# Patient Record
Sex: FEMALE | Race: WHITE | NOT HISPANIC OR LATINO | Employment: UNEMPLOYED | URBAN - NONMETROPOLITAN AREA
[De-identification: names, ages, dates, MRNs, and addresses within clinical notes are randomized per-mention and may not be internally consistent; named-entity substitution may affect disease eponyms.]

---

## 2021-01-27 ENCOUNTER — HOSPITAL ENCOUNTER (OUTPATIENT)
Facility: HOSPITAL | Age: 76
End: 2021-03-18
Attending: INTERNAL MEDICINE | Admitting: INTERNAL MEDICINE

## 2021-01-27 ENCOUNTER — APPOINTMENT (OUTPATIENT)
Dept: GENERAL RADIOLOGY | Facility: HOSPITAL | Age: 76
End: 2021-01-27

## 2021-01-27 LAB
A-A DO2: 264.7 MMHG (ref 0–300)
ALBUMIN SERPL-MCNC: 2.72 G/DL (ref 3.5–5.2)
ALBUMIN/GLOB SERPL: 0.7 G/DL
ALP SERPL-CCNC: 103 U/L (ref 39–117)
ALT SERPL W P-5'-P-CCNC: 15 U/L (ref 1–33)
ANION GAP SERPL CALCULATED.3IONS-SCNC: 8.7 MMOL/L (ref 5–15)
ARTERIAL PATENCY WRIST A: POSITIVE
AST SERPL-CCNC: 21 U/L (ref 1–32)
ATMOSPHERIC PRESS: 732 MMHG
BASE EXCESS BLDA CALC-SCNC: 12.1 MMOL/L (ref 0–2)
BASOPHILS # BLD AUTO: 0.07 10*3/MM3 (ref 0–0.2)
BASOPHILS NFR BLD AUTO: 0.7 % (ref 0–1.5)
BDY SITE: ABNORMAL
BILIRUB SERPL-MCNC: 0.4 MG/DL (ref 0–1.2)
BODY TEMPERATURE: 0 C
BUN SERPL-MCNC: 25 MG/DL (ref 8–23)
BUN/CREAT SERPL: 92.6 (ref 7–25)
CALCIUM SPEC-SCNC: 8.4 MG/DL (ref 8.6–10.5)
CHLORIDE SERPL-SCNC: 98 MMOL/L (ref 98–107)
CO2 BLDA-SCNC: 40.5 MMOL/L (ref 22–33)
CO2 SERPL-SCNC: 32.3 MMOL/L (ref 22–29)
COHGB MFR BLD: 1.7 % (ref 0–5)
CREAT SERPL-MCNC: 0.27 MG/DL (ref 0.57–1)
CRP SERPL-MCNC: 11.03 MG/DL (ref 0–0.5)
DEPRECATED RDW RBC AUTO: 55.5 FL (ref 37–54)
EOSINOPHIL # BLD AUTO: 0.3 10*3/MM3 (ref 0–0.4)
EOSINOPHIL NFR BLD AUTO: 3.1 % (ref 0.3–6.2)
ERYTHROCYTE [DISTWIDTH] IN BLOOD BY AUTOMATED COUNT: 14.7 % (ref 12.3–15.4)
GFR SERPL CREATININE-BSD FRML MDRD: >150 ML/MIN/1.73
GLOBULIN UR ELPH-MCNC: 3.7 GM/DL
GLUCOSE BLDC GLUCOMTR-MCNC: 149 MG/DL (ref 70–130)
GLUCOSE SERPL-MCNC: 173 MG/DL (ref 65–99)
HCO3 BLDA-SCNC: 38.6 MMOL/L (ref 20–26)
HCT VFR BLD AUTO: 33 % (ref 34–46.6)
HCT VFR BLD CALC: 28.3 % (ref 38–51)
HGB BLD-MCNC: 9.3 G/DL (ref 12–15.9)
HGB BLDA-MCNC: 9.2 G/DL (ref 13.5–17.5)
HYPOCHROMIA BLD QL: NORMAL
IMM GRANULOCYTES # BLD AUTO: 0.39 10*3/MM3 (ref 0–0.05)
IMM GRANULOCYTES NFR BLD AUTO: 4 % (ref 0–0.5)
INHALED O2 CONCENTRATION: 60 %
LYMPHOCYTES # BLD AUTO: 0.68 10*3/MM3 (ref 0.7–3.1)
LYMPHOCYTES NFR BLD AUTO: 7 % (ref 19.6–45.3)
Lab: ABNORMAL
MACROCYTES BLD QL SMEAR: NORMAL
MCH RBC QN AUTO: 29.5 PG (ref 26.6–33)
MCHC RBC AUTO-ENTMCNC: 28.2 G/DL (ref 31.5–35.7)
MCV RBC AUTO: 104.8 FL (ref 79–97)
METHGB BLD QL: 0.4 % (ref 0–3)
MODALITY: ABNORMAL
MONOCYTES # BLD AUTO: 0.55 10*3/MM3 (ref 0.1–0.9)
MONOCYTES NFR BLD AUTO: 5.6 % (ref 5–12)
NEUTROPHILS NFR BLD AUTO: 7.76 10*3/MM3 (ref 1.7–7)
NEUTROPHILS NFR BLD AUTO: 79.6 % (ref 42.7–76)
NOTE: ABNORMAL
NRBC BLD AUTO-RTO: 0 /100 WBC (ref 0–0.2)
OXYHGB MFR BLDV: 94.9 % (ref 94–99)
PCO2 BLDA: 61.4 MM HG (ref 35–45)
PCO2 TEMP ADJ BLD: ABNORMAL MM[HG]
PEEP RESPIRATORY: 10 CM[H2O]
PH BLDA: 7.41 PH UNITS (ref 7.35–7.45)
PH, TEMP CORRECTED: ABNORMAL
PLAT MORPH BLD: NORMAL
PLATELET # BLD AUTO: 147 10*3/MM3 (ref 140–450)
PMV BLD AUTO: 9.5 FL (ref 6–12)
PO2 BLDA: 80.7 MM HG (ref 83–108)
PO2 TEMP ADJ BLD: ABNORMAL MM[HG]
POTASSIUM SERPL-SCNC: 5 MMOL/L (ref 3.5–5.2)
PROT SERPL-MCNC: 6.4 G/DL (ref 6–8.5)
RBC # BLD AUTO: 3.15 10*6/MM3 (ref 3.77–5.28)
SAO2 % BLDCOA: 96.9 % (ref 94–99)
SET MECH RESP RATE: 24
SODIUM SERPL-SCNC: 139 MMOL/L (ref 136–145)
VENTILATOR MODE: ABNORMAL
VT ON VENT VENT: 450 ML
WBC # BLD AUTO: 9.75 10*3/MM3 (ref 3.4–10.8)

## 2021-01-27 PROCEDURE — 71045 X-RAY EXAM CHEST 1 VIEW: CPT

## 2021-01-27 PROCEDURE — 86140 C-REACTIVE PROTEIN: CPT | Performed by: PHYSICIAN ASSISTANT

## 2021-01-27 PROCEDURE — 84134 ASSAY OF PREALBUMIN: CPT | Performed by: PHYSICIAN ASSISTANT

## 2021-01-27 PROCEDURE — 85025 COMPLETE CBC W/AUTO DIFF WBC: CPT | Performed by: PHYSICIAN ASSISTANT

## 2021-01-27 PROCEDURE — 93010 ELECTROCARDIOGRAM REPORT: CPT | Performed by: INTERNAL MEDICINE

## 2021-01-27 PROCEDURE — 93005 ELECTROCARDIOGRAM TRACING: CPT | Performed by: PHYSICIAN ASSISTANT

## 2021-01-27 PROCEDURE — 82962 GLUCOSE BLOOD TEST: CPT

## 2021-01-27 PROCEDURE — 85007 BL SMEAR W/DIFF WBC COUNT: CPT | Performed by: PHYSICIAN ASSISTANT

## 2021-01-27 PROCEDURE — 36600 WITHDRAWAL OF ARTERIAL BLOOD: CPT

## 2021-01-27 PROCEDURE — 82805 BLOOD GASES W/O2 SATURATION: CPT

## 2021-01-27 PROCEDURE — 82375 ASSAY CARBOXYHB QUANT: CPT

## 2021-01-27 PROCEDURE — 71045 X-RAY EXAM CHEST 1 VIEW: CPT | Performed by: RADIOLOGY

## 2021-01-27 PROCEDURE — 80053 COMPREHEN METABOLIC PANEL: CPT | Performed by: PHYSICIAN ASSISTANT

## 2021-01-27 PROCEDURE — 83050 HGB METHEMOGLOBIN QUAN: CPT

## 2021-01-28 ENCOUNTER — OUTSIDE FACILITY SERVICE (OUTPATIENT)
Dept: PULMONOLOGY | Facility: CLINIC | Age: 76
End: 2021-01-28

## 2021-01-28 LAB
ALBUMIN SERPL-MCNC: 2.67 G/DL (ref 3.5–5.2)
ALBUMIN/GLOB SERPL: 0.7 G/DL
ALP SERPL-CCNC: 94 U/L (ref 39–117)
ALT SERPL W P-5'-P-CCNC: 14 U/L (ref 1–33)
ANION GAP SERPL CALCULATED.3IONS-SCNC: 7.2 MMOL/L (ref 5–15)
AST SERPL-CCNC: 19 U/L (ref 1–32)
BACTERIA UR QL AUTO: ABNORMAL /HPF
BILIRUB SERPL-MCNC: 0.4 MG/DL (ref 0–1.2)
BILIRUB UR QL STRIP: NEGATIVE
BUN SERPL-MCNC: 24 MG/DL (ref 8–23)
BUN/CREAT SERPL: 104.3 (ref 7–25)
CALCIUM SPEC-SCNC: 8.3 MG/DL (ref 8.6–10.5)
CHLORIDE SERPL-SCNC: 98 MMOL/L (ref 98–107)
CHOLEST SERPL-MCNC: 149 MG/DL (ref 0–200)
CLARITY UR: ABNORMAL
CO2 SERPL-SCNC: 33.8 MMOL/L (ref 22–29)
COLOR UR: ABNORMAL
CREAT SERPL-MCNC: 0.23 MG/DL (ref 0.57–1)
CRP SERPL-MCNC: 9.43 MG/DL (ref 0–0.5)
DEPRECATED RDW RBC AUTO: 54.4 FL (ref 37–54)
EOSINOPHIL # BLD MANUAL: 0.17 10*3/MM3 (ref 0–0.4)
EOSINOPHIL NFR BLD MANUAL: 2 % (ref 0.3–6.2)
ERYTHROCYTE [DISTWIDTH] IN BLOOD BY AUTOMATED COUNT: 14.7 % (ref 12.3–15.4)
GFR SERPL CREATININE-BSD FRML MDRD: >150 ML/MIN/1.73
GLOBULIN UR ELPH-MCNC: 3.6 GM/DL
GLUCOSE BLDC GLUCOMTR-MCNC: 111 MG/DL (ref 70–130)
GLUCOSE BLDC GLUCOMTR-MCNC: 121 MG/DL (ref 70–130)
GLUCOSE BLDC GLUCOMTR-MCNC: 127 MG/DL (ref 70–130)
GLUCOSE SERPL-MCNC: 126 MG/DL (ref 65–99)
GLUCOSE UR STRIP-MCNC: NEGATIVE MG/DL
HCT VFR BLD AUTO: 30.3 % (ref 34–46.6)
HDLC SERPL-MCNC: 31 MG/DL (ref 40–60)
HGB BLD-MCNC: 8.8 G/DL (ref 12–15.9)
HGB UR QL STRIP.AUTO: ABNORMAL
HYALINE CASTS UR QL AUTO: ABNORMAL /LPF
HYPOCHROMIA BLD QL: ABNORMAL
KETONES UR QL STRIP: ABNORMAL
LDLC SERPL CALC-MCNC: 90 MG/DL (ref 0–100)
LDLC/HDLC SERPL: 2.76 {RATIO}
LEUKOCYTE ESTERASE UR QL STRIP.AUTO: ABNORMAL
LYMPHOCYTES # BLD MANUAL: 0.86 10*3/MM3 (ref 0.7–3.1)
LYMPHOCYTES NFR BLD MANUAL: 10 % (ref 19.6–45.3)
LYMPHOCYTES NFR BLD MANUAL: 6 % (ref 5–12)
MACROCYTES BLD QL SMEAR: ABNORMAL
MCH RBC QN AUTO: 29.5 PG (ref 26.6–33)
MCHC RBC AUTO-ENTMCNC: 29 G/DL (ref 31.5–35.7)
MCV RBC AUTO: 101.7 FL (ref 79–97)
METAMYELOCYTES NFR BLD MANUAL: 1 % (ref 0–0)
MONOCYTES # BLD AUTO: 0.52 10*3/MM3 (ref 0.1–0.9)
NEUTROPHILS # BLD AUTO: 6.99 10*3/MM3 (ref 1.7–7)
NEUTROPHILS NFR BLD MANUAL: 78 % (ref 42.7–76)
NEUTS BAND NFR BLD MANUAL: 3 % (ref 0–5)
NITRITE UR QL STRIP: NEGATIVE
PH UR STRIP.AUTO: 5.5 [PH] (ref 5–8)
PLAT MORPH BLD: NORMAL
PLATELET # BLD AUTO: 157 10*3/MM3 (ref 140–450)
PMV BLD AUTO: 9.9 FL (ref 6–12)
POTASSIUM SERPL-SCNC: 4.5 MMOL/L (ref 3.5–5.2)
PREALB SERPL-MCNC: 11.1 MG/DL (ref 20–40)
PROT SERPL-MCNC: 6.3 G/DL (ref 6–8.5)
PROT UR QL STRIP: ABNORMAL
QT INTERVAL: 306 MS
QTC INTERVAL: 456 MS
RBC # BLD AUTO: 2.98 10*6/MM3 (ref 3.77–5.28)
RBC # UR: ABNORMAL /HPF
REF LAB TEST METHOD: ABNORMAL
SCAN SLIDE: NORMAL
SODIUM SERPL-SCNC: 139 MMOL/L (ref 136–145)
SP GR UR STRIP: >1.03 (ref 1–1.03)
SQUAMOUS #/AREA URNS HPF: ABNORMAL /HPF
STOMATOCYTES BLD QL SMEAR: ABNORMAL
TRIGL SERPL-MCNC: 162 MG/DL (ref 0–150)
UROBILINOGEN UR QL STRIP: ABNORMAL
VLDLC SERPL-MCNC: 28 MG/DL (ref 5–40)
WBC # BLD AUTO: 8.63 10*3/MM3 (ref 3.4–10.8)
WBC UR QL AUTO: ABNORMAL /HPF

## 2021-01-28 PROCEDURE — 82962 GLUCOSE BLOOD TEST: CPT

## 2021-01-28 PROCEDURE — 99291 CRITICAL CARE FIRST HOUR: CPT | Performed by: INTERNAL MEDICINE

## 2021-01-28 PROCEDURE — 87040 BLOOD CULTURE FOR BACTERIA: CPT | Performed by: INTERNAL MEDICINE

## 2021-01-28 PROCEDURE — 85007 BL SMEAR W/DIFF WBC COUNT: CPT | Performed by: PHYSICIAN ASSISTANT

## 2021-01-28 PROCEDURE — 87070 CULTURE OTHR SPECIMN AEROBIC: CPT | Performed by: INTERNAL MEDICINE

## 2021-01-28 PROCEDURE — 86140 C-REACTIVE PROTEIN: CPT | Performed by: PHYSICIAN ASSISTANT

## 2021-01-28 PROCEDURE — 87205 SMEAR GRAM STAIN: CPT | Performed by: INTERNAL MEDICINE

## 2021-01-28 PROCEDURE — 80061 LIPID PANEL: CPT | Performed by: PHYSICIAN ASSISTANT

## 2021-01-28 PROCEDURE — 85025 COMPLETE CBC W/AUTO DIFF WBC: CPT | Performed by: PHYSICIAN ASSISTANT

## 2021-01-28 PROCEDURE — 80053 COMPREHEN METABOLIC PANEL: CPT | Performed by: PHYSICIAN ASSISTANT

## 2021-01-28 PROCEDURE — 81001 URINALYSIS AUTO W/SCOPE: CPT | Performed by: PHYSICIAN ASSISTANT

## 2021-01-29 ENCOUNTER — OUTSIDE FACILITY SERVICE (OUTPATIENT)
Dept: PULMONOLOGY | Facility: CLINIC | Age: 76
End: 2021-01-29

## 2021-01-29 ENCOUNTER — APPOINTMENT (OUTPATIENT)
Dept: GENERAL RADIOLOGY | Facility: HOSPITAL | Age: 76
End: 2021-01-29

## 2021-01-29 LAB
ALBUMIN SERPL-MCNC: 2.84 G/DL (ref 3.5–5.2)
ALBUMIN/GLOB SERPL: 0.8 G/DL
ALP SERPL-CCNC: 87 U/L (ref 39–117)
ALT SERPL W P-5'-P-CCNC: 14 U/L (ref 1–33)
ANION GAP SERPL CALCULATED.3IONS-SCNC: 10.5 MMOL/L (ref 5–15)
AST SERPL-CCNC: 21 U/L (ref 1–32)
BASOPHILS # BLD AUTO: 0.07 10*3/MM3 (ref 0–0.2)
BASOPHILS NFR BLD AUTO: 0.8 % (ref 0–1.5)
BILIRUB SERPL-MCNC: 0.3 MG/DL (ref 0–1.2)
BUN SERPL-MCNC: 25 MG/DL (ref 8–23)
BUN/CREAT SERPL: 96.2 (ref 7–25)
CALCIUM SPEC-SCNC: 8.5 MG/DL (ref 8.6–10.5)
CHLORIDE SERPL-SCNC: 99 MMOL/L (ref 98–107)
CO2 SERPL-SCNC: 30.5 MMOL/L (ref 22–29)
CREAT SERPL-MCNC: 0.26 MG/DL (ref 0.57–1)
CRP SERPL-MCNC: 5.06 MG/DL (ref 0–0.5)
DEPRECATED RDW RBC AUTO: 53.8 FL (ref 37–54)
EOSINOPHIL # BLD AUTO: 0.24 10*3/MM3 (ref 0–0.4)
EOSINOPHIL NFR BLD AUTO: 2.7 % (ref 0.3–6.2)
ERYTHROCYTE [DISTWIDTH] IN BLOOD BY AUTOMATED COUNT: 14.6 % (ref 12.3–15.4)
GFR SERPL CREATININE-BSD FRML MDRD: >150 ML/MIN/1.73
GLOBULIN UR ELPH-MCNC: 3.7 GM/DL
GLUCOSE BLDC GLUCOMTR-MCNC: 104 MG/DL (ref 70–130)
GLUCOSE BLDC GLUCOMTR-MCNC: 109 MG/DL (ref 70–130)
GLUCOSE BLDC GLUCOMTR-MCNC: 127 MG/DL (ref 70–130)
GLUCOSE BLDC GLUCOMTR-MCNC: 98 MG/DL (ref 70–130)
GLUCOSE SERPL-MCNC: 117 MG/DL (ref 65–99)
HCT VFR BLD AUTO: 31.4 % (ref 34–46.6)
HGB BLD-MCNC: 9.1 G/DL (ref 12–15.9)
IMM GRANULOCYTES # BLD AUTO: 0.45 10*3/MM3 (ref 0–0.05)
IMM GRANULOCYTES NFR BLD AUTO: 5 % (ref 0–0.5)
LYMPHOCYTES # BLD AUTO: 1.18 10*3/MM3 (ref 0.7–3.1)
LYMPHOCYTES NFR BLD AUTO: 13.2 % (ref 19.6–45.3)
MCH RBC QN AUTO: 29.5 PG (ref 26.6–33)
MCHC RBC AUTO-ENTMCNC: 29 G/DL (ref 31.5–35.7)
MCV RBC AUTO: 101.9 FL (ref 79–97)
MONOCYTES # BLD AUTO: 0.67 10*3/MM3 (ref 0.1–0.9)
MONOCYTES NFR BLD AUTO: 7.5 % (ref 5–12)
NEUTROPHILS NFR BLD AUTO: 6.34 10*3/MM3 (ref 1.7–7)
NEUTROPHILS NFR BLD AUTO: 70.8 % (ref 42.7–76)
NRBC BLD AUTO-RTO: 0.2 /100 WBC (ref 0–0.2)
PLATELET # BLD AUTO: 170 10*3/MM3 (ref 140–450)
PMV BLD AUTO: 9.8 FL (ref 6–12)
POTASSIUM SERPL-SCNC: 4.1 MMOL/L (ref 3.5–5.2)
PROT SERPL-MCNC: 6.5 G/DL (ref 6–8.5)
RBC # BLD AUTO: 3.08 10*6/MM3 (ref 3.77–5.28)
SODIUM SERPL-SCNC: 140 MMOL/L (ref 136–145)
WBC # BLD AUTO: 8.95 10*3/MM3 (ref 3.4–10.8)

## 2021-01-29 PROCEDURE — 97165 OT EVAL LOW COMPLEX 30 MIN: CPT

## 2021-01-29 PROCEDURE — 74018 RADEX ABDOMEN 1 VIEW: CPT

## 2021-01-29 PROCEDURE — 99291 CRITICAL CARE FIRST HOUR: CPT | Performed by: INTERNAL MEDICINE

## 2021-01-29 PROCEDURE — 86140 C-REACTIVE PROTEIN: CPT | Performed by: PHYSICIAN ASSISTANT

## 2021-01-29 PROCEDURE — 85025 COMPLETE CBC W/AUTO DIFF WBC: CPT | Performed by: PHYSICIAN ASSISTANT

## 2021-01-29 PROCEDURE — 74018 RADEX ABDOMEN 1 VIEW: CPT | Performed by: RADIOLOGY

## 2021-01-29 PROCEDURE — 82962 GLUCOSE BLOOD TEST: CPT

## 2021-01-29 PROCEDURE — 80053 COMPREHEN METABOLIC PANEL: CPT | Performed by: PHYSICIAN ASSISTANT

## 2021-01-30 ENCOUNTER — APPOINTMENT (OUTPATIENT)
Dept: CT IMAGING | Facility: HOSPITAL | Age: 76
End: 2021-01-30

## 2021-01-30 ENCOUNTER — OUTSIDE FACILITY SERVICE (OUTPATIENT)
Dept: PULMONOLOGY | Facility: CLINIC | Age: 76
End: 2021-01-30

## 2021-01-30 LAB
ALBUMIN SERPL-MCNC: 2.97 G/DL (ref 3.5–5.2)
ALBUMIN/GLOB SERPL: 0.8 G/DL
ALP SERPL-CCNC: 82 U/L (ref 39–117)
ALT SERPL W P-5'-P-CCNC: 14 U/L (ref 1–33)
AMYLASE SERPL-CCNC: 101 U/L (ref 28–100)
ANION GAP SERPL CALCULATED.3IONS-SCNC: 10 MMOL/L (ref 5–15)
AST SERPL-CCNC: 18 U/L (ref 1–32)
BACTERIA SPEC RESP CULT: NO GROWTH
BILIRUB SERPL-MCNC: 0.3 MG/DL (ref 0–1.2)
BUN SERPL-MCNC: 30 MG/DL (ref 8–23)
BUN/CREAT SERPL: 107.1 (ref 7–25)
CALCIUM SPEC-SCNC: 8.6 MG/DL (ref 8.6–10.5)
CHLORIDE SERPL-SCNC: 99 MMOL/L (ref 98–107)
CO2 SERPL-SCNC: 33 MMOL/L (ref 22–29)
CREAT SERPL-MCNC: 0.28 MG/DL (ref 0.57–1)
CRP SERPL-MCNC: 2.69 MG/DL (ref 0–0.5)
DEPRECATED RDW RBC AUTO: 54.3 FL (ref 37–54)
EOSINOPHIL # BLD MANUAL: 0.43 10*3/MM3 (ref 0–0.4)
EOSINOPHIL NFR BLD MANUAL: 5 % (ref 0.3–6.2)
ERYTHROCYTE [DISTWIDTH] IN BLOOD BY AUTOMATED COUNT: 15 % (ref 12.3–15.4)
GFR SERPL CREATININE-BSD FRML MDRD: >150 ML/MIN/1.73
GLOBULIN UR ELPH-MCNC: 3.6 GM/DL
GLUCOSE BLDC GLUCOMTR-MCNC: 102 MG/DL (ref 70–130)
GLUCOSE BLDC GLUCOMTR-MCNC: 107 MG/DL (ref 70–130)
GLUCOSE BLDC GLUCOMTR-MCNC: 110 MG/DL (ref 70–130)
GLUCOSE BLDC GLUCOMTR-MCNC: 124 MG/DL (ref 70–130)
GLUCOSE BLDC GLUCOMTR-MCNC: 131 MG/DL (ref 70–130)
GLUCOSE SERPL-MCNC: 126 MG/DL (ref 65–99)
GRAM STN SPEC: NORMAL
GRAM STN SPEC: NORMAL
HCT VFR BLD AUTO: 30.5 % (ref 34–46.6)
HGB BLD-MCNC: 9 G/DL (ref 12–15.9)
HYPOCHROMIA BLD QL: ABNORMAL
LIPASE SERPL-CCNC: 96 U/L (ref 13–60)
LYMPHOCYTES # BLD MANUAL: 1.19 10*3/MM3 (ref 0.7–3.1)
LYMPHOCYTES NFR BLD MANUAL: 14 % (ref 19.6–45.3)
LYMPHOCYTES NFR BLD MANUAL: 9 % (ref 5–12)
MACROCYTES BLD QL SMEAR: ABNORMAL
MCH RBC QN AUTO: 29.7 PG (ref 26.6–33)
MCHC RBC AUTO-ENTMCNC: 29.5 G/DL (ref 31.5–35.7)
MCV RBC AUTO: 100.7 FL (ref 79–97)
METAMYELOCYTES NFR BLD MANUAL: 2 % (ref 0–0)
MONOCYTES # BLD AUTO: 0.77 10*3/MM3 (ref 0.1–0.9)
NEUTROPHILS # BLD AUTO: 5.95 10*3/MM3 (ref 1.7–7)
NEUTROPHILS NFR BLD MANUAL: 65 % (ref 42.7–76)
NEUTS BAND NFR BLD MANUAL: 5 % (ref 0–5)
PLAT MORPH BLD: NORMAL
PLATELET # BLD AUTO: 175 10*3/MM3 (ref 140–450)
PMV BLD AUTO: 9.9 FL (ref 6–12)
POTASSIUM SERPL-SCNC: 3.4 MMOL/L (ref 3.5–5.2)
POTASSIUM SERPL-SCNC: 5 MMOL/L (ref 3.5–5.2)
PROT SERPL-MCNC: 6.6 G/DL (ref 6–8.5)
RBC # BLD AUTO: 3.03 10*6/MM3 (ref 3.77–5.28)
SODIUM SERPL-SCNC: 142 MMOL/L (ref 136–145)
WBC # BLD AUTO: 8.5 10*3/MM3 (ref 3.4–10.8)

## 2021-01-30 PROCEDURE — 86140 C-REACTIVE PROTEIN: CPT | Performed by: PHYSICIAN ASSISTANT

## 2021-01-30 PROCEDURE — 99291 CRITICAL CARE FIRST HOUR: CPT | Performed by: INTERNAL MEDICINE

## 2021-01-30 PROCEDURE — 85025 COMPLETE CBC W/AUTO DIFF WBC: CPT | Performed by: PHYSICIAN ASSISTANT

## 2021-01-30 PROCEDURE — 83690 ASSAY OF LIPASE: CPT | Performed by: INTERNAL MEDICINE

## 2021-01-30 PROCEDURE — 80053 COMPREHEN METABOLIC PANEL: CPT | Performed by: PHYSICIAN ASSISTANT

## 2021-01-30 PROCEDURE — 74176 CT ABD & PELVIS W/O CONTRAST: CPT

## 2021-01-30 PROCEDURE — 82150 ASSAY OF AMYLASE: CPT | Performed by: INTERNAL MEDICINE

## 2021-01-30 PROCEDURE — 74176 CT ABD & PELVIS W/O CONTRAST: CPT | Performed by: RADIOLOGY

## 2021-01-30 PROCEDURE — 82962 GLUCOSE BLOOD TEST: CPT

## 2021-01-30 PROCEDURE — 85007 BL SMEAR W/DIFF WBC COUNT: CPT | Performed by: PHYSICIAN ASSISTANT

## 2021-01-30 PROCEDURE — 84132 ASSAY OF SERUM POTASSIUM: CPT | Performed by: INTERNAL MEDICINE

## 2021-01-31 ENCOUNTER — APPOINTMENT (OUTPATIENT)
Dept: ULTRASOUND IMAGING | Facility: HOSPITAL | Age: 76
End: 2021-01-31

## 2021-01-31 ENCOUNTER — OUTSIDE FACILITY SERVICE (OUTPATIENT)
Dept: PULMONOLOGY | Facility: CLINIC | Age: 76
End: 2021-01-31

## 2021-01-31 LAB
ALBUMIN SERPL-MCNC: 2.66 G/DL (ref 3.5–5.2)
ALBUMIN/GLOB SERPL: 0.8 G/DL
ALP SERPL-CCNC: 74 U/L (ref 39–117)
ALT SERPL W P-5'-P-CCNC: 13 U/L (ref 1–33)
AMYLASE SERPL-CCNC: 105 U/L (ref 28–100)
ANION GAP SERPL CALCULATED.3IONS-SCNC: 5 MMOL/L (ref 5–15)
ANISOCYTOSIS BLD QL: NORMAL
AST SERPL-CCNC: 18 U/L (ref 1–32)
BASOPHILS # BLD AUTO: 0.05 10*3/MM3 (ref 0–0.2)
BASOPHILS NFR BLD AUTO: 0.7 % (ref 0–1.5)
BILIRUB SERPL-MCNC: 0.3 MG/DL (ref 0–1.2)
BUN SERPL-MCNC: 29 MG/DL (ref 8–23)
BUN/CREAT SERPL: 116 (ref 7–25)
CALCIUM SPEC-SCNC: 8.1 MG/DL (ref 8.6–10.5)
CHLORIDE SERPL-SCNC: 105 MMOL/L (ref 98–107)
CO2 SERPL-SCNC: 29 MMOL/L (ref 22–29)
CREAT SERPL-MCNC: 0.25 MG/DL (ref 0.57–1)
CRP SERPL-MCNC: 1.38 MG/DL (ref 0–0.5)
DEPRECATED RDW RBC AUTO: 55.5 FL (ref 37–54)
EOSINOPHIL # BLD AUTO: 0.49 10*3/MM3 (ref 0–0.4)
EOSINOPHIL NFR BLD AUTO: 6.4 % (ref 0.3–6.2)
ERYTHROCYTE [DISTWIDTH] IN BLOOD BY AUTOMATED COUNT: 15.2 % (ref 12.3–15.4)
GFR SERPL CREATININE-BSD FRML MDRD: >150 ML/MIN/1.73
GLOBULIN UR ELPH-MCNC: 3.1 GM/DL
GLUCOSE BLDC GLUCOMTR-MCNC: 121 MG/DL (ref 70–130)
GLUCOSE BLDC GLUCOMTR-MCNC: 85 MG/DL (ref 70–130)
GLUCOSE BLDC GLUCOMTR-MCNC: 91 MG/DL (ref 70–130)
GLUCOSE BLDC GLUCOMTR-MCNC: 97 MG/DL (ref 70–130)
GLUCOSE SERPL-MCNC: 121 MG/DL (ref 65–99)
HCT VFR BLD AUTO: 29.6 % (ref 34–46.6)
HGB BLD-MCNC: 8.4 G/DL (ref 12–15.9)
HYPOCHROMIA BLD QL: NORMAL
IMM GRANULOCYTES # BLD AUTO: 0.4 10*3/MM3 (ref 0–0.05)
IMM GRANULOCYTES NFR BLD AUTO: 5.2 % (ref 0–0.5)
LIPASE SERPL-CCNC: 143 U/L (ref 13–60)
LYMPHOCYTES # BLD AUTO: 1.06 10*3/MM3 (ref 0.7–3.1)
LYMPHOCYTES NFR BLD AUTO: 13.9 % (ref 19.6–45.3)
MACROCYTES BLD QL SMEAR: NORMAL
MCH RBC QN AUTO: 29.1 PG (ref 26.6–33)
MCHC RBC AUTO-ENTMCNC: 28.4 G/DL (ref 31.5–35.7)
MCV RBC AUTO: 102.4 FL (ref 79–97)
MONOCYTES # BLD AUTO: 0.49 10*3/MM3 (ref 0.1–0.9)
MONOCYTES NFR BLD AUTO: 6.4 % (ref 5–12)
NEUTROPHILS NFR BLD AUTO: 5.16 10*3/MM3 (ref 1.7–7)
NEUTROPHILS NFR BLD AUTO: 67.4 % (ref 42.7–76)
NRBC BLD AUTO-RTO: 0 /100 WBC (ref 0–0.2)
PLAT MORPH BLD: NORMAL
PLATELET # BLD AUTO: 154 10*3/MM3 (ref 140–450)
PMV BLD AUTO: 9.8 FL (ref 6–12)
POTASSIUM SERPL-SCNC: 3.6 MMOL/L (ref 3.5–5.2)
PROT SERPL-MCNC: 5.8 G/DL (ref 6–8.5)
RBC # BLD AUTO: 2.89 10*6/MM3 (ref 3.77–5.28)
SODIUM SERPL-SCNC: 139 MMOL/L (ref 136–145)
WBC # BLD AUTO: 7.65 10*3/MM3 (ref 3.4–10.8)

## 2021-01-31 PROCEDURE — 80053 COMPREHEN METABOLIC PANEL: CPT | Performed by: PHYSICIAN ASSISTANT

## 2021-01-31 PROCEDURE — 82150 ASSAY OF AMYLASE: CPT | Performed by: INTERNAL MEDICINE

## 2021-01-31 PROCEDURE — 99291 CRITICAL CARE FIRST HOUR: CPT | Performed by: INTERNAL MEDICINE

## 2021-01-31 PROCEDURE — 82962 GLUCOSE BLOOD TEST: CPT

## 2021-01-31 PROCEDURE — 85007 BL SMEAR W/DIFF WBC COUNT: CPT | Performed by: PHYSICIAN ASSISTANT

## 2021-01-31 PROCEDURE — 85025 COMPLETE CBC W/AUTO DIFF WBC: CPT | Performed by: PHYSICIAN ASSISTANT

## 2021-01-31 PROCEDURE — 86140 C-REACTIVE PROTEIN: CPT | Performed by: PHYSICIAN ASSISTANT

## 2021-01-31 PROCEDURE — 83690 ASSAY OF LIPASE: CPT | Performed by: INTERNAL MEDICINE

## 2021-01-31 PROCEDURE — 76705 ECHO EXAM OF ABDOMEN: CPT | Performed by: RADIOLOGY

## 2021-01-31 PROCEDURE — 76705 ECHO EXAM OF ABDOMEN: CPT

## 2021-02-01 ENCOUNTER — OUTSIDE FACILITY SERVICE (OUTPATIENT)
Dept: PULMONOLOGY | Facility: CLINIC | Age: 76
End: 2021-02-01

## 2021-02-01 LAB
ALBUMIN SERPL-MCNC: 2.85 G/DL (ref 3.5–5.2)
ALBUMIN/GLOB SERPL: 0.9 G/DL
ALP SERPL-CCNC: 70 U/L (ref 39–117)
ALT SERPL W P-5'-P-CCNC: 13 U/L (ref 1–33)
AMYLASE SERPL-CCNC: 107 U/L (ref 28–100)
ANION GAP SERPL CALCULATED.3IONS-SCNC: 8.2 MMOL/L (ref 5–15)
AST SERPL-CCNC: 19 U/L (ref 1–32)
BASOPHILS # BLD AUTO: 0.04 10*3/MM3 (ref 0–0.2)
BASOPHILS NFR BLD AUTO: 0.4 % (ref 0–1.5)
BILIRUB SERPL-MCNC: 0.4 MG/DL (ref 0–1.2)
BUN SERPL-MCNC: 14 MG/DL (ref 8–23)
BUN/CREAT SERPL: 63.6 (ref 7–25)
CALCIUM SPEC-SCNC: 8.2 MG/DL (ref 8.6–10.5)
CHLORIDE SERPL-SCNC: 98 MMOL/L (ref 98–107)
CO2 SERPL-SCNC: 26.8 MMOL/L (ref 22–29)
CREAT SERPL-MCNC: 0.22 MG/DL (ref 0.57–1)
CRP SERPL-MCNC: 1.42 MG/DL (ref 0–0.5)
DEPRECATED RDW RBC AUTO: 54.4 FL (ref 37–54)
EOSINOPHIL # BLD AUTO: 0.49 10*3/MM3 (ref 0–0.4)
EOSINOPHIL NFR BLD AUTO: 5.1 % (ref 0.3–6.2)
ERYTHROCYTE [DISTWIDTH] IN BLOOD BY AUTOMATED COUNT: 15.3 % (ref 12.3–15.4)
GFR SERPL CREATININE-BSD FRML MDRD: >150 ML/MIN/1.73
GLOBULIN UR ELPH-MCNC: 3.1 GM/DL
GLUCOSE BLDC GLUCOMTR-MCNC: 82 MG/DL (ref 70–130)
GLUCOSE BLDC GLUCOMTR-MCNC: 85 MG/DL (ref 70–130)
GLUCOSE BLDC GLUCOMTR-MCNC: 89 MG/DL (ref 70–130)
GLUCOSE BLDC GLUCOMTR-MCNC: 92 MG/DL (ref 70–130)
GLUCOSE BLDC GLUCOMTR-MCNC: 93 MG/DL (ref 70–130)
GLUCOSE BLDC GLUCOMTR-MCNC: 96 MG/DL (ref 70–130)
GLUCOSE SERPL-MCNC: 89 MG/DL (ref 65–99)
HCT VFR BLD AUTO: 29.8 % (ref 34–46.6)
HGB BLD-MCNC: 8.8 G/DL (ref 12–15.9)
IMM GRANULOCYTES # BLD AUTO: 0.44 10*3/MM3 (ref 0–0.05)
IMM GRANULOCYTES NFR BLD AUTO: 4.6 % (ref 0–0.5)
LIPASE SERPL-CCNC: 121 U/L (ref 13–60)
LYMPHOCYTES # BLD AUTO: 1 10*3/MM3 (ref 0.7–3.1)
LYMPHOCYTES NFR BLD AUTO: 10.4 % (ref 19.6–45.3)
MCH RBC QN AUTO: 29.5 PG (ref 26.6–33)
MCHC RBC AUTO-ENTMCNC: 29.5 G/DL (ref 31.5–35.7)
MCV RBC AUTO: 100 FL (ref 79–97)
MONOCYTES # BLD AUTO: 0.48 10*3/MM3 (ref 0.1–0.9)
MONOCYTES NFR BLD AUTO: 5 % (ref 5–12)
NEUTROPHILS NFR BLD AUTO: 7.19 10*3/MM3 (ref 1.7–7)
NEUTROPHILS NFR BLD AUTO: 74.5 % (ref 42.7–76)
NRBC BLD AUTO-RTO: 0 /100 WBC (ref 0–0.2)
PLATELET # BLD AUTO: 158 10*3/MM3 (ref 140–450)
PMV BLD AUTO: 10 FL (ref 6–12)
POTASSIUM SERPL-SCNC: 3.1 MMOL/L (ref 3.5–5.2)
PROT SERPL-MCNC: 5.9 G/DL (ref 6–8.5)
RBC # BLD AUTO: 2.98 10*6/MM3 (ref 3.77–5.28)
SODIUM SERPL-SCNC: 133 MMOL/L (ref 136–145)
WBC # BLD AUTO: 9.64 10*3/MM3 (ref 3.4–10.8)

## 2021-02-01 PROCEDURE — 85025 COMPLETE CBC W/AUTO DIFF WBC: CPT | Performed by: PHYSICIAN ASSISTANT

## 2021-02-01 PROCEDURE — 97162 PT EVAL MOD COMPLEX 30 MIN: CPT

## 2021-02-01 PROCEDURE — 99291 CRITICAL CARE FIRST HOUR: CPT | Performed by: INTERNAL MEDICINE

## 2021-02-01 PROCEDURE — 80053 COMPREHEN METABOLIC PANEL: CPT | Performed by: PHYSICIAN ASSISTANT

## 2021-02-01 PROCEDURE — 82962 GLUCOSE BLOOD TEST: CPT

## 2021-02-01 PROCEDURE — 86140 C-REACTIVE PROTEIN: CPT | Performed by: PHYSICIAN ASSISTANT

## 2021-02-01 PROCEDURE — 82150 ASSAY OF AMYLASE: CPT | Performed by: INTERNAL MEDICINE

## 2021-02-01 PROCEDURE — 83690 ASSAY OF LIPASE: CPT | Performed by: INTERNAL MEDICINE

## 2021-02-02 ENCOUNTER — APPOINTMENT (OUTPATIENT)
Dept: GENERAL RADIOLOGY | Facility: HOSPITAL | Age: 76
End: 2021-02-02

## 2021-02-02 ENCOUNTER — OUTSIDE FACILITY SERVICE (OUTPATIENT)
Dept: PULMONOLOGY | Facility: CLINIC | Age: 76
End: 2021-02-02

## 2021-02-02 LAB
ALBUMIN SERPL-MCNC: 2.72 G/DL (ref 3.5–5.2)
ALBUMIN/GLOB SERPL: 0.9 G/DL
ALP SERPL-CCNC: 76 U/L (ref 39–117)
ALT SERPL W P-5'-P-CCNC: 13 U/L (ref 1–33)
AMYLASE SERPL-CCNC: 74 U/L (ref 28–100)
ANION GAP SERPL CALCULATED.3IONS-SCNC: 8.5 MMOL/L (ref 5–15)
AST SERPL-CCNC: 22 U/L (ref 1–32)
BACTERIA SPEC AEROBE CULT: NORMAL
BACTERIA SPEC AEROBE CULT: NORMAL
BASOPHILS # BLD AUTO: 0.01 10*3/MM3 (ref 0–0.2)
BASOPHILS NFR BLD AUTO: 0.1 % (ref 0–1.5)
BILIRUB SERPL-MCNC: 0.4 MG/DL (ref 0–1.2)
BUN SERPL-MCNC: 8 MG/DL (ref 8–23)
BUN/CREAT SERPL: 34.8 (ref 7–25)
CALCIUM SPEC-SCNC: 8.2 MG/DL (ref 8.6–10.5)
CHLORIDE SERPL-SCNC: 97 MMOL/L (ref 98–107)
CO2 SERPL-SCNC: 26.5 MMOL/L (ref 22–29)
CREAT SERPL-MCNC: 0.23 MG/DL (ref 0.57–1)
CRP SERPL-MCNC: 2.05 MG/DL (ref 0–0.5)
DEPRECATED RDW RBC AUTO: 53.5 FL (ref 37–54)
EOSINOPHIL # BLD AUTO: 0.33 10*3/MM3 (ref 0–0.4)
EOSINOPHIL NFR BLD AUTO: 4.7 % (ref 0.3–6.2)
ERYTHROCYTE [DISTWIDTH] IN BLOOD BY AUTOMATED COUNT: 15.3 % (ref 12.3–15.4)
GFR SERPL CREATININE-BSD FRML MDRD: >150 ML/MIN/1.73
GLOBULIN UR ELPH-MCNC: 3.1 GM/DL
GLUCOSE BLDC GLUCOMTR-MCNC: 102 MG/DL (ref 70–130)
GLUCOSE BLDC GLUCOMTR-MCNC: 107 MG/DL (ref 70–130)
GLUCOSE BLDC GLUCOMTR-MCNC: 128 MG/DL (ref 70–130)
GLUCOSE SERPL-MCNC: 106 MG/DL (ref 65–99)
HCT VFR BLD AUTO: 23.3 % (ref 34–46.6)
HGB BLD-MCNC: 7.1 G/DL (ref 12–15.9)
IMM GRANULOCYTES # BLD AUTO: 0.31 10*3/MM3 (ref 0–0.05)
IMM GRANULOCYTES NFR BLD AUTO: 4.4 % (ref 0–0.5)
LIPASE SERPL-CCNC: 84 U/L (ref 13–60)
LYMPHOCYTES # BLD AUTO: 0.73 10*3/MM3 (ref 0.7–3.1)
LYMPHOCYTES NFR BLD AUTO: 10.3 % (ref 19.6–45.3)
MCH RBC QN AUTO: 29.8 PG (ref 26.6–33)
MCHC RBC AUTO-ENTMCNC: 30.5 G/DL (ref 31.5–35.7)
MCV RBC AUTO: 97.9 FL (ref 79–97)
MONOCYTES # BLD AUTO: 0.38 10*3/MM3 (ref 0.1–0.9)
MONOCYTES NFR BLD AUTO: 5.4 % (ref 5–12)
NEUTROPHILS NFR BLD AUTO: 5.33 10*3/MM3 (ref 1.7–7)
NEUTROPHILS NFR BLD AUTO: 75.1 % (ref 42.7–76)
NRBC BLD AUTO-RTO: 0 /100 WBC (ref 0–0.2)
PLATELET # BLD AUTO: 125 10*3/MM3 (ref 140–450)
PMV BLD AUTO: 10.6 FL (ref 6–12)
POTASSIUM SERPL-SCNC: 2.8 MMOL/L (ref 3.5–5.2)
POTASSIUM SERPL-SCNC: 3.2 MMOL/L (ref 3.5–5.2)
PROT SERPL-MCNC: 5.8 G/DL (ref 6–8.5)
RBC # BLD AUTO: 2.38 10*6/MM3 (ref 3.77–5.28)
SODIUM SERPL-SCNC: 132 MMOL/L (ref 136–145)
WBC # BLD AUTO: 7.09 10*3/MM3 (ref 3.4–10.8)

## 2021-02-02 PROCEDURE — 71045 X-RAY EXAM CHEST 1 VIEW: CPT

## 2021-02-02 PROCEDURE — 84132 ASSAY OF SERUM POTASSIUM: CPT | Performed by: INTERNAL MEDICINE

## 2021-02-02 PROCEDURE — 71045 X-RAY EXAM CHEST 1 VIEW: CPT | Performed by: RADIOLOGY

## 2021-02-02 PROCEDURE — 99291 CRITICAL CARE FIRST HOUR: CPT | Performed by: INTERNAL MEDICINE

## 2021-02-02 PROCEDURE — 82962 GLUCOSE BLOOD TEST: CPT

## 2021-02-02 PROCEDURE — 80053 COMPREHEN METABOLIC PANEL: CPT | Performed by: PHYSICIAN ASSISTANT

## 2021-02-02 PROCEDURE — 86140 C-REACTIVE PROTEIN: CPT | Performed by: PHYSICIAN ASSISTANT

## 2021-02-02 PROCEDURE — 83690 ASSAY OF LIPASE: CPT | Performed by: INTERNAL MEDICINE

## 2021-02-02 PROCEDURE — 82150 ASSAY OF AMYLASE: CPT | Performed by: INTERNAL MEDICINE

## 2021-02-02 PROCEDURE — 85025 COMPLETE CBC W/AUTO DIFF WBC: CPT | Performed by: PHYSICIAN ASSISTANT

## 2021-02-03 ENCOUNTER — OUTSIDE FACILITY SERVICE (OUTPATIENT)
Dept: CARDIOLOGY | Facility: CLINIC | Age: 76
End: 2021-02-03

## 2021-02-03 ENCOUNTER — APPOINTMENT (OUTPATIENT)
Dept: GENERAL RADIOLOGY | Facility: HOSPITAL | Age: 76
End: 2021-02-03

## 2021-02-03 ENCOUNTER — OUTSIDE FACILITY SERVICE (OUTPATIENT)
Dept: PULMONOLOGY | Facility: CLINIC | Age: 76
End: 2021-02-03

## 2021-02-03 LAB
ALBUMIN SERPL-MCNC: 2.61 G/DL (ref 3.5–5.2)
ALBUMIN/GLOB SERPL: 0.8 G/DL
ALP SERPL-CCNC: 74 U/L (ref 39–117)
ALT SERPL W P-5'-P-CCNC: 18 U/L (ref 1–33)
ANION GAP SERPL CALCULATED.3IONS-SCNC: 9.9 MMOL/L (ref 5–15)
AST SERPL-CCNC: 29 U/L (ref 1–32)
BASOPHILS # BLD AUTO: 0.04 10*3/MM3 (ref 0–0.2)
BASOPHILS NFR BLD AUTO: 0.4 % (ref 0–1.5)
BILIRUB SERPL-MCNC: 0.3 MG/DL (ref 0–1.2)
BUN SERPL-MCNC: 12 MG/DL (ref 8–23)
BUN/CREAT SERPL: 54.5 (ref 7–25)
CALCIUM SPEC-SCNC: 8.4 MG/DL (ref 8.6–10.5)
CHLORIDE SERPL-SCNC: 102 MMOL/L (ref 98–107)
CO2 SERPL-SCNC: 25.1 MMOL/L (ref 22–29)
CREAT SERPL-MCNC: 0.22 MG/DL (ref 0.57–1)
CRP SERPL-MCNC: 5.86 MG/DL (ref 0–0.5)
DEPRECATED RDW RBC AUTO: 54.6 FL (ref 37–54)
EOSINOPHIL # BLD AUTO: 0.29 10*3/MM3 (ref 0–0.4)
EOSINOPHIL NFR BLD AUTO: 3.1 % (ref 0.3–6.2)
ERYTHROCYTE [DISTWIDTH] IN BLOOD BY AUTOMATED COUNT: 15.7 % (ref 12.3–15.4)
GFR SERPL CREATININE-BSD FRML MDRD: >150 ML/MIN/1.73
GLOBULIN UR ELPH-MCNC: 3.3 GM/DL
GLUCOSE BLDC GLUCOMTR-MCNC: 107 MG/DL (ref 70–130)
GLUCOSE BLDC GLUCOMTR-MCNC: 116 MG/DL (ref 70–130)
GLUCOSE BLDC GLUCOMTR-MCNC: 140 MG/DL (ref 70–130)
GLUCOSE BLDC GLUCOMTR-MCNC: 162 MG/DL (ref 70–130)
GLUCOSE SERPL-MCNC: 115 MG/DL (ref 65–99)
HCT VFR BLD AUTO: 30.7 % (ref 34–46.6)
HGB BLD-MCNC: 9.4 G/DL (ref 12–15.9)
IMM GRANULOCYTES # BLD AUTO: 0.29 10*3/MM3 (ref 0–0.05)
IMM GRANULOCYTES NFR BLD AUTO: 3.1 % (ref 0–0.5)
LYMPHOCYTES # BLD AUTO: 1.07 10*3/MM3 (ref 0.7–3.1)
LYMPHOCYTES NFR BLD AUTO: 11.5 % (ref 19.6–45.3)
MCH RBC QN AUTO: 30 PG (ref 26.6–33)
MCHC RBC AUTO-ENTMCNC: 30.6 G/DL (ref 31.5–35.7)
MCV RBC AUTO: 98.1 FL (ref 79–97)
MONOCYTES # BLD AUTO: 0.44 10*3/MM3 (ref 0.1–0.9)
MONOCYTES NFR BLD AUTO: 4.7 % (ref 5–12)
NEUTROPHILS NFR BLD AUTO: 7.18 10*3/MM3 (ref 1.7–7)
NEUTROPHILS NFR BLD AUTO: 77.2 % (ref 42.7–76)
NRBC BLD AUTO-RTO: 0 /100 WBC (ref 0–0.2)
PLATELET # BLD AUTO: 178 10*3/MM3 (ref 140–450)
PMV BLD AUTO: 11 FL (ref 6–12)
POTASSIUM SERPL-SCNC: 3 MMOL/L (ref 3.5–5.2)
POTASSIUM SERPL-SCNC: 3.2 MMOL/L (ref 3.5–5.2)
PROT SERPL-MCNC: 5.9 G/DL (ref 6–8.5)
RBC # BLD AUTO: 3.13 10*6/MM3 (ref 3.77–5.28)
SODIUM SERPL-SCNC: 137 MMOL/L (ref 136–145)
WBC # BLD AUTO: 9.31 10*3/MM3 (ref 3.4–10.8)

## 2021-02-03 PROCEDURE — 86140 C-REACTIVE PROTEIN: CPT | Performed by: PHYSICIAN ASSISTANT

## 2021-02-03 PROCEDURE — 84132 ASSAY OF SERUM POTASSIUM: CPT | Performed by: INTERNAL MEDICINE

## 2021-02-03 PROCEDURE — 93005 ELECTROCARDIOGRAM TRACING: CPT | Performed by: INTERNAL MEDICINE

## 2021-02-03 PROCEDURE — 85025 COMPLETE CBC W/AUTO DIFF WBC: CPT | Performed by: PHYSICIAN ASSISTANT

## 2021-02-03 PROCEDURE — 93010 ELECTROCARDIOGRAM REPORT: CPT | Performed by: INTERNAL MEDICINE

## 2021-02-03 PROCEDURE — 71045 X-RAY EXAM CHEST 1 VIEW: CPT | Performed by: RADIOLOGY

## 2021-02-03 PROCEDURE — 71045 X-RAY EXAM CHEST 1 VIEW: CPT

## 2021-02-03 PROCEDURE — 99222 1ST HOSP IP/OBS MODERATE 55: CPT | Performed by: NURSE PRACTITIONER

## 2021-02-03 PROCEDURE — 80053 COMPREHEN METABOLIC PANEL: CPT | Performed by: PHYSICIAN ASSISTANT

## 2021-02-03 PROCEDURE — 99291 CRITICAL CARE FIRST HOUR: CPT | Performed by: INTERNAL MEDICINE

## 2021-02-03 PROCEDURE — 82962 GLUCOSE BLOOD TEST: CPT

## 2021-02-04 ENCOUNTER — OUTSIDE FACILITY SERVICE (OUTPATIENT)
Dept: PULMONOLOGY | Facility: CLINIC | Age: 76
End: 2021-02-04

## 2021-02-04 ENCOUNTER — APPOINTMENT (OUTPATIENT)
Dept: CARDIOLOGY | Facility: HOSPITAL | Age: 76
End: 2021-02-04

## 2021-02-04 LAB
ALBUMIN SERPL-MCNC: 2.55 G/DL (ref 3.5–5.2)
ALBUMIN/GLOB SERPL: 0.8 G/DL
ALP SERPL-CCNC: 81 U/L (ref 39–117)
ALT SERPL W P-5'-P-CCNC: 17 U/L (ref 1–33)
AMYLASE SERPL-CCNC: 52 U/L (ref 28–100)
ANION GAP SERPL CALCULATED.3IONS-SCNC: 8.7 MMOL/L (ref 5–15)
AST SERPL-CCNC: 23 U/L (ref 1–32)
BASOPHILS # BLD AUTO: 0.02 10*3/MM3 (ref 0–0.2)
BASOPHILS NFR BLD AUTO: 0.2 % (ref 0–1.5)
BILIRUB SERPL-MCNC: 0.4 MG/DL (ref 0–1.2)
BUN SERPL-MCNC: 11 MG/DL (ref 8–23)
BUN/CREAT SERPL: 52.4 (ref 7–25)
CALCIUM SPEC-SCNC: 8.7 MG/DL (ref 8.6–10.5)
CHLORIDE SERPL-SCNC: 101 MMOL/L (ref 98–107)
CO2 SERPL-SCNC: 31.3 MMOL/L (ref 22–29)
CREAT SERPL-MCNC: 0.21 MG/DL (ref 0.57–1)
CRP SERPL-MCNC: 16.85 MG/DL (ref 0–0.5)
DEPRECATED RDW RBC AUTO: 57.2 FL (ref 37–54)
EOSINOPHIL # BLD AUTO: 0.54 10*3/MM3 (ref 0–0.4)
EOSINOPHIL NFR BLD AUTO: 6.3 % (ref 0.3–6.2)
ERYTHROCYTE [DISTWIDTH] IN BLOOD BY AUTOMATED COUNT: 16 % (ref 12.3–15.4)
GFR SERPL CREATININE-BSD FRML MDRD: >150 ML/MIN/1.73
GLOBULIN UR ELPH-MCNC: 3.4 GM/DL
GLUCOSE BLDC GLUCOMTR-MCNC: 122 MG/DL (ref 70–130)
GLUCOSE BLDC GLUCOMTR-MCNC: 123 MG/DL (ref 70–130)
GLUCOSE BLDC GLUCOMTR-MCNC: 134 MG/DL (ref 70–130)
GLUCOSE BLDC GLUCOMTR-MCNC: 142 MG/DL (ref 70–130)
GLUCOSE SERPL-MCNC: 147 MG/DL (ref 65–99)
HCT VFR BLD AUTO: 30.8 % (ref 34–46.6)
HGB BLD-MCNC: 9.1 G/DL (ref 12–15.9)
IMM GRANULOCYTES # BLD AUTO: 0.18 10*3/MM3 (ref 0–0.05)
IMM GRANULOCYTES NFR BLD AUTO: 2.1 % (ref 0–0.5)
LIPASE SERPL-CCNC: 35 U/L (ref 13–60)
LYMPHOCYTES # BLD AUTO: 0.88 10*3/MM3 (ref 0.7–3.1)
LYMPHOCYTES NFR BLD AUTO: 10.2 % (ref 19.6–45.3)
MAGNESIUM SERPL-MCNC: 1.7 MG/DL (ref 1.6–2.4)
MCH RBC QN AUTO: 29.5 PG (ref 26.6–33)
MCHC RBC AUTO-ENTMCNC: 29.5 G/DL (ref 31.5–35.7)
MCV RBC AUTO: 100 FL (ref 79–97)
MONOCYTES # BLD AUTO: 0.42 10*3/MM3 (ref 0.1–0.9)
MONOCYTES NFR BLD AUTO: 4.9 % (ref 5–12)
NEUTROPHILS NFR BLD AUTO: 6.58 10*3/MM3 (ref 1.7–7)
NEUTROPHILS NFR BLD AUTO: 76.3 % (ref 42.7–76)
NRBC BLD AUTO-RTO: 0 /100 WBC (ref 0–0.2)
PLATELET # BLD AUTO: 153 10*3/MM3 (ref 140–450)
PMV BLD AUTO: 10.2 FL (ref 6–12)
POTASSIUM SERPL-SCNC: 3.9 MMOL/L (ref 3.5–5.2)
PROT SERPL-MCNC: 5.9 G/DL (ref 6–8.5)
RBC # BLD AUTO: 3.08 10*6/MM3 (ref 3.77–5.28)
SODIUM SERPL-SCNC: 141 MMOL/L (ref 136–145)
WBC # BLD AUTO: 8.62 10*3/MM3 (ref 3.4–10.8)

## 2021-02-04 PROCEDURE — 93306 TTE W/DOPPLER COMPLETE: CPT

## 2021-02-04 PROCEDURE — 82962 GLUCOSE BLOOD TEST: CPT

## 2021-02-04 PROCEDURE — 85025 COMPLETE CBC W/AUTO DIFF WBC: CPT | Performed by: PHYSICIAN ASSISTANT

## 2021-02-04 PROCEDURE — 83735 ASSAY OF MAGNESIUM: CPT | Performed by: INTERNAL MEDICINE

## 2021-02-04 PROCEDURE — 99291 CRITICAL CARE FIRST HOUR: CPT | Performed by: INTERNAL MEDICINE

## 2021-02-04 PROCEDURE — 83690 ASSAY OF LIPASE: CPT | Performed by: INTERNAL MEDICINE

## 2021-02-04 PROCEDURE — 97530 THERAPEUTIC ACTIVITIES: CPT

## 2021-02-04 PROCEDURE — 86140 C-REACTIVE PROTEIN: CPT | Performed by: PHYSICIAN ASSISTANT

## 2021-02-04 PROCEDURE — 82150 ASSAY OF AMYLASE: CPT | Performed by: INTERNAL MEDICINE

## 2021-02-04 PROCEDURE — 80053 COMPREHEN METABOLIC PANEL: CPT | Performed by: PHYSICIAN ASSISTANT

## 2021-02-04 PROCEDURE — 93306 TTE W/DOPPLER COMPLETE: CPT | Performed by: INTERNAL MEDICINE

## 2021-02-04 PROCEDURE — 97110 THERAPEUTIC EXERCISES: CPT

## 2021-02-05 ENCOUNTER — OUTSIDE FACILITY SERVICE (OUTPATIENT)
Dept: PULMONOLOGY | Facility: CLINIC | Age: 76
End: 2021-02-05

## 2021-02-05 ENCOUNTER — APPOINTMENT (OUTPATIENT)
Dept: GENERAL RADIOLOGY | Facility: HOSPITAL | Age: 76
End: 2021-02-05

## 2021-02-05 LAB
ALBUMIN SERPL-MCNC: 2.72 G/DL (ref 3.5–5.2)
ALBUMIN/GLOB SERPL: 0.8 G/DL
ALP SERPL-CCNC: 84 U/L (ref 39–117)
ALT SERPL W P-5'-P-CCNC: 15 U/L (ref 1–33)
ANION GAP SERPL CALCULATED.3IONS-SCNC: 7 MMOL/L (ref 5–15)
AST SERPL-CCNC: 17 U/L (ref 1–32)
BASOPHILS # BLD AUTO: 0.03 10*3/MM3 (ref 0–0.2)
BASOPHILS NFR BLD AUTO: 0.4 % (ref 0–1.5)
BH CV ECHO MEAS - % IVS THICK: 21.4 %
BH CV ECHO MEAS - % LVPW THICK: 55.4 %
BH CV ECHO MEAS - ACS: 1.9 CM
BH CV ECHO MEAS - AO MAX PG: 13.7 MMHG
BH CV ECHO MEAS - AO MEAN PG: 7 MMHG
BH CV ECHO MEAS - AO ROOT AREA: 5.7 CM^2
BH CV ECHO MEAS - AO ROOT DIAM: 2.7 CM
BH CV ECHO MEAS - AO V2 MAX: 185 CM/SEC
BH CV ECHO MEAS - AO V2 MEAN: 126 CM/SEC
BH CV ECHO MEAS - AO V2 VTI: 36.6 CM
BH CV ECHO MEAS - BZI_METRIC_WEIGHT: 89.4 KG
BH CV ECHO MEAS - EDV(CUBED): 101.5 ML
BH CV ECHO MEAS - EDV(MOD-SP4): 70 ML
BH CV ECHO MEAS - EDV(TEICH): 100.6 ML
BH CV ECHO MEAS - EF(CUBED): 62.3 %
BH CV ECHO MEAS - EF(MOD-SP4): 74.6 %
BH CV ECHO MEAS - EF(TEICH): 53.8 %
BH CV ECHO MEAS - ESV(CUBED): 38.3 ML
BH CV ECHO MEAS - ESV(MOD-SP4): 17.8 ML
BH CV ECHO MEAS - ESV(TEICH): 46.4 ML
BH CV ECHO MEAS - FS: 27.8 %
BH CV ECHO MEAS - IVS/LVPW: 1.1
BH CV ECHO MEAS - IVSD: 1 CM
BH CV ECHO MEAS - IVSS: 1.2 CM
BH CV ECHO MEAS - LA DIMENSION: 2.6 CM
BH CV ECHO MEAS - LA/AO: 0.96
BH CV ECHO MEAS - LV MASS(C)D: 154.2 GRAMS
BH CV ECHO MEAS - LV MASS(C)S: 150.3 GRAMS
BH CV ECHO MEAS - LVIDD: 4.7 CM
BH CV ECHO MEAS - LVIDS: 3.4 CM
BH CV ECHO MEAS - LVLD AP4: 7 CM
BH CV ECHO MEAS - LVLS AP4: 5.8 CM
BH CV ECHO MEAS - LVOT AREA (M): 3.1 CM^2
BH CV ECHO MEAS - LVOT AREA: 3.1 CM^2
BH CV ECHO MEAS - LVOT DIAM: 2 CM
BH CV ECHO MEAS - LVPWD: 0.92 CM
BH CV ECHO MEAS - LVPWS: 1.4 CM
BH CV ECHO MEAS - MV A MAX VEL: 120 CM/SEC
BH CV ECHO MEAS - MV E MAX VEL: 73.1 CM/SEC
BH CV ECHO MEAS - MV E/A: 0.61
BH CV ECHO MEAS - PA ACC TIME: 0.1 SEC
BH CV ECHO MEAS - PA PR(ACCEL): 33.1 MMHG
BH CV ECHO MEAS - RAP SYSTOLE: 10 MMHG
BH CV ECHO MEAS - RVSP: 67.5 MMHG
BH CV ECHO MEAS - SV(AO): 209.6 ML
BH CV ECHO MEAS - SV(CUBED): 63.2 ML
BH CV ECHO MEAS - SV(MOD-SP4): 52.2 ML
BH CV ECHO MEAS - SV(TEICH): 54.2 ML
BH CV ECHO MEAS - TR MAX VEL: 379 CM/SEC
BILIRUB SERPL-MCNC: 0.3 MG/DL (ref 0–1.2)
BUN SERPL-MCNC: 16 MG/DL (ref 8–23)
BUN/CREAT SERPL: 69.6 (ref 7–25)
CALCIUM SPEC-SCNC: 9 MG/DL (ref 8.6–10.5)
CHLORIDE SERPL-SCNC: 95 MMOL/L (ref 98–107)
CO2 SERPL-SCNC: 36 MMOL/L (ref 22–29)
CREAT SERPL-MCNC: 0.23 MG/DL (ref 0.57–1)
CRP SERPL-MCNC: 14.24 MG/DL (ref 0–0.5)
DEPRECATED RDW RBC AUTO: 58.4 FL (ref 37–54)
EOSINOPHIL # BLD AUTO: 0.87 10*3/MM3 (ref 0–0.4)
EOSINOPHIL NFR BLD AUTO: 10.9 % (ref 0.3–6.2)
ERYTHROCYTE [DISTWIDTH] IN BLOOD BY AUTOMATED COUNT: 15.9 % (ref 12.3–15.4)
GFR SERPL CREATININE-BSD FRML MDRD: >150 ML/MIN/1.73
GLOBULIN UR ELPH-MCNC: 3.5 GM/DL
GLUCOSE BLDC GLUCOMTR-MCNC: 118 MG/DL (ref 70–130)
GLUCOSE BLDC GLUCOMTR-MCNC: 119 MG/DL (ref 70–130)
GLUCOSE BLDC GLUCOMTR-MCNC: 129 MG/DL (ref 70–130)
GLUCOSE BLDC GLUCOMTR-MCNC: 142 MG/DL (ref 70–130)
GLUCOSE SERPL-MCNC: 127 MG/DL (ref 65–99)
HCT VFR BLD AUTO: 32.4 % (ref 34–46.6)
HGB BLD-MCNC: 9.5 G/DL (ref 12–15.9)
IMM GRANULOCYTES # BLD AUTO: 0.09 10*3/MM3 (ref 0–0.05)
IMM GRANULOCYTES NFR BLD AUTO: 1.1 % (ref 0–0.5)
LV EF 2D ECHO EST: 75 %
LYMPHOCYTES # BLD AUTO: 1.31 10*3/MM3 (ref 0.7–3.1)
LYMPHOCYTES NFR BLD AUTO: 16.5 % (ref 19.6–45.3)
MAGNESIUM SERPL-MCNC: 1.8 MG/DL (ref 1.6–2.4)
MAXIMAL PREDICTED HEART RATE: 145 BPM
MCH RBC QN AUTO: 29.7 PG (ref 26.6–33)
MCHC RBC AUTO-ENTMCNC: 29.3 G/DL (ref 31.5–35.7)
MCV RBC AUTO: 101.3 FL (ref 79–97)
MONOCYTES # BLD AUTO: 0.38 10*3/MM3 (ref 0.1–0.9)
MONOCYTES NFR BLD AUTO: 4.8 % (ref 5–12)
NEUTROPHILS NFR BLD AUTO: 5.27 10*3/MM3 (ref 1.7–7)
NEUTROPHILS NFR BLD AUTO: 66.3 % (ref 42.7–76)
NRBC BLD AUTO-RTO: 0 /100 WBC (ref 0–0.2)
PLATELET # BLD AUTO: 182 10*3/MM3 (ref 140–450)
PMV BLD AUTO: 9.9 FL (ref 6–12)
POTASSIUM SERPL-SCNC: 4.2 MMOL/L (ref 3.5–5.2)
PROT SERPL-MCNC: 6.2 G/DL (ref 6–8.5)
QT INTERVAL: 352 MS
QTC INTERVAL: 480 MS
RBC # BLD AUTO: 3.2 10*6/MM3 (ref 3.77–5.28)
SODIUM SERPL-SCNC: 138 MMOL/L (ref 136–145)
STRESS TARGET HR: 123 BPM
WBC # BLD AUTO: 7.95 10*3/MM3 (ref 3.4–10.8)

## 2021-02-05 PROCEDURE — 71045 X-RAY EXAM CHEST 1 VIEW: CPT

## 2021-02-05 PROCEDURE — 80053 COMPREHEN METABOLIC PANEL: CPT | Performed by: PHYSICIAN ASSISTANT

## 2021-02-05 PROCEDURE — 82962 GLUCOSE BLOOD TEST: CPT

## 2021-02-05 PROCEDURE — 85025 COMPLETE CBC W/AUTO DIFF WBC: CPT | Performed by: PHYSICIAN ASSISTANT

## 2021-02-05 PROCEDURE — 71045 X-RAY EXAM CHEST 1 VIEW: CPT | Performed by: RADIOLOGY

## 2021-02-05 PROCEDURE — 86140 C-REACTIVE PROTEIN: CPT | Performed by: PHYSICIAN ASSISTANT

## 2021-02-05 PROCEDURE — 99291 CRITICAL CARE FIRST HOUR: CPT | Performed by: INTERNAL MEDICINE

## 2021-02-05 PROCEDURE — 83735 ASSAY OF MAGNESIUM: CPT | Performed by: INTERNAL MEDICINE

## 2021-02-06 ENCOUNTER — APPOINTMENT (OUTPATIENT)
Dept: GENERAL RADIOLOGY | Facility: HOSPITAL | Age: 76
End: 2021-02-06

## 2021-02-06 ENCOUNTER — OUTSIDE FACILITY SERVICE (OUTPATIENT)
Dept: PULMONOLOGY | Facility: CLINIC | Age: 76
End: 2021-02-06

## 2021-02-06 LAB
ALBUMIN SERPL-MCNC: 2.69 G/DL (ref 3.5–5.2)
ALBUMIN/GLOB SERPL: 0.7 G/DL
ALP SERPL-CCNC: 86 U/L (ref 39–117)
ALT SERPL W P-5'-P-CCNC: 16 U/L (ref 1–33)
ANION GAP SERPL CALCULATED.3IONS-SCNC: 8.5 MMOL/L (ref 5–15)
AST SERPL-CCNC: 20 U/L (ref 1–32)
BASOPHILS # BLD AUTO: 0.04 10*3/MM3 (ref 0–0.2)
BASOPHILS NFR BLD AUTO: 0.6 % (ref 0–1.5)
BILIRUB SERPL-MCNC: 0.4 MG/DL (ref 0–1.2)
BUN SERPL-MCNC: 25 MG/DL (ref 8–23)
BUN/CREAT SERPL: 80.6 (ref 7–25)
CALCIUM SPEC-SCNC: 9.5 MG/DL (ref 8.6–10.5)
CHLORIDE SERPL-SCNC: 91 MMOL/L (ref 98–107)
CO2 SERPL-SCNC: 36.5 MMOL/L (ref 22–29)
CREAT SERPL-MCNC: 0.31 MG/DL (ref 0.57–1)
CRP SERPL-MCNC: 8.2 MG/DL (ref 0–0.5)
DEPRECATED RDW RBC AUTO: 56.5 FL (ref 37–54)
EOSINOPHIL # BLD AUTO: 0.76 10*3/MM3 (ref 0–0.4)
EOSINOPHIL NFR BLD AUTO: 11.1 % (ref 0.3–6.2)
ERYTHROCYTE [DISTWIDTH] IN BLOOD BY AUTOMATED COUNT: 15.4 % (ref 12.3–15.4)
GFR SERPL CREATININE-BSD FRML MDRD: >150 ML/MIN/1.73
GLOBULIN UR ELPH-MCNC: 3.9 GM/DL
GLUCOSE BLDC GLUCOMTR-MCNC: 154 MG/DL (ref 70–130)
GLUCOSE BLDC GLUCOMTR-MCNC: 156 MG/DL (ref 70–130)
GLUCOSE BLDC GLUCOMTR-MCNC: 159 MG/DL (ref 70–130)
GLUCOSE BLDC GLUCOMTR-MCNC: 169 MG/DL (ref 70–130)
GLUCOSE SERPL-MCNC: 140 MG/DL (ref 65–99)
HCT VFR BLD AUTO: 36.2 % (ref 34–46.6)
HGB BLD-MCNC: 10.6 G/DL (ref 12–15.9)
IMM GRANULOCYTES # BLD AUTO: 0.09 10*3/MM3 (ref 0–0.05)
IMM GRANULOCYTES NFR BLD AUTO: 1.3 % (ref 0–0.5)
LYMPHOCYTES # BLD AUTO: 1.31 10*3/MM3 (ref 0.7–3.1)
LYMPHOCYTES NFR BLD AUTO: 19.1 % (ref 19.6–45.3)
MCH RBC QN AUTO: 29.3 PG (ref 26.6–33)
MCHC RBC AUTO-ENTMCNC: 29.3 G/DL (ref 31.5–35.7)
MCV RBC AUTO: 100 FL (ref 79–97)
MONOCYTES # BLD AUTO: 0.4 10*3/MM3 (ref 0.1–0.9)
MONOCYTES NFR BLD AUTO: 5.8 % (ref 5–12)
NEUTROPHILS NFR BLD AUTO: 4.27 10*3/MM3 (ref 1.7–7)
NEUTROPHILS NFR BLD AUTO: 62.1 % (ref 42.7–76)
NRBC BLD AUTO-RTO: 0 /100 WBC (ref 0–0.2)
PLATELET # BLD AUTO: 187 10*3/MM3 (ref 140–450)
PMV BLD AUTO: 10.5 FL (ref 6–12)
POTASSIUM SERPL-SCNC: 4.8 MMOL/L (ref 3.5–5.2)
PROT SERPL-MCNC: 6.6 G/DL (ref 6–8.5)
RBC # BLD AUTO: 3.62 10*6/MM3 (ref 3.77–5.28)
SODIUM SERPL-SCNC: 136 MMOL/L (ref 136–145)
WBC # BLD AUTO: 6.87 10*3/MM3 (ref 3.4–10.8)

## 2021-02-06 PROCEDURE — 82962 GLUCOSE BLOOD TEST: CPT

## 2021-02-06 PROCEDURE — 86140 C-REACTIVE PROTEIN: CPT | Performed by: PHYSICIAN ASSISTANT

## 2021-02-06 PROCEDURE — 71045 X-RAY EXAM CHEST 1 VIEW: CPT

## 2021-02-06 PROCEDURE — 71045 X-RAY EXAM CHEST 1 VIEW: CPT | Performed by: RADIOLOGY

## 2021-02-06 PROCEDURE — 85025 COMPLETE CBC W/AUTO DIFF WBC: CPT | Performed by: PHYSICIAN ASSISTANT

## 2021-02-06 PROCEDURE — 80053 COMPREHEN METABOLIC PANEL: CPT | Performed by: PHYSICIAN ASSISTANT

## 2021-02-06 PROCEDURE — 99291 CRITICAL CARE FIRST HOUR: CPT | Performed by: INTERNAL MEDICINE

## 2021-02-07 ENCOUNTER — APPOINTMENT (OUTPATIENT)
Dept: GENERAL RADIOLOGY | Facility: HOSPITAL | Age: 76
End: 2021-02-07

## 2021-02-07 ENCOUNTER — OUTSIDE FACILITY SERVICE (OUTPATIENT)
Dept: PULMONOLOGY | Facility: CLINIC | Age: 76
End: 2021-02-07

## 2021-02-07 LAB
A-A DO2: 238 MMHG (ref 0–300)
ALBUMIN SERPL-MCNC: 3.02 G/DL (ref 3.5–5.2)
ALBUMIN/GLOB SERPL: 0.8 G/DL
ALP SERPL-CCNC: 98 U/L (ref 39–117)
ALT SERPL W P-5'-P-CCNC: 16 U/L (ref 1–33)
ANION GAP SERPL CALCULATED.3IONS-SCNC: 10.2 MMOL/L (ref 5–15)
ARTERIAL PATENCY WRIST A: ABNORMAL
AST SERPL-CCNC: 19 U/L (ref 1–32)
ATMOSPHERIC PRESS: 729 MMHG
BASE EXCESS BLDA CALC-SCNC: 13.9 MMOL/L (ref 0–2)
BASOPHILS # BLD AUTO: 0.06 10*3/MM3 (ref 0–0.2)
BASOPHILS NFR BLD AUTO: 0.6 % (ref 0–1.5)
BDY SITE: ABNORMAL
BILIRUB SERPL-MCNC: 0.4 MG/DL (ref 0–1.2)
BODY TEMPERATURE: 0 C
BUN SERPL-MCNC: 38 MG/DL (ref 8–23)
BUN/CREAT SERPL: 146.2 (ref 7–25)
CALCIUM SPEC-SCNC: 9.3 MG/DL (ref 8.6–10.5)
CHLORIDE SERPL-SCNC: 93 MMOL/L (ref 98–107)
CO2 BLDA-SCNC: 43.9 MMOL/L (ref 22–33)
CO2 SERPL-SCNC: 34.8 MMOL/L (ref 22–29)
COHGB MFR BLD: 1.3 % (ref 0–5)
CREAT SERPL-MCNC: 0.26 MG/DL (ref 0.57–1)
CRP SERPL-MCNC: 6.14 MG/DL (ref 0–0.5)
DEPRECATED RDW RBC AUTO: 54.5 FL (ref 37–54)
EOSINOPHIL # BLD AUTO: 0.97 10*3/MM3 (ref 0–0.4)
EOSINOPHIL NFR BLD AUTO: 10 % (ref 0.3–6.2)
ERYTHROCYTE [DISTWIDTH] IN BLOOD BY AUTOMATED COUNT: 15.3 % (ref 12.3–15.4)
GFR SERPL CREATININE-BSD FRML MDRD: >150 ML/MIN/1.73
GLOBULIN UR ELPH-MCNC: 4 GM/DL
GLUCOSE BLDC GLUCOMTR-MCNC: 122 MG/DL (ref 70–130)
GLUCOSE BLDC GLUCOMTR-MCNC: 148 MG/DL (ref 70–130)
GLUCOSE BLDC GLUCOMTR-MCNC: 178 MG/DL (ref 70–130)
GLUCOSE BLDC GLUCOMTR-MCNC: 230 MG/DL (ref 70–130)
GLUCOSE SERPL-MCNC: 170 MG/DL (ref 65–99)
HCO3 BLDA-SCNC: 41.8 MMOL/L (ref 20–26)
HCT VFR BLD AUTO: 37.1 % (ref 34–46.6)
HCT VFR BLD CALC: 41 % (ref 38–51)
HGB BLD-MCNC: 11 G/DL (ref 12–15.9)
HGB BLDA-MCNC: 13.4 G/DL (ref 13.5–17.5)
IMM GRANULOCYTES # BLD AUTO: 0.2 10*3/MM3 (ref 0–0.05)
IMM GRANULOCYTES NFR BLD AUTO: 2.1 % (ref 0–0.5)
INHALED O2 CONCENTRATION: 60 %
LYMPHOCYTES # BLD AUTO: 1.97 10*3/MM3 (ref 0.7–3.1)
LYMPHOCYTES NFR BLD AUTO: 20.3 % (ref 19.6–45.3)
Lab: ABNORMAL
Lab: ABNORMAL
MCH RBC QN AUTO: 29.1 PG (ref 26.6–33)
MCHC RBC AUTO-ENTMCNC: 29.6 G/DL (ref 31.5–35.7)
MCV RBC AUTO: 98.1 FL (ref 79–97)
METHGB BLD QL: 0 % (ref 0–3)
MODALITY: ABNORMAL
MONOCYTES # BLD AUTO: 0.56 10*3/MM3 (ref 0.1–0.9)
MONOCYTES NFR BLD AUTO: 5.8 % (ref 5–12)
NEUTROPHILS NFR BLD AUTO: 5.93 10*3/MM3 (ref 1.7–7)
NEUTROPHILS NFR BLD AUTO: 61.2 % (ref 42.7–76)
NOTE: ABNORMAL
NOTIFIED BY: ABNORMAL
NOTIFIED WHO: ABNORMAL
NRBC BLD AUTO-RTO: 0 /100 WBC (ref 0–0.2)
OXYHGB MFR BLDV: 96.5 % (ref 94–99)
PCO2 BLDA: 67 MM HG (ref 35–45)
PCO2 TEMP ADJ BLD: ABNORMAL MM[HG]
PEEP RESPIRATORY: 5 CM[H2O]
PH BLDA: 7.4 PH UNITS (ref 7.35–7.45)
PH, TEMP CORRECTED: ABNORMAL
PLATELET # BLD AUTO: 228 10*3/MM3 (ref 140–450)
PMV BLD AUTO: 10.3 FL (ref 6–12)
PO2 BLDA: 99.5 MM HG (ref 83–108)
PO2 TEMP ADJ BLD: ABNORMAL MM[HG]
POTASSIUM SERPL-SCNC: 4.2 MMOL/L (ref 3.5–5.2)
PROT SERPL-MCNC: 7 G/DL (ref 6–8.5)
RBC # BLD AUTO: 3.78 10*6/MM3 (ref 3.77–5.28)
SAO2 % BLDCOA: 97.8 % (ref 94–99)
SET MECH RESP RATE: 24
SODIUM SERPL-SCNC: 138 MMOL/L (ref 136–145)
VENTILATOR MODE: ABNORMAL
VT ON VENT VENT: 450 ML
WBC # BLD AUTO: 9.69 10*3/MM3 (ref 3.4–10.8)

## 2021-02-07 PROCEDURE — 71045 X-RAY EXAM CHEST 1 VIEW: CPT | Performed by: RADIOLOGY

## 2021-02-07 PROCEDURE — 85025 COMPLETE CBC W/AUTO DIFF WBC: CPT | Performed by: PHYSICIAN ASSISTANT

## 2021-02-07 PROCEDURE — 82375 ASSAY CARBOXYHB QUANT: CPT

## 2021-02-07 PROCEDURE — 83050 HGB METHEMOGLOBIN QUAN: CPT

## 2021-02-07 PROCEDURE — 71045 X-RAY EXAM CHEST 1 VIEW: CPT

## 2021-02-07 PROCEDURE — 99291 CRITICAL CARE FIRST HOUR: CPT | Performed by: INTERNAL MEDICINE

## 2021-02-07 PROCEDURE — 80053 COMPREHEN METABOLIC PANEL: CPT | Performed by: PHYSICIAN ASSISTANT

## 2021-02-07 PROCEDURE — 82805 BLOOD GASES W/O2 SATURATION: CPT

## 2021-02-07 PROCEDURE — 36600 WITHDRAWAL OF ARTERIAL BLOOD: CPT

## 2021-02-07 PROCEDURE — 82962 GLUCOSE BLOOD TEST: CPT

## 2021-02-07 PROCEDURE — 86140 C-REACTIVE PROTEIN: CPT | Performed by: PHYSICIAN ASSISTANT

## 2021-02-08 ENCOUNTER — APPOINTMENT (OUTPATIENT)
Dept: GENERAL RADIOLOGY | Facility: HOSPITAL | Age: 76
End: 2021-02-08

## 2021-02-08 ENCOUNTER — OUTSIDE FACILITY SERVICE (OUTPATIENT)
Dept: PULMONOLOGY | Facility: CLINIC | Age: 76
End: 2021-02-08

## 2021-02-08 LAB
ALBUMIN SERPL-MCNC: 2.85 G/DL (ref 3.5–5.2)
ALBUMIN/GLOB SERPL: 0.7 G/DL
ALP SERPL-CCNC: 97 U/L (ref 39–117)
ALT SERPL W P-5'-P-CCNC: 14 U/L (ref 1–33)
AMYLASE SERPL-CCNC: 37 U/L (ref 28–100)
ANION GAP SERPL CALCULATED.3IONS-SCNC: 10.3 MMOL/L (ref 5–15)
AST SERPL-CCNC: 18 U/L (ref 1–32)
BASOPHILS # BLD AUTO: 0.04 10*3/MM3 (ref 0–0.2)
BASOPHILS NFR BLD AUTO: 0.5 % (ref 0–1.5)
BILIRUB SERPL-MCNC: 0.4 MG/DL (ref 0–1.2)
BUN SERPL-MCNC: 35 MG/DL (ref 8–23)
BUN/CREAT SERPL: 102.9 (ref 7–25)
CALCIUM SPEC-SCNC: 9.3 MG/DL (ref 8.6–10.5)
CHLORIDE SERPL-SCNC: 92 MMOL/L (ref 98–107)
CO2 SERPL-SCNC: 35.7 MMOL/L (ref 22–29)
CREAT SERPL-MCNC: 0.34 MG/DL (ref 0.57–1)
CRP SERPL-MCNC: 7.99 MG/DL (ref 0–0.5)
DEPRECATED RDW RBC AUTO: 54.9 FL (ref 37–54)
EOSINOPHIL # BLD AUTO: 0.45 10*3/MM3 (ref 0–0.4)
EOSINOPHIL NFR BLD AUTO: 5.1 % (ref 0.3–6.2)
ERYTHROCYTE [DISTWIDTH] IN BLOOD BY AUTOMATED COUNT: 15.5 % (ref 12.3–15.4)
GFR SERPL CREATININE-BSD FRML MDRD: >150 ML/MIN/1.73
GLOBULIN UR ELPH-MCNC: 4 GM/DL
GLUCOSE BLDC GLUCOMTR-MCNC: 139 MG/DL (ref 70–130)
GLUCOSE BLDC GLUCOMTR-MCNC: 149 MG/DL (ref 70–130)
GLUCOSE BLDC GLUCOMTR-MCNC: 170 MG/DL (ref 70–130)
GLUCOSE BLDC GLUCOMTR-MCNC: 210 MG/DL (ref 70–130)
GLUCOSE BLDC GLUCOMTR-MCNC: 215 MG/DL (ref 70–130)
GLUCOSE BLDC GLUCOMTR-MCNC: 242 MG/DL (ref 70–130)
GLUCOSE SERPL-MCNC: 152 MG/DL (ref 65–99)
HCT VFR BLD AUTO: 36 % (ref 34–46.6)
HGB BLD-MCNC: 10.8 G/DL (ref 12–15.9)
IMM GRANULOCYTES # BLD AUTO: 0.17 10*3/MM3 (ref 0–0.05)
IMM GRANULOCYTES NFR BLD AUTO: 1.9 % (ref 0–0.5)
LIPASE SERPL-CCNC: 22 U/L (ref 13–60)
LYMPHOCYTES # BLD AUTO: 1.83 10*3/MM3 (ref 0.7–3.1)
LYMPHOCYTES NFR BLD AUTO: 20.9 % (ref 19.6–45.3)
MCH RBC QN AUTO: 29.3 PG (ref 26.6–33)
MCHC RBC AUTO-ENTMCNC: 30 G/DL (ref 31.5–35.7)
MCV RBC AUTO: 97.6 FL (ref 79–97)
MONOCYTES # BLD AUTO: 0.6 10*3/MM3 (ref 0.1–0.9)
MONOCYTES NFR BLD AUTO: 6.8 % (ref 5–12)
NEUTROPHILS NFR BLD AUTO: 5.67 10*3/MM3 (ref 1.7–7)
NEUTROPHILS NFR BLD AUTO: 64.8 % (ref 42.7–76)
NRBC BLD AUTO-RTO: 0 /100 WBC (ref 0–0.2)
PLATELET # BLD AUTO: 199 10*3/MM3 (ref 140–450)
PMV BLD AUTO: 10.2 FL (ref 6–12)
POTASSIUM SERPL-SCNC: 4.5 MMOL/L (ref 3.5–5.2)
PROT SERPL-MCNC: 6.8 G/DL (ref 6–8.5)
RBC # BLD AUTO: 3.69 10*6/MM3 (ref 3.77–5.28)
SODIUM SERPL-SCNC: 138 MMOL/L (ref 136–145)
WBC # BLD AUTO: 8.76 10*3/MM3 (ref 3.4–10.8)

## 2021-02-08 PROCEDURE — 99291 CRITICAL CARE FIRST HOUR: CPT | Performed by: INTERNAL MEDICINE

## 2021-02-08 PROCEDURE — 74018 RADEX ABDOMEN 1 VIEW: CPT

## 2021-02-08 PROCEDURE — 82150 ASSAY OF AMYLASE: CPT | Performed by: INTERNAL MEDICINE

## 2021-02-08 PROCEDURE — 97530 THERAPEUTIC ACTIVITIES: CPT

## 2021-02-08 PROCEDURE — 83690 ASSAY OF LIPASE: CPT | Performed by: INTERNAL MEDICINE

## 2021-02-08 PROCEDURE — 80053 COMPREHEN METABOLIC PANEL: CPT | Performed by: PHYSICIAN ASSISTANT

## 2021-02-08 PROCEDURE — 85025 COMPLETE CBC W/AUTO DIFF WBC: CPT | Performed by: PHYSICIAN ASSISTANT

## 2021-02-08 PROCEDURE — 97110 THERAPEUTIC EXERCISES: CPT

## 2021-02-08 PROCEDURE — 86140 C-REACTIVE PROTEIN: CPT | Performed by: PHYSICIAN ASSISTANT

## 2021-02-08 PROCEDURE — 74022 RADEX COMPL AQT ABD SERIES: CPT | Performed by: RADIOLOGY

## 2021-02-08 PROCEDURE — 71045 X-RAY EXAM CHEST 1 VIEW: CPT

## 2021-02-08 PROCEDURE — 82962 GLUCOSE BLOOD TEST: CPT

## 2021-02-09 ENCOUNTER — OUTSIDE FACILITY SERVICE (OUTPATIENT)
Dept: PULMONOLOGY | Facility: CLINIC | Age: 76
End: 2021-02-09

## 2021-02-09 LAB
ALBUMIN SERPL-MCNC: 2.82 G/DL (ref 3.5–5.2)
ALBUMIN/GLOB SERPL: 0.7 G/DL
ALP SERPL-CCNC: 108 U/L (ref 39–117)
ALT SERPL W P-5'-P-CCNC: 12 U/L (ref 1–33)
ANION GAP SERPL CALCULATED.3IONS-SCNC: 10.8 MMOL/L (ref 5–15)
AST SERPL-CCNC: 14 U/L (ref 1–32)
BASOPHILS # BLD AUTO: 0.03 10*3/MM3 (ref 0–0.2)
BASOPHILS NFR BLD AUTO: 0.3 % (ref 0–1.5)
BILIRUB SERPL-MCNC: 0.4 MG/DL (ref 0–1.2)
BUN SERPL-MCNC: 42 MG/DL (ref 8–23)
BUN/CREAT SERPL: 131.3 (ref 7–25)
CALCIUM SPEC-SCNC: 9.2 MG/DL (ref 8.6–10.5)
CHLORIDE SERPL-SCNC: 91 MMOL/L (ref 98–107)
CO2 SERPL-SCNC: 35.2 MMOL/L (ref 22–29)
CREAT SERPL-MCNC: 0.32 MG/DL (ref 0.57–1)
CRP SERPL-MCNC: 13.35 MG/DL (ref 0–0.5)
DEPRECATED RDW RBC AUTO: 54.6 FL (ref 37–54)
EOSINOPHIL # BLD AUTO: 0.46 10*3/MM3 (ref 0–0.4)
EOSINOPHIL NFR BLD AUTO: 5.1 % (ref 0.3–6.2)
ERYTHROCYTE [DISTWIDTH] IN BLOOD BY AUTOMATED COUNT: 15.4 % (ref 12.3–15.4)
GFR SERPL CREATININE-BSD FRML MDRD: >150 ML/MIN/1.73
GLOBULIN UR ELPH-MCNC: 3.8 GM/DL
GLUCOSE BLDC GLUCOMTR-MCNC: 173 MG/DL (ref 70–130)
GLUCOSE BLDC GLUCOMTR-MCNC: 216 MG/DL (ref 70–130)
GLUCOSE BLDC GLUCOMTR-MCNC: 241 MG/DL (ref 70–130)
GLUCOSE BLDC GLUCOMTR-MCNC: 252 MG/DL (ref 70–130)
GLUCOSE BLDC GLUCOMTR-MCNC: 256 MG/DL (ref 70–130)
GLUCOSE SERPL-MCNC: 225 MG/DL (ref 65–99)
HCT VFR BLD AUTO: 35.5 % (ref 34–46.6)
HGB BLD-MCNC: 10.5 G/DL (ref 12–15.9)
IMM GRANULOCYTES # BLD AUTO: 0.13 10*3/MM3 (ref 0–0.05)
IMM GRANULOCYTES NFR BLD AUTO: 1.4 % (ref 0–0.5)
LYMPHOCYTES # BLD AUTO: 1.21 10*3/MM3 (ref 0.7–3.1)
LYMPHOCYTES NFR BLD AUTO: 13.5 % (ref 19.6–45.3)
MCH RBC QN AUTO: 28.7 PG (ref 26.6–33)
MCHC RBC AUTO-ENTMCNC: 29.6 G/DL (ref 31.5–35.7)
MCV RBC AUTO: 97 FL (ref 79–97)
MONOCYTES # BLD AUTO: 0.67 10*3/MM3 (ref 0.1–0.9)
MONOCYTES NFR BLD AUTO: 7.5 % (ref 5–12)
NEUTROPHILS NFR BLD AUTO: 6.47 10*3/MM3 (ref 1.7–7)
NEUTROPHILS NFR BLD AUTO: 72.2 % (ref 42.7–76)
NRBC BLD AUTO-RTO: 0 /100 WBC (ref 0–0.2)
PLATELET # BLD AUTO: 172 10*3/MM3 (ref 140–450)
PMV BLD AUTO: 10.5 FL (ref 6–12)
POTASSIUM SERPL-SCNC: 3.6 MMOL/L (ref 3.5–5.2)
PROT SERPL-MCNC: 6.6 G/DL (ref 6–8.5)
QT INTERVAL: 322 MS
QTC INTERVAL: 451 MS
RBC # BLD AUTO: 3.66 10*6/MM3 (ref 3.77–5.28)
SODIUM SERPL-SCNC: 137 MMOL/L (ref 136–145)
WBC # BLD AUTO: 8.97 10*3/MM3 (ref 3.4–10.8)

## 2021-02-09 PROCEDURE — 93010 ELECTROCARDIOGRAM REPORT: CPT | Performed by: INTERNAL MEDICINE

## 2021-02-09 PROCEDURE — 86140 C-REACTIVE PROTEIN: CPT | Performed by: PHYSICIAN ASSISTANT

## 2021-02-09 PROCEDURE — 80053 COMPREHEN METABOLIC PANEL: CPT | Performed by: PHYSICIAN ASSISTANT

## 2021-02-09 PROCEDURE — 97110 THERAPEUTIC EXERCISES: CPT

## 2021-02-09 PROCEDURE — 99291 CRITICAL CARE FIRST HOUR: CPT | Performed by: INTERNAL MEDICINE

## 2021-02-09 PROCEDURE — 85025 COMPLETE CBC W/AUTO DIFF WBC: CPT | Performed by: PHYSICIAN ASSISTANT

## 2021-02-09 PROCEDURE — 97530 THERAPEUTIC ACTIVITIES: CPT

## 2021-02-09 PROCEDURE — 93005 ELECTROCARDIOGRAM TRACING: CPT | Performed by: INTERNAL MEDICINE

## 2021-02-09 PROCEDURE — 82962 GLUCOSE BLOOD TEST: CPT

## 2021-02-10 ENCOUNTER — APPOINTMENT (OUTPATIENT)
Dept: GENERAL RADIOLOGY | Facility: HOSPITAL | Age: 76
End: 2021-02-10

## 2021-02-10 ENCOUNTER — OUTSIDE FACILITY SERVICE (OUTPATIENT)
Dept: PULMONOLOGY | Facility: CLINIC | Age: 76
End: 2021-02-10

## 2021-02-10 LAB
ALBUMIN SERPL-MCNC: 2.84 G/DL (ref 3.5–5.2)
ALBUMIN/GLOB SERPL: 0.7 G/DL
ALP SERPL-CCNC: 114 U/L (ref 39–117)
ALT SERPL W P-5'-P-CCNC: 14 U/L (ref 1–33)
ANION GAP SERPL CALCULATED.3IONS-SCNC: 7 MMOL/L (ref 5–15)
AST SERPL-CCNC: 17 U/L (ref 1–32)
BASOPHILS # BLD AUTO: 0.03 10*3/MM3 (ref 0–0.2)
BASOPHILS NFR BLD AUTO: 0.3 % (ref 0–1.5)
BILIRUB SERPL-MCNC: 0.3 MG/DL (ref 0–1.2)
BUN SERPL-MCNC: 46 MG/DL (ref 8–23)
BUN/CREAT SERPL: 153.3 (ref 7–25)
CALCIUM SPEC-SCNC: 9.2 MG/DL (ref 8.6–10.5)
CHLORIDE SERPL-SCNC: 92 MMOL/L (ref 98–107)
CO2 SERPL-SCNC: 36 MMOL/L (ref 22–29)
CREAT SERPL-MCNC: 0.3 MG/DL (ref 0.57–1)
CRP SERPL-MCNC: 13.36 MG/DL (ref 0–0.5)
DEPRECATED RDW RBC AUTO: 54.4 FL (ref 37–54)
EOSINOPHIL # BLD AUTO: 0.4 10*3/MM3 (ref 0–0.4)
EOSINOPHIL NFR BLD AUTO: 4 % (ref 0.3–6.2)
ERYTHROCYTE [DISTWIDTH] IN BLOOD BY AUTOMATED COUNT: 15 % (ref 12.3–15.4)
GFR SERPL CREATININE-BSD FRML MDRD: >150 ML/MIN/1.73
GLOBULIN UR ELPH-MCNC: 3.9 GM/DL
GLUCOSE BLDC GLUCOMTR-MCNC: 168 MG/DL (ref 70–130)
GLUCOSE BLDC GLUCOMTR-MCNC: 173 MG/DL (ref 70–130)
GLUCOSE BLDC GLUCOMTR-MCNC: 235 MG/DL (ref 70–130)
GLUCOSE BLDC GLUCOMTR-MCNC: 241 MG/DL (ref 70–130)
GLUCOSE SERPL-MCNC: 214 MG/DL (ref 65–99)
HCT VFR BLD AUTO: 34.9 % (ref 34–46.6)
HGB BLD-MCNC: 10.3 G/DL (ref 12–15.9)
IMM GRANULOCYTES # BLD AUTO: 0.19 10*3/MM3 (ref 0–0.05)
IMM GRANULOCYTES NFR BLD AUTO: 1.9 % (ref 0–0.5)
LYMPHOCYTES # BLD AUTO: 1.2 10*3/MM3 (ref 0.7–3.1)
LYMPHOCYTES NFR BLD AUTO: 12.1 % (ref 19.6–45.3)
MCH RBC QN AUTO: 28.9 PG (ref 26.6–33)
MCHC RBC AUTO-ENTMCNC: 29.5 G/DL (ref 31.5–35.7)
MCV RBC AUTO: 98 FL (ref 79–97)
MONOCYTES # BLD AUTO: 0.88 10*3/MM3 (ref 0.1–0.9)
MONOCYTES NFR BLD AUTO: 8.9 % (ref 5–12)
NEUTROPHILS NFR BLD AUTO: 7.2 10*3/MM3 (ref 1.7–7)
NEUTROPHILS NFR BLD AUTO: 72.8 % (ref 42.7–76)
NRBC BLD AUTO-RTO: 0 /100 WBC (ref 0–0.2)
PLATELET # BLD AUTO: 170 10*3/MM3 (ref 140–450)
PMV BLD AUTO: 10.8 FL (ref 6–12)
POTASSIUM SERPL-SCNC: 5.4 MMOL/L (ref 3.5–5.2)
PROT SERPL-MCNC: 6.7 G/DL (ref 6–8.5)
RBC # BLD AUTO: 3.56 10*6/MM3 (ref 3.77–5.28)
SODIUM SERPL-SCNC: 135 MMOL/L (ref 136–145)
WBC # BLD AUTO: 9.9 10*3/MM3 (ref 3.4–10.8)

## 2021-02-10 PROCEDURE — 86140 C-REACTIVE PROTEIN: CPT | Performed by: PHYSICIAN ASSISTANT

## 2021-02-10 PROCEDURE — 97110 THERAPEUTIC EXERCISES: CPT

## 2021-02-10 PROCEDURE — 82962 GLUCOSE BLOOD TEST: CPT

## 2021-02-10 PROCEDURE — 71045 X-RAY EXAM CHEST 1 VIEW: CPT | Performed by: RADIOLOGY

## 2021-02-10 PROCEDURE — 71045 X-RAY EXAM CHEST 1 VIEW: CPT

## 2021-02-10 PROCEDURE — 85025 COMPLETE CBC W/AUTO DIFF WBC: CPT | Performed by: PHYSICIAN ASSISTANT

## 2021-02-10 PROCEDURE — 99291 CRITICAL CARE FIRST HOUR: CPT | Performed by: INTERNAL MEDICINE

## 2021-02-10 PROCEDURE — 97530 THERAPEUTIC ACTIVITIES: CPT

## 2021-02-10 PROCEDURE — 80053 COMPREHEN METABOLIC PANEL: CPT | Performed by: PHYSICIAN ASSISTANT

## 2021-02-11 ENCOUNTER — APPOINTMENT (OUTPATIENT)
Dept: GENERAL RADIOLOGY | Facility: HOSPITAL | Age: 76
End: 2021-02-11

## 2021-02-11 ENCOUNTER — OUTSIDE FACILITY SERVICE (OUTPATIENT)
Dept: PULMONOLOGY | Facility: CLINIC | Age: 76
End: 2021-02-11

## 2021-02-11 LAB
ALBUMIN SERPL-MCNC: 2.53 G/DL (ref 3.5–5.2)
ALBUMIN/GLOB SERPL: 0.6 G/DL
ALP SERPL-CCNC: 108 U/L (ref 39–117)
ALT SERPL W P-5'-P-CCNC: 17 U/L (ref 1–33)
ANION GAP SERPL CALCULATED.3IONS-SCNC: 8.2 MMOL/L (ref 5–15)
AST SERPL-CCNC: 20 U/L (ref 1–32)
BASOPHILS # BLD AUTO: 0.04 10*3/MM3 (ref 0–0.2)
BASOPHILS NFR BLD AUTO: 0.4 % (ref 0–1.5)
BILIRUB SERPL-MCNC: 0.2 MG/DL (ref 0–1.2)
BUN SERPL-MCNC: 45 MG/DL (ref 8–23)
BUN/CREAT SERPL: 155.2 (ref 7–25)
CALCIUM SPEC-SCNC: 9.1 MG/DL (ref 8.6–10.5)
CHLORIDE SERPL-SCNC: 93 MMOL/L (ref 98–107)
CO2 SERPL-SCNC: 34.8 MMOL/L (ref 22–29)
CREAT SERPL-MCNC: 0.29 MG/DL (ref 0.57–1)
CRP SERPL-MCNC: 8.16 MG/DL (ref 0–0.5)
DEPRECATED RDW RBC AUTO: 55.1 FL (ref 37–54)
EOSINOPHIL # BLD AUTO: 0.72 10*3/MM3 (ref 0–0.4)
EOSINOPHIL NFR BLD AUTO: 8 % (ref 0.3–6.2)
ERYTHROCYTE [DISTWIDTH] IN BLOOD BY AUTOMATED COUNT: 15.1 % (ref 12.3–15.4)
GFR SERPL CREATININE-BSD FRML MDRD: >150 ML/MIN/1.73
GLOBULIN UR ELPH-MCNC: 4 GM/DL
GLUCOSE BLDC GLUCOMTR-MCNC: 156 MG/DL (ref 70–130)
GLUCOSE BLDC GLUCOMTR-MCNC: 175 MG/DL (ref 70–130)
GLUCOSE BLDC GLUCOMTR-MCNC: 178 MG/DL (ref 70–130)
GLUCOSE BLDC GLUCOMTR-MCNC: 217 MG/DL (ref 70–130)
GLUCOSE BLDC GLUCOMTR-MCNC: 227 MG/DL (ref 70–130)
GLUCOSE SERPL-MCNC: 162 MG/DL (ref 65–99)
HCT VFR BLD AUTO: 33.3 % (ref 34–46.6)
HGB BLD-MCNC: 9.7 G/DL (ref 12–15.9)
IMM GRANULOCYTES # BLD AUTO: 0.13 10*3/MM3 (ref 0–0.05)
IMM GRANULOCYTES NFR BLD AUTO: 1.4 % (ref 0–0.5)
LYMPHOCYTES # BLD AUTO: 1.94 10*3/MM3 (ref 0.7–3.1)
LYMPHOCYTES NFR BLD AUTO: 21.6 % (ref 19.6–45.3)
MCH RBC QN AUTO: 28.8 PG (ref 26.6–33)
MCHC RBC AUTO-ENTMCNC: 29.1 G/DL (ref 31.5–35.7)
MCV RBC AUTO: 98.8 FL (ref 79–97)
MONOCYTES # BLD AUTO: 0.73 10*3/MM3 (ref 0.1–0.9)
MONOCYTES NFR BLD AUTO: 8.1 % (ref 5–12)
NEUTROPHILS NFR BLD AUTO: 5.42 10*3/MM3 (ref 1.7–7)
NEUTROPHILS NFR BLD AUTO: 60.5 % (ref 42.7–76)
NRBC BLD AUTO-RTO: 0 /100 WBC (ref 0–0.2)
PLATELET # BLD AUTO: 178 10*3/MM3 (ref 140–450)
PMV BLD AUTO: 10.7 FL (ref 6–12)
POTASSIUM SERPL-SCNC: 4.3 MMOL/L (ref 3.5–5.2)
PROT SERPL-MCNC: 6.5 G/DL (ref 6–8.5)
RBC # BLD AUTO: 3.37 10*6/MM3 (ref 3.77–5.28)
SODIUM SERPL-SCNC: 136 MMOL/L (ref 136–145)
WBC # BLD AUTO: 8.98 10*3/MM3 (ref 3.4–10.8)

## 2021-02-11 PROCEDURE — 71045 X-RAY EXAM CHEST 1 VIEW: CPT

## 2021-02-11 PROCEDURE — 85025 COMPLETE CBC W/AUTO DIFF WBC: CPT | Performed by: PHYSICIAN ASSISTANT

## 2021-02-11 PROCEDURE — 71045 X-RAY EXAM CHEST 1 VIEW: CPT | Performed by: RADIOLOGY

## 2021-02-11 PROCEDURE — 99291 CRITICAL CARE FIRST HOUR: CPT | Performed by: INTERNAL MEDICINE

## 2021-02-11 PROCEDURE — 86140 C-REACTIVE PROTEIN: CPT | Performed by: PHYSICIAN ASSISTANT

## 2021-02-11 PROCEDURE — 82962 GLUCOSE BLOOD TEST: CPT

## 2021-02-11 PROCEDURE — 80053 COMPREHEN METABOLIC PANEL: CPT | Performed by: PHYSICIAN ASSISTANT

## 2021-02-12 ENCOUNTER — OUTSIDE FACILITY SERVICE (OUTPATIENT)
Dept: PULMONOLOGY | Facility: CLINIC | Age: 76
End: 2021-02-12

## 2021-02-12 LAB
A-A DO2: 310.9 MMHG (ref 0–300)
ALBUMIN SERPL-MCNC: 2.77 G/DL (ref 3.5–5.2)
ALBUMIN/GLOB SERPL: 0.7 G/DL
ALP SERPL-CCNC: 121 U/L (ref 39–117)
ALT SERPL W P-5'-P-CCNC: 22 U/L (ref 1–33)
ANION GAP SERPL CALCULATED.3IONS-SCNC: 9.9 MMOL/L (ref 5–15)
ARTERIAL PATENCY WRIST A: ABNORMAL
AST SERPL-CCNC: 23 U/L (ref 1–32)
ATMOSPHERIC PRESS: 730 MMHG
BASE EXCESS BLDA CALC-SCNC: 15.2 MMOL/L (ref 0–2)
BASOPHILS # BLD AUTO: 0.04 10*3/MM3 (ref 0–0.2)
BASOPHILS NFR BLD AUTO: 0.4 % (ref 0–1.5)
BDY SITE: ABNORMAL
BILIRUB SERPL-MCNC: 0.3 MG/DL (ref 0–1.2)
BODY TEMPERATURE: 0 C
BUN SERPL-MCNC: 37 MG/DL (ref 8–23)
BUN/CREAT SERPL: 127.6 (ref 7–25)
CALCIUM SPEC-SCNC: 9.1 MG/DL (ref 8.6–10.5)
CHLORIDE SERPL-SCNC: 93 MMOL/L (ref 98–107)
CO2 BLDA-SCNC: 43.7 MMOL/L (ref 22–33)
CO2 SERPL-SCNC: 34.1 MMOL/L (ref 22–29)
COHGB MFR BLD: 1.3 % (ref 0–5)
CREAT SERPL-MCNC: 0.29 MG/DL (ref 0.57–1)
CRP SERPL-MCNC: 5.9 MG/DL (ref 0–0.5)
DEPRECATED RDW RBC AUTO: 54.4 FL (ref 37–54)
EOSINOPHIL # BLD AUTO: 0.41 10*3/MM3 (ref 0–0.4)
EOSINOPHIL NFR BLD AUTO: 4.5 % (ref 0.3–6.2)
ERYTHROCYTE [DISTWIDTH] IN BLOOD BY AUTOMATED COUNT: 14.9 % (ref 12.3–15.4)
GFR SERPL CREATININE-BSD FRML MDRD: >150 ML/MIN/1.73
GLOBULIN UR ELPH-MCNC: 3.9 GM/DL
GLUCOSE BLDC GLUCOMTR-MCNC: 191 MG/DL (ref 70–130)
GLUCOSE BLDC GLUCOMTR-MCNC: 214 MG/DL (ref 70–130)
GLUCOSE BLDC GLUCOMTR-MCNC: 229 MG/DL (ref 70–130)
GLUCOSE BLDC GLUCOMTR-MCNC: 232 MG/DL (ref 70–130)
GLUCOSE BLDC GLUCOMTR-MCNC: 253 MG/DL (ref 70–130)
GLUCOSE BLDC GLUCOMTR-MCNC: 261 MG/DL (ref 70–130)
GLUCOSE SERPL-MCNC: 239 MG/DL (ref 65–99)
HCO3 BLDA-SCNC: 41.8 MMOL/L (ref 20–26)
HCT VFR BLD AUTO: 35.5 % (ref 34–46.6)
HCT VFR BLD CALC: 34.2 % (ref 38–51)
HGB BLD-MCNC: 10.5 G/DL (ref 12–15.9)
HGB BLDA-MCNC: 11.1 G/DL (ref 13.5–17.5)
IMM GRANULOCYTES # BLD AUTO: 0.13 10*3/MM3 (ref 0–0.05)
IMM GRANULOCYTES NFR BLD AUTO: 1.4 % (ref 0–0.5)
INHALED O2 CONCENTRATION: 70 %
LYMPHOCYTES # BLD AUTO: 0.77 10*3/MM3 (ref 0.7–3.1)
LYMPHOCYTES NFR BLD AUTO: 8.5 % (ref 19.6–45.3)
Lab: ABNORMAL
MCH RBC QN AUTO: 29.1 PG (ref 26.6–33)
MCHC RBC AUTO-ENTMCNC: 29.6 G/DL (ref 31.5–35.7)
MCV RBC AUTO: 98.3 FL (ref 79–97)
METHGB BLD QL: 0.1 % (ref 0–3)
MODALITY: ABNORMAL
MONOCYTES # BLD AUTO: 0.31 10*3/MM3 (ref 0.1–0.9)
MONOCYTES NFR BLD AUTO: 3.4 % (ref 5–12)
NEUTROPHILS NFR BLD AUTO: 7.42 10*3/MM3 (ref 1.7–7)
NEUTROPHILS NFR BLD AUTO: 81.8 % (ref 42.7–76)
NOTE: ABNORMAL
NOTIFIED WHO: ABNORMAL
NRBC BLD AUTO-RTO: 0 /100 WBC (ref 0–0.2)
OXYHGB MFR BLDV: 97.1 % (ref 94–99)
PCO2 BLDA: 61.8 MM HG (ref 35–45)
PCO2 TEMP ADJ BLD: ABNORMAL MM[HG]
PEEP RESPIRATORY: 0 CM[H2O]
PH BLDA: 7.44 PH UNITS (ref 7.35–7.45)
PH, TEMP CORRECTED: ABNORMAL
PLATELET # BLD AUTO: 183 10*3/MM3 (ref 140–450)
PMV BLD AUTO: 10.4 FL (ref 6–12)
PO2 BLDA: 103 MM HG (ref 83–108)
PO2 TEMP ADJ BLD: ABNORMAL MM[HG]
POTASSIUM SERPL-SCNC: 4 MMOL/L (ref 3.5–5.2)
PROT SERPL-MCNC: 6.7 G/DL (ref 6–8.5)
RBC # BLD AUTO: 3.61 10*6/MM3 (ref 3.77–5.28)
SAO2 % BLDCOA: 98.5 % (ref 94–99)
SET MECH RESP RATE: 24
SODIUM SERPL-SCNC: 137 MMOL/L (ref 136–145)
VENTILATOR MODE: ABNORMAL
VT ON VENT VENT: 450 ML
WBC # BLD AUTO: 9.08 10*3/MM3 (ref 3.4–10.8)

## 2021-02-12 PROCEDURE — 86140 C-REACTIVE PROTEIN: CPT | Performed by: PHYSICIAN ASSISTANT

## 2021-02-12 PROCEDURE — 36600 WITHDRAWAL OF ARTERIAL BLOOD: CPT

## 2021-02-12 PROCEDURE — 80053 COMPREHEN METABOLIC PANEL: CPT | Performed by: PHYSICIAN ASSISTANT

## 2021-02-12 PROCEDURE — 82805 BLOOD GASES W/O2 SATURATION: CPT

## 2021-02-12 PROCEDURE — 83050 HGB METHEMOGLOBIN QUAN: CPT

## 2021-02-12 PROCEDURE — 97530 THERAPEUTIC ACTIVITIES: CPT

## 2021-02-12 PROCEDURE — 99291 CRITICAL CARE FIRST HOUR: CPT | Performed by: INTERNAL MEDICINE

## 2021-02-12 PROCEDURE — 82375 ASSAY CARBOXYHB QUANT: CPT

## 2021-02-12 PROCEDURE — 85025 COMPLETE CBC W/AUTO DIFF WBC: CPT | Performed by: PHYSICIAN ASSISTANT

## 2021-02-12 PROCEDURE — 82962 GLUCOSE BLOOD TEST: CPT

## 2021-02-13 ENCOUNTER — OUTSIDE FACILITY SERVICE (OUTPATIENT)
Dept: PULMONOLOGY | Facility: CLINIC | Age: 76
End: 2021-02-13

## 2021-02-13 ENCOUNTER — APPOINTMENT (OUTPATIENT)
Dept: GENERAL RADIOLOGY | Facility: HOSPITAL | Age: 76
End: 2021-02-13

## 2021-02-13 LAB
ALBUMIN SERPL-MCNC: 2.77 G/DL (ref 3.5–5.2)
ALBUMIN/GLOB SERPL: 0.7 G/DL
ALP SERPL-CCNC: 129 U/L (ref 39–117)
ALT SERPL W P-5'-P-CCNC: 27 U/L (ref 1–33)
ANION GAP SERPL CALCULATED.3IONS-SCNC: 11.6 MMOL/L (ref 5–15)
AST SERPL-CCNC: 30 U/L (ref 1–32)
BASOPHILS # BLD AUTO: 0.04 10*3/MM3 (ref 0–0.2)
BASOPHILS NFR BLD AUTO: 0.5 % (ref 0–1.5)
BILIRUB SERPL-MCNC: 0.2 MG/DL (ref 0–1.2)
BUN SERPL-MCNC: 38 MG/DL (ref 8–23)
BUN/CREAT SERPL: 140.7 (ref 7–25)
CALCIUM SPEC-SCNC: 9.2 MG/DL (ref 8.6–10.5)
CHLORIDE SERPL-SCNC: 90 MMOL/L (ref 98–107)
CO2 SERPL-SCNC: 36.4 MMOL/L (ref 22–29)
CREAT SERPL-MCNC: 0.27 MG/DL (ref 0.57–1)
CRP SERPL-MCNC: 12.99 MG/DL (ref 0–0.5)
DEPRECATED RDW RBC AUTO: 55.7 FL (ref 37–54)
EOSINOPHIL # BLD AUTO: 0.13 10*3/MM3 (ref 0–0.4)
EOSINOPHIL NFR BLD AUTO: 1.6 % (ref 0.3–6.2)
ERYTHROCYTE [DISTWIDTH] IN BLOOD BY AUTOMATED COUNT: 15.1 % (ref 12.3–15.4)
GFR SERPL CREATININE-BSD FRML MDRD: >150 ML/MIN/1.73
GLOBULIN UR ELPH-MCNC: 4 GM/DL
GLUCOSE BLDC GLUCOMTR-MCNC: 197 MG/DL (ref 70–130)
GLUCOSE BLDC GLUCOMTR-MCNC: 206 MG/DL (ref 70–130)
GLUCOSE BLDC GLUCOMTR-MCNC: 208 MG/DL (ref 70–130)
GLUCOSE BLDC GLUCOMTR-MCNC: 213 MG/DL (ref 70–130)
GLUCOSE BLDC GLUCOMTR-MCNC: 226 MG/DL (ref 70–130)
GLUCOSE BLDC GLUCOMTR-MCNC: 238 MG/DL (ref 70–130)
GLUCOSE SERPL-MCNC: 253 MG/DL (ref 65–99)
HCT VFR BLD AUTO: 35.5 % (ref 34–46.6)
HGB BLD-MCNC: 10.1 G/DL (ref 12–15.9)
HYPOCHROMIA BLD QL: NORMAL
IMM GRANULOCYTES # BLD AUTO: 0.16 10*3/MM3 (ref 0–0.05)
IMM GRANULOCYTES NFR BLD AUTO: 2 % (ref 0–0.5)
LYMPHOCYTES # BLD AUTO: 0.69 10*3/MM3 (ref 0.7–3.1)
LYMPHOCYTES NFR BLD AUTO: 8.5 % (ref 19.6–45.3)
MACROCYTES BLD QL SMEAR: NORMAL
MCH RBC QN AUTO: 28.1 PG (ref 26.6–33)
MCHC RBC AUTO-ENTMCNC: 28.5 G/DL (ref 31.5–35.7)
MCV RBC AUTO: 98.6 FL (ref 79–97)
MONOCYTES # BLD AUTO: 0.51 10*3/MM3 (ref 0.1–0.9)
MONOCYTES NFR BLD AUTO: 6.3 % (ref 5–12)
NEUTROPHILS NFR BLD AUTO: 6.56 10*3/MM3 (ref 1.7–7)
NEUTROPHILS NFR BLD AUTO: 81.1 % (ref 42.7–76)
NRBC BLD AUTO-RTO: 0 /100 WBC (ref 0–0.2)
PLAT MORPH BLD: NORMAL
PLATELET # BLD AUTO: 148 10*3/MM3 (ref 140–450)
PMV BLD AUTO: 10.6 FL (ref 6–12)
POTASSIUM SERPL-SCNC: 3.9 MMOL/L (ref 3.5–5.2)
PROT SERPL-MCNC: 6.8 G/DL (ref 6–8.5)
RBC # BLD AUTO: 3.6 10*6/MM3 (ref 3.77–5.28)
SODIUM SERPL-SCNC: 138 MMOL/L (ref 136–145)
WBC # BLD AUTO: 8.09 10*3/MM3 (ref 3.4–10.8)

## 2021-02-13 PROCEDURE — 86140 C-REACTIVE PROTEIN: CPT | Performed by: PHYSICIAN ASSISTANT

## 2021-02-13 PROCEDURE — 82962 GLUCOSE BLOOD TEST: CPT

## 2021-02-13 PROCEDURE — 99291 CRITICAL CARE FIRST HOUR: CPT | Performed by: INTERNAL MEDICINE

## 2021-02-13 PROCEDURE — 80053 COMPREHEN METABOLIC PANEL: CPT | Performed by: PHYSICIAN ASSISTANT

## 2021-02-13 PROCEDURE — 85007 BL SMEAR W/DIFF WBC COUNT: CPT | Performed by: PHYSICIAN ASSISTANT

## 2021-02-13 PROCEDURE — 71045 X-RAY EXAM CHEST 1 VIEW: CPT | Performed by: RADIOLOGY

## 2021-02-13 PROCEDURE — 71045 X-RAY EXAM CHEST 1 VIEW: CPT

## 2021-02-13 PROCEDURE — 85025 COMPLETE CBC W/AUTO DIFF WBC: CPT | Performed by: PHYSICIAN ASSISTANT

## 2021-02-14 ENCOUNTER — APPOINTMENT (OUTPATIENT)
Dept: GENERAL RADIOLOGY | Facility: HOSPITAL | Age: 76
End: 2021-02-14

## 2021-02-14 ENCOUNTER — OUTSIDE FACILITY SERVICE (OUTPATIENT)
Dept: PULMONOLOGY | Facility: CLINIC | Age: 76
End: 2021-02-14

## 2021-02-14 LAB
ALBUMIN SERPL-MCNC: 2.49 G/DL (ref 3.5–5.2)
ALBUMIN/GLOB SERPL: 0.6 G/DL
ALP SERPL-CCNC: 146 U/L (ref 39–117)
ALT SERPL W P-5'-P-CCNC: 38 U/L (ref 1–33)
ANION GAP SERPL CALCULATED.3IONS-SCNC: 8.6 MMOL/L (ref 5–15)
AST SERPL-CCNC: 50 U/L (ref 1–32)
BASOPHILS # BLD AUTO: 0.04 10*3/MM3 (ref 0–0.2)
BASOPHILS NFR BLD AUTO: 0.4 % (ref 0–1.5)
BILIRUB SERPL-MCNC: 0.2 MG/DL (ref 0–1.2)
BUN SERPL-MCNC: 44 MG/DL (ref 8–23)
BUN/CREAT SERPL: 183.3 (ref 7–25)
CALCIUM SPEC-SCNC: 9.4 MG/DL (ref 8.6–10.5)
CHLORIDE SERPL-SCNC: 91 MMOL/L (ref 98–107)
CO2 SERPL-SCNC: 37.4 MMOL/L (ref 22–29)
CREAT SERPL-MCNC: 0.24 MG/DL (ref 0.57–1)
CRP SERPL-MCNC: 13.89 MG/DL (ref 0–0.5)
DEPRECATED RDW RBC AUTO: 54.3 FL (ref 37–54)
EOSINOPHIL # BLD AUTO: 0.22 10*3/MM3 (ref 0–0.4)
EOSINOPHIL NFR BLD AUTO: 2.1 % (ref 0.3–6.2)
ERYTHROCYTE [DISTWIDTH] IN BLOOD BY AUTOMATED COUNT: 15.1 % (ref 12.3–15.4)
GFR SERPL CREATININE-BSD FRML MDRD: >150 ML/MIN/1.73
GLOBULIN UR ELPH-MCNC: 4.4 GM/DL
GLUCOSE BLDC GLUCOMTR-MCNC: 179 MG/DL (ref 70–130)
GLUCOSE BLDC GLUCOMTR-MCNC: 201 MG/DL (ref 70–130)
GLUCOSE BLDC GLUCOMTR-MCNC: 203 MG/DL (ref 70–130)
GLUCOSE BLDC GLUCOMTR-MCNC: 207 MG/DL (ref 70–130)
GLUCOSE SERPL-MCNC: 220 MG/DL (ref 65–99)
HCT VFR BLD AUTO: 34.1 % (ref 34–46.6)
HGB BLD-MCNC: 9.8 G/DL (ref 12–15.9)
HYPOCHROMIA BLD QL: NORMAL
IMM GRANULOCYTES # BLD AUTO: 0.16 10*3/MM3 (ref 0–0.05)
IMM GRANULOCYTES NFR BLD AUTO: 1.5 % (ref 0–0.5)
LYMPHOCYTES # BLD AUTO: 1.24 10*3/MM3 (ref 0.7–3.1)
LYMPHOCYTES NFR BLD AUTO: 12 % (ref 19.6–45.3)
MACROCYTES BLD QL SMEAR: NORMAL
MCH RBC QN AUTO: 28.2 PG (ref 26.6–33)
MCHC RBC AUTO-ENTMCNC: 28.7 G/DL (ref 31.5–35.7)
MCV RBC AUTO: 98.3 FL (ref 79–97)
MONOCYTES # BLD AUTO: 0.75 10*3/MM3 (ref 0.1–0.9)
MONOCYTES NFR BLD AUTO: 7.2 % (ref 5–12)
NEUTROPHILS NFR BLD AUTO: 7.96 10*3/MM3 (ref 1.7–7)
NEUTROPHILS NFR BLD AUTO: 76.8 % (ref 42.7–76)
NRBC BLD AUTO-RTO: 0 /100 WBC (ref 0–0.2)
PLAT MORPH BLD: NORMAL
PLATELET # BLD AUTO: 144 10*3/MM3 (ref 140–450)
PMV BLD AUTO: 10.8 FL (ref 6–12)
POTASSIUM SERPL-SCNC: 3.9 MMOL/L (ref 3.5–5.2)
PROT SERPL-MCNC: 6.9 G/DL (ref 6–8.5)
RBC # BLD AUTO: 3.47 10*6/MM3 (ref 3.77–5.28)
SODIUM SERPL-SCNC: 137 MMOL/L (ref 136–145)
WBC # BLD AUTO: 10.37 10*3/MM3 (ref 3.4–10.8)

## 2021-02-14 PROCEDURE — 82962 GLUCOSE BLOOD TEST: CPT

## 2021-02-14 PROCEDURE — 99291 CRITICAL CARE FIRST HOUR: CPT | Performed by: INTERNAL MEDICINE

## 2021-02-14 PROCEDURE — 71045 X-RAY EXAM CHEST 1 VIEW: CPT

## 2021-02-14 PROCEDURE — 85007 BL SMEAR W/DIFF WBC COUNT: CPT | Performed by: PHYSICIAN ASSISTANT

## 2021-02-14 PROCEDURE — 85025 COMPLETE CBC W/AUTO DIFF WBC: CPT | Performed by: PHYSICIAN ASSISTANT

## 2021-02-14 PROCEDURE — 86140 C-REACTIVE PROTEIN: CPT | Performed by: PHYSICIAN ASSISTANT

## 2021-02-14 PROCEDURE — 80053 COMPREHEN METABOLIC PANEL: CPT | Performed by: PHYSICIAN ASSISTANT

## 2021-02-15 ENCOUNTER — OUTSIDE FACILITY SERVICE (OUTPATIENT)
Dept: PULMONOLOGY | Facility: CLINIC | Age: 76
End: 2021-02-15

## 2021-02-15 LAB
ALBUMIN SERPL-MCNC: 2.51 G/DL (ref 3.5–5.2)
ALBUMIN/GLOB SERPL: 0.6 G/DL
ALP SERPL-CCNC: 143 U/L (ref 39–117)
ALT SERPL W P-5'-P-CCNC: 51 U/L (ref 1–33)
ANION GAP SERPL CALCULATED.3IONS-SCNC: 7.1 MMOL/L (ref 5–15)
ANISOCYTOSIS BLD QL: NORMAL
AST SERPL-CCNC: 54 U/L (ref 1–32)
BASOPHILS # BLD AUTO: 0.02 10*3/MM3 (ref 0–0.2)
BASOPHILS NFR BLD AUTO: 0.2 % (ref 0–1.5)
BILIRUB SERPL-MCNC: 0.3 MG/DL (ref 0–1.2)
BUN SERPL-MCNC: 41 MG/DL (ref 8–23)
BUN/CREAT SERPL: 178.3 (ref 7–25)
CALCIUM SPEC-SCNC: 9.3 MG/DL (ref 8.6–10.5)
CHLORIDE SERPL-SCNC: 90 MMOL/L (ref 98–107)
CO2 SERPL-SCNC: 38.9 MMOL/L (ref 22–29)
CREAT SERPL-MCNC: 0.23 MG/DL (ref 0.57–1)
CRP SERPL-MCNC: 18.28 MG/DL (ref 0–0.5)
DEPRECATED RDW RBC AUTO: 55 FL (ref 37–54)
EOSINOPHIL # BLD AUTO: 0.42 10*3/MM3 (ref 0–0.4)
EOSINOPHIL NFR BLD AUTO: 5.2 % (ref 0.3–6.2)
ERYTHROCYTE [DISTWIDTH] IN BLOOD BY AUTOMATED COUNT: 15.2 % (ref 12.3–15.4)
GFR SERPL CREATININE-BSD FRML MDRD: >150 ML/MIN/1.73
GLOBULIN UR ELPH-MCNC: 4.1 GM/DL
GLUCOSE BLDC GLUCOMTR-MCNC: 142 MG/DL (ref 70–130)
GLUCOSE BLDC GLUCOMTR-MCNC: 173 MG/DL (ref 70–130)
GLUCOSE BLDC GLUCOMTR-MCNC: 217 MG/DL (ref 70–130)
GLUCOSE BLDC GLUCOMTR-MCNC: 265 MG/DL (ref 70–130)
GLUCOSE SERPL-MCNC: 174 MG/DL (ref 65–99)
HCT VFR BLD AUTO: 33.1 % (ref 34–46.6)
HGB BLD-MCNC: 9.5 G/DL (ref 12–15.9)
HYPOCHROMIA BLD QL: NORMAL
IMM GRANULOCYTES # BLD AUTO: 0.14 10*3/MM3 (ref 0–0.05)
IMM GRANULOCYTES NFR BLD AUTO: 1.7 % (ref 0–0.5)
LYMPHOCYTES # BLD AUTO: 1.4 10*3/MM3 (ref 0.7–3.1)
LYMPHOCYTES NFR BLD AUTO: 17.4 % (ref 19.6–45.3)
MACROCYTES BLD QL SMEAR: NORMAL
MCH RBC QN AUTO: 28.4 PG (ref 26.6–33)
MCHC RBC AUTO-ENTMCNC: 28.7 G/DL (ref 31.5–35.7)
MCV RBC AUTO: 98.8 FL (ref 79–97)
MONOCYTES # BLD AUTO: 0.54 10*3/MM3 (ref 0.1–0.9)
MONOCYTES NFR BLD AUTO: 6.7 % (ref 5–12)
NEUTROPHILS NFR BLD AUTO: 5.51 10*3/MM3 (ref 1.7–7)
NEUTROPHILS NFR BLD AUTO: 68.8 % (ref 42.7–76)
NRBC BLD AUTO-RTO: 0 /100 WBC (ref 0–0.2)
PLAT MORPH BLD: NORMAL
PLATELET # BLD AUTO: 150 10*3/MM3 (ref 140–450)
PMV BLD AUTO: 10.7 FL (ref 6–12)
POTASSIUM SERPL-SCNC: 3.4 MMOL/L (ref 3.5–5.2)
POTASSIUM SERPL-SCNC: 3.9 MMOL/L (ref 3.5–5.2)
PROT SERPL-MCNC: 6.6 G/DL (ref 6–8.5)
RBC # BLD AUTO: 3.35 10*6/MM3 (ref 3.77–5.28)
SODIUM SERPL-SCNC: 136 MMOL/L (ref 136–145)
WBC # BLD AUTO: 8.03 10*3/MM3 (ref 3.4–10.8)

## 2021-02-15 PROCEDURE — 99291 CRITICAL CARE FIRST HOUR: CPT | Performed by: INTERNAL MEDICINE

## 2021-02-15 PROCEDURE — 82962 GLUCOSE BLOOD TEST: CPT

## 2021-02-15 PROCEDURE — 85025 COMPLETE CBC W/AUTO DIFF WBC: CPT | Performed by: PHYSICIAN ASSISTANT

## 2021-02-15 PROCEDURE — 84132 ASSAY OF SERUM POTASSIUM: CPT | Performed by: INTERNAL MEDICINE

## 2021-02-15 PROCEDURE — 86140 C-REACTIVE PROTEIN: CPT | Performed by: PHYSICIAN ASSISTANT

## 2021-02-15 PROCEDURE — 85007 BL SMEAR W/DIFF WBC COUNT: CPT | Performed by: PHYSICIAN ASSISTANT

## 2021-02-15 PROCEDURE — 80053 COMPREHEN METABOLIC PANEL: CPT | Performed by: PHYSICIAN ASSISTANT

## 2021-02-16 ENCOUNTER — OUTSIDE FACILITY SERVICE (OUTPATIENT)
Dept: PULMONOLOGY | Facility: CLINIC | Age: 76
End: 2021-02-16

## 2021-02-16 ENCOUNTER — APPOINTMENT (OUTPATIENT)
Dept: GENERAL RADIOLOGY | Facility: HOSPITAL | Age: 76
End: 2021-02-16

## 2021-02-16 LAB
A-A DO2: 247 MMHG (ref 0–300)
ALBUMIN SERPL-MCNC: 2.42 G/DL (ref 3.5–5.2)
ALBUMIN/GLOB SERPL: 0.6 G/DL
ALP SERPL-CCNC: 191 U/L (ref 39–117)
ALT SERPL W P-5'-P-CCNC: 58 U/L (ref 1–33)
ANION GAP SERPL CALCULATED.3IONS-SCNC: 10.5 MMOL/L (ref 5–15)
ANISOCYTOSIS BLD QL: NORMAL
ARTERIAL PATENCY WRIST A: POSITIVE
AST SERPL-CCNC: 50 U/L (ref 1–32)
ATMOSPHERIC PRESS: 728 MMHG
BASE EXCESS BLDA CALC-SCNC: 16.9 MMOL/L (ref 0–2)
BASOPHILS # BLD AUTO: 0.04 10*3/MM3 (ref 0–0.2)
BASOPHILS NFR BLD AUTO: 0.5 % (ref 0–1.5)
BDY SITE: ABNORMAL
BILIRUB SERPL-MCNC: 0.3 MG/DL (ref 0–1.2)
BODY TEMPERATURE: 0 C
BUN SERPL-MCNC: 43 MG/DL (ref 8–23)
BUN/CREAT SERPL: 148.3 (ref 7–25)
CALCIUM SPEC-SCNC: 9.1 MG/DL (ref 8.6–10.5)
CHLORIDE SERPL-SCNC: 92 MMOL/L (ref 98–107)
CO2 BLDA-SCNC: 44.9 MMOL/L (ref 22–33)
CO2 SERPL-SCNC: 33.5 MMOL/L (ref 22–29)
COHGB MFR BLD: 1.3 % (ref 0–5)
CREAT SERPL-MCNC: 0.29 MG/DL (ref 0.57–1)
CRP SERPL-MCNC: 17.62 MG/DL (ref 0–0.5)
DEPRECATED RDW RBC AUTO: 57.2 FL (ref 37–54)
EOSINOPHIL # BLD AUTO: 0.12 10*3/MM3 (ref 0–0.4)
EOSINOPHIL NFR BLD AUTO: 1.5 % (ref 0.3–6.2)
ERYTHROCYTE [DISTWIDTH] IN BLOOD BY AUTOMATED COUNT: 15.5 % (ref 12.3–15.4)
GFR SERPL CREATININE-BSD FRML MDRD: >150 ML/MIN/1.73
GLOBULIN UR ELPH-MCNC: 4.4 GM/DL
GLUCOSE BLDC GLUCOMTR-MCNC: 226 MG/DL (ref 70–130)
GLUCOSE BLDC GLUCOMTR-MCNC: 238 MG/DL (ref 70–130)
GLUCOSE BLDC GLUCOMTR-MCNC: 243 MG/DL (ref 70–130)
GLUCOSE BLDC GLUCOMTR-MCNC: 264 MG/DL (ref 70–130)
GLUCOSE SERPL-MCNC: 256 MG/DL (ref 65–99)
HCO3 BLDA-SCNC: 43.1 MMOL/L (ref 20–26)
HCT VFR BLD AUTO: 36.5 % (ref 34–46.6)
HCT VFR BLD CALC: 29.8 % (ref 38–51)
HGB BLD-MCNC: 10.3 G/DL (ref 12–15.9)
HGB BLDA-MCNC: 9.7 G/DL (ref 13.5–17.5)
HYPOCHROMIA BLD QL: NORMAL
IMM GRANULOCYTES # BLD AUTO: 0.17 10*3/MM3 (ref 0–0.05)
IMM GRANULOCYTES NFR BLD AUTO: 2.1 % (ref 0–0.5)
INHALED O2 CONCENTRATION: 60 %
LYMPHOCYTES # BLD AUTO: 1.06 10*3/MM3 (ref 0.7–3.1)
LYMPHOCYTES NFR BLD AUTO: 13.2 % (ref 19.6–45.3)
Lab: ABNORMAL
MACROCYTES BLD QL SMEAR: NORMAL
MCH RBC QN AUTO: 28.2 PG (ref 26.6–33)
MCHC RBC AUTO-ENTMCNC: 28.2 G/DL (ref 31.5–35.7)
MCV RBC AUTO: 100 FL (ref 79–97)
METHGB BLD QL: 0 % (ref 0–3)
MODALITY: ABNORMAL
MONOCYTES # BLD AUTO: 0.53 10*3/MM3 (ref 0.1–0.9)
MONOCYTES NFR BLD AUTO: 6.6 % (ref 5–12)
NEUTROPHILS NFR BLD AUTO: 6.14 10*3/MM3 (ref 1.7–7)
NEUTROPHILS NFR BLD AUTO: 76.1 % (ref 42.7–76)
NOTE: ABNORMAL
NOTIFIED WHO: ABNORMAL
NRBC BLD AUTO-RTO: 0 /100 WBC (ref 0–0.2)
OXYHGB MFR BLDV: 96.7 % (ref 94–99)
PCO2 BLDA: 60.6 MM HG (ref 35–45)
PCO2 TEMP ADJ BLD: ABNORMAL MM[HG]
PEEP RESPIRATORY: 0 CM[H2O]
PH BLDA: 7.46 PH UNITS (ref 7.35–7.45)
PH, TEMP CORRECTED: ABNORMAL
PLAT MORPH BLD: NORMAL
PLATELET # BLD AUTO: 152 10*3/MM3 (ref 140–450)
PMV BLD AUTO: 10.6 FL (ref 6–12)
PO2 BLDA: 97.2 MM HG (ref 83–108)
PO2 TEMP ADJ BLD: ABNORMAL MM[HG]
POTASSIUM SERPL-SCNC: 3.8 MMOL/L (ref 3.5–5.2)
PROT SERPL-MCNC: 6.8 G/DL (ref 6–8.5)
RBC # BLD AUTO: 3.65 10*6/MM3 (ref 3.77–5.28)
SAO2 % BLDCOA: 98 % (ref 94–99)
SET MECH RESP RATE: 24
SODIUM SERPL-SCNC: 136 MMOL/L (ref 136–145)
VENTILATOR MODE: ABNORMAL
VT ON VENT VENT: 450 ML
WBC # BLD AUTO: 8.06 10*3/MM3 (ref 3.4–10.8)

## 2021-02-16 PROCEDURE — 82805 BLOOD GASES W/O2 SATURATION: CPT

## 2021-02-16 PROCEDURE — 80053 COMPREHEN METABOLIC PANEL: CPT | Performed by: PHYSICIAN ASSISTANT

## 2021-02-16 PROCEDURE — 83050 HGB METHEMOGLOBIN QUAN: CPT

## 2021-02-16 PROCEDURE — 82962 GLUCOSE BLOOD TEST: CPT

## 2021-02-16 PROCEDURE — 85007 BL SMEAR W/DIFF WBC COUNT: CPT | Performed by: PHYSICIAN ASSISTANT

## 2021-02-16 PROCEDURE — 87077 CULTURE AEROBIC IDENTIFY: CPT | Performed by: INTERNAL MEDICINE

## 2021-02-16 PROCEDURE — 71045 X-RAY EXAM CHEST 1 VIEW: CPT | Performed by: RADIOLOGY

## 2021-02-16 PROCEDURE — 87070 CULTURE OTHR SPECIMN AEROBIC: CPT | Performed by: INTERNAL MEDICINE

## 2021-02-16 PROCEDURE — 87186 SC STD MICRODIL/AGAR DIL: CPT | Performed by: INTERNAL MEDICINE

## 2021-02-16 PROCEDURE — 82375 ASSAY CARBOXYHB QUANT: CPT

## 2021-02-16 PROCEDURE — 86140 C-REACTIVE PROTEIN: CPT | Performed by: PHYSICIAN ASSISTANT

## 2021-02-16 PROCEDURE — 36600 WITHDRAWAL OF ARTERIAL BLOOD: CPT

## 2021-02-16 PROCEDURE — 87205 SMEAR GRAM STAIN: CPT | Performed by: INTERNAL MEDICINE

## 2021-02-16 PROCEDURE — 99291 CRITICAL CARE FIRST HOUR: CPT | Performed by: INTERNAL MEDICINE

## 2021-02-16 PROCEDURE — 71045 X-RAY EXAM CHEST 1 VIEW: CPT

## 2021-02-16 PROCEDURE — 85025 COMPLETE CBC W/AUTO DIFF WBC: CPT | Performed by: PHYSICIAN ASSISTANT

## 2021-02-17 ENCOUNTER — OUTSIDE FACILITY SERVICE (OUTPATIENT)
Dept: PULMONOLOGY | Facility: CLINIC | Age: 76
End: 2021-02-17

## 2021-02-17 LAB
ALBUMIN SERPL-MCNC: 2.54 G/DL (ref 3.5–5.2)
ALBUMIN/GLOB SERPL: 0.6 G/DL
ALP SERPL-CCNC: 187 U/L (ref 39–117)
ALT SERPL W P-5'-P-CCNC: 68 U/L (ref 1–33)
ANION GAP SERPL CALCULATED.3IONS-SCNC: 9.1 MMOL/L (ref 5–15)
ANISOCYTOSIS BLD QL: NORMAL
AST SERPL-CCNC: 57 U/L (ref 1–32)
BASOPHILS # BLD AUTO: 0.03 10*3/MM3 (ref 0–0.2)
BASOPHILS NFR BLD AUTO: 0.4 % (ref 0–1.5)
BILIRUB SERPL-MCNC: 0.2 MG/DL (ref 0–1.2)
BUN SERPL-MCNC: 45 MG/DL (ref 8–23)
BUN/CREAT SERPL: 150 (ref 7–25)
CALCIUM SPEC-SCNC: 9.1 MG/DL (ref 8.6–10.5)
CHLORIDE SERPL-SCNC: 101 MMOL/L (ref 98–107)
CO2 SERPL-SCNC: 34.9 MMOL/L (ref 22–29)
CREAT SERPL-MCNC: 0.3 MG/DL (ref 0.57–1)
CRP SERPL-MCNC: 15.02 MG/DL (ref 0–0.5)
DEPRECATED RDW RBC AUTO: 58.9 FL (ref 37–54)
EOSINOPHIL # BLD AUTO: 0.24 10*3/MM3 (ref 0–0.4)
EOSINOPHIL NFR BLD AUTO: 3.3 % (ref 0.3–6.2)
ERYTHROCYTE [DISTWIDTH] IN BLOOD BY AUTOMATED COUNT: 15.6 % (ref 12.3–15.4)
GFR SERPL CREATININE-BSD FRML MDRD: >150 ML/MIN/1.73
GLOBULIN UR ELPH-MCNC: 4.2 GM/DL
GLUCOSE BLDC GLUCOMTR-MCNC: 160 MG/DL (ref 70–130)
GLUCOSE BLDC GLUCOMTR-MCNC: 216 MG/DL (ref 70–130)
GLUCOSE BLDC GLUCOMTR-MCNC: 279 MG/DL (ref 70–130)
GLUCOSE BLDC GLUCOMTR-MCNC: 283 MG/DL (ref 70–130)
GLUCOSE SERPL-MCNC: 215 MG/DL (ref 65–99)
HCT VFR BLD AUTO: 34.8 % (ref 34–46.6)
HGB BLD-MCNC: 9.5 G/DL (ref 12–15.9)
HYPOCHROMIA BLD QL: NORMAL
IMM GRANULOCYTES # BLD AUTO: 0.18 10*3/MM3 (ref 0–0.05)
IMM GRANULOCYTES NFR BLD AUTO: 2.4 % (ref 0–0.5)
LYMPHOCYTES # BLD AUTO: 1.23 10*3/MM3 (ref 0.7–3.1)
LYMPHOCYTES NFR BLD AUTO: 16.7 % (ref 19.6–45.3)
MACROCYTES BLD QL SMEAR: NORMAL
MCH RBC QN AUTO: 27.8 PG (ref 26.6–33)
MCHC RBC AUTO-ENTMCNC: 27.3 G/DL (ref 31.5–35.7)
MCV RBC AUTO: 101.8 FL (ref 79–97)
MONOCYTES # BLD AUTO: 0.51 10*3/MM3 (ref 0.1–0.9)
MONOCYTES NFR BLD AUTO: 6.9 % (ref 5–12)
NEUTROPHILS NFR BLD AUTO: 5.18 10*3/MM3 (ref 1.7–7)
NEUTROPHILS NFR BLD AUTO: 70.3 % (ref 42.7–76)
NRBC BLD AUTO-RTO: 0 /100 WBC (ref 0–0.2)
PLATELET # BLD AUTO: 158 10*3/MM3 (ref 140–450)
PMV BLD AUTO: 10.5 FL (ref 6–12)
POTASSIUM SERPL-SCNC: 4.8 MMOL/L (ref 3.5–5.2)
PROT SERPL-MCNC: 6.7 G/DL (ref 6–8.5)
RBC # BLD AUTO: 3.42 10*6/MM3 (ref 3.77–5.28)
SMALL PLATELETS BLD QL SMEAR: ADEQUATE
SODIUM SERPL-SCNC: 145 MMOL/L (ref 136–145)
WBC # BLD AUTO: 7.37 10*3/MM3 (ref 3.4–10.8)

## 2021-02-17 PROCEDURE — 97530 THERAPEUTIC ACTIVITIES: CPT

## 2021-02-17 PROCEDURE — 97110 THERAPEUTIC EXERCISES: CPT

## 2021-02-17 PROCEDURE — 86140 C-REACTIVE PROTEIN: CPT | Performed by: PHYSICIAN ASSISTANT

## 2021-02-17 PROCEDURE — 85007 BL SMEAR W/DIFF WBC COUNT: CPT | Performed by: PHYSICIAN ASSISTANT

## 2021-02-17 PROCEDURE — 80053 COMPREHEN METABOLIC PANEL: CPT | Performed by: PHYSICIAN ASSISTANT

## 2021-02-17 PROCEDURE — 99291 CRITICAL CARE FIRST HOUR: CPT | Performed by: INTERNAL MEDICINE

## 2021-02-17 PROCEDURE — 85025 COMPLETE CBC W/AUTO DIFF WBC: CPT | Performed by: PHYSICIAN ASSISTANT

## 2021-02-17 PROCEDURE — 82962 GLUCOSE BLOOD TEST: CPT

## 2021-02-18 ENCOUNTER — OUTSIDE FACILITY SERVICE (OUTPATIENT)
Dept: PULMONOLOGY | Facility: CLINIC | Age: 76
End: 2021-02-18

## 2021-02-18 LAB
ALBUMIN SERPL-MCNC: 2.42 G/DL (ref 3.5–5.2)
ALBUMIN/GLOB SERPL: 0.5 G/DL
ALP SERPL-CCNC: 177 U/L (ref 39–117)
ALT SERPL W P-5'-P-CCNC: 65 U/L (ref 1–33)
ANION GAP SERPL CALCULATED.3IONS-SCNC: 9.4 MMOL/L (ref 5–15)
ANISOCYTOSIS BLD QL: NORMAL
AST SERPL-CCNC: 45 U/L (ref 1–32)
BASOPHILS # BLD AUTO: 0.04 10*3/MM3 (ref 0–0.2)
BASOPHILS NFR BLD AUTO: 0.3 % (ref 0–1.5)
BILIRUB SERPL-MCNC: 0.3 MG/DL (ref 0–1.2)
BUN SERPL-MCNC: 40 MG/DL (ref 8–23)
BUN/CREAT SERPL: 148.1 (ref 7–25)
CALCIUM SPEC-SCNC: 9.4 MG/DL (ref 8.6–10.5)
CHLORIDE SERPL-SCNC: 98 MMOL/L (ref 98–107)
CO2 SERPL-SCNC: 34.6 MMOL/L (ref 22–29)
CREAT SERPL-MCNC: 0.27 MG/DL (ref 0.57–1)
CRP SERPL-MCNC: 13.65 MG/DL (ref 0–0.5)
DEPRECATED RDW RBC AUTO: 57 FL (ref 37–54)
EOSINOPHIL # BLD AUTO: 0.25 10*3/MM3 (ref 0–0.4)
EOSINOPHIL NFR BLD AUTO: 2.1 % (ref 0.3–6.2)
ERYTHROCYTE [DISTWIDTH] IN BLOOD BY AUTOMATED COUNT: 15.5 % (ref 12.3–15.4)
GFR SERPL CREATININE-BSD FRML MDRD: >150 ML/MIN/1.73
GLOBULIN UR ELPH-MCNC: 4.7 GM/DL
GLUCOSE BLDC GLUCOMTR-MCNC: 214 MG/DL (ref 70–130)
GLUCOSE BLDC GLUCOMTR-MCNC: 247 MG/DL (ref 70–130)
GLUCOSE BLDC GLUCOMTR-MCNC: 260 MG/DL (ref 70–130)
GLUCOSE BLDC GLUCOMTR-MCNC: 354 MG/DL (ref 70–130)
GLUCOSE SERPL-MCNC: 259 MG/DL (ref 65–99)
HCT VFR BLD AUTO: 36 % (ref 34–46.6)
HGB BLD-MCNC: 10.1 G/DL (ref 12–15.9)
HYPOCHROMIA BLD QL: NORMAL
IMM GRANULOCYTES # BLD AUTO: 0.21 10*3/MM3 (ref 0–0.05)
IMM GRANULOCYTES NFR BLD AUTO: 1.8 % (ref 0–0.5)
LYMPHOCYTES # BLD AUTO: 0.8 10*3/MM3 (ref 0.7–3.1)
LYMPHOCYTES NFR BLD AUTO: 6.7 % (ref 19.6–45.3)
MACROCYTES BLD QL SMEAR: NORMAL
MCH RBC QN AUTO: 28.1 PG (ref 26.6–33)
MCHC RBC AUTO-ENTMCNC: 28.1 G/DL (ref 31.5–35.7)
MCV RBC AUTO: 100 FL (ref 79–97)
MONOCYTES # BLD AUTO: 0.47 10*3/MM3 (ref 0.1–0.9)
MONOCYTES NFR BLD AUTO: 3.9 % (ref 5–12)
NEUTROPHILS NFR BLD AUTO: 10.16 10*3/MM3 (ref 1.7–7)
NEUTROPHILS NFR BLD AUTO: 85.2 % (ref 42.7–76)
NRBC BLD AUTO-RTO: 0 /100 WBC (ref 0–0.2)
PLAT MORPH BLD: NORMAL
PLATELET # BLD AUTO: 186 10*3/MM3 (ref 140–450)
PMV BLD AUTO: 10.2 FL (ref 6–12)
POTASSIUM SERPL-SCNC: 4.2 MMOL/L (ref 3.5–5.2)
PROT SERPL-MCNC: 7.1 G/DL (ref 6–8.5)
RBC # BLD AUTO: 3.6 10*6/MM3 (ref 3.77–5.28)
SODIUM SERPL-SCNC: 142 MMOL/L (ref 136–145)
STOMATOCYTES BLD QL SMEAR: NORMAL
WBC # BLD AUTO: 11.93 10*3/MM3 (ref 3.4–10.8)

## 2021-02-18 PROCEDURE — 82962 GLUCOSE BLOOD TEST: CPT

## 2021-02-18 PROCEDURE — 86140 C-REACTIVE PROTEIN: CPT | Performed by: PHYSICIAN ASSISTANT

## 2021-02-18 PROCEDURE — 80053 COMPREHEN METABOLIC PANEL: CPT | Performed by: PHYSICIAN ASSISTANT

## 2021-02-18 PROCEDURE — 97110 THERAPEUTIC EXERCISES: CPT

## 2021-02-18 PROCEDURE — 85025 COMPLETE CBC W/AUTO DIFF WBC: CPT | Performed by: PHYSICIAN ASSISTANT

## 2021-02-18 PROCEDURE — 85007 BL SMEAR W/DIFF WBC COUNT: CPT | Performed by: PHYSICIAN ASSISTANT

## 2021-02-18 PROCEDURE — 99291 CRITICAL CARE FIRST HOUR: CPT | Performed by: INTERNAL MEDICINE

## 2021-02-18 PROCEDURE — 97530 THERAPEUTIC ACTIVITIES: CPT

## 2021-02-19 ENCOUNTER — OUTSIDE FACILITY SERVICE (OUTPATIENT)
Dept: PULMONOLOGY | Facility: CLINIC | Age: 76
End: 2021-02-19

## 2021-02-19 ENCOUNTER — APPOINTMENT (OUTPATIENT)
Dept: GENERAL RADIOLOGY | Facility: HOSPITAL | Age: 76
End: 2021-02-19

## 2021-02-19 LAB
ALBUMIN SERPL-MCNC: 2.44 G/DL (ref 3.5–5.2)
ALBUMIN/GLOB SERPL: 0.6 G/DL
ALP SERPL-CCNC: 174 U/L (ref 39–117)
ALT SERPL W P-5'-P-CCNC: 59 U/L (ref 1–33)
ANION GAP SERPL CALCULATED.3IONS-SCNC: 8.6 MMOL/L (ref 5–15)
ANISOCYTOSIS BLD QL: NORMAL
AST SERPL-CCNC: 34 U/L (ref 1–32)
BASOPHILS # BLD AUTO: 0.03 10*3/MM3 (ref 0–0.2)
BASOPHILS NFR BLD AUTO: 0.3 % (ref 0–1.5)
BILIRUB SERPL-MCNC: 0.2 MG/DL (ref 0–1.2)
BUN SERPL-MCNC: 44 MG/DL (ref 8–23)
BUN/CREAT SERPL: 146.7 (ref 7–25)
CALCIUM SPEC-SCNC: 9.4 MG/DL (ref 8.6–10.5)
CHLORIDE SERPL-SCNC: 102 MMOL/L (ref 98–107)
CO2 SERPL-SCNC: 34.4 MMOL/L (ref 22–29)
CREAT SERPL-MCNC: 0.3 MG/DL (ref 0.57–1)
CRP SERPL-MCNC: 18.35 MG/DL (ref 0–0.5)
DEPRECATED RDW RBC AUTO: 57.4 FL (ref 37–54)
EOSINOPHIL # BLD AUTO: 0.11 10*3/MM3 (ref 0–0.4)
EOSINOPHIL NFR BLD AUTO: 1.3 % (ref 0.3–6.2)
ERYTHROCYTE [DISTWIDTH] IN BLOOD BY AUTOMATED COUNT: 15.5 % (ref 12.3–15.4)
GFR SERPL CREATININE-BSD FRML MDRD: >150 ML/MIN/1.73
GLOBULIN UR ELPH-MCNC: 4.4 GM/DL
GLUCOSE BLDC GLUCOMTR-MCNC: 254 MG/DL (ref 70–130)
GLUCOSE BLDC GLUCOMTR-MCNC: 292 MG/DL (ref 70–130)
GLUCOSE BLDC GLUCOMTR-MCNC: 296 MG/DL (ref 70–130)
GLUCOSE SERPL-MCNC: 332 MG/DL (ref 65–99)
HCT VFR BLD AUTO: 34.7 % (ref 34–46.6)
HGB BLD-MCNC: 9.5 G/DL (ref 12–15.9)
HYPOCHROMIA BLD QL: NORMAL
IMM GRANULOCYTES # BLD AUTO: 0.13 10*3/MM3 (ref 0–0.05)
IMM GRANULOCYTES NFR BLD AUTO: 1.5 % (ref 0–0.5)
LYMPHOCYTES # BLD AUTO: 0.74 10*3/MM3 (ref 0.7–3.1)
LYMPHOCYTES NFR BLD AUTO: 8.5 % (ref 19.6–45.3)
MACROCYTES BLD QL SMEAR: NORMAL
MCH RBC QN AUTO: 27.5 PG (ref 26.6–33)
MCHC RBC AUTO-ENTMCNC: 27.4 G/DL (ref 31.5–35.7)
MCV RBC AUTO: 100.6 FL (ref 79–97)
MONOCYTES # BLD AUTO: 0.2 10*3/MM3 (ref 0.1–0.9)
MONOCYTES NFR BLD AUTO: 2.3 % (ref 5–12)
NEUTROPHILS NFR BLD AUTO: 7.46 10*3/MM3 (ref 1.7–7)
NEUTROPHILS NFR BLD AUTO: 86.1 % (ref 42.7–76)
NRBC BLD AUTO-RTO: 0 /100 WBC (ref 0–0.2)
PLAT MORPH BLD: NORMAL
PLATELET # BLD AUTO: 160 10*3/MM3 (ref 140–450)
PMV BLD AUTO: 10.5 FL (ref 6–12)
POTASSIUM SERPL-SCNC: 4.4 MMOL/L (ref 3.5–5.2)
PROT SERPL-MCNC: 6.8 G/DL (ref 6–8.5)
RBC # BLD AUTO: 3.45 10*6/MM3 (ref 3.77–5.28)
SODIUM SERPL-SCNC: 145 MMOL/L (ref 136–145)
WBC # BLD AUTO: 8.67 10*3/MM3 (ref 3.4–10.8)

## 2021-02-19 PROCEDURE — 71045 X-RAY EXAM CHEST 1 VIEW: CPT

## 2021-02-19 PROCEDURE — 85025 COMPLETE CBC W/AUTO DIFF WBC: CPT | Performed by: PHYSICIAN ASSISTANT

## 2021-02-19 PROCEDURE — 99291 CRITICAL CARE FIRST HOUR: CPT | Performed by: INTERNAL MEDICINE

## 2021-02-19 PROCEDURE — 82962 GLUCOSE BLOOD TEST: CPT

## 2021-02-19 PROCEDURE — 80053 COMPREHEN METABOLIC PANEL: CPT | Performed by: PHYSICIAN ASSISTANT

## 2021-02-19 PROCEDURE — 86140 C-REACTIVE PROTEIN: CPT | Performed by: PHYSICIAN ASSISTANT

## 2021-02-19 PROCEDURE — 71045 X-RAY EXAM CHEST 1 VIEW: CPT | Performed by: RADIOLOGY

## 2021-02-19 PROCEDURE — 85007 BL SMEAR W/DIFF WBC COUNT: CPT | Performed by: PHYSICIAN ASSISTANT

## 2021-02-20 ENCOUNTER — OUTSIDE FACILITY SERVICE (OUTPATIENT)
Dept: PULMONOLOGY | Facility: CLINIC | Age: 76
End: 2021-02-20

## 2021-02-20 LAB
A-A DO2: 171.1 MMHG (ref 0–300)
ALBUMIN SERPL-MCNC: 2.59 G/DL (ref 3.5–5.2)
ALBUMIN/GLOB SERPL: 0.6 G/DL
ALP SERPL-CCNC: 181 U/L (ref 39–117)
ALT SERPL W P-5'-P-CCNC: 71 U/L (ref 1–33)
ANION GAP SERPL CALCULATED.3IONS-SCNC: 9 MMOL/L (ref 5–15)
ANISOCYTOSIS BLD QL: NORMAL
ARTERIAL PATENCY WRIST A: ABNORMAL
AST SERPL-CCNC: 42 U/L (ref 1–32)
ATMOSPHERIC PRESS: 736 MMHG
BASE EXCESS BLDA CALC-SCNC: 15.5 MMOL/L (ref 0–2)
BASOPHILS # BLD AUTO: 0.03 10*3/MM3 (ref 0–0.2)
BASOPHILS NFR BLD AUTO: 0.3 % (ref 0–1.5)
BDY SITE: ABNORMAL
BILIRUB SERPL-MCNC: 0.2 MG/DL (ref 0–1.2)
BODY TEMPERATURE: 0 C
BUN SERPL-MCNC: 50 MG/DL (ref 8–23)
BUN/CREAT SERPL: 208.3 (ref 7–25)
CALCIUM SPEC-SCNC: 9.4 MG/DL (ref 8.6–10.5)
CHLORIDE SERPL-SCNC: 104 MMOL/L (ref 98–107)
CO2 BLDA-SCNC: 42.9 MMOL/L (ref 22–33)
CO2 SERPL-SCNC: 37 MMOL/L (ref 22–29)
COHGB MFR BLD: 1.6 % (ref 0–5)
CREAT SERPL-MCNC: 0.24 MG/DL (ref 0.57–1)
CRP SERPL-MCNC: 11.04 MG/DL (ref 0–0.5)
DEPRECATED RDW RBC AUTO: 57.3 FL (ref 37–54)
EOSINOPHIL # BLD AUTO: 0.17 10*3/MM3 (ref 0–0.4)
EOSINOPHIL NFR BLD AUTO: 1.6 % (ref 0.3–6.2)
ERYTHROCYTE [DISTWIDTH] IN BLOOD BY AUTOMATED COUNT: 15.3 % (ref 12.3–15.4)
GFR SERPL CREATININE-BSD FRML MDRD: >150 ML/MIN/1.73
GLOBULIN UR ELPH-MCNC: 4.3 GM/DL
GLUCOSE BLDC GLUCOMTR-MCNC: 162 MG/DL (ref 70–130)
GLUCOSE BLDC GLUCOMTR-MCNC: 189 MG/DL (ref 70–130)
GLUCOSE BLDC GLUCOMTR-MCNC: 193 MG/DL (ref 70–130)
GLUCOSE BLDC GLUCOMTR-MCNC: 200 MG/DL (ref 70–130)
GLUCOSE BLDC GLUCOMTR-MCNC: 245 MG/DL (ref 70–130)
GLUCOSE SERPL-MCNC: 198 MG/DL (ref 65–99)
HCO3 BLDA-SCNC: 41.1 MMOL/L (ref 20–26)
HCT VFR BLD AUTO: 35.9 % (ref 34–46.6)
HCT VFR BLD CALC: 22.5 % (ref 38–51)
HGB BLD-MCNC: 10 G/DL (ref 12–15.9)
HGB BLDA-MCNC: 7.4 G/DL (ref 13.5–17.5)
HYPOCHROMIA BLD QL: NORMAL
IMM GRANULOCYTES # BLD AUTO: 0.31 10*3/MM3 (ref 0–0.05)
IMM GRANULOCYTES NFR BLD AUTO: 3 % (ref 0–0.5)
INHALED O2 CONCENTRATION: 45 %
LYMPHOCYTES # BLD AUTO: 1.19 10*3/MM3 (ref 0.7–3.1)
LYMPHOCYTES NFR BLD AUTO: 11.5 % (ref 19.6–45.3)
Lab: ABNORMAL
MACROCYTES BLD QL SMEAR: NORMAL
MCH RBC QN AUTO: 28.3 PG (ref 26.6–33)
MCHC RBC AUTO-ENTMCNC: 27.9 G/DL (ref 31.5–35.7)
MCV RBC AUTO: 101.7 FL (ref 79–97)
METHGB BLD QL: 0.3 % (ref 0–3)
MODALITY: ABNORMAL
MONOCYTES # BLD AUTO: 0.51 10*3/MM3 (ref 0.1–0.9)
MONOCYTES NFR BLD AUTO: 4.9 % (ref 5–12)
NEUTROPHILS NFR BLD AUTO: 78.7 % (ref 42.7–76)
NEUTROPHILS NFR BLD AUTO: 8.13 10*3/MM3 (ref 1.7–7)
NOTE: ABNORMAL
NOTIFIED WHO: ABNORMAL
NRBC BLD AUTO-RTO: 0 /100 WBC (ref 0–0.2)
OXYHGB MFR BLDV: 94.7 % (ref 94–99)
PCO2 BLDA: 58.5 MM HG (ref 35–45)
PCO2 TEMP ADJ BLD: ABNORMAL MM[HG]
PEEP RESPIRATORY: 5 CM[H2O]
PH BLDA: 7.46 PH UNITS (ref 7.35–7.45)
PH, TEMP CORRECTED: ABNORMAL
PLAT MORPH BLD: NORMAL
PLATELET # BLD AUTO: 190 10*3/MM3 (ref 140–450)
PMV BLD AUTO: 10.4 FL (ref 6–12)
PO2 BLDA: 75.4 MM HG (ref 83–108)
PO2 TEMP ADJ BLD: ABNORMAL MM[HG]
POTASSIUM SERPL-SCNC: 3.8 MMOL/L (ref 3.5–5.2)
PROT SERPL-MCNC: 6.9 G/DL (ref 6–8.5)
PSV: 8 CMH2O
RBC # BLD AUTO: 3.53 10*6/MM3 (ref 3.77–5.28)
SAO2 % BLDCOA: 96.5 % (ref 94–99)
SODIUM SERPL-SCNC: 150 MMOL/L (ref 136–145)
VENTILATOR MODE: ABNORMAL
WBC # BLD AUTO: 10.34 10*3/MM3 (ref 3.4–10.8)

## 2021-02-20 PROCEDURE — 82805 BLOOD GASES W/O2 SATURATION: CPT

## 2021-02-20 PROCEDURE — 80053 COMPREHEN METABOLIC PANEL: CPT | Performed by: PHYSICIAN ASSISTANT

## 2021-02-20 PROCEDURE — 83050 HGB METHEMOGLOBIN QUAN: CPT

## 2021-02-20 PROCEDURE — 82375 ASSAY CARBOXYHB QUANT: CPT

## 2021-02-20 PROCEDURE — 82962 GLUCOSE BLOOD TEST: CPT

## 2021-02-20 PROCEDURE — 85025 COMPLETE CBC W/AUTO DIFF WBC: CPT | Performed by: PHYSICIAN ASSISTANT

## 2021-02-20 PROCEDURE — 36600 WITHDRAWAL OF ARTERIAL BLOOD: CPT

## 2021-02-20 PROCEDURE — 86140 C-REACTIVE PROTEIN: CPT | Performed by: PHYSICIAN ASSISTANT

## 2021-02-20 PROCEDURE — 85007 BL SMEAR W/DIFF WBC COUNT: CPT | Performed by: PHYSICIAN ASSISTANT

## 2021-02-20 PROCEDURE — 99291 CRITICAL CARE FIRST HOUR: CPT | Performed by: INTERNAL MEDICINE

## 2021-02-21 ENCOUNTER — OUTSIDE FACILITY SERVICE (OUTPATIENT)
Dept: PULMONOLOGY | Facility: CLINIC | Age: 76
End: 2021-02-21

## 2021-02-21 LAB
ALBUMIN SERPL-MCNC: 2.53 G/DL (ref 3.5–5.2)
ALBUMIN/GLOB SERPL: 0.6 G/DL
ALP SERPL-CCNC: 166 U/L (ref 39–117)
ALT SERPL W P-5'-P-CCNC: 78 U/L (ref 1–33)
ANION GAP SERPL CALCULATED.3IONS-SCNC: 8.3 MMOL/L (ref 5–15)
ANISOCYTOSIS BLD QL: NORMAL
AST SERPL-CCNC: 48 U/L (ref 1–32)
BACTERIA SPEC AEROBE CULT: ABNORMAL
BASOPHILS # BLD AUTO: 0.04 10*3/MM3 (ref 0–0.2)
BASOPHILS NFR BLD AUTO: 0.5 % (ref 0–1.5)
BILIRUB SERPL-MCNC: 0.2 MG/DL (ref 0–1.2)
BUN SERPL-MCNC: 53 MG/DL (ref 8–23)
BUN/CREAT SERPL: 196.3 (ref 7–25)
CALCIUM SPEC-SCNC: 8.9 MG/DL (ref 8.6–10.5)
CHLORIDE SERPL-SCNC: 107 MMOL/L (ref 98–107)
CO2 SERPL-SCNC: 35.7 MMOL/L (ref 22–29)
CREAT SERPL-MCNC: 0.27 MG/DL (ref 0.57–1)
CRP SERPL-MCNC: 5.65 MG/DL (ref 0–0.5)
DEPRECATED RDW RBC AUTO: 58.1 FL (ref 37–54)
EOSINOPHIL # BLD AUTO: 0.35 10*3/MM3 (ref 0–0.4)
EOSINOPHIL NFR BLD AUTO: 4.3 % (ref 0.3–6.2)
ERYTHROCYTE [DISTWIDTH] IN BLOOD BY AUTOMATED COUNT: 15.5 % (ref 12.3–15.4)
GFR SERPL CREATININE-BSD FRML MDRD: >150 ML/MIN/1.73
GLOBULIN UR ELPH-MCNC: 4 GM/DL
GLUCOSE BLDC GLUCOMTR-MCNC: 131 MG/DL (ref 70–130)
GLUCOSE BLDC GLUCOMTR-MCNC: 182 MG/DL (ref 70–130)
GLUCOSE BLDC GLUCOMTR-MCNC: 211 MG/DL (ref 70–130)
GLUCOSE BLDC GLUCOMTR-MCNC: 237 MG/DL (ref 70–130)
GLUCOSE SERPL-MCNC: 223 MG/DL (ref 65–99)
GRAM STN SPEC: ABNORMAL
HCT VFR BLD AUTO: 34.6 % (ref 34–46.6)
HGB BLD-MCNC: 9.7 G/DL (ref 12–15.9)
HYPOCHROMIA BLD QL: NORMAL
IMM GRANULOCYTES # BLD AUTO: 0.29 10*3/MM3 (ref 0–0.05)
IMM GRANULOCYTES NFR BLD AUTO: 3.5 % (ref 0–0.5)
LYMPHOCYTES # BLD AUTO: 1.72 10*3/MM3 (ref 0.7–3.1)
LYMPHOCYTES NFR BLD AUTO: 21 % (ref 19.6–45.3)
MACROCYTES BLD QL SMEAR: NORMAL
MCH RBC QN AUTO: 28.1 PG (ref 26.6–33)
MCHC RBC AUTO-ENTMCNC: 28 G/DL (ref 31.5–35.7)
MCV RBC AUTO: 100.3 FL (ref 79–97)
MONOCYTES # BLD AUTO: 0.44 10*3/MM3 (ref 0.1–0.9)
MONOCYTES NFR BLD AUTO: 5.4 % (ref 5–12)
NEUTROPHILS NFR BLD AUTO: 5.35 10*3/MM3 (ref 1.7–7)
NEUTROPHILS NFR BLD AUTO: 65.3 % (ref 42.7–76)
NRBC BLD AUTO-RTO: 0 /100 WBC (ref 0–0.2)
PLAT MORPH BLD: NORMAL
PLATELET # BLD AUTO: 194 10*3/MM3 (ref 140–450)
PMV BLD AUTO: 10.1 FL (ref 6–12)
POTASSIUM SERPL-SCNC: 3.9 MMOL/L (ref 3.5–5.2)
PROT SERPL-MCNC: 6.5 G/DL (ref 6–8.5)
RBC # BLD AUTO: 3.45 10*6/MM3 (ref 3.77–5.28)
SODIUM SERPL-SCNC: 151 MMOL/L (ref 136–145)
WBC # BLD AUTO: 8.19 10*3/MM3 (ref 3.4–10.8)

## 2021-02-21 PROCEDURE — 85025 COMPLETE CBC W/AUTO DIFF WBC: CPT | Performed by: PHYSICIAN ASSISTANT

## 2021-02-21 PROCEDURE — 85007 BL SMEAR W/DIFF WBC COUNT: CPT | Performed by: PHYSICIAN ASSISTANT

## 2021-02-21 PROCEDURE — 82962 GLUCOSE BLOOD TEST: CPT

## 2021-02-21 PROCEDURE — 99291 CRITICAL CARE FIRST HOUR: CPT | Performed by: INTERNAL MEDICINE

## 2021-02-21 PROCEDURE — 86140 C-REACTIVE PROTEIN: CPT | Performed by: PHYSICIAN ASSISTANT

## 2021-02-21 PROCEDURE — 80053 COMPREHEN METABOLIC PANEL: CPT | Performed by: PHYSICIAN ASSISTANT

## 2021-02-22 ENCOUNTER — OUTSIDE FACILITY SERVICE (OUTPATIENT)
Dept: PULMONOLOGY | Facility: CLINIC | Age: 76
End: 2021-02-22

## 2021-02-22 LAB
ALBUMIN SERPL-MCNC: 2.32 G/DL (ref 3.5–5.2)
ALBUMIN/GLOB SERPL: 0.6 G/DL
ALP SERPL-CCNC: 141 U/L (ref 39–117)
ALT SERPL W P-5'-P-CCNC: 69 U/L (ref 1–33)
ANION GAP SERPL CALCULATED.3IONS-SCNC: 7.1 MMOL/L (ref 5–15)
ANISOCYTOSIS BLD QL: NORMAL
AST SERPL-CCNC: 40 U/L (ref 1–32)
BASOPHILS # BLD AUTO: 0.03 10*3/MM3 (ref 0–0.2)
BASOPHILS NFR BLD AUTO: 0.3 % (ref 0–1.5)
BILIRUB SERPL-MCNC: 0.2 MG/DL (ref 0–1.2)
BUN SERPL-MCNC: 43 MG/DL (ref 8–23)
BUN/CREAT SERPL: 215 (ref 7–25)
CALCIUM SPEC-SCNC: 8.8 MG/DL (ref 8.6–10.5)
CHLORIDE SERPL-SCNC: 108 MMOL/L (ref 98–107)
CO2 SERPL-SCNC: 34.9 MMOL/L (ref 22–29)
CREAT SERPL-MCNC: 0.2 MG/DL (ref 0.57–1)
CRP SERPL-MCNC: 3.03 MG/DL (ref 0–0.5)
DEPRECATED RDW RBC AUTO: 57.6 FL (ref 37–54)
EOSINOPHIL # BLD AUTO: 0.48 10*3/MM3 (ref 0–0.4)
EOSINOPHIL NFR BLD AUTO: 5.4 % (ref 0.3–6.2)
ERYTHROCYTE [DISTWIDTH] IN BLOOD BY AUTOMATED COUNT: 15.6 % (ref 12.3–15.4)
GFR SERPL CREATININE-BSD FRML MDRD: >150 ML/MIN/1.73
GLOBULIN UR ELPH-MCNC: 3.7 GM/DL
GLUCOSE BLDC GLUCOMTR-MCNC: 170 MG/DL (ref 70–130)
GLUCOSE BLDC GLUCOMTR-MCNC: 198 MG/DL (ref 70–130)
GLUCOSE BLDC GLUCOMTR-MCNC: 204 MG/DL (ref 70–130)
GLUCOSE SERPL-MCNC: 197 MG/DL (ref 65–99)
HBA1C MFR BLD: 6.4 % (ref 4.8–5.6)
HCT VFR BLD AUTO: 34.3 % (ref 34–46.6)
HGB BLD-MCNC: 9.6 G/DL (ref 12–15.9)
HYPOCHROMIA BLD QL: NORMAL
IMM GRANULOCYTES # BLD AUTO: 0.34 10*3/MM3 (ref 0–0.05)
IMM GRANULOCYTES NFR BLD AUTO: 3.8 % (ref 0–0.5)
LYMPHOCYTES # BLD AUTO: 1.62 10*3/MM3 (ref 0.7–3.1)
LYMPHOCYTES NFR BLD AUTO: 18.2 % (ref 19.6–45.3)
MACROCYTES BLD QL SMEAR: NORMAL
MCH RBC QN AUTO: 28.3 PG (ref 26.6–33)
MCHC RBC AUTO-ENTMCNC: 28 G/DL (ref 31.5–35.7)
MCV RBC AUTO: 101.2 FL (ref 79–97)
MONOCYTES # BLD AUTO: 0.39 10*3/MM3 (ref 0.1–0.9)
MONOCYTES NFR BLD AUTO: 4.4 % (ref 5–12)
NEUTROPHILS NFR BLD AUTO: 6.02 10*3/MM3 (ref 1.7–7)
NEUTROPHILS NFR BLD AUTO: 67.9 % (ref 42.7–76)
NRBC BLD AUTO-RTO: 0 /100 WBC (ref 0–0.2)
PLAT MORPH BLD: NORMAL
PLATELET # BLD AUTO: 185 10*3/MM3 (ref 140–450)
PMV BLD AUTO: 10.2 FL (ref 6–12)
POTASSIUM SERPL-SCNC: 4.1 MMOL/L (ref 3.5–5.2)
PROT SERPL-MCNC: 6 G/DL (ref 6–8.5)
RBC # BLD AUTO: 3.39 10*6/MM3 (ref 3.77–5.28)
SODIUM SERPL-SCNC: 150 MMOL/L (ref 136–145)
STOMATOCYTES BLD QL SMEAR: NORMAL
WBC # BLD AUTO: 8.88 10*3/MM3 (ref 3.4–10.8)

## 2021-02-22 PROCEDURE — 85025 COMPLETE CBC W/AUTO DIFF WBC: CPT | Performed by: PHYSICIAN ASSISTANT

## 2021-02-22 PROCEDURE — 80053 COMPREHEN METABOLIC PANEL: CPT | Performed by: PHYSICIAN ASSISTANT

## 2021-02-22 PROCEDURE — 99291 CRITICAL CARE FIRST HOUR: CPT | Performed by: INTERNAL MEDICINE

## 2021-02-22 PROCEDURE — 82962 GLUCOSE BLOOD TEST: CPT

## 2021-02-22 PROCEDURE — 97110 THERAPEUTIC EXERCISES: CPT

## 2021-02-22 PROCEDURE — 85007 BL SMEAR W/DIFF WBC COUNT: CPT | Performed by: PHYSICIAN ASSISTANT

## 2021-02-22 PROCEDURE — 97530 THERAPEUTIC ACTIVITIES: CPT

## 2021-02-22 PROCEDURE — 86140 C-REACTIVE PROTEIN: CPT | Performed by: PHYSICIAN ASSISTANT

## 2021-02-22 PROCEDURE — 83036 HEMOGLOBIN GLYCOSYLATED A1C: CPT | Performed by: PHYSICIAN ASSISTANT

## 2021-02-23 ENCOUNTER — OUTSIDE FACILITY SERVICE (OUTPATIENT)
Dept: PULMONOLOGY | Facility: CLINIC | Age: 76
End: 2021-02-23

## 2021-02-23 LAB
ALBUMIN SERPL-MCNC: 2.43 G/DL (ref 3.5–5.2)
ALBUMIN/GLOB SERPL: 0.7 G/DL
ALP SERPL-CCNC: 138 U/L (ref 39–117)
ALT SERPL W P-5'-P-CCNC: 61 U/L (ref 1–33)
ANION GAP SERPL CALCULATED.3IONS-SCNC: 6.1 MMOL/L (ref 5–15)
ANISOCYTOSIS BLD QL: NORMAL
AST SERPL-CCNC: 35 U/L (ref 1–32)
BASOPHILS # BLD AUTO: 0.06 10*3/MM3 (ref 0–0.2)
BASOPHILS NFR BLD AUTO: 0.5 % (ref 0–1.5)
BILIRUB SERPL-MCNC: 0.2 MG/DL (ref 0–1.2)
BUN SERPL-MCNC: 36 MG/DL (ref 8–23)
BUN/CREAT SERPL: 163.6 (ref 7–25)
CALCIUM SPEC-SCNC: 8.3 MG/DL (ref 8.6–10.5)
CHLORIDE SERPL-SCNC: 105 MMOL/L (ref 98–107)
CO2 SERPL-SCNC: 29.9 MMOL/L (ref 22–29)
CREAT SERPL-MCNC: 0.22 MG/DL (ref 0.57–1)
CRP SERPL-MCNC: 1.56 MG/DL (ref 0–0.5)
DEPRECATED RDW RBC AUTO: 56.1 FL (ref 37–54)
EOSINOPHIL # BLD AUTO: 0.54 10*3/MM3 (ref 0–0.4)
EOSINOPHIL NFR BLD AUTO: 4.8 % (ref 0.3–6.2)
ERYTHROCYTE [DISTWIDTH] IN BLOOD BY AUTOMATED COUNT: 15.6 % (ref 12.3–15.4)
GFR SERPL CREATININE-BSD FRML MDRD: >150 ML/MIN/1.73
GLOBULIN UR ELPH-MCNC: 3.5 GM/DL
GLUCOSE BLDC GLUCOMTR-MCNC: 154 MG/DL (ref 70–130)
GLUCOSE BLDC GLUCOMTR-MCNC: 178 MG/DL (ref 70–130)
GLUCOSE BLDC GLUCOMTR-MCNC: 181 MG/DL (ref 70–130)
GLUCOSE BLDC GLUCOMTR-MCNC: 199 MG/DL (ref 70–130)
GLUCOSE BLDC GLUCOMTR-MCNC: 215 MG/DL (ref 70–130)
GLUCOSE SERPL-MCNC: 195 MG/DL (ref 65–99)
HCT VFR BLD AUTO: 35 % (ref 34–46.6)
HGB BLD-MCNC: 10 G/DL (ref 12–15.9)
HYPOCHROMIA BLD QL: NORMAL
IMM GRANULOCYTES # BLD AUTO: 0.38 10*3/MM3 (ref 0–0.05)
IMM GRANULOCYTES NFR BLD AUTO: 3.4 % (ref 0–0.5)
LYMPHOCYTES # BLD AUTO: 2.27 10*3/MM3 (ref 0.7–3.1)
LYMPHOCYTES NFR BLD AUTO: 20.3 % (ref 19.6–45.3)
MACROCYTES BLD QL SMEAR: NORMAL
MCH RBC QN AUTO: 28.4 PG (ref 26.6–33)
MCHC RBC AUTO-ENTMCNC: 28.6 G/DL (ref 31.5–35.7)
MCV RBC AUTO: 99.4 FL (ref 79–97)
MONOCYTES # BLD AUTO: 0.47 10*3/MM3 (ref 0.1–0.9)
MONOCYTES NFR BLD AUTO: 4.2 % (ref 5–12)
NEUTROPHILS NFR BLD AUTO: 66.8 % (ref 42.7–76)
NEUTROPHILS NFR BLD AUTO: 7.46 10*3/MM3 (ref 1.7–7)
NRBC BLD AUTO-RTO: 0.2 /100 WBC (ref 0–0.2)
PLAT MORPH BLD: NORMAL
PLATELET # BLD AUTO: 190 10*3/MM3 (ref 140–450)
PMV BLD AUTO: 10.2 FL (ref 6–12)
POTASSIUM SERPL-SCNC: 3.7 MMOL/L (ref 3.5–5.2)
PROT SERPL-MCNC: 5.9 G/DL (ref 6–8.5)
RBC # BLD AUTO: 3.52 10*6/MM3 (ref 3.77–5.28)
SODIUM SERPL-SCNC: 141 MMOL/L (ref 136–145)
WBC # BLD AUTO: 11.18 10*3/MM3 (ref 3.4–10.8)

## 2021-02-23 PROCEDURE — 92610 EVALUATE SWALLOWING FUNCTION: CPT

## 2021-02-23 PROCEDURE — 97530 THERAPEUTIC ACTIVITIES: CPT

## 2021-02-23 PROCEDURE — 99291 CRITICAL CARE FIRST HOUR: CPT | Performed by: INTERNAL MEDICINE

## 2021-02-23 PROCEDURE — 82962 GLUCOSE BLOOD TEST: CPT

## 2021-02-23 PROCEDURE — 85025 COMPLETE CBC W/AUTO DIFF WBC: CPT | Performed by: PHYSICIAN ASSISTANT

## 2021-02-23 PROCEDURE — 97110 THERAPEUTIC EXERCISES: CPT

## 2021-02-23 PROCEDURE — 85007 BL SMEAR W/DIFF WBC COUNT: CPT | Performed by: PHYSICIAN ASSISTANT

## 2021-02-23 PROCEDURE — 86140 C-REACTIVE PROTEIN: CPT | Performed by: PHYSICIAN ASSISTANT

## 2021-02-23 PROCEDURE — 80053 COMPREHEN METABOLIC PANEL: CPT | Performed by: PHYSICIAN ASSISTANT

## 2021-02-23 PROCEDURE — 97167 OT EVAL HIGH COMPLEX 60 MIN: CPT

## 2021-02-24 ENCOUNTER — OUTSIDE FACILITY SERVICE (OUTPATIENT)
Dept: PULMONOLOGY | Facility: CLINIC | Age: 76
End: 2021-02-24

## 2021-02-24 LAB
A-A DO2: 133 MMHG (ref 0–300)
ALBUMIN SERPL-MCNC: 2.6 G/DL (ref 3.5–5.2)
ALBUMIN/GLOB SERPL: 0.7 G/DL
ALP SERPL-CCNC: 148 U/L (ref 39–117)
ALT SERPL W P-5'-P-CCNC: 59 U/L (ref 1–33)
ANION GAP SERPL CALCULATED.3IONS-SCNC: 6.4 MMOL/L (ref 5–15)
ARTERIAL PATENCY WRIST A: ABNORMAL
AST SERPL-CCNC: 36 U/L (ref 1–32)
ATMOSPHERIC PRESS: 725 MMHG
BASE EXCESS BLDA CALC-SCNC: 8.3 MMOL/L (ref 0–2)
BASOPHILS # BLD AUTO: 0.05 10*3/MM3 (ref 0–0.2)
BASOPHILS NFR BLD AUTO: 0.4 % (ref 0–1.5)
BDY SITE: ABNORMAL
BILIRUB SERPL-MCNC: 0.2 MG/DL (ref 0–1.2)
BODY TEMPERATURE: 0 C
BUN SERPL-MCNC: 32 MG/DL (ref 8–23)
BUN/CREAT SERPL: 152.4 (ref 7–25)
CALCIUM SPEC-SCNC: 8.5 MG/DL (ref 8.6–10.5)
CHLORIDE SERPL-SCNC: 103 MMOL/L (ref 98–107)
CO2 BLDA-SCNC: 37 MMOL/L (ref 22–33)
CO2 SERPL-SCNC: 30.6 MMOL/L (ref 22–29)
COHGB MFR BLD: 1.8 % (ref 0–5)
CREAT SERPL-MCNC: 0.21 MG/DL (ref 0.57–1)
CRP SERPL-MCNC: 1.61 MG/DL (ref 0–0.5)
DEPRECATED RDW RBC AUTO: 54.7 FL (ref 37–54)
EOSINOPHIL # BLD AUTO: 0.47 10*3/MM3 (ref 0–0.4)
EOSINOPHIL NFR BLD AUTO: 4.2 % (ref 0.3–6.2)
ERYTHROCYTE [DISTWIDTH] IN BLOOD BY AUTOMATED COUNT: 15.5 % (ref 12.3–15.4)
GAS FLOW AIRWAY: 10 LPM
GFR SERPL CREATININE-BSD FRML MDRD: >150 ML/MIN/1.73
GLOBULIN UR ELPH-MCNC: 3.6 GM/DL
GLUCOSE BLDC GLUCOMTR-MCNC: 180 MG/DL (ref 70–130)
GLUCOSE SERPL-MCNC: 167 MG/DL (ref 65–99)
HCO3 BLDA-SCNC: 35.2 MMOL/L (ref 20–26)
HCT VFR BLD AUTO: 35.1 % (ref 34–46.6)
HCT VFR BLD CALC: 33.1 % (ref 38–51)
HGB BLD-MCNC: 10 G/DL (ref 12–15.9)
HGB BLDA-MCNC: 10.8 G/DL (ref 13.5–17.5)
IMM GRANULOCYTES # BLD AUTO: 0.29 10*3/MM3 (ref 0–0.05)
IMM GRANULOCYTES NFR BLD AUTO: 2.6 % (ref 0–0.5)
INHALED O2 CONCENTRATION: 40 %
LYMPHOCYTES # BLD AUTO: 1.95 10*3/MM3 (ref 0.7–3.1)
LYMPHOCYTES NFR BLD AUTO: 17.2 % (ref 19.6–45.3)
Lab: ABNORMAL
MCH RBC QN AUTO: 27.7 PG (ref 26.6–33)
MCHC RBC AUTO-ENTMCNC: 28.5 G/DL (ref 31.5–35.7)
MCV RBC AUTO: 97.2 FL (ref 79–97)
METHGB BLD QL: 0.4 % (ref 0–3)
MODALITY: ABNORMAL
MONOCYTES # BLD AUTO: 0.45 10*3/MM3 (ref 0.1–0.9)
MONOCYTES NFR BLD AUTO: 4 % (ref 5–12)
NEUTROPHILS NFR BLD AUTO: 71.6 % (ref 42.7–76)
NEUTROPHILS NFR BLD AUTO: 8.1 10*3/MM3 (ref 1.7–7)
NOTE: ABNORMAL
NOTIFIED WHO: ABNORMAL
NRBC BLD AUTO-RTO: 0 /100 WBC (ref 0–0.2)
OXYHGB MFR BLDV: 92.7 % (ref 94–99)
PCO2 BLDA: 59.9 MM HG (ref 35–45)
PCO2 TEMP ADJ BLD: ABNORMAL MM[HG]
PH BLDA: 7.38 PH UNITS (ref 7.35–7.45)
PH, TEMP CORRECTED: ABNORMAL
PLATELET # BLD AUTO: 175 10*3/MM3 (ref 140–450)
PMV BLD AUTO: 10.3 FL (ref 6–12)
PO2 BLDA: 72.2 MM HG (ref 83–108)
PO2 TEMP ADJ BLD: ABNORMAL MM[HG]
POTASSIUM SERPL-SCNC: 4.7 MMOL/L (ref 3.5–5.2)
PROT SERPL-MCNC: 6.2 G/DL (ref 6–8.5)
RBC # BLD AUTO: 3.61 10*6/MM3 (ref 3.77–5.28)
SAO2 % BLDCOA: 94.8 % (ref 94–99)
SODIUM SERPL-SCNC: 140 MMOL/L (ref 136–145)
VENTILATOR MODE: ABNORMAL
WBC # BLD AUTO: 11.31 10*3/MM3 (ref 3.4–10.8)

## 2021-02-24 PROCEDURE — 36600 WITHDRAWAL OF ARTERIAL BLOOD: CPT

## 2021-02-24 PROCEDURE — 82805 BLOOD GASES W/O2 SATURATION: CPT

## 2021-02-24 PROCEDURE — 92610 EVALUATE SWALLOWING FUNCTION: CPT

## 2021-02-24 PROCEDURE — 97530 THERAPEUTIC ACTIVITIES: CPT

## 2021-02-24 PROCEDURE — 82375 ASSAY CARBOXYHB QUANT: CPT

## 2021-02-24 PROCEDURE — 86140 C-REACTIVE PROTEIN: CPT | Performed by: PHYSICIAN ASSISTANT

## 2021-02-24 PROCEDURE — 99291 CRITICAL CARE FIRST HOUR: CPT | Performed by: INTERNAL MEDICINE

## 2021-02-24 PROCEDURE — 83050 HGB METHEMOGLOBIN QUAN: CPT

## 2021-02-24 PROCEDURE — 85025 COMPLETE CBC W/AUTO DIFF WBC: CPT | Performed by: PHYSICIAN ASSISTANT

## 2021-02-24 PROCEDURE — 97110 THERAPEUTIC EXERCISES: CPT

## 2021-02-24 PROCEDURE — 82962 GLUCOSE BLOOD TEST: CPT

## 2021-02-24 PROCEDURE — 80053 COMPREHEN METABOLIC PANEL: CPT | Performed by: PHYSICIAN ASSISTANT

## 2021-02-25 ENCOUNTER — OUTSIDE FACILITY SERVICE (OUTPATIENT)
Dept: PULMONOLOGY | Facility: CLINIC | Age: 76
End: 2021-02-25

## 2021-02-25 LAB
ALBUMIN SERPL-MCNC: 2.52 G/DL (ref 3.5–5.2)
ALBUMIN/GLOB SERPL: 0.7 G/DL
ALP SERPL-CCNC: 145 U/L (ref 39–117)
ALT SERPL W P-5'-P-CCNC: 50 U/L (ref 1–33)
ANION GAP SERPL CALCULATED.3IONS-SCNC: 5.3 MMOL/L (ref 5–15)
ANISOCYTOSIS BLD QL: NORMAL
AST SERPL-CCNC: 27 U/L (ref 1–32)
BASOPHILS # BLD AUTO: 0.03 10*3/MM3 (ref 0–0.2)
BASOPHILS NFR BLD AUTO: 0.3 % (ref 0–1.5)
BILIRUB SERPL-MCNC: 0.2 MG/DL (ref 0–1.2)
BUN SERPL-MCNC: 27 MG/DL (ref 8–23)
BUN/CREAT SERPL: 142.1 (ref 7–25)
CALCIUM SPEC-SCNC: 8.3 MG/DL (ref 8.6–10.5)
CHLORIDE SERPL-SCNC: 98 MMOL/L (ref 98–107)
CO2 SERPL-SCNC: 31.7 MMOL/L (ref 22–29)
CREAT SERPL-MCNC: 0.19 MG/DL (ref 0.57–1)
CRP SERPL-MCNC: 2.81 MG/DL (ref 0–0.5)
DEPRECATED RDW RBC AUTO: 55.6 FL (ref 37–54)
EOSINOPHIL # BLD AUTO: 0.38 10*3/MM3 (ref 0–0.4)
EOSINOPHIL NFR BLD AUTO: 4.3 % (ref 0.3–6.2)
ERYTHROCYTE [DISTWIDTH] IN BLOOD BY AUTOMATED COUNT: 15.6 % (ref 12.3–15.4)
GFR SERPL CREATININE-BSD FRML MDRD: >150 ML/MIN/1.73
GLOBULIN UR ELPH-MCNC: 3.4 GM/DL
GLUCOSE BLDC GLUCOMTR-MCNC: 140 MG/DL (ref 70–130)
GLUCOSE BLDC GLUCOMTR-MCNC: 166 MG/DL (ref 70–130)
GLUCOSE BLDC GLUCOMTR-MCNC: 173 MG/DL (ref 70–130)
GLUCOSE BLDC GLUCOMTR-MCNC: 187 MG/DL (ref 70–130)
GLUCOSE BLDC GLUCOMTR-MCNC: 191 MG/DL (ref 70–130)
GLUCOSE BLDC GLUCOMTR-MCNC: 199 MG/DL (ref 70–130)
GLUCOSE SERPL-MCNC: 195 MG/DL (ref 65–99)
HCT VFR BLD AUTO: 32 % (ref 34–46.6)
HGB BLD-MCNC: 9.1 G/DL (ref 12–15.9)
HYPOCHROMIA BLD QL: NORMAL
IMM GRANULOCYTES # BLD AUTO: 0.21 10*3/MM3 (ref 0–0.05)
IMM GRANULOCYTES NFR BLD AUTO: 2.4 % (ref 0–0.5)
LYMPHOCYTES # BLD AUTO: 1.38 10*3/MM3 (ref 0.7–3.1)
LYMPHOCYTES NFR BLD AUTO: 15.5 % (ref 19.6–45.3)
MACROCYTES BLD QL SMEAR: NORMAL
MCH RBC QN AUTO: 27.9 PG (ref 26.6–33)
MCHC RBC AUTO-ENTMCNC: 28.4 G/DL (ref 31.5–35.7)
MCV RBC AUTO: 98.2 FL (ref 79–97)
MONOCYTES # BLD AUTO: 0.36 10*3/MM3 (ref 0.1–0.9)
MONOCYTES NFR BLD AUTO: 4 % (ref 5–12)
NEUTROPHILS NFR BLD AUTO: 6.56 10*3/MM3 (ref 1.7–7)
NEUTROPHILS NFR BLD AUTO: 73.5 % (ref 42.7–76)
NRBC BLD AUTO-RTO: 0 /100 WBC (ref 0–0.2)
PLAT MORPH BLD: NORMAL
PLATELET # BLD AUTO: 139 10*3/MM3 (ref 140–450)
PMV BLD AUTO: 10.4 FL (ref 6–12)
POTASSIUM SERPL-SCNC: 4.1 MMOL/L (ref 3.5–5.2)
PROT SERPL-MCNC: 5.9 G/DL (ref 6–8.5)
RBC # BLD AUTO: 3.26 10*6/MM3 (ref 3.77–5.28)
SODIUM SERPL-SCNC: 135 MMOL/L (ref 136–145)
STOMATOCYTES BLD QL SMEAR: NORMAL
WBC # BLD AUTO: 8.92 10*3/MM3 (ref 3.4–10.8)

## 2021-02-25 PROCEDURE — 80053 COMPREHEN METABOLIC PANEL: CPT | Performed by: PHYSICIAN ASSISTANT

## 2021-02-25 PROCEDURE — 82962 GLUCOSE BLOOD TEST: CPT

## 2021-02-25 PROCEDURE — 85007 BL SMEAR W/DIFF WBC COUNT: CPT | Performed by: PHYSICIAN ASSISTANT

## 2021-02-25 PROCEDURE — 99291 CRITICAL CARE FIRST HOUR: CPT | Performed by: INTERNAL MEDICINE

## 2021-02-25 PROCEDURE — 86140 C-REACTIVE PROTEIN: CPT | Performed by: PHYSICIAN ASSISTANT

## 2021-02-25 PROCEDURE — 85025 COMPLETE CBC W/AUTO DIFF WBC: CPT | Performed by: PHYSICIAN ASSISTANT

## 2021-02-26 ENCOUNTER — OUTSIDE FACILITY SERVICE (OUTPATIENT)
Dept: PULMONOLOGY | Facility: CLINIC | Age: 76
End: 2021-02-26

## 2021-02-26 ENCOUNTER — APPOINTMENT (OUTPATIENT)
Dept: GENERAL RADIOLOGY | Facility: HOSPITAL | Age: 76
End: 2021-02-26

## 2021-02-26 LAB
ALBUMIN SERPL-MCNC: 2.78 G/DL (ref 3.5–5.2)
ALBUMIN/GLOB SERPL: 0.8 G/DL
ALP SERPL-CCNC: 143 U/L (ref 39–117)
ALT SERPL W P-5'-P-CCNC: 41 U/L (ref 1–33)
ANION GAP SERPL CALCULATED.3IONS-SCNC: 6.3 MMOL/L (ref 5–15)
ANISOCYTOSIS BLD QL: NORMAL
AST SERPL-CCNC: 22 U/L (ref 1–32)
BASOPHILS # BLD AUTO: 0.02 10*3/MM3 (ref 0–0.2)
BASOPHILS NFR BLD AUTO: 0.2 % (ref 0–1.5)
BILIRUB SERPL-MCNC: 0.2 MG/DL (ref 0–1.2)
BUN SERPL-MCNC: 24 MG/DL (ref 8–23)
BUN/CREAT SERPL: 141.2 (ref 7–25)
CALCIUM SPEC-SCNC: 8.5 MG/DL (ref 8.6–10.5)
CHLORIDE SERPL-SCNC: 97 MMOL/L (ref 98–107)
CO2 SERPL-SCNC: 32.7 MMOL/L (ref 22–29)
CREAT SERPL-MCNC: 0.17 MG/DL (ref 0.57–1)
CRP SERPL-MCNC: 5.03 MG/DL (ref 0–0.5)
DEPRECATED RDW RBC AUTO: 56.8 FL (ref 37–54)
EOSINOPHIL # BLD AUTO: 0.33 10*3/MM3 (ref 0–0.4)
EOSINOPHIL NFR BLD AUTO: 3.3 % (ref 0.3–6.2)
ERYTHROCYTE [DISTWIDTH] IN BLOOD BY AUTOMATED COUNT: 16 % (ref 12.3–15.4)
GFR SERPL CREATININE-BSD FRML MDRD: >150 ML/MIN/1.73
GLOBULIN UR ELPH-MCNC: 3.5 GM/DL
GLUCOSE BLDC GLUCOMTR-MCNC: 159 MG/DL (ref 70–130)
GLUCOSE BLDC GLUCOMTR-MCNC: 185 MG/DL (ref 70–130)
GLUCOSE BLDC GLUCOMTR-MCNC: 210 MG/DL (ref 70–130)
GLUCOSE BLDC GLUCOMTR-MCNC: 224 MG/DL (ref 70–130)
GLUCOSE SERPL-MCNC: 149 MG/DL (ref 65–99)
HCT VFR BLD AUTO: 32.8 % (ref 34–46.6)
HGB BLD-MCNC: 9.7 G/DL (ref 12–15.9)
HYPOCHROMIA BLD QL: NORMAL
IMM GRANULOCYTES # BLD AUTO: 0.26 10*3/MM3 (ref 0–0.05)
IMM GRANULOCYTES NFR BLD AUTO: 2.6 % (ref 0–0.5)
LYMPHOCYTES # BLD AUTO: 1.5 10*3/MM3 (ref 0.7–3.1)
LYMPHOCYTES NFR BLD AUTO: 15 % (ref 19.6–45.3)
MACROCYTES BLD QL SMEAR: NORMAL
MCH RBC QN AUTO: 28.7 PG (ref 26.6–33)
MCHC RBC AUTO-ENTMCNC: 29.6 G/DL (ref 31.5–35.7)
MCV RBC AUTO: 97 FL (ref 79–97)
MONOCYTES # BLD AUTO: 0.45 10*3/MM3 (ref 0.1–0.9)
MONOCYTES NFR BLD AUTO: 4.5 % (ref 5–12)
NEUTROPHILS NFR BLD AUTO: 7.42 10*3/MM3 (ref 1.7–7)
NEUTROPHILS NFR BLD AUTO: 74.4 % (ref 42.7–76)
NRBC BLD AUTO-RTO: 0 /100 WBC (ref 0–0.2)
PLATELET # BLD AUTO: 91 10*3/MM3 (ref 140–450)
PMV BLD AUTO: 11.1 FL (ref 6–12)
POTASSIUM SERPL-SCNC: 4.3 MMOL/L (ref 3.5–5.2)
PROT SERPL-MCNC: 6.3 G/DL (ref 6–8.5)
RBC # BLD AUTO: 3.38 10*6/MM3 (ref 3.77–5.28)
SMALL PLATELETS BLD QL SMEAR: NORMAL
SODIUM SERPL-SCNC: 136 MMOL/L (ref 136–145)
WBC # BLD AUTO: 9.98 10*3/MM3 (ref 3.4–10.8)

## 2021-02-26 PROCEDURE — 71045 X-RAY EXAM CHEST 1 VIEW: CPT | Performed by: RADIOLOGY

## 2021-02-26 PROCEDURE — 71045 X-RAY EXAM CHEST 1 VIEW: CPT

## 2021-02-26 PROCEDURE — 85007 BL SMEAR W/DIFF WBC COUNT: CPT | Performed by: PHYSICIAN ASSISTANT

## 2021-02-26 PROCEDURE — 86140 C-REACTIVE PROTEIN: CPT | Performed by: PHYSICIAN ASSISTANT

## 2021-02-26 PROCEDURE — 82962 GLUCOSE BLOOD TEST: CPT

## 2021-02-26 PROCEDURE — 80053 COMPREHEN METABOLIC PANEL: CPT | Performed by: PHYSICIAN ASSISTANT

## 2021-02-26 PROCEDURE — 85025 COMPLETE CBC W/AUTO DIFF WBC: CPT | Performed by: PHYSICIAN ASSISTANT

## 2021-02-26 PROCEDURE — 99291 CRITICAL CARE FIRST HOUR: CPT | Performed by: INTERNAL MEDICINE

## 2021-02-27 ENCOUNTER — OUTSIDE FACILITY SERVICE (OUTPATIENT)
Dept: PULMONOLOGY | Facility: CLINIC | Age: 76
End: 2021-02-27

## 2021-02-27 LAB
ALBUMIN SERPL-MCNC: 2.61 G/DL (ref 3.5–5.2)
ALBUMIN/GLOB SERPL: 0.7 G/DL
ALP SERPL-CCNC: 139 U/L (ref 39–117)
ALT SERPL W P-5'-P-CCNC: 35 U/L (ref 1–33)
ANION GAP SERPL CALCULATED.3IONS-SCNC: 5.4 MMOL/L (ref 5–15)
AST SERPL-CCNC: 23 U/L (ref 1–32)
BASOPHILS # BLD AUTO: 0.03 10*3/MM3 (ref 0–0.2)
BASOPHILS NFR BLD AUTO: 0.4 % (ref 0–1.5)
BILIRUB SERPL-MCNC: 0.2 MG/DL (ref 0–1.2)
BUN SERPL-MCNC: 19 MG/DL (ref 8–23)
BUN/CREAT SERPL: 111.8 (ref 7–25)
CALCIUM SPEC-SCNC: 8.4 MG/DL (ref 8.6–10.5)
CHLORIDE SERPL-SCNC: 95 MMOL/L (ref 98–107)
CO2 SERPL-SCNC: 31.6 MMOL/L (ref 22–29)
CREAT SERPL-MCNC: 0.17 MG/DL (ref 0.57–1)
CRP SERPL-MCNC: 5.35 MG/DL (ref 0–0.5)
DEPRECATED RDW RBC AUTO: 56 FL (ref 37–54)
EOSINOPHIL # BLD AUTO: 0.29 10*3/MM3 (ref 0–0.4)
EOSINOPHIL NFR BLD AUTO: 3.9 % (ref 0.3–6.2)
ERYTHROCYTE [DISTWIDTH] IN BLOOD BY AUTOMATED COUNT: 16 % (ref 12.3–15.4)
GFR SERPL CREATININE-BSD FRML MDRD: >150 ML/MIN/1.73
GLOBULIN UR ELPH-MCNC: 3.6 GM/DL
GLUCOSE BLDC GLUCOMTR-MCNC: 118 MG/DL (ref 70–130)
GLUCOSE BLDC GLUCOMTR-MCNC: 156 MG/DL (ref 70–130)
GLUCOSE BLDC GLUCOMTR-MCNC: 158 MG/DL (ref 70–130)
GLUCOSE BLDC GLUCOMTR-MCNC: 229 MG/DL (ref 70–130)
GLUCOSE SERPL-MCNC: 164 MG/DL (ref 65–99)
HCT VFR BLD AUTO: 32.7 % (ref 34–46.6)
HGB BLD-MCNC: 9.6 G/DL (ref 12–15.9)
IMM GRANULOCYTES # BLD AUTO: 0.13 10*3/MM3 (ref 0–0.05)
IMM GRANULOCYTES NFR BLD AUTO: 1.7 % (ref 0–0.5)
LYMPHOCYTES # BLD AUTO: 1.45 10*3/MM3 (ref 0.7–3.1)
LYMPHOCYTES NFR BLD AUTO: 19.5 % (ref 19.6–45.3)
MCH RBC QN AUTO: 28.4 PG (ref 26.6–33)
MCHC RBC AUTO-ENTMCNC: 29.4 G/DL (ref 31.5–35.7)
MCV RBC AUTO: 96.7 FL (ref 79–97)
MONOCYTES # BLD AUTO: 0.43 10*3/MM3 (ref 0.1–0.9)
MONOCYTES NFR BLD AUTO: 5.8 % (ref 5–12)
NEUTROPHILS NFR BLD AUTO: 5.12 10*3/MM3 (ref 1.7–7)
NEUTROPHILS NFR BLD AUTO: 68.7 % (ref 42.7–76)
NRBC BLD AUTO-RTO: 0 /100 WBC (ref 0–0.2)
PLATELET # BLD AUTO: 121 10*3/MM3 (ref 140–450)
PMV BLD AUTO: 10.9 FL (ref 6–12)
POTASSIUM SERPL-SCNC: 3.9 MMOL/L (ref 3.5–5.2)
PROT SERPL-MCNC: 6.2 G/DL (ref 6–8.5)
RBC # BLD AUTO: 3.38 10*6/MM3 (ref 3.77–5.28)
SODIUM SERPL-SCNC: 132 MMOL/L (ref 136–145)
WBC # BLD AUTO: 7.45 10*3/MM3 (ref 3.4–10.8)

## 2021-02-27 PROCEDURE — 82962 GLUCOSE BLOOD TEST: CPT

## 2021-02-27 PROCEDURE — 99291 CRITICAL CARE FIRST HOUR: CPT | Performed by: INTERNAL MEDICINE

## 2021-02-27 PROCEDURE — 80053 COMPREHEN METABOLIC PANEL: CPT | Performed by: PHYSICIAN ASSISTANT

## 2021-02-27 PROCEDURE — 85025 COMPLETE CBC W/AUTO DIFF WBC: CPT | Performed by: PHYSICIAN ASSISTANT

## 2021-02-27 PROCEDURE — 86140 C-REACTIVE PROTEIN: CPT | Performed by: PHYSICIAN ASSISTANT

## 2021-02-28 ENCOUNTER — APPOINTMENT (OUTPATIENT)
Dept: GENERAL RADIOLOGY | Facility: HOSPITAL | Age: 76
End: 2021-02-28

## 2021-02-28 ENCOUNTER — OUTSIDE FACILITY SERVICE (OUTPATIENT)
Dept: PULMONOLOGY | Facility: CLINIC | Age: 76
End: 2021-02-28

## 2021-02-28 LAB
A-A DO2: 104.1 MMHG (ref 0–300)
ALBUMIN SERPL-MCNC: 2.42 G/DL (ref 3.5–5.2)
ALBUMIN/GLOB SERPL: 0.7 G/DL
ALP SERPL-CCNC: 133 U/L (ref 39–117)
ALT SERPL W P-5'-P-CCNC: 30 U/L (ref 1–33)
ANION GAP SERPL CALCULATED.3IONS-SCNC: 6.4 MMOL/L (ref 5–15)
ANISOCYTOSIS BLD QL: NORMAL
ARTERIAL PATENCY WRIST A: ABNORMAL
AST SERPL-CCNC: 22 U/L (ref 1–32)
ATMOSPHERIC PRESS: 724 MMHG
BASE EXCESS BLDA CALC-SCNC: 10.6 MMOL/L (ref 0–2)
BASOPHILS # BLD AUTO: 0.03 10*3/MM3 (ref 0–0.2)
BASOPHILS NFR BLD AUTO: 0.4 % (ref 0–1.5)
BDY SITE: ABNORMAL
BILIRUB SERPL-MCNC: 0.2 MG/DL (ref 0–1.2)
BODY TEMPERATURE: 0 C
BUN SERPL-MCNC: 16 MG/DL (ref 8–23)
BUN/CREAT SERPL: 88.9 (ref 7–25)
CALCIUM SPEC-SCNC: 8.7 MG/DL (ref 8.6–10.5)
CHLORIDE SERPL-SCNC: 100 MMOL/L (ref 98–107)
CO2 BLDA-SCNC: 40.6 MMOL/L (ref 22–33)
CO2 SERPL-SCNC: 31.6 MMOL/L (ref 22–29)
COHGB MFR BLD: 1.9 % (ref 0–5)
CREAT SERPL-MCNC: 0.18 MG/DL (ref 0.57–1)
CRP SERPL-MCNC: 6.11 MG/DL (ref 0–0.5)
DEPRECATED RDW RBC AUTO: 57.9 FL (ref 37–54)
EOSINOPHIL # BLD AUTO: 0.28 10*3/MM3 (ref 0–0.4)
EOSINOPHIL NFR BLD AUTO: 3.6 % (ref 0.3–6.2)
ERYTHROCYTE [DISTWIDTH] IN BLOOD BY AUTOMATED COUNT: 16.2 % (ref 12.3–15.4)
GFR SERPL CREATININE-BSD FRML MDRD: >150 ML/MIN/1.73
GLOBULIN UR ELPH-MCNC: 3.5 GM/DL
GLUCOSE BLDC GLUCOMTR-MCNC: 160 MG/DL (ref 70–130)
GLUCOSE BLDC GLUCOMTR-MCNC: 160 MG/DL (ref 70–130)
GLUCOSE BLDC GLUCOMTR-MCNC: 177 MG/DL (ref 70–130)
GLUCOSE SERPL-MCNC: 155 MG/DL (ref 65–99)
HCO3 BLDA-SCNC: 38.4 MMOL/L (ref 20–26)
HCT VFR BLD AUTO: 34.5 % (ref 34–46.6)
HCT VFR BLD CALC: 32.8 % (ref 38–51)
HGB BLD-MCNC: 9.9 G/DL (ref 12–15.9)
HGB BLDA-MCNC: 10.7 G/DL (ref 13.5–17.5)
HYPOCHROMIA BLD QL: NORMAL
IMM GRANULOCYTES # BLD AUTO: 0.14 10*3/MM3 (ref 0–0.05)
IMM GRANULOCYTES NFR BLD AUTO: 1.8 % (ref 0–0.5)
INHALED O2 CONCENTRATION: 40 %
LYMPHOCYTES # BLD AUTO: 1.31 10*3/MM3 (ref 0.7–3.1)
LYMPHOCYTES NFR BLD AUTO: 16.9 % (ref 19.6–45.3)
Lab: ABNORMAL
Lab: ABNORMAL
MACROCYTES BLD QL SMEAR: NORMAL
MCH RBC QN AUTO: 27.7 PG (ref 26.6–33)
MCHC RBC AUTO-ENTMCNC: 28.7 G/DL (ref 31.5–35.7)
MCV RBC AUTO: 96.6 FL (ref 79–97)
METHGB BLD QL: 0 % (ref 0–3)
MODALITY: ABNORMAL
MONOCYTES # BLD AUTO: 0.37 10*3/MM3 (ref 0.1–0.9)
MONOCYTES NFR BLD AUTO: 4.8 % (ref 5–12)
NEUTROPHILS NFR BLD AUTO: 5.63 10*3/MM3 (ref 1.7–7)
NEUTROPHILS NFR BLD AUTO: 72.5 % (ref 42.7–76)
NOTE: ABNORMAL
NOTIFIED BY: ABNORMAL
NOTIFIED WHO: ABNORMAL
NRBC BLD AUTO-RTO: 0 /100 WBC (ref 0–0.2)
OXYHGB MFR BLDV: 95.5 % (ref 94–99)
PCO2 BLDA: 69.3 MM HG (ref 35–45)
PCO2 TEMP ADJ BLD: ABNORMAL MM[HG]
PH BLDA: 7.35 PH UNITS (ref 7.35–7.45)
PH, TEMP CORRECTED: ABNORMAL
PLAT MORPH BLD: NORMAL
PLATELET # BLD AUTO: 132 10*3/MM3 (ref 140–450)
PMV BLD AUTO: 10.8 FL (ref 6–12)
PO2 BLDA: 90.7 MM HG (ref 83–108)
PO2 TEMP ADJ BLD: ABNORMAL MM[HG]
POTASSIUM SERPL-SCNC: 4.3 MMOL/L (ref 3.5–5.2)
PROT SERPL-MCNC: 5.9 G/DL (ref 6–8.5)
RBC # BLD AUTO: 3.57 10*6/MM3 (ref 3.77–5.28)
SAO2 % BLDCOA: 97.3 % (ref 94–99)
SODIUM SERPL-SCNC: 138 MMOL/L (ref 136–145)
VENTILATOR MODE: ABNORMAL
WBC # BLD AUTO: 7.76 10*3/MM3 (ref 3.4–10.8)

## 2021-02-28 PROCEDURE — 71045 X-RAY EXAM CHEST 1 VIEW: CPT

## 2021-02-28 PROCEDURE — 71045 X-RAY EXAM CHEST 1 VIEW: CPT | Performed by: RADIOLOGY

## 2021-02-28 PROCEDURE — 85025 COMPLETE CBC W/AUTO DIFF WBC: CPT | Performed by: PHYSICIAN ASSISTANT

## 2021-02-28 PROCEDURE — 82962 GLUCOSE BLOOD TEST: CPT

## 2021-02-28 PROCEDURE — 86140 C-REACTIVE PROTEIN: CPT | Performed by: PHYSICIAN ASSISTANT

## 2021-02-28 PROCEDURE — 82375 ASSAY CARBOXYHB QUANT: CPT

## 2021-02-28 PROCEDURE — 83050 HGB METHEMOGLOBIN QUAN: CPT

## 2021-02-28 PROCEDURE — 36600 WITHDRAWAL OF ARTERIAL BLOOD: CPT

## 2021-02-28 PROCEDURE — 80053 COMPREHEN METABOLIC PANEL: CPT | Performed by: PHYSICIAN ASSISTANT

## 2021-02-28 PROCEDURE — 82805 BLOOD GASES W/O2 SATURATION: CPT

## 2021-02-28 PROCEDURE — 99291 CRITICAL CARE FIRST HOUR: CPT | Performed by: INTERNAL MEDICINE

## 2021-02-28 PROCEDURE — 85007 BL SMEAR W/DIFF WBC COUNT: CPT | Performed by: PHYSICIAN ASSISTANT

## 2021-03-01 LAB
ALBUMIN SERPL-MCNC: 2.6 G/DL (ref 3.5–5.2)
ALBUMIN/GLOB SERPL: 0.8 G/DL
ALP SERPL-CCNC: 141 U/L (ref 39–117)
ALT SERPL W P-5'-P-CCNC: 27 U/L (ref 1–33)
ANION GAP SERPL CALCULATED.3IONS-SCNC: 6.1 MMOL/L (ref 5–15)
AST SERPL-CCNC: 18 U/L (ref 1–32)
BASOPHILS # BLD AUTO: 0.02 10*3/MM3 (ref 0–0.2)
BASOPHILS NFR BLD AUTO: 0.3 % (ref 0–1.5)
BILIRUB SERPL-MCNC: 0.2 MG/DL (ref 0–1.2)
BUN SERPL-MCNC: 21 MG/DL (ref 8–23)
BUN/CREAT SERPL: 123.5 (ref 7–25)
CALCIUM SPEC-SCNC: 8.9 MG/DL (ref 8.6–10.5)
CHLORIDE SERPL-SCNC: 98 MMOL/L (ref 98–107)
CO2 SERPL-SCNC: 34.9 MMOL/L (ref 22–29)
CREAT SERPL-MCNC: 0.17 MG/DL (ref 0.57–1)
CRP SERPL-MCNC: 6.24 MG/DL (ref 0–0.5)
DEPRECATED RDW RBC AUTO: 56.9 FL (ref 37–54)
EOSINOPHIL # BLD AUTO: 0.21 10*3/MM3 (ref 0–0.4)
EOSINOPHIL NFR BLD AUTO: 3.3 % (ref 0.3–6.2)
ERYTHROCYTE [DISTWIDTH] IN BLOOD BY AUTOMATED COUNT: 16.1 % (ref 12.3–15.4)
GFR SERPL CREATININE-BSD FRML MDRD: >150 ML/MIN/1.73
GLOBULIN UR ELPH-MCNC: 3.4 GM/DL
GLUCOSE BLDC GLUCOMTR-MCNC: 176 MG/DL (ref 70–130)
GLUCOSE BLDC GLUCOMTR-MCNC: 176 MG/DL (ref 70–130)
GLUCOSE BLDC GLUCOMTR-MCNC: 194 MG/DL (ref 70–130)
GLUCOSE BLDC GLUCOMTR-MCNC: 200 MG/DL (ref 70–130)
GLUCOSE SERPL-MCNC: 193 MG/DL (ref 65–99)
HCT VFR BLD AUTO: 32.8 % (ref 34–46.6)
HGB BLD-MCNC: 9.5 G/DL (ref 12–15.9)
IMM GRANULOCYTES # BLD AUTO: 0.09 10*3/MM3 (ref 0–0.05)
IMM GRANULOCYTES NFR BLD AUTO: 1.4 % (ref 0–0.5)
LYMPHOCYTES # BLD AUTO: 1.41 10*3/MM3 (ref 0.7–3.1)
LYMPHOCYTES NFR BLD AUTO: 21.8 % (ref 19.6–45.3)
MCH RBC QN AUTO: 27.9 PG (ref 26.6–33)
MCHC RBC AUTO-ENTMCNC: 29 G/DL (ref 31.5–35.7)
MCV RBC AUTO: 96.2 FL (ref 79–97)
MONOCYTES # BLD AUTO: 0.42 10*3/MM3 (ref 0.1–0.9)
MONOCYTES NFR BLD AUTO: 6.5 % (ref 5–12)
NEUTROPHILS NFR BLD AUTO: 4.31 10*3/MM3 (ref 1.7–7)
NEUTROPHILS NFR BLD AUTO: 66.7 % (ref 42.7–76)
NRBC BLD AUTO-RTO: 0 /100 WBC (ref 0–0.2)
PLATELET # BLD AUTO: 127 10*3/MM3 (ref 140–450)
PMV BLD AUTO: 10.9 FL (ref 6–12)
POTASSIUM SERPL-SCNC: 4.6 MMOL/L (ref 3.5–5.2)
PROT SERPL-MCNC: 6 G/DL (ref 6–8.5)
RBC # BLD AUTO: 3.41 10*6/MM3 (ref 3.77–5.28)
SODIUM SERPL-SCNC: 139 MMOL/L (ref 136–145)
WBC # BLD AUTO: 6.46 10*3/MM3 (ref 3.4–10.8)

## 2021-03-01 PROCEDURE — 97110 THERAPEUTIC EXERCISES: CPT

## 2021-03-01 PROCEDURE — 85025 COMPLETE CBC W/AUTO DIFF WBC: CPT | Performed by: PHYSICIAN ASSISTANT

## 2021-03-01 PROCEDURE — 82962 GLUCOSE BLOOD TEST: CPT

## 2021-03-01 PROCEDURE — 97530 THERAPEUTIC ACTIVITIES: CPT

## 2021-03-01 PROCEDURE — 80053 COMPREHEN METABOLIC PANEL: CPT | Performed by: PHYSICIAN ASSISTANT

## 2021-03-01 PROCEDURE — 86140 C-REACTIVE PROTEIN: CPT | Performed by: PHYSICIAN ASSISTANT

## 2021-03-02 ENCOUNTER — APPOINTMENT (OUTPATIENT)
Dept: CT IMAGING | Facility: HOSPITAL | Age: 76
End: 2021-03-02

## 2021-03-02 LAB
ALBUMIN SERPL-MCNC: 3.04 G/DL (ref 3.5–5.2)
ALBUMIN/GLOB SERPL: 0.9 G/DL
ALP SERPL-CCNC: 144 U/L (ref 39–117)
ALT SERPL W P-5'-P-CCNC: 27 U/L (ref 1–33)
ANION GAP SERPL CALCULATED.3IONS-SCNC: 8.3 MMOL/L (ref 5–15)
ANISOCYTOSIS BLD QL: NORMAL
AST SERPL-CCNC: 20 U/L (ref 1–32)
BASOPHILS # BLD AUTO: 0.03 10*3/MM3 (ref 0–0.2)
BASOPHILS NFR BLD AUTO: 0.5 % (ref 0–1.5)
BILIRUB SERPL-MCNC: <0.2 MG/DL (ref 0–1.2)
BUN SERPL-MCNC: 23 MG/DL (ref 8–23)
BUN/CREAT SERPL: 135.3 (ref 7–25)
CALCIUM SPEC-SCNC: 9.3 MG/DL (ref 8.6–10.5)
CHLORIDE SERPL-SCNC: 95 MMOL/L (ref 98–107)
CO2 SERPL-SCNC: 36.7 MMOL/L (ref 22–29)
CREAT SERPL-MCNC: 0.17 MG/DL (ref 0.57–1)
CRP SERPL-MCNC: 3.57 MG/DL (ref 0–0.5)
DEPRECATED RDW RBC AUTO: 58 FL (ref 37–54)
EOSINOPHIL # BLD AUTO: 0.22 10*3/MM3 (ref 0–0.4)
EOSINOPHIL NFR BLD AUTO: 3.5 % (ref 0.3–6.2)
ERYTHROCYTE [DISTWIDTH] IN BLOOD BY AUTOMATED COUNT: 16.4 % (ref 12.3–15.4)
GFR SERPL CREATININE-BSD FRML MDRD: >150 ML/MIN/1.73
GLOBULIN UR ELPH-MCNC: 3.6 GM/DL
GLUCOSE BLDC GLUCOMTR-MCNC: 140 MG/DL (ref 70–130)
GLUCOSE BLDC GLUCOMTR-MCNC: 160 MG/DL (ref 70–130)
GLUCOSE BLDC GLUCOMTR-MCNC: 160 MG/DL (ref 70–130)
GLUCOSE BLDC GLUCOMTR-MCNC: 188 MG/DL (ref 70–130)
GLUCOSE BLDC GLUCOMTR-MCNC: 218 MG/DL (ref 70–130)
GLUCOSE BLDC GLUCOMTR-MCNC: 228 MG/DL (ref 70–130)
GLUCOSE SERPL-MCNC: 161 MG/DL (ref 65–99)
HCT VFR BLD AUTO: 34.2 % (ref 34–46.6)
HGB BLD-MCNC: 9.9 G/DL (ref 12–15.9)
HYPOCHROMIA BLD QL: NORMAL
IMM GRANULOCYTES # BLD AUTO: 0.05 10*3/MM3 (ref 0–0.05)
IMM GRANULOCYTES NFR BLD AUTO: 0.8 % (ref 0–0.5)
LYMPHOCYTES # BLD AUTO: 1.6 10*3/MM3 (ref 0.7–3.1)
LYMPHOCYTES NFR BLD AUTO: 25.2 % (ref 19.6–45.3)
MACROCYTES BLD QL SMEAR: NORMAL
MCH RBC QN AUTO: 28 PG (ref 26.6–33)
MCHC RBC AUTO-ENTMCNC: 28.9 G/DL (ref 31.5–35.7)
MCV RBC AUTO: 96.9 FL (ref 79–97)
MONOCYTES # BLD AUTO: 0.42 10*3/MM3 (ref 0.1–0.9)
MONOCYTES NFR BLD AUTO: 6.6 % (ref 5–12)
NEUTROPHILS NFR BLD AUTO: 4.04 10*3/MM3 (ref 1.7–7)
NEUTROPHILS NFR BLD AUTO: 63.4 % (ref 42.7–76)
NRBC BLD AUTO-RTO: 0 /100 WBC (ref 0–0.2)
PLATELET # BLD AUTO: 143 10*3/MM3 (ref 140–450)
PMV BLD AUTO: 11.5 FL (ref 6–12)
POTASSIUM SERPL-SCNC: 4 MMOL/L (ref 3.5–5.2)
PROT SERPL-MCNC: 6.6 G/DL (ref 6–8.5)
RBC # BLD AUTO: 3.53 10*6/MM3 (ref 3.77–5.28)
SMALL PLATELETS BLD QL SMEAR: ADEQUATE
SODIUM SERPL-SCNC: 140 MMOL/L (ref 136–145)
WBC # BLD AUTO: 6.36 10*3/MM3 (ref 3.4–10.8)

## 2021-03-02 PROCEDURE — 86140 C-REACTIVE PROTEIN: CPT | Performed by: PHYSICIAN ASSISTANT

## 2021-03-02 PROCEDURE — 74176 CT ABD & PELVIS W/O CONTRAST: CPT | Performed by: RADIOLOGY

## 2021-03-02 PROCEDURE — 85025 COMPLETE CBC W/AUTO DIFF WBC: CPT | Performed by: PHYSICIAN ASSISTANT

## 2021-03-02 PROCEDURE — 82962 GLUCOSE BLOOD TEST: CPT

## 2021-03-02 PROCEDURE — 71250 CT THORAX DX C-: CPT

## 2021-03-02 PROCEDURE — 71250 CT THORAX DX C-: CPT | Performed by: RADIOLOGY

## 2021-03-02 PROCEDURE — 74176 CT ABD & PELVIS W/O CONTRAST: CPT

## 2021-03-02 PROCEDURE — 85007 BL SMEAR W/DIFF WBC COUNT: CPT | Performed by: PHYSICIAN ASSISTANT

## 2021-03-02 PROCEDURE — 80053 COMPREHEN METABOLIC PANEL: CPT | Performed by: PHYSICIAN ASSISTANT

## 2021-03-03 LAB
ALBUMIN SERPL-MCNC: 3.03 G/DL (ref 3.5–5.2)
ALBUMIN/GLOB SERPL: 0.7 G/DL
ALP SERPL-CCNC: 154 U/L (ref 39–117)
ALT SERPL W P-5'-P-CCNC: 31 U/L (ref 1–33)
ANION GAP SERPL CALCULATED.3IONS-SCNC: 9.3 MMOL/L (ref 5–15)
ANISOCYTOSIS BLD QL: NORMAL
AST SERPL-CCNC: 22 U/L (ref 1–32)
BACTERIA UR QL AUTO: ABNORMAL /HPF
BASOPHILS # BLD AUTO: 0.02 10*3/MM3 (ref 0–0.2)
BASOPHILS NFR BLD AUTO: 0.3 % (ref 0–1.5)
BILIRUB SERPL-MCNC: 0.3 MG/DL (ref 0–1.2)
BILIRUB UR QL STRIP: NEGATIVE
BUN SERPL-MCNC: 26 MG/DL (ref 8–23)
BUN/CREAT SERPL: 89.7 (ref 7–25)
CALCIUM SPEC-SCNC: 10 MG/DL (ref 8.6–10.5)
CHLORIDE SERPL-SCNC: 97 MMOL/L (ref 98–107)
CLARITY UR: ABNORMAL
CO2 SERPL-SCNC: 34.7 MMOL/L (ref 22–29)
COD CRY URNS QL: ABNORMAL /HPF
COLOR UR: ABNORMAL
CREAT SERPL-MCNC: 0.29 MG/DL (ref 0.57–1)
CRP SERPL-MCNC: 8.28 MG/DL (ref 0–0.5)
DEPRECATED RDW RBC AUTO: 60 FL (ref 37–54)
EOSINOPHIL # BLD AUTO: 0.27 10*3/MM3 (ref 0–0.4)
EOSINOPHIL NFR BLD AUTO: 4 % (ref 0.3–6.2)
ERYTHROCYTE [DISTWIDTH] IN BLOOD BY AUTOMATED COUNT: 16.7 % (ref 12.3–15.4)
GFR SERPL CREATININE-BSD FRML MDRD: >150 ML/MIN/1.73
GLOBULIN UR ELPH-MCNC: 4.2 GM/DL
GLUCOSE BLDC GLUCOMTR-MCNC: 146 MG/DL (ref 70–130)
GLUCOSE BLDC GLUCOMTR-MCNC: 176 MG/DL (ref 70–130)
GLUCOSE BLDC GLUCOMTR-MCNC: 186 MG/DL (ref 70–130)
GLUCOSE BLDC GLUCOMTR-MCNC: 216 MG/DL (ref 70–130)
GLUCOSE SERPL-MCNC: 190 MG/DL (ref 65–99)
GLUCOSE UR STRIP-MCNC: NEGATIVE MG/DL
HCT VFR BLD AUTO: 36.4 % (ref 34–46.6)
HGB BLD-MCNC: 10.3 G/DL (ref 12–15.9)
HGB UR QL STRIP.AUTO: NEGATIVE
HYPOCHROMIA BLD QL: NORMAL
IMM GRANULOCYTES # BLD AUTO: 0.09 10*3/MM3 (ref 0–0.05)
IMM GRANULOCYTES NFR BLD AUTO: 1.3 % (ref 0–0.5)
KETONES UR QL STRIP: NEGATIVE
LEUKOCYTE ESTERASE UR QL STRIP.AUTO: ABNORMAL
LYMPHOCYTES # BLD AUTO: 1.22 10*3/MM3 (ref 0.7–3.1)
LYMPHOCYTES NFR BLD AUTO: 18.2 % (ref 19.6–45.3)
MACROCYTES BLD QL SMEAR: NORMAL
MCH RBC QN AUTO: 27.5 PG (ref 26.6–33)
MCHC RBC AUTO-ENTMCNC: 28.3 G/DL (ref 31.5–35.7)
MCV RBC AUTO: 97.3 FL (ref 79–97)
MONOCYTES # BLD AUTO: 0.42 10*3/MM3 (ref 0.1–0.9)
MONOCYTES NFR BLD AUTO: 6.3 % (ref 5–12)
NEUTROPHILS NFR BLD AUTO: 4.68 10*3/MM3 (ref 1.7–7)
NEUTROPHILS NFR BLD AUTO: 69.9 % (ref 42.7–76)
NITRITE UR QL STRIP: NEGATIVE
NRBC BLD AUTO-RTO: 0 /100 WBC (ref 0–0.2)
PH UR STRIP.AUTO: 6.5 [PH] (ref 5–8)
PLAT MORPH BLD: NORMAL
PLATELET # BLD AUTO: 145 10*3/MM3 (ref 140–450)
PMV BLD AUTO: 11.7 FL (ref 6–12)
POTASSIUM SERPL-SCNC: 4.9 MMOL/L (ref 3.5–5.2)
PROT SERPL-MCNC: 7.2 G/DL (ref 6–8.5)
PROT UR QL STRIP: ABNORMAL
RBC # BLD AUTO: 3.74 10*6/MM3 (ref 3.77–5.28)
RBC # UR: ABNORMAL /HPF
REF LAB TEST METHOD: ABNORMAL
SODIUM SERPL-SCNC: 141 MMOL/L (ref 136–145)
SP GR UR STRIP: 1.02 (ref 1–1.03)
SQUAMOUS #/AREA URNS HPF: ABNORMAL /HPF
STOMATOCYTES BLD QL SMEAR: NORMAL
TRANS CELLS #/AREA URNS HPF: ABNORMAL /HPF
UROBILINOGEN UR QL STRIP: ABNORMAL
WBC # BLD AUTO: 6.7 10*3/MM3 (ref 3.4–10.8)
WBC UR QL AUTO: ABNORMAL /HPF

## 2021-03-03 PROCEDURE — 85025 COMPLETE CBC W/AUTO DIFF WBC: CPT | Performed by: PHYSICIAN ASSISTANT

## 2021-03-03 PROCEDURE — 87186 SC STD MICRODIL/AGAR DIL: CPT | Performed by: INTERNAL MEDICINE

## 2021-03-03 PROCEDURE — 87086 URINE CULTURE/COLONY COUNT: CPT | Performed by: INTERNAL MEDICINE

## 2021-03-03 PROCEDURE — 87040 BLOOD CULTURE FOR BACTERIA: CPT | Performed by: INTERNAL MEDICINE

## 2021-03-03 PROCEDURE — 85007 BL SMEAR W/DIFF WBC COUNT: CPT | Performed by: PHYSICIAN ASSISTANT

## 2021-03-03 PROCEDURE — 86140 C-REACTIVE PROTEIN: CPT | Performed by: PHYSICIAN ASSISTANT

## 2021-03-03 PROCEDURE — 97530 THERAPEUTIC ACTIVITIES: CPT

## 2021-03-03 PROCEDURE — 97110 THERAPEUTIC EXERCISES: CPT

## 2021-03-03 PROCEDURE — 87077 CULTURE AEROBIC IDENTIFY: CPT | Performed by: INTERNAL MEDICINE

## 2021-03-03 PROCEDURE — 81001 URINALYSIS AUTO W/SCOPE: CPT | Performed by: INTERNAL MEDICINE

## 2021-03-03 PROCEDURE — 80053 COMPREHEN METABOLIC PANEL: CPT | Performed by: PHYSICIAN ASSISTANT

## 2021-03-03 PROCEDURE — 82962 GLUCOSE BLOOD TEST: CPT

## 2021-03-04 LAB
ALBUMIN SERPL-MCNC: 3.03 G/DL (ref 3.5–5.2)
ALBUMIN/GLOB SERPL: 0.8 G/DL
ALP SERPL-CCNC: 145 U/L (ref 39–117)
ALT SERPL W P-5'-P-CCNC: 29 U/L (ref 1–33)
ANION GAP SERPL CALCULATED.3IONS-SCNC: 9.7 MMOL/L (ref 5–15)
ANISOCYTOSIS BLD QL: NORMAL
AST SERPL-CCNC: 19 U/L (ref 1–32)
BASOPHILS # BLD AUTO: 0.02 10*3/MM3 (ref 0–0.2)
BASOPHILS NFR BLD AUTO: 0.3 % (ref 0–1.5)
BILIRUB SERPL-MCNC: 0.2 MG/DL (ref 0–1.2)
BUN SERPL-MCNC: 30 MG/DL (ref 8–23)
BUN/CREAT SERPL: 120 (ref 7–25)
CALCIUM SPEC-SCNC: 9.3 MG/DL (ref 8.6–10.5)
CHLORIDE SERPL-SCNC: 98 MMOL/L (ref 98–107)
CO2 SERPL-SCNC: 34.3 MMOL/L (ref 22–29)
CREAT SERPL-MCNC: 0.25 MG/DL (ref 0.57–1)
CRP SERPL-MCNC: 7.01 MG/DL (ref 0–0.5)
DEPRECATED RDW RBC AUTO: 60 FL (ref 37–54)
EOSINOPHIL # BLD AUTO: 0.18 10*3/MM3 (ref 0–0.4)
EOSINOPHIL NFR BLD AUTO: 2.8 % (ref 0.3–6.2)
ERYTHROCYTE [DISTWIDTH] IN BLOOD BY AUTOMATED COUNT: 17 % (ref 12.3–15.4)
GFR SERPL CREATININE-BSD FRML MDRD: >150 ML/MIN/1.73
GLOBULIN UR ELPH-MCNC: 3.7 GM/DL
GLUCOSE BLDC GLUCOMTR-MCNC: 161 MG/DL (ref 70–130)
GLUCOSE BLDC GLUCOMTR-MCNC: 163 MG/DL (ref 70–130)
GLUCOSE BLDC GLUCOMTR-MCNC: 163 MG/DL (ref 70–130)
GLUCOSE BLDC GLUCOMTR-MCNC: 205 MG/DL (ref 70–130)
GLUCOSE SERPL-MCNC: 187 MG/DL (ref 65–99)
HCT VFR BLD AUTO: 34.6 % (ref 34–46.6)
HGB BLD-MCNC: 9.8 G/DL (ref 12–15.9)
HYPOCHROMIA BLD QL: NORMAL
IMM GRANULOCYTES # BLD AUTO: 0.03 10*3/MM3 (ref 0–0.05)
IMM GRANULOCYTES NFR BLD AUTO: 0.5 % (ref 0–0.5)
LYMPHOCYTES # BLD AUTO: 1 10*3/MM3 (ref 0.7–3.1)
LYMPHOCYTES NFR BLD AUTO: 15.4 % (ref 19.6–45.3)
MCH RBC QN AUTO: 27.5 PG (ref 26.6–33)
MCHC RBC AUTO-ENTMCNC: 28.3 G/DL (ref 31.5–35.7)
MCV RBC AUTO: 97.2 FL (ref 79–97)
MONOCYTES # BLD AUTO: 0.38 10*3/MM3 (ref 0.1–0.9)
MONOCYTES NFR BLD AUTO: 5.8 % (ref 5–12)
NEUTROPHILS NFR BLD AUTO: 4.9 10*3/MM3 (ref 1.7–7)
NEUTROPHILS NFR BLD AUTO: 75.2 % (ref 42.7–76)
NRBC BLD AUTO-RTO: 0 /100 WBC (ref 0–0.2)
PLAT MORPH BLD: NORMAL
PLATELET # BLD AUTO: 139 10*3/MM3 (ref 140–450)
PMV BLD AUTO: 10.8 FL (ref 6–12)
POTASSIUM SERPL-SCNC: 4.4 MMOL/L (ref 3.5–5.2)
PROT SERPL-MCNC: 6.7 G/DL (ref 6–8.5)
RBC # BLD AUTO: 3.56 10*6/MM3 (ref 3.77–5.28)
SODIUM SERPL-SCNC: 142 MMOL/L (ref 136–145)
STOMATOCYTES BLD QL SMEAR: NORMAL
WBC # BLD AUTO: 6.51 10*3/MM3 (ref 3.4–10.8)

## 2021-03-04 PROCEDURE — 82962 GLUCOSE BLOOD TEST: CPT

## 2021-03-04 PROCEDURE — 92610 EVALUATE SWALLOWING FUNCTION: CPT

## 2021-03-04 PROCEDURE — 85007 BL SMEAR W/DIFF WBC COUNT: CPT | Performed by: PHYSICIAN ASSISTANT

## 2021-03-04 PROCEDURE — 80053 COMPREHEN METABOLIC PANEL: CPT | Performed by: PHYSICIAN ASSISTANT

## 2021-03-04 PROCEDURE — 85025 COMPLETE CBC W/AUTO DIFF WBC: CPT | Performed by: PHYSICIAN ASSISTANT

## 2021-03-04 PROCEDURE — 86140 C-REACTIVE PROTEIN: CPT | Performed by: PHYSICIAN ASSISTANT

## 2021-03-05 ENCOUNTER — OUTSIDE FACILITY SERVICE (OUTPATIENT)
Dept: PULMONOLOGY | Facility: CLINIC | Age: 76
End: 2021-03-05

## 2021-03-05 ENCOUNTER — APPOINTMENT (OUTPATIENT)
Dept: GENERAL RADIOLOGY | Facility: HOSPITAL | Age: 76
End: 2021-03-05

## 2021-03-05 LAB
ALBUMIN SERPL-MCNC: 2.67 G/DL (ref 3.5–5.2)
ALBUMIN/GLOB SERPL: 0.7 G/DL
ALP SERPL-CCNC: 133 U/L (ref 39–117)
ALT SERPL W P-5'-P-CCNC: 29 U/L (ref 1–33)
ANION GAP SERPL CALCULATED.3IONS-SCNC: 7 MMOL/L (ref 5–15)
ANISOCYTOSIS BLD QL: NORMAL
AST SERPL-CCNC: 26 U/L (ref 1–32)
BASOPHILS # BLD AUTO: 0.03 10*3/MM3 (ref 0–0.2)
BASOPHILS NFR BLD AUTO: 0.6 % (ref 0–1.5)
BILIRUB SERPL-MCNC: 0.2 MG/DL (ref 0–1.2)
BUN SERPL-MCNC: 28 MG/DL (ref 8–23)
BUN/CREAT SERPL: 133.3 (ref 7–25)
CALCIUM SPEC-SCNC: 9 MG/DL (ref 8.6–10.5)
CHLORIDE SERPL-SCNC: 98 MMOL/L (ref 98–107)
CO2 SERPL-SCNC: 33 MMOL/L (ref 22–29)
CREAT SERPL-MCNC: 0.21 MG/DL (ref 0.57–1)
CRP SERPL-MCNC: 6.26 MG/DL (ref 0–0.5)
DEPRECATED RDW RBC AUTO: 61.8 FL (ref 37–54)
EOSINOPHIL # BLD AUTO: 0.27 10*3/MM3 (ref 0–0.4)
EOSINOPHIL NFR BLD AUTO: 5.6 % (ref 0.3–6.2)
ERYTHROCYTE [DISTWIDTH] IN BLOOD BY AUTOMATED COUNT: 16.8 % (ref 12.3–15.4)
GFR SERPL CREATININE-BSD FRML MDRD: >150 ML/MIN/1.73
GLOBULIN UR ELPH-MCNC: 3.6 GM/DL
GLUCOSE BLDC GLUCOMTR-MCNC: 113 MG/DL (ref 70–130)
GLUCOSE BLDC GLUCOMTR-MCNC: 126 MG/DL (ref 70–130)
GLUCOSE BLDC GLUCOMTR-MCNC: 131 MG/DL (ref 70–130)
GLUCOSE BLDC GLUCOMTR-MCNC: 177 MG/DL (ref 70–130)
GLUCOSE BLDC GLUCOMTR-MCNC: 190 MG/DL (ref 70–130)
GLUCOSE SERPL-MCNC: 181 MG/DL (ref 65–99)
HCT VFR BLD AUTO: 33.3 % (ref 34–46.6)
HGB BLD-MCNC: 9.1 G/DL (ref 12–15.9)
HYPOCHROMIA BLD QL: NORMAL
IMM GRANULOCYTES # BLD AUTO: 0.03 10*3/MM3 (ref 0–0.05)
IMM GRANULOCYTES NFR BLD AUTO: 0.6 % (ref 0–0.5)
LYMPHOCYTES # BLD AUTO: 1.15 10*3/MM3 (ref 0.7–3.1)
LYMPHOCYTES NFR BLD AUTO: 23.9 % (ref 19.6–45.3)
MACROCYTES BLD QL SMEAR: NORMAL
MCH RBC QN AUTO: 27.4 PG (ref 26.6–33)
MCHC RBC AUTO-ENTMCNC: 27.3 G/DL (ref 31.5–35.7)
MCV RBC AUTO: 100.3 FL (ref 79–97)
MONOCYTES # BLD AUTO: 0.35 10*3/MM3 (ref 0.1–0.9)
MONOCYTES NFR BLD AUTO: 7.3 % (ref 5–12)
NEUTROPHILS NFR BLD AUTO: 2.99 10*3/MM3 (ref 1.7–7)
NEUTROPHILS NFR BLD AUTO: 62 % (ref 42.7–76)
NRBC BLD AUTO-RTO: 0 /100 WBC (ref 0–0.2)
PLAT MORPH BLD: NORMAL
PLATELET # BLD AUTO: 131 10*3/MM3 (ref 140–450)
PMV BLD AUTO: 10.1 FL (ref 6–12)
POTASSIUM SERPL-SCNC: 4.3 MMOL/L (ref 3.5–5.2)
PROT SERPL-MCNC: 6.3 G/DL (ref 6–8.5)
RBC # BLD AUTO: 3.32 10*6/MM3 (ref 3.77–5.28)
SODIUM SERPL-SCNC: 138 MMOL/L (ref 136–145)
WBC # BLD AUTO: 4.82 10*3/MM3 (ref 3.4–10.8)

## 2021-03-05 PROCEDURE — 85007 BL SMEAR W/DIFF WBC COUNT: CPT | Performed by: PHYSICIAN ASSISTANT

## 2021-03-05 PROCEDURE — 99291 CRITICAL CARE FIRST HOUR: CPT | Performed by: INTERNAL MEDICINE

## 2021-03-05 PROCEDURE — 74230 X-RAY XM SWLNG FUNCJ C+: CPT

## 2021-03-05 PROCEDURE — 86140 C-REACTIVE PROTEIN: CPT | Performed by: PHYSICIAN ASSISTANT

## 2021-03-05 PROCEDURE — 82962 GLUCOSE BLOOD TEST: CPT

## 2021-03-05 PROCEDURE — 74230 X-RAY XM SWLNG FUNCJ C+: CPT | Performed by: RADIOLOGY

## 2021-03-05 PROCEDURE — 92611 MOTION FLUOROSCOPY/SWALLOW: CPT

## 2021-03-05 PROCEDURE — 85025 COMPLETE CBC W/AUTO DIFF WBC: CPT | Performed by: PHYSICIAN ASSISTANT

## 2021-03-05 PROCEDURE — 80053 COMPREHEN METABOLIC PANEL: CPT | Performed by: PHYSICIAN ASSISTANT

## 2021-03-05 NOTE — MBS/VFSS/FEES
Long Term Care - Speech Language Pathology   Swallow Initial Evaluation [facility]   MODIFIED BARIUM SWALLOW STUDY     Patient Name: Argenis Lay  : 1945  MRN: [id number]  Today's Date: 3/5/2021             Admit Date: 2021    Visit Dx:   No diagnosis found.  There is no problem list on file for this patient.    No past medical history on file.  No past surgical history on file.    Argenis Lay  presents to the radiology suite this am from [place] [address]/ to participate in an instrumental MBS to evaluate safety/efficacy of swallowing fnx, determine safest/least restrictive diet. She is NPO pending this evaluation. She is accompanied to this evaluation by her RN and nursing students.     PMH is significant for h/o covid-19 w/ acute respiratory failure, MRSA PNA, trichomoniasis, ARDS, obesity, DM II, HLD, chest tube (d/c'ed 21), s/p trach 21 and PEG 21.      Social History     Socioeconomic History   • Marital status: Single     Spouse name: Not on file   • Number of children: Not on file   • Years of education: Not on file   • Highest education level: Not on file      EXAM: CT CHEST WO CONTRAST DIAGNOSTIC-      CLINICAL INDICATION:Respiratory failure      COMPARISON: Chest x-ray 2021     TECHNIQUE: Multiple axial CT images were obtained from lung apex through  upper abdomen without the administration of IV contrast. Reformatted  images in the coronal and/or sagittal plane(s) were generated from the  axial data set to facilitate diagnostic accuracy and/or surgical  planning.     Radiation dose reduction techniques were utilized per ALARA protocol.  Automated exposure control was initiated through either or [employer] or  [employer] software packages by  protocol.    DOSE (DLP mGy-cm): 799.57 mGy.cm        FINDINGS:     LUNGS: Diffuse interstitial and airspace disease is present, similar to  the findings at plain radiography.     HEART: Unremarkable.     MEDIASTINUM: No  masses. No enlarged lymph nodes.  No fluid collections.     PLEURA: No pleural effusion. No pleural mass or abnormal calcification.  No pneumothorax.     VASCULATURE: No evidence of aneurysm.     BONES: No acute bony abnormality.     VISUALIZED UPPER ABDOMEN:Please see the CT report for the abdomen and  pelvis     Other: None.     IMPRESSION:  1. Diffuse interstitial and airspace disease bilaterally.  2. COPD.    This report was finalized on 3/2/2021 2:01 PM by Dr. Alan Hargrove MD.    Diet Orders (active) (From admission, onward)     Start     Ordered    03/05/21 1010  Diet Soft Texture; Ground; Nectar / Syrup Thick  Diet Effective Now     Comments: Ice chips between meals/meds and when on mech vent.    03/05/21 1010    02/23/21 1021  Peptamen AF (Vital AF 1.2); Tube Feeding Type: Continuous; Continuous Tube Feeding Start Rate (mL/hr): 10; Then Advance Rate By (mL/hr): Do Not Advance; Every __ Hours: Patient at Goal Rate; To Goal Rate of (mL/hr): 60; Water Flush Amount (mL): 10  Diet Effective Now     Comments: 10ml/hr    02/23/21 1021              She is observed on O2 via trach collar, shiley size 6 trach. SV in place.     Risks and benefits of the procedure are explained w/ pt verbalizing understanding/agreement to participate. Proceed per protocol.     Pt is positioned upright and centered in a soft strap supportive chair to accept multiple po presentations of solid cracker, puree, honey thick, nectar thick, and thin liquids via spoon, cup and straw. Whole placebo pill in puree deferred. She is able to self feed.     All views are from the lateral plane.     Facial/oral structures are symmetrical upon observation w/o lingual deviation upon protrusion. Oral mucosa are moist, pink and clean. Secretions are clear, thin and well controlled. OROM/HELADIO is slightly weak to imitate oral postures. She is edentulous, reports her dentures are at home. Gag is not assessed. Volitional cough is adequate in intensity, clear in  quality, nonproductive. Vocal quality is adequate in intensity, clear in quality w/ intelligible speech. Pt is a/a and cooperative to particpate. She is oriented to person, place, and time, follows simple directives, and participates in simple conversational exchanges. She is hard of hearing.     Upon po presentations, adequate bolus anticipation w/ good labial seal for bolus clearance via spoon bowl, cup rim stability and suction via straw. Bolus formation, manipulation, and control are mildly weak, mildly-moderately increased oral prep time w/ solids, w/ rotary mastication pattern. A-p transit is slightly delayed w/o oral residue. Tongue base retraction and linguavelar seal are adequate w/o significant premature spillage. No laryngeal penetration or aspiration is evidenced before the swallow.     Pharyngeal swallow is timely w/ adequate hyolaryngeal elevation and slightly delayed epiglottic inversion. Deep laryngeal penetration occurs during the swallow w/ thin liquids via cup and straw w/ eventual minimal silent aspiration after the swallow from laryngeal residue. Cued cough effective to clear. Compensatory strategies deferred 2/2 tracheostomy. Repeat nectar thick liquid trials w/ and w/o SV wfl. Pharyngeal contraction is slightly weak w/ mild vallecular residue across consistencies. No other laryngeal penetration or aspiration evidenced during or after the swallow.     Full esophageal sweep reveals no obvious mucosal abnormalities or retrograde flow noted noted.      Impression: Per this evaluation, Ms. Lay presents w/ a mild oropharyngeal dysphagia w/ intermittent minimal silent aspiration w/ thin liquids. She is felt to most benefit from a modified po diet and dysphagia tx per poc.      EDUCATION  The patient has been educated in the following areas:   Dysphagia (Swallowing Impairment) Modified Diet Instruction.    SLP Recommendation and Plan  1. Mechanical soft, ground meats, nectar thick liquids.   2. Ice  chips between meals and medications, and when on mech vent.   4. Meds crushed in puree or via non-oral method.   4. Universal aspiration precautions.   5. KAJAL precautions.   6. Oral care protocol.   7. Assist w/ meals PRN.   8. Dysphagia tx.   SLP to f/u for diet safety, dysphagia tx per poc.     D/w pt results and recommendations w/ verbal understanding and agreement.     D/w RN results and recommendations w/ verbal understanding and agreement.     Thank you for allowing me to participate in the care of your patient-  Nia Michele M.A., CCC-SLP         Time Calculation:   Time Calculation- SLP     Row Name 03/05/21 1010             Time Calculation- SLP    SLP - Next Appointment  03/08/21  -JOHN        User Key  (r) = Recorded By, (t) = Taken By, (c) = Cosigned By    Initials Name Provider Type    Nia Branham MA,CCC-SLP Speech and Language Pathologist          Therapy Charges for Today     Code Description Service Date Service Provider Modifiers Qty    [id number] HC ST EVAL ORAL PHARYNG SWALLOW 4 3/4/2021 Nia Michele MA,CCC-SLP GN 1    [id number] HC ST MOTION FLUORO EVAL SWALLOW 8 3/5/2021 Nia Michele MA,CCC-SLP GN 1        Patient was not wearing a face mask during this therapy encounter. Therapist used appropriate personal protective equipment including mask, eye protection and gloves.  Mask used was standard procedure mask. Appropriate PPE was worn during the entire therapy session. The patient coughed during this evaluation. Therapist was within 6 feet for 15 minutes or more during the evaluation. Hand hygiene was completed before and after therapy session. Patient is not in enhanced droplet precautions.         Nia Michele MA,CCC-SLP  3/5/2021

## 2021-03-06 ENCOUNTER — OUTSIDE FACILITY SERVICE (OUTPATIENT)
Dept: PULMONOLOGY | Facility: CLINIC | Age: 76
End: 2021-03-06

## 2021-03-06 LAB
ALBUMIN SERPL-MCNC: 3.02 G/DL (ref 3.5–5.2)
ALBUMIN/GLOB SERPL: 0.8 G/DL
ALP SERPL-CCNC: 132 U/L (ref 39–117)
ALT SERPL W P-5'-P-CCNC: 35 U/L (ref 1–33)
ANION GAP SERPL CALCULATED.3IONS-SCNC: 9.9 MMOL/L (ref 5–15)
ANISOCYTOSIS BLD QL: NORMAL
AST SERPL-CCNC: 30 U/L (ref 1–32)
BASOPHILS # BLD AUTO: 0.02 10*3/MM3 (ref 0–0.2)
BASOPHILS NFR BLD AUTO: 0.4 % (ref 0–1.5)
BILIRUB SERPL-MCNC: 0.3 MG/DL (ref 0–1.2)
BUN SERPL-MCNC: 28 MG/DL (ref 8–23)
BUN/CREAT SERPL: 96.6 (ref 7–25)
CALCIUM SPEC-SCNC: 9.5 MG/DL (ref 8.6–10.5)
CHLORIDE SERPL-SCNC: 100 MMOL/L (ref 98–107)
CO2 SERPL-SCNC: 31.1 MMOL/L (ref 22–29)
CREAT SERPL-MCNC: 0.29 MG/DL (ref 0.57–1)
CRP SERPL-MCNC: 5.36 MG/DL (ref 0–0.5)
DEPRECATED RDW RBC AUTO: 59.7 FL (ref 37–54)
EOSINOPHIL # BLD AUTO: 0.24 10*3/MM3 (ref 0–0.4)
EOSINOPHIL NFR BLD AUTO: 5.1 % (ref 0.3–6.2)
ERYTHROCYTE [DISTWIDTH] IN BLOOD BY AUTOMATED COUNT: 16.4 % (ref 12.3–15.4)
GFR SERPL CREATININE-BSD FRML MDRD: >150 ML/MIN/1.73
GLOBULIN UR ELPH-MCNC: 3.9 GM/DL
GLUCOSE BLDC GLUCOMTR-MCNC: 105 MG/DL (ref 70–130)
GLUCOSE BLDC GLUCOMTR-MCNC: 108 MG/DL (ref 70–130)
GLUCOSE BLDC GLUCOMTR-MCNC: 145 MG/DL (ref 70–130)
GLUCOSE BLDC GLUCOMTR-MCNC: 158 MG/DL (ref 70–130)
GLUCOSE BLDC GLUCOMTR-MCNC: 158 MG/DL (ref 70–130)
GLUCOSE SERPL-MCNC: 130 MG/DL (ref 65–99)
HCT VFR BLD AUTO: 35.8 % (ref 34–46.6)
HGB BLD-MCNC: 9.9 G/DL (ref 12–15.9)
HYPOCHROMIA BLD QL: NORMAL
IMM GRANULOCYTES # BLD AUTO: 0.02 10*3/MM3 (ref 0–0.05)
IMM GRANULOCYTES NFR BLD AUTO: 0.4 % (ref 0–0.5)
LYMPHOCYTES # BLD AUTO: 1.06 10*3/MM3 (ref 0.7–3.1)
LYMPHOCYTES NFR BLD AUTO: 22.4 % (ref 19.6–45.3)
MACROCYTES BLD QL SMEAR: NORMAL
MCH RBC QN AUTO: 27.4 PG (ref 26.6–33)
MCHC RBC AUTO-ENTMCNC: 27.7 G/DL (ref 31.5–35.7)
MCV RBC AUTO: 99.2 FL (ref 79–97)
MONOCYTES # BLD AUTO: 0.37 10*3/MM3 (ref 0.1–0.9)
MONOCYTES NFR BLD AUTO: 7.8 % (ref 5–12)
NEUTROPHILS NFR BLD AUTO: 3.02 10*3/MM3 (ref 1.7–7)
NEUTROPHILS NFR BLD AUTO: 63.9 % (ref 42.7–76)
NRBC BLD AUTO-RTO: 0 /100 WBC (ref 0–0.2)
PLAT MORPH BLD: NORMAL
PLATELET # BLD AUTO: 142 10*3/MM3 (ref 140–450)
PMV BLD AUTO: 10 FL (ref 6–12)
POTASSIUM SERPL-SCNC: 5.1 MMOL/L (ref 3.5–5.2)
PROT SERPL-MCNC: 6.9 G/DL (ref 6–8.5)
RBC # BLD AUTO: 3.61 10*6/MM3 (ref 3.77–5.28)
SODIUM SERPL-SCNC: 141 MMOL/L (ref 136–145)
WBC # BLD AUTO: 4.73 10*3/MM3 (ref 3.4–10.8)

## 2021-03-06 PROCEDURE — 99291 CRITICAL CARE FIRST HOUR: CPT | Performed by: INTERNAL MEDICINE

## 2021-03-06 PROCEDURE — 86140 C-REACTIVE PROTEIN: CPT | Performed by: PHYSICIAN ASSISTANT

## 2021-03-06 PROCEDURE — 85025 COMPLETE CBC W/AUTO DIFF WBC: CPT | Performed by: PHYSICIAN ASSISTANT

## 2021-03-06 PROCEDURE — 82962 GLUCOSE BLOOD TEST: CPT

## 2021-03-06 PROCEDURE — 80053 COMPREHEN METABOLIC PANEL: CPT | Performed by: PHYSICIAN ASSISTANT

## 2021-03-06 PROCEDURE — 85007 BL SMEAR W/DIFF WBC COUNT: CPT | Performed by: PHYSICIAN ASSISTANT

## 2021-03-07 ENCOUNTER — OUTSIDE FACILITY SERVICE (OUTPATIENT)
Dept: PULMONOLOGY | Facility: CLINIC | Age: 76
End: 2021-03-07

## 2021-03-07 LAB
ALBUMIN SERPL-MCNC: 3.02 G/DL (ref 3.5–5.2)
ALBUMIN/GLOB SERPL: 0.8 G/DL
ALP SERPL-CCNC: 119 U/L (ref 39–117)
ALT SERPL W P-5'-P-CCNC: 31 U/L (ref 1–33)
ANION GAP SERPL CALCULATED.3IONS-SCNC: 7.2 MMOL/L (ref 5–15)
ANISOCYTOSIS BLD QL: NORMAL
AST SERPL-CCNC: 23 U/L (ref 1–32)
BACTERIA SPEC AEROBE CULT: ABNORMAL
BACTERIA SPEC AEROBE CULT: ABNORMAL
BASOPHILS # BLD AUTO: 0.02 10*3/MM3 (ref 0–0.2)
BASOPHILS NFR BLD AUTO: 0.5 % (ref 0–1.5)
BILIRUB SERPL-MCNC: 0.3 MG/DL (ref 0–1.2)
BUN SERPL-MCNC: 31 MG/DL (ref 8–23)
BUN/CREAT SERPL: 110.7 (ref 7–25)
CALCIUM SPEC-SCNC: 9.4 MG/DL (ref 8.6–10.5)
CHLORIDE SERPL-SCNC: 103 MMOL/L (ref 98–107)
CO2 SERPL-SCNC: 32.8 MMOL/L (ref 22–29)
CREAT SERPL-MCNC: 0.28 MG/DL (ref 0.57–1)
CRP SERPL-MCNC: 4.68 MG/DL (ref 0–0.5)
DEPRECATED RDW RBC AUTO: 57.4 FL (ref 37–54)
EOSINOPHIL # BLD AUTO: 0.3 10*3/MM3 (ref 0–0.4)
EOSINOPHIL NFR BLD AUTO: 7.4 % (ref 0.3–6.2)
ERYTHROCYTE [DISTWIDTH] IN BLOOD BY AUTOMATED COUNT: 16.3 % (ref 12.3–15.4)
GFR SERPL CREATININE-BSD FRML MDRD: >150 ML/MIN/1.73
GLOBULIN UR ELPH-MCNC: 3.6 GM/DL
GLUCOSE BLDC GLUCOMTR-MCNC: 102 MG/DL (ref 70–130)
GLUCOSE BLDC GLUCOMTR-MCNC: 123 MG/DL (ref 70–130)
GLUCOSE BLDC GLUCOMTR-MCNC: 190 MG/DL (ref 70–130)
GLUCOSE SERPL-MCNC: 100 MG/DL (ref 65–99)
HCT VFR BLD AUTO: 35.4 % (ref 34–46.6)
HGB BLD-MCNC: 10.2 G/DL (ref 12–15.9)
HYPOCHROMIA BLD QL: NORMAL
IMM GRANULOCYTES # BLD AUTO: 0.03 10*3/MM3 (ref 0–0.05)
IMM GRANULOCYTES NFR BLD AUTO: 0.7 % (ref 0–0.5)
LYMPHOCYTES # BLD AUTO: 1.48 10*3/MM3 (ref 0.7–3.1)
LYMPHOCYTES NFR BLD AUTO: 36.4 % (ref 19.6–45.3)
MACROCYTES BLD QL SMEAR: NORMAL
MCH RBC QN AUTO: 27.7 PG (ref 26.6–33)
MCHC RBC AUTO-ENTMCNC: 28.8 G/DL (ref 31.5–35.7)
MCV RBC AUTO: 96.2 FL (ref 79–97)
MONOCYTES # BLD AUTO: 0.33 10*3/MM3 (ref 0.1–0.9)
MONOCYTES NFR BLD AUTO: 8.1 % (ref 5–12)
NEUTROPHILS NFR BLD AUTO: 1.91 10*3/MM3 (ref 1.7–7)
NEUTROPHILS NFR BLD AUTO: 46.9 % (ref 42.7–76)
NRBC BLD AUTO-RTO: 0 /100 WBC (ref 0–0.2)
PLAT MORPH BLD: NORMAL
PLATELET # BLD AUTO: 143 10*3/MM3 (ref 140–450)
PMV BLD AUTO: 10 FL (ref 6–12)
POTASSIUM SERPL-SCNC: 4.2 MMOL/L (ref 3.5–5.2)
PROT SERPL-MCNC: 6.6 G/DL (ref 6–8.5)
RBC # BLD AUTO: 3.68 10*6/MM3 (ref 3.77–5.28)
SODIUM SERPL-SCNC: 143 MMOL/L (ref 136–145)
WBC # BLD AUTO: 4.07 10*3/MM3 (ref 3.4–10.8)

## 2021-03-07 PROCEDURE — 86140 C-REACTIVE PROTEIN: CPT | Performed by: PHYSICIAN ASSISTANT

## 2021-03-07 PROCEDURE — 82962 GLUCOSE BLOOD TEST: CPT

## 2021-03-07 PROCEDURE — 99291 CRITICAL CARE FIRST HOUR: CPT | Performed by: INTERNAL MEDICINE

## 2021-03-07 PROCEDURE — 80053 COMPREHEN METABOLIC PANEL: CPT | Performed by: PHYSICIAN ASSISTANT

## 2021-03-07 PROCEDURE — 85025 COMPLETE CBC W/AUTO DIFF WBC: CPT | Performed by: PHYSICIAN ASSISTANT

## 2021-03-07 PROCEDURE — 85007 BL SMEAR W/DIFF WBC COUNT: CPT | Performed by: PHYSICIAN ASSISTANT

## 2021-03-08 ENCOUNTER — OUTSIDE FACILITY SERVICE (OUTPATIENT)
Dept: PULMONOLOGY | Facility: CLINIC | Age: 76
End: 2021-03-08

## 2021-03-08 LAB
A-A DO2: 139.5 MMHG (ref 0–300)
ALBUMIN SERPL-MCNC: 2.99 G/DL (ref 3.5–5.2)
ALBUMIN/GLOB SERPL: 0.9 G/DL
ALP SERPL-CCNC: 112 U/L (ref 39–117)
ALT SERPL W P-5'-P-CCNC: 31 U/L (ref 1–33)
ANION GAP SERPL CALCULATED.3IONS-SCNC: 9.1 MMOL/L (ref 5–15)
ANISOCYTOSIS BLD QL: NORMAL
ARTERIAL PATENCY WRIST A: ABNORMAL
AST SERPL-CCNC: 26 U/L (ref 1–32)
ATMOSPHERIC PRESS: 740 MMHG
BACTERIA SPEC AEROBE CULT: NORMAL
BACTERIA SPEC AEROBE CULT: NORMAL
BASE EXCESS BLDA CALC-SCNC: 10.8 MMOL/L (ref 0–2)
BASOPHILS # BLD AUTO: 0.02 10*3/MM3 (ref 0–0.2)
BASOPHILS NFR BLD AUTO: 0.5 % (ref 0–1.5)
BDY SITE: ABNORMAL
BILIRUB SERPL-MCNC: 0.2 MG/DL (ref 0–1.2)
BODY TEMPERATURE: 0 C
BUN SERPL-MCNC: 28 MG/DL (ref 8–23)
BUN/CREAT SERPL: 116.7 (ref 7–25)
CALCIUM SPEC-SCNC: 9 MG/DL (ref 8.6–10.5)
CHLORIDE SERPL-SCNC: 101 MMOL/L (ref 98–107)
CO2 BLDA-SCNC: 36.3 MMOL/L (ref 22–33)
CO2 SERPL-SCNC: 29.9 MMOL/L (ref 22–29)
COHGB MFR BLD: 1.4 % (ref 0–5)
CREAT SERPL-MCNC: 0.24 MG/DL (ref 0.57–1)
CRP SERPL-MCNC: 2.44 MG/DL (ref 0–0.5)
DEPRECATED RDW RBC AUTO: 57.8 FL (ref 37–54)
EOSINOPHIL # BLD AUTO: 0.31 10*3/MM3 (ref 0–0.4)
EOSINOPHIL NFR BLD AUTO: 7.2 % (ref 0.3–6.2)
ERYTHROCYTE [DISTWIDTH] IN BLOOD BY AUTOMATED COUNT: 16.2 % (ref 12.3–15.4)
GAS FLOW AIRWAY: 10 LPM
GFR SERPL CREATININE-BSD FRML MDRD: >150 ML/MIN/1.73
GLOBULIN UR ELPH-MCNC: 3.5 GM/DL
GLUCOSE BLDC GLUCOMTR-MCNC: 118 MG/DL (ref 70–130)
GLUCOSE BLDC GLUCOMTR-MCNC: 119 MG/DL (ref 70–130)
GLUCOSE BLDC GLUCOMTR-MCNC: 153 MG/DL (ref 70–130)
GLUCOSE BLDC GLUCOMTR-MCNC: 86 MG/DL (ref 70–130)
GLUCOSE BLDC GLUCOMTR-MCNC: 91 MG/DL (ref 70–130)
GLUCOSE BLDC GLUCOMTR-MCNC: 99 MG/DL (ref 70–130)
GLUCOSE SERPL-MCNC: 98 MG/DL (ref 65–99)
HCO3 BLDA-SCNC: 35 MMOL/L (ref 20–26)
HCT VFR BLD AUTO: 35.9 % (ref 34–46.6)
HCT VFR BLD CALC: 33.4 % (ref 38–51)
HGB BLD-MCNC: 10 G/DL (ref 12–15.9)
HGB BLDA-MCNC: 10.9 G/DL (ref 13.5–17.5)
HYPOCHROMIA BLD QL: NORMAL
IMM GRANULOCYTES # BLD AUTO: 0.03 10*3/MM3 (ref 0–0.05)
IMM GRANULOCYTES NFR BLD AUTO: 0.7 % (ref 0–0.5)
INHALED O2 CONCENTRATION: 40 %
LYMPHOCYTES # BLD AUTO: 1.45 10*3/MM3 (ref 0.7–3.1)
LYMPHOCYTES NFR BLD AUTO: 33.6 % (ref 19.6–45.3)
Lab: ABNORMAL
MACROCYTES BLD QL SMEAR: NORMAL
MCH RBC QN AUTO: 27.1 PG (ref 26.6–33)
MCHC RBC AUTO-ENTMCNC: 27.9 G/DL (ref 31.5–35.7)
MCV RBC AUTO: 97.3 FL (ref 79–97)
METHGB BLD QL: <-0.1 % (ref 0–3)
MODALITY: ABNORMAL
MONOCYTES # BLD AUTO: 0.31 10*3/MM3 (ref 0.1–0.9)
MONOCYTES NFR BLD AUTO: 7.2 % (ref 5–12)
NEUTROPHILS NFR BLD AUTO: 2.2 10*3/MM3 (ref 1.7–7)
NEUTROPHILS NFR BLD AUTO: 50.8 % (ref 42.7–76)
NOTE: ABNORMAL
NOTIFIED WHO: ABNORMAL
NRBC BLD AUTO-RTO: 0 /100 WBC (ref 0–0.2)
OXYHGB MFR BLDV: 96.9 % (ref 94–99)
PCO2 BLDA: 43.8 MM HG (ref 35–45)
PCO2 TEMP ADJ BLD: ABNORMAL MM[HG]
PH BLDA: 7.51 PH UNITS (ref 7.35–7.45)
PH, TEMP CORRECTED: ABNORMAL
PLAT MORPH BLD: NORMAL
PLATELET # BLD AUTO: 160 10*3/MM3 (ref 140–450)
PMV BLD AUTO: 10.2 FL (ref 6–12)
PO2 BLDA: 89.4 MM HG (ref 83–108)
PO2 TEMP ADJ BLD: ABNORMAL MM[HG]
POTASSIUM SERPL-SCNC: 3.7 MMOL/L (ref 3.5–5.2)
PROT SERPL-MCNC: 6.5 G/DL (ref 6–8.5)
RBC # BLD AUTO: 3.69 10*6/MM3 (ref 3.77–5.28)
SAO2 % BLDCOA: 97.9 % (ref 94–99)
SODIUM SERPL-SCNC: 140 MMOL/L (ref 136–145)
VENTILATOR MODE: ABNORMAL
WBC # BLD AUTO: 4.32 10*3/MM3 (ref 3.4–10.8)

## 2021-03-08 PROCEDURE — 83050 HGB METHEMOGLOBIN QUAN: CPT

## 2021-03-08 PROCEDURE — 99233 SBSQ HOSP IP/OBS HIGH 50: CPT | Performed by: INTERNAL MEDICINE

## 2021-03-08 PROCEDURE — 36600 WITHDRAWAL OF ARTERIAL BLOOD: CPT

## 2021-03-08 PROCEDURE — 82805 BLOOD GASES W/O2 SATURATION: CPT

## 2021-03-08 PROCEDURE — 80053 COMPREHEN METABOLIC PANEL: CPT | Performed by: PHYSICIAN ASSISTANT

## 2021-03-08 PROCEDURE — 82375 ASSAY CARBOXYHB QUANT: CPT

## 2021-03-08 PROCEDURE — 82962 GLUCOSE BLOOD TEST: CPT

## 2021-03-08 PROCEDURE — 92526 ORAL FUNCTION THERAPY: CPT

## 2021-03-08 PROCEDURE — 86140 C-REACTIVE PROTEIN: CPT | Performed by: PHYSICIAN ASSISTANT

## 2021-03-08 PROCEDURE — 85025 COMPLETE CBC W/AUTO DIFF WBC: CPT | Performed by: PHYSICIAN ASSISTANT

## 2021-03-08 PROCEDURE — 85007 BL SMEAR W/DIFF WBC COUNT: CPT | Performed by: PHYSICIAN ASSISTANT

## 2021-03-09 ENCOUNTER — OUTSIDE FACILITY SERVICE (OUTPATIENT)
Dept: PULMONOLOGY | Facility: CLINIC | Age: 76
End: 2021-03-09

## 2021-03-09 ENCOUNTER — APPOINTMENT (OUTPATIENT)
Dept: GENERAL RADIOLOGY | Facility: HOSPITAL | Age: 76
End: 2021-03-09

## 2021-03-09 LAB
ALBUMIN SERPL-MCNC: 3.16 G/DL (ref 3.5–5.2)
ALBUMIN/GLOB SERPL: 0.8 G/DL
ALP SERPL-CCNC: 119 U/L (ref 39–117)
ALT SERPL W P-5'-P-CCNC: 30 U/L (ref 1–33)
ANION GAP SERPL CALCULATED.3IONS-SCNC: 10.8 MMOL/L (ref 5–15)
AST SERPL-CCNC: 25 U/L (ref 1–32)
BASOPHILS # BLD AUTO: 0.04 10*3/MM3 (ref 0–0.2)
BASOPHILS NFR BLD AUTO: 0.6 % (ref 0–1.5)
BILIRUB SERPL-MCNC: 0.3 MG/DL (ref 0–1.2)
BUN SERPL-MCNC: 22 MG/DL (ref 8–23)
BUN/CREAT SERPL: 61.1 (ref 7–25)
CALCIUM SPEC-SCNC: 9.3 MG/DL (ref 8.6–10.5)
CHLORIDE SERPL-SCNC: 99 MMOL/L (ref 98–107)
CO2 SERPL-SCNC: 28.2 MMOL/L (ref 22–29)
CREAT SERPL-MCNC: 0.36 MG/DL (ref 0.57–1)
CRP SERPL-MCNC: 1.4 MG/DL (ref 0–0.5)
DEPRECATED RDW RBC AUTO: 54.9 FL (ref 37–54)
EOSINOPHIL # BLD AUTO: 0.43 10*3/MM3 (ref 0–0.4)
EOSINOPHIL NFR BLD AUTO: 6.8 % (ref 0.3–6.2)
ERYTHROCYTE [DISTWIDTH] IN BLOOD BY AUTOMATED COUNT: 15.9 % (ref 12.3–15.4)
GFR SERPL CREATININE-BSD FRML MDRD: >150 ML/MIN/1.73
GLOBULIN UR ELPH-MCNC: 3.7 GM/DL
GLUCOSE BLDC GLUCOMTR-MCNC: 105 MG/DL (ref 70–130)
GLUCOSE BLDC GLUCOMTR-MCNC: 168 MG/DL (ref 70–130)
GLUCOSE BLDC GLUCOMTR-MCNC: 76 MG/DL (ref 70–130)
GLUCOSE BLDC GLUCOMTR-MCNC: 92 MG/DL (ref 70–130)
GLUCOSE SERPL-MCNC: 90 MG/DL (ref 65–99)
HCT VFR BLD AUTO: 39.3 % (ref 34–46.6)
HGB BLD-MCNC: 11.8 G/DL (ref 12–15.9)
IMM GRANULOCYTES # BLD AUTO: 0.06 10*3/MM3 (ref 0–0.05)
IMM GRANULOCYTES NFR BLD AUTO: 0.9 % (ref 0–0.5)
LYMPHOCYTES # BLD AUTO: 2.01 10*3/MM3 (ref 0.7–3.1)
LYMPHOCYTES NFR BLD AUTO: 31.8 % (ref 19.6–45.3)
MCH RBC QN AUTO: 27.9 PG (ref 26.6–33)
MCHC RBC AUTO-ENTMCNC: 30 G/DL (ref 31.5–35.7)
MCV RBC AUTO: 92.9 FL (ref 79–97)
MONOCYTES # BLD AUTO: 0.4 10*3/MM3 (ref 0.1–0.9)
MONOCYTES NFR BLD AUTO: 6.3 % (ref 5–12)
NEUTROPHILS NFR BLD AUTO: 3.39 10*3/MM3 (ref 1.7–7)
NEUTROPHILS NFR BLD AUTO: 53.6 % (ref 42.7–76)
NRBC BLD AUTO-RTO: 0 /100 WBC (ref 0–0.2)
PLATELET # BLD AUTO: 180 10*3/MM3 (ref 140–450)
PMV BLD AUTO: 10 FL (ref 6–12)
POTASSIUM SERPL-SCNC: 4.4 MMOL/L (ref 3.5–5.2)
PROT SERPL-MCNC: 6.9 G/DL (ref 6–8.5)
RBC # BLD AUTO: 4.23 10*6/MM3 (ref 3.77–5.28)
SODIUM SERPL-SCNC: 138 MMOL/L (ref 136–145)
WBC # BLD AUTO: 6.33 10*3/MM3 (ref 3.4–10.8)

## 2021-03-09 PROCEDURE — 80053 COMPREHEN METABOLIC PANEL: CPT | Performed by: PHYSICIAN ASSISTANT

## 2021-03-09 PROCEDURE — 82962 GLUCOSE BLOOD TEST: CPT

## 2021-03-09 PROCEDURE — 86140 C-REACTIVE PROTEIN: CPT | Performed by: PHYSICIAN ASSISTANT

## 2021-03-09 PROCEDURE — 71045 X-RAY EXAM CHEST 1 VIEW: CPT

## 2021-03-09 PROCEDURE — 85025 COMPLETE CBC W/AUTO DIFF WBC: CPT | Performed by: PHYSICIAN ASSISTANT

## 2021-03-09 PROCEDURE — 71045 X-RAY EXAM CHEST 1 VIEW: CPT | Performed by: RADIOLOGY

## 2021-03-09 PROCEDURE — 92526 ORAL FUNCTION THERAPY: CPT

## 2021-03-09 PROCEDURE — 99291 CRITICAL CARE FIRST HOUR: CPT | Performed by: INTERNAL MEDICINE

## 2021-03-10 ENCOUNTER — OUTSIDE FACILITY SERVICE (OUTPATIENT)
Dept: PULMONOLOGY | Facility: CLINIC | Age: 76
End: 2021-03-10

## 2021-03-10 LAB
ALBUMIN SERPL-MCNC: 2.95 G/DL (ref 3.5–5.2)
ALBUMIN/GLOB SERPL: 0.9 G/DL
ALP SERPL-CCNC: 109 U/L (ref 39–117)
ALT SERPL W P-5'-P-CCNC: 27 U/L (ref 1–33)
ANION GAP SERPL CALCULATED.3IONS-SCNC: 8.8 MMOL/L (ref 5–15)
AST SERPL-CCNC: 25 U/L (ref 1–32)
BASOPHILS # BLD AUTO: 0.03 10*3/MM3 (ref 0–0.2)
BASOPHILS NFR BLD AUTO: 0.4 % (ref 0–1.5)
BILIRUB SERPL-MCNC: 0.3 MG/DL (ref 0–1.2)
BUN SERPL-MCNC: 20 MG/DL (ref 8–23)
BUN/CREAT SERPL: 76.9 (ref 7–25)
CALCIUM SPEC-SCNC: 9 MG/DL (ref 8.6–10.5)
CHLORIDE SERPL-SCNC: 99 MMOL/L (ref 98–107)
CO2 SERPL-SCNC: 28.2 MMOL/L (ref 22–29)
CREAT SERPL-MCNC: 0.26 MG/DL (ref 0.57–1)
CRP SERPL-MCNC: 0.83 MG/DL (ref 0–0.5)
DEPRECATED RDW RBC AUTO: 54 FL (ref 37–54)
EOSINOPHIL # BLD AUTO: 0.42 10*3/MM3 (ref 0–0.4)
EOSINOPHIL NFR BLD AUTO: 5.5 % (ref 0.3–6.2)
ERYTHROCYTE [DISTWIDTH] IN BLOOD BY AUTOMATED COUNT: 15.9 % (ref 12.3–15.4)
GFR SERPL CREATININE-BSD FRML MDRD: >150 ML/MIN/1.73
GLOBULIN UR ELPH-MCNC: 3.4 GM/DL
GLUCOSE BLDC GLUCOMTR-MCNC: 142 MG/DL (ref 70–130)
GLUCOSE BLDC GLUCOMTR-MCNC: 188 MG/DL (ref 70–130)
GLUCOSE BLDC GLUCOMTR-MCNC: 200 MG/DL (ref 70–130)
GLUCOSE BLDC GLUCOMTR-MCNC: 83 MG/DL (ref 70–130)
GLUCOSE BLDC GLUCOMTR-MCNC: 87 MG/DL (ref 70–130)
GLUCOSE BLDC GLUCOMTR-MCNC: 96 MG/DL (ref 70–130)
GLUCOSE SERPL-MCNC: 76 MG/DL (ref 65–99)
HCT VFR BLD AUTO: 37.6 % (ref 34–46.6)
HGB BLD-MCNC: 11.1 G/DL (ref 12–15.9)
IMM GRANULOCYTES # BLD AUTO: 0.09 10*3/MM3 (ref 0–0.05)
IMM GRANULOCYTES NFR BLD AUTO: 1.2 % (ref 0–0.5)
LYMPHOCYTES # BLD AUTO: 2.04 10*3/MM3 (ref 0.7–3.1)
LYMPHOCYTES NFR BLD AUTO: 26.5 % (ref 19.6–45.3)
MCH RBC QN AUTO: 27.3 PG (ref 26.6–33)
MCHC RBC AUTO-ENTMCNC: 29.5 G/DL (ref 31.5–35.7)
MCV RBC AUTO: 92.6 FL (ref 79–97)
MONOCYTES # BLD AUTO: 0.44 10*3/MM3 (ref 0.1–0.9)
MONOCYTES NFR BLD AUTO: 5.7 % (ref 5–12)
NEUTROPHILS NFR BLD AUTO: 4.67 10*3/MM3 (ref 1.7–7)
NEUTROPHILS NFR BLD AUTO: 60.7 % (ref 42.7–76)
NRBC BLD AUTO-RTO: 0 /100 WBC (ref 0–0.2)
PLATELET # BLD AUTO: 174 10*3/MM3 (ref 140–450)
PMV BLD AUTO: 9.8 FL (ref 6–12)
POTASSIUM SERPL-SCNC: 4.3 MMOL/L (ref 3.5–5.2)
PROT SERPL-MCNC: 6.3 G/DL (ref 6–8.5)
RBC # BLD AUTO: 4.06 10*6/MM3 (ref 3.77–5.28)
SODIUM SERPL-SCNC: 136 MMOL/L (ref 136–145)
WBC # BLD AUTO: 7.69 10*3/MM3 (ref 3.4–10.8)

## 2021-03-10 PROCEDURE — 97535 SELF CARE MNGMENT TRAINING: CPT

## 2021-03-10 PROCEDURE — 99291 CRITICAL CARE FIRST HOUR: CPT | Performed by: INTERNAL MEDICINE

## 2021-03-10 PROCEDURE — 85025 COMPLETE CBC W/AUTO DIFF WBC: CPT | Performed by: PHYSICIAN ASSISTANT

## 2021-03-10 PROCEDURE — 97530 THERAPEUTIC ACTIVITIES: CPT

## 2021-03-10 PROCEDURE — 80053 COMPREHEN METABOLIC PANEL: CPT | Performed by: PHYSICIAN ASSISTANT

## 2021-03-10 PROCEDURE — 86140 C-REACTIVE PROTEIN: CPT | Performed by: PHYSICIAN ASSISTANT

## 2021-03-10 PROCEDURE — 82962 GLUCOSE BLOOD TEST: CPT

## 2021-03-10 PROCEDURE — 92526 ORAL FUNCTION THERAPY: CPT

## 2021-03-11 ENCOUNTER — OUTSIDE FACILITY SERVICE (OUTPATIENT)
Dept: PULMONOLOGY | Facility: CLINIC | Age: 76
End: 2021-03-11

## 2021-03-11 LAB
ALBUMIN SERPL-MCNC: 3.01 G/DL (ref 3.5–5.2)
ALBUMIN/GLOB SERPL: 0.9 G/DL
ALP SERPL-CCNC: 106 U/L (ref 39–117)
ALT SERPL W P-5'-P-CCNC: 26 U/L (ref 1–33)
ANION GAP SERPL CALCULATED.3IONS-SCNC: 8.4 MMOL/L (ref 5–15)
AST SERPL-CCNC: 25 U/L (ref 1–32)
BASOPHILS # BLD AUTO: 0.05 10*3/MM3 (ref 0–0.2)
BASOPHILS NFR BLD AUTO: 0.8 % (ref 0–1.5)
BILIRUB SERPL-MCNC: 0.4 MG/DL (ref 0–1.2)
BUN SERPL-MCNC: 19 MG/DL (ref 8–23)
BUN/CREAT SERPL: 76 (ref 7–25)
CALCIUM SPEC-SCNC: 8.8 MG/DL (ref 8.6–10.5)
CHLORIDE SERPL-SCNC: 97 MMOL/L (ref 98–107)
CO2 SERPL-SCNC: 26.6 MMOL/L (ref 22–29)
CREAT SERPL-MCNC: 0.25 MG/DL (ref 0.57–1)
CRP SERPL-MCNC: 1.01 MG/DL (ref 0–0.5)
DEPRECATED RDW RBC AUTO: 54.5 FL (ref 37–54)
EOSINOPHIL # BLD AUTO: 0.51 10*3/MM3 (ref 0–0.4)
EOSINOPHIL NFR BLD AUTO: 7.7 % (ref 0.3–6.2)
ERYTHROCYTE [DISTWIDTH] IN BLOOD BY AUTOMATED COUNT: 16 % (ref 12.3–15.4)
GFR SERPL CREATININE-BSD FRML MDRD: >150 ML/MIN/1.73
GLOBULIN UR ELPH-MCNC: 3.3 GM/DL
GLUCOSE BLDC GLUCOMTR-MCNC: 115 MG/DL (ref 70–130)
GLUCOSE BLDC GLUCOMTR-MCNC: 127 MG/DL (ref 70–130)
GLUCOSE BLDC GLUCOMTR-MCNC: 87 MG/DL (ref 70–130)
GLUCOSE BLDC GLUCOMTR-MCNC: 99 MG/DL (ref 70–130)
GLUCOSE SERPL-MCNC: 92 MG/DL (ref 65–99)
HCT VFR BLD AUTO: 38.3 % (ref 34–46.6)
HGB BLD-MCNC: 11.1 G/DL (ref 12–15.9)
IMM GRANULOCYTES # BLD AUTO: 0.09 10*3/MM3 (ref 0–0.05)
IMM GRANULOCYTES NFR BLD AUTO: 1.4 % (ref 0–0.5)
LYMPHOCYTES # BLD AUTO: 1.96 10*3/MM3 (ref 0.7–3.1)
LYMPHOCYTES NFR BLD AUTO: 29.7 % (ref 19.6–45.3)
MCH RBC QN AUTO: 26.9 PG (ref 26.6–33)
MCHC RBC AUTO-ENTMCNC: 29 G/DL (ref 31.5–35.7)
MCV RBC AUTO: 92.7 FL (ref 79–97)
MONOCYTES # BLD AUTO: 0.4 10*3/MM3 (ref 0.1–0.9)
MONOCYTES NFR BLD AUTO: 6.1 % (ref 5–12)
NEUTROPHILS NFR BLD AUTO: 3.58 10*3/MM3 (ref 1.7–7)
NEUTROPHILS NFR BLD AUTO: 54.3 % (ref 42.7–76)
NRBC BLD AUTO-RTO: 0 /100 WBC (ref 0–0.2)
PLATELET # BLD AUTO: 181 10*3/MM3 (ref 140–450)
PMV BLD AUTO: 10 FL (ref 6–12)
POTASSIUM SERPL-SCNC: 4 MMOL/L (ref 3.5–5.2)
PROT SERPL-MCNC: 6.3 G/DL (ref 6–8.5)
RBC # BLD AUTO: 4.13 10*6/MM3 (ref 3.77–5.28)
SODIUM SERPL-SCNC: 132 MMOL/L (ref 136–145)
WBC # BLD AUTO: 6.59 10*3/MM3 (ref 3.4–10.8)

## 2021-03-11 PROCEDURE — 82962 GLUCOSE BLOOD TEST: CPT

## 2021-03-11 PROCEDURE — 97530 THERAPEUTIC ACTIVITIES: CPT

## 2021-03-11 PROCEDURE — 92526 ORAL FUNCTION THERAPY: CPT

## 2021-03-11 PROCEDURE — 97110 THERAPEUTIC EXERCISES: CPT

## 2021-03-11 PROCEDURE — 85025 COMPLETE CBC W/AUTO DIFF WBC: CPT | Performed by: PHYSICIAN ASSISTANT

## 2021-03-11 PROCEDURE — 99291 CRITICAL CARE FIRST HOUR: CPT | Performed by: INTERNAL MEDICINE

## 2021-03-11 PROCEDURE — 80053 COMPREHEN METABOLIC PANEL: CPT | Performed by: PHYSICIAN ASSISTANT

## 2021-03-11 PROCEDURE — 86140 C-REACTIVE PROTEIN: CPT | Performed by: PHYSICIAN ASSISTANT

## 2021-03-12 ENCOUNTER — APPOINTMENT (OUTPATIENT)
Dept: GENERAL RADIOLOGY | Facility: HOSPITAL | Age: 76
End: 2021-03-12

## 2021-03-12 ENCOUNTER — OUTSIDE FACILITY SERVICE (OUTPATIENT)
Dept: PULMONOLOGY | Facility: CLINIC | Age: 76
End: 2021-03-12

## 2021-03-12 LAB
ALBUMIN SERPL-MCNC: 2.95 G/DL (ref 3.5–5.2)
ALBUMIN/GLOB SERPL: 0.9 G/DL
ALP SERPL-CCNC: 104 U/L (ref 39–117)
ALT SERPL W P-5'-P-CCNC: 26 U/L (ref 1–33)
ANION GAP SERPL CALCULATED.3IONS-SCNC: 9.9 MMOL/L (ref 5–15)
AST SERPL-CCNC: 23 U/L (ref 1–32)
BASOPHILS # BLD AUTO: 0.03 10*3/MM3 (ref 0–0.2)
BASOPHILS NFR BLD AUTO: 0.4 % (ref 0–1.5)
BILIRUB SERPL-MCNC: 0.4 MG/DL (ref 0–1.2)
BUN SERPL-MCNC: 15 MG/DL (ref 8–23)
BUN/CREAT SERPL: 60 (ref 7–25)
CALCIUM SPEC-SCNC: 9 MG/DL (ref 8.6–10.5)
CHLORIDE SERPL-SCNC: 96 MMOL/L (ref 98–107)
CO2 SERPL-SCNC: 28.1 MMOL/L (ref 22–29)
CREAT SERPL-MCNC: 0.25 MG/DL (ref 0.57–1)
CRP SERPL-MCNC: 1.99 MG/DL (ref 0–0.5)
DEPRECATED RDW RBC AUTO: 53 FL (ref 37–54)
EOSINOPHIL # BLD AUTO: 0.39 10*3/MM3 (ref 0–0.4)
EOSINOPHIL NFR BLD AUTO: 5.7 % (ref 0.3–6.2)
ERYTHROCYTE [DISTWIDTH] IN BLOOD BY AUTOMATED COUNT: 16 % (ref 12.3–15.4)
GFR SERPL CREATININE-BSD FRML MDRD: >150 ML/MIN/1.73
GLOBULIN UR ELPH-MCNC: 3.2 GM/DL
GLUCOSE BLDC GLUCOMTR-MCNC: 113 MG/DL (ref 70–130)
GLUCOSE BLDC GLUCOMTR-MCNC: 139 MG/DL (ref 70–130)
GLUCOSE BLDC GLUCOMTR-MCNC: 159 MG/DL (ref 70–130)
GLUCOSE BLDC GLUCOMTR-MCNC: 75 MG/DL (ref 70–130)
GLUCOSE SERPL-MCNC: 82 MG/DL (ref 65–99)
HCT VFR BLD AUTO: 36 % (ref 34–46.6)
HGB BLD-MCNC: 10.8 G/DL (ref 12–15.9)
IMM GRANULOCYTES # BLD AUTO: 0.08 10*3/MM3 (ref 0–0.05)
IMM GRANULOCYTES NFR BLD AUTO: 1.2 % (ref 0–0.5)
LYMPHOCYTES # BLD AUTO: 1.71 10*3/MM3 (ref 0.7–3.1)
LYMPHOCYTES NFR BLD AUTO: 24.8 % (ref 19.6–45.3)
MCH RBC QN AUTO: 27.1 PG (ref 26.6–33)
MCHC RBC AUTO-ENTMCNC: 30 G/DL (ref 31.5–35.7)
MCV RBC AUTO: 90.5 FL (ref 79–97)
MONOCYTES # BLD AUTO: 0.43 10*3/MM3 (ref 0.1–0.9)
MONOCYTES NFR BLD AUTO: 6.2 % (ref 5–12)
NEUTROPHILS NFR BLD AUTO: 4.25 10*3/MM3 (ref 1.7–7)
NEUTROPHILS NFR BLD AUTO: 61.7 % (ref 42.7–76)
NRBC BLD AUTO-RTO: 0 /100 WBC (ref 0–0.2)
PLATELET # BLD AUTO: 157 10*3/MM3 (ref 140–450)
PMV BLD AUTO: 9.9 FL (ref 6–12)
POTASSIUM SERPL-SCNC: 3.9 MMOL/L (ref 3.5–5.2)
PROT SERPL-MCNC: 6.1 G/DL (ref 6–8.5)
RBC # BLD AUTO: 3.98 10*6/MM3 (ref 3.77–5.28)
SODIUM SERPL-SCNC: 134 MMOL/L (ref 136–145)
WBC # BLD AUTO: 6.89 10*3/MM3 (ref 3.4–10.8)

## 2021-03-12 PROCEDURE — 97530 THERAPEUTIC ACTIVITIES: CPT

## 2021-03-12 PROCEDURE — 86140 C-REACTIVE PROTEIN: CPT | Performed by: PHYSICIAN ASSISTANT

## 2021-03-12 PROCEDURE — 99291 CRITICAL CARE FIRST HOUR: CPT | Performed by: INTERNAL MEDICINE

## 2021-03-12 PROCEDURE — 92611 MOTION FLUOROSCOPY/SWALLOW: CPT

## 2021-03-12 PROCEDURE — 85025 COMPLETE CBC W/AUTO DIFF WBC: CPT | Performed by: PHYSICIAN ASSISTANT

## 2021-03-12 PROCEDURE — 97110 THERAPEUTIC EXERCISES: CPT

## 2021-03-12 PROCEDURE — 74230 X-RAY XM SWLNG FUNCJ C+: CPT

## 2021-03-12 PROCEDURE — 97116 GAIT TRAINING THERAPY: CPT

## 2021-03-12 PROCEDURE — 80053 COMPREHEN METABOLIC PANEL: CPT | Performed by: PHYSICIAN ASSISTANT

## 2021-03-12 PROCEDURE — 74230 X-RAY XM SWLNG FUNCJ C+: CPT | Performed by: RADIOLOGY

## 2021-03-12 PROCEDURE — 82962 GLUCOSE BLOOD TEST: CPT

## 2021-03-12 NOTE — MBS/VFSS/FEES
Long Term Care - Speech Language Pathology   Swallow Initial Evaluation  [facility]   MODIFIED BARIUM SWALLOW STUDY     Patient Name: Argenis Lay  : 1945  MRN: [id number]  Today's Date: 3/12/2021             Admit Date: 2021    Visit Dx:   No diagnosis found.  There is no problem list on file for this patient.    No past medical history on file.  No past surgical history on file.    Argenis Lay  presents to the radiology suite this am from [facility] OT[facility]/ to participate in an instrumental MBS to re-evaluate safety/efficacy of swallowing fnx, determine safest/least restrictive diet and candidacy for diet upgrade. She is s/p most recent MBS 3/5/21 w/ recommendation for modified po diet and dysphagia tx per poc. She was decannulated last pm. She is accompanied to this evaluation by nursing students.     PMH is significant for h/o covid-19 w/ acute respiratory failure, MRSA PNA, trichomoniasis, ARDS, obesity, DM II, HLD, chest tube (d/c'ed 21), s/p trach 21 and PEG 21.      Social History     Socioeconomic History   • Marital status: Single     Spouse name: Not on file   • Number of children: Not on file   • Years of education: Not on file   • Highest education level: Not on file      No recent chest xray available for review.     Diet Orders (active) (From admission, onward)     Start     Ordered    21 1010  Diet Soft Texture; Ground; Nectar / Syrup Thick  Diet Effective Now     Comments: Ice chips between meals/meds and when on [facility] vent.    21 1010    21 1021  Peptamen AF (Vital AF 1.2); Tube Feeding Type: Continuous; Continuous Tube Feeding Start Rate (mL/hr): 10; Then Advance Rate By (mL/hr): Do Not Advance; Every __ Hours: Patient at Goal Rate; To Goal Rate of (mL/hr): 60; Water Flush Amount (mL): 10  Diet Effective Now     Comments: 10ml/hr    21 1021              She is observed on 3L O2 via NC w/o complications.      Risks and benefits of the procedure are  explained w/ pt verbalizing understanding/agreement to participate. Proceed per protocol.     Pt is positioned upright and centered in a soft strap supportive chair to accept multiple po presentations of solid cracker, puree, honey thick, nectar thick, and thin liquids via spoon, cup and straw, along w/ whole placebo pill in puree deferred. She is able to self feed.     All views are from the lateral plane.     Facial/oral structures are symmetrical upon observation w/o lingual deviation upon protrusion. Oral mucosa are moist, pink and clean. Secretions are clear, thin and well controlled. OROM/HELADIO is slightly weak to imitate oral postures. She is edentulous, does not wear dentures w/ po intake at baseline per her report. Gag is not assessed. Volitional cough is adequate in intensity, clear in quality, nonproductive. Vocal quality is adequate in intensity, clear in quality w/ intelligible speech. Pt is a/a and cooperative to particpate. She is oriented to person, place, and time, follows simple directives, and participates in simple conversational exchanges. She is somewhat hard of hearing.     Upon po presentations, adequate bolus anticipation w/ good labial seal for bolus clearance via spoon bowl, cup rim stability and suction via straw. Bolus formation, manipulation, and control are mildly weak, mildly-moderately increased oral prep time w/ solids, w/ rotary mastication pattern. A-p transit is slightly delayed w/o oral residue. Tongue base retraction and linguavelar seal are slightly weak w/ premature spillage of thin liquids to the bilateral pyriform sinuses. No laryngeal penetration or aspiration is evidenced before the swallow.     Pharyngeal swallow is timely w/ slightly weak hyolaryngeal elevation and delayed epiglottic inversion. Deep laryngeal penetration occurs during the swallow w/ thin liquids via cup and straw w/ intermittent aspiration during and after the swallow. Aspiration is intermittently silent  in nature. Compensatory chin tuck is ineffective.  Cued cough effective to diminish but not completely clear laryngeal residue and aspirated material. Repeat nectar thick liquid trial wfl. Pharyngeal contraction is slightly weak w/ mild vallecular residue across consistencies. No other laryngeal penetration or aspiration evidenced during or after the swallow.     She is able to manipulate and swallow whole placebo pill in puree w/o difficulty.     Full esophageal sweep reveals no obvious mucosal abnormalities or retrograde flow noted.      Impression: Per this re-evaluation, Ms. Lay continues w/ a mild oropharyngeal dysphagia w/ intermittent silent aspiration w/ thin liquids. She is felt to most benefit from continuing modified po diet and dysphagia tx per poc.      EDUCATION  The patient has been educated in the following areas:   Dysphagia (Swallowing Impairment) Modified Diet Instruction.    SLP Recommendation and Plan  1. Mechanical soft, ground meats, nectar thick liquids.   2. Ice chips between meals and medications.   4. Meds whole in puree.   4. Universal aspiration precautions.   5. KAJAL precautions.   6. Oral care protocol.   7. Assist w/ meals PRN.   8. Dysphagia tx.   SLP to f/u for continued dysphagia tx per poc.     D/w pt results and recommendations w/ verbal understanding and agreement.     D/w RN results and recommendations w/ verbal understanding and agreement.     Thank you for allowing me to participate in the care of your patient-  Nia Michele M.A., CCC-SLP         Time Calculation:   Time Calculation- SLP     Row Name 03/12/21 1300             Time Calculation- SLP    SLP - Next Appointment  03/15/21  -        User Key  (r) = Recorded By, (t) = Taken By, (c) = Cosigned By    Initials Name Provider Type    Nia Branham MA,[facility]-SLP Speech and Language Pathologist          Therapy Charges for Today     Code Description Service Date Service Provider Modifiers Qty    [id number]  HC ST TREATMENT SWALLOW 3 3/11/2021 Nia Michele MA,CCC-SLP GN 1    [id number] HC ST MOTION FLUORO EVAL SWALLOW 8 3/12/2021 Nia Michele MA,CCC-SLP GN 1        Patient was not wearing a face mask during this therapy encounter. Therapist used appropriate personal protective equipment including mask, eye protection and gloves.  Mask used was standard procedure mask. Appropriate PPE was worn during the entire therapy session. The patient coughed during this evaluation. Therapist was within 6 feet for 15 minutes or more during the evaluation. Hand hygiene was completed before and after therapy session. Patient is not in enhanced droplet precautions.         Nia Michele MA,CCC-SLP  3/12/2021

## 2021-03-13 ENCOUNTER — OUTSIDE FACILITY SERVICE (OUTPATIENT)
Dept: PULMONOLOGY | Facility: CLINIC | Age: 76
End: 2021-03-13

## 2021-03-13 LAB
ALBUMIN SERPL-MCNC: 2.91 G/DL (ref 3.5–5.2)
ALBUMIN/GLOB SERPL: 0.9 G/DL
ALP SERPL-CCNC: 103 U/L (ref 39–117)
ALT SERPL W P-5'-P-CCNC: 25 U/L (ref 1–33)
ANION GAP SERPL CALCULATED.3IONS-SCNC: 9.3 MMOL/L (ref 5–15)
AST SERPL-CCNC: 24 U/L (ref 1–32)
BASOPHILS # BLD AUTO: 0.03 10*3/MM3 (ref 0–0.2)
BASOPHILS NFR BLD AUTO: 0.5 % (ref 0–1.5)
BILIRUB SERPL-MCNC: 0.4 MG/DL (ref 0–1.2)
BUN SERPL-MCNC: 13 MG/DL (ref 8–23)
BUN/CREAT SERPL: 61.9 (ref 7–25)
CALCIUM SPEC-SCNC: 8.6 MG/DL (ref 8.6–10.5)
CHLORIDE SERPL-SCNC: 96 MMOL/L (ref 98–107)
CO2 SERPL-SCNC: 29.7 MMOL/L (ref 22–29)
CREAT SERPL-MCNC: 0.21 MG/DL (ref 0.57–1)
CRP SERPL-MCNC: 2.02 MG/DL (ref 0–0.5)
DEPRECATED RDW RBC AUTO: 54 FL (ref 37–54)
EOSINOPHIL # BLD AUTO: 0.34 10*3/MM3 (ref 0–0.4)
EOSINOPHIL NFR BLD AUTO: 5.3 % (ref 0.3–6.2)
ERYTHROCYTE [DISTWIDTH] IN BLOOD BY AUTOMATED COUNT: 15.9 % (ref 12.3–15.4)
GFR SERPL CREATININE-BSD FRML MDRD: >150 ML/MIN/1.73
GLOBULIN UR ELPH-MCNC: 3.1 GM/DL
GLUCOSE BLDC GLUCOMTR-MCNC: 102 MG/DL (ref 70–130)
GLUCOSE BLDC GLUCOMTR-MCNC: 136 MG/DL (ref 70–130)
GLUCOSE BLDC GLUCOMTR-MCNC: 151 MG/DL (ref 70–130)
GLUCOSE BLDC GLUCOMTR-MCNC: 182 MG/DL (ref 70–130)
GLUCOSE BLDC GLUCOMTR-MCNC: 91 MG/DL (ref 70–130)
GLUCOSE SERPL-MCNC: 89 MG/DL (ref 65–99)
HCT VFR BLD AUTO: 36.7 % (ref 34–46.6)
HGB BLD-MCNC: 11 G/DL (ref 12–15.9)
IMM GRANULOCYTES # BLD AUTO: 0.05 10*3/MM3 (ref 0–0.05)
IMM GRANULOCYTES NFR BLD AUTO: 0.8 % (ref 0–0.5)
LYMPHOCYTES # BLD AUTO: 1.39 10*3/MM3 (ref 0.7–3.1)
LYMPHOCYTES NFR BLD AUTO: 21.7 % (ref 19.6–45.3)
MCH RBC QN AUTO: 27.4 PG (ref 26.6–33)
MCHC RBC AUTO-ENTMCNC: 30 G/DL (ref 31.5–35.7)
MCV RBC AUTO: 91.3 FL (ref 79–97)
MONOCYTES # BLD AUTO: 0.42 10*3/MM3 (ref 0.1–0.9)
MONOCYTES NFR BLD AUTO: 6.6 % (ref 5–12)
NEUTROPHILS NFR BLD AUTO: 4.18 10*3/MM3 (ref 1.7–7)
NEUTROPHILS NFR BLD AUTO: 65.1 % (ref 42.7–76)
NRBC BLD AUTO-RTO: 0 /100 WBC (ref 0–0.2)
PLATELET # BLD AUTO: 146 10*3/MM3 (ref 140–450)
PMV BLD AUTO: 10.3 FL (ref 6–12)
POTASSIUM SERPL-SCNC: 3.5 MMOL/L (ref 3.5–5.2)
PROT SERPL-MCNC: 6 G/DL (ref 6–8.5)
RBC # BLD AUTO: 4.02 10*6/MM3 (ref 3.77–5.28)
SODIUM SERPL-SCNC: 135 MMOL/L (ref 136–145)
WBC # BLD AUTO: 6.41 10*3/MM3 (ref 3.4–10.8)

## 2021-03-13 PROCEDURE — 82962 GLUCOSE BLOOD TEST: CPT

## 2021-03-13 PROCEDURE — 99291 CRITICAL CARE FIRST HOUR: CPT | Performed by: INTERNAL MEDICINE

## 2021-03-13 PROCEDURE — 80053 COMPREHEN METABOLIC PANEL: CPT | Performed by: PHYSICIAN ASSISTANT

## 2021-03-13 PROCEDURE — 86140 C-REACTIVE PROTEIN: CPT | Performed by: PHYSICIAN ASSISTANT

## 2021-03-13 PROCEDURE — 85025 COMPLETE CBC W/AUTO DIFF WBC: CPT | Performed by: PHYSICIAN ASSISTANT

## 2021-03-14 ENCOUNTER — OUTSIDE FACILITY SERVICE (OUTPATIENT)
Dept: PULMONOLOGY | Facility: CLINIC | Age: 76
End: 2021-03-14

## 2021-03-14 LAB
ALBUMIN SERPL-MCNC: 3.2 G/DL (ref 3.5–5.2)
ALBUMIN/GLOB SERPL: 1.2 G/DL
ALP SERPL-CCNC: 90 U/L (ref 39–117)
ALT SERPL W P-5'-P-CCNC: 21 U/L (ref 1–33)
ANION GAP SERPL CALCULATED.3IONS-SCNC: 8.1 MMOL/L (ref 5–15)
AST SERPL-CCNC: 20 U/L (ref 1–32)
BASOPHILS # BLD AUTO: 0.02 10*3/MM3 (ref 0–0.2)
BASOPHILS NFR BLD AUTO: 0.3 % (ref 0–1.5)
BILIRUB SERPL-MCNC: 0.3 MG/DL (ref 0–1.2)
BUN SERPL-MCNC: 12 MG/DL (ref 8–23)
BUN/CREAT SERPL: 52.2 (ref 7–25)
CALCIUM SPEC-SCNC: 8.8 MG/DL (ref 8.6–10.5)
CHLORIDE SERPL-SCNC: 97 MMOL/L (ref 98–107)
CO2 SERPL-SCNC: 28.9 MMOL/L (ref 22–29)
CREAT SERPL-MCNC: 0.23 MG/DL (ref 0.57–1)
CRP SERPL-MCNC: 2.73 MG/DL (ref 0–0.5)
DEPRECATED RDW RBC AUTO: 54.4 FL (ref 37–54)
EOSINOPHIL # BLD AUTO: 0.37 10*3/MM3 (ref 0–0.4)
EOSINOPHIL NFR BLD AUTO: 5.7 % (ref 0.3–6.2)
ERYTHROCYTE [DISTWIDTH] IN BLOOD BY AUTOMATED COUNT: 15.9 % (ref 12.3–15.4)
GFR SERPL CREATININE-BSD FRML MDRD: >150 ML/MIN/1.73
GLOBULIN UR ELPH-MCNC: 2.7 GM/DL
GLUCOSE BLDC GLUCOMTR-MCNC: 145 MG/DL (ref 70–130)
GLUCOSE BLDC GLUCOMTR-MCNC: 187 MG/DL (ref 70–130)
GLUCOSE BLDC GLUCOMTR-MCNC: 207 MG/DL (ref 70–130)
GLUCOSE BLDC GLUCOMTR-MCNC: 236 MG/DL (ref 70–130)
GLUCOSE BLDC GLUCOMTR-MCNC: 70 MG/DL (ref 70–130)
GLUCOSE SERPL-MCNC: 133 MG/DL (ref 65–99)
HCT VFR BLD AUTO: 33.9 % (ref 34–46.6)
HGB BLD-MCNC: 10.1 G/DL (ref 12–15.9)
IMM GRANULOCYTES # BLD AUTO: 0.04 10*3/MM3 (ref 0–0.05)
IMM GRANULOCYTES NFR BLD AUTO: 0.6 % (ref 0–0.5)
LYMPHOCYTES # BLD AUTO: 1.15 10*3/MM3 (ref 0.7–3.1)
LYMPHOCYTES NFR BLD AUTO: 17.8 % (ref 19.6–45.3)
MCH RBC QN AUTO: 27.7 PG (ref 26.6–33)
MCHC RBC AUTO-ENTMCNC: 29.8 G/DL (ref 31.5–35.7)
MCV RBC AUTO: 92.9 FL (ref 79–97)
MONOCYTES # BLD AUTO: 0.43 10*3/MM3 (ref 0.1–0.9)
MONOCYTES NFR BLD AUTO: 6.7 % (ref 5–12)
NEUTROPHILS NFR BLD AUTO: 4.44 10*3/MM3 (ref 1.7–7)
NEUTROPHILS NFR BLD AUTO: 68.9 % (ref 42.7–76)
NRBC BLD AUTO-RTO: 0 /100 WBC (ref 0–0.2)
PLATELET # BLD AUTO: 139 10*3/MM3 (ref 140–450)
PMV BLD AUTO: 10.6 FL (ref 6–12)
POTASSIUM SERPL-SCNC: 4.3 MMOL/L (ref 3.5–5.2)
PROT SERPL-MCNC: 5.9 G/DL (ref 6–8.5)
RBC # BLD AUTO: 3.65 10*6/MM3 (ref 3.77–5.28)
SODIUM SERPL-SCNC: 134 MMOL/L (ref 136–145)
WBC # BLD AUTO: 6.45 10*3/MM3 (ref 3.4–10.8)

## 2021-03-14 PROCEDURE — 86140 C-REACTIVE PROTEIN: CPT | Performed by: PHYSICIAN ASSISTANT

## 2021-03-14 PROCEDURE — 99233 SBSQ HOSP IP/OBS HIGH 50: CPT | Performed by: INTERNAL MEDICINE

## 2021-03-14 PROCEDURE — 80053 COMPREHEN METABOLIC PANEL: CPT | Performed by: PHYSICIAN ASSISTANT

## 2021-03-14 PROCEDURE — 82962 GLUCOSE BLOOD TEST: CPT

## 2021-03-14 PROCEDURE — 85025 COMPLETE CBC W/AUTO DIFF WBC: CPT | Performed by: PHYSICIAN ASSISTANT

## 2021-03-15 ENCOUNTER — OUTSIDE FACILITY SERVICE (OUTPATIENT)
Dept: PULMONOLOGY | Facility: CLINIC | Age: 76
End: 2021-03-15

## 2021-03-15 LAB
ALBUMIN SERPL-MCNC: 3.32 G/DL (ref 3.5–5.2)
ALBUMIN/GLOB SERPL: 1.2 G/DL
ALP SERPL-CCNC: 101 U/L (ref 39–117)
ALT SERPL W P-5'-P-CCNC: 22 U/L (ref 1–33)
ANION GAP SERPL CALCULATED.3IONS-SCNC: 9 MMOL/L (ref 5–15)
AST SERPL-CCNC: 23 U/L (ref 1–32)
BASOPHILS # BLD AUTO: 0.02 10*3/MM3 (ref 0–0.2)
BASOPHILS NFR BLD AUTO: 0.4 % (ref 0–1.5)
BILIRUB SERPL-MCNC: 0.3 MG/DL (ref 0–1.2)
BUN SERPL-MCNC: 12 MG/DL (ref 8–23)
BUN/CREAT SERPL: 54.5 (ref 7–25)
CALCIUM SPEC-SCNC: 9.2 MG/DL (ref 8.6–10.5)
CHLORIDE SERPL-SCNC: 97 MMOL/L (ref 98–107)
CO2 SERPL-SCNC: 30 MMOL/L (ref 22–29)
CREAT SERPL-MCNC: 0.22 MG/DL (ref 0.57–1)
CRP SERPL-MCNC: 1.81 MG/DL (ref 0–0.5)
DEPRECATED RDW RBC AUTO: 54.4 FL (ref 37–54)
EOSINOPHIL # BLD AUTO: 0.35 10*3/MM3 (ref 0–0.4)
EOSINOPHIL NFR BLD AUTO: 6.2 % (ref 0.3–6.2)
ERYTHROCYTE [DISTWIDTH] IN BLOOD BY AUTOMATED COUNT: 15.9 % (ref 12.3–15.4)
GFR SERPL CREATININE-BSD FRML MDRD: >150 ML/MIN/1.73
GLOBULIN UR ELPH-MCNC: 2.9 GM/DL
GLUCOSE BLDC GLUCOMTR-MCNC: 104 MG/DL (ref 70–130)
GLUCOSE BLDC GLUCOMTR-MCNC: 129 MG/DL (ref 70–130)
GLUCOSE BLDC GLUCOMTR-MCNC: 132 MG/DL (ref 70–130)
GLUCOSE SERPL-MCNC: 63 MG/DL (ref 65–99)
HCT VFR BLD AUTO: 37.7 % (ref 34–46.6)
HGB BLD-MCNC: 11.2 G/DL (ref 12–15.9)
IMM GRANULOCYTES # BLD AUTO: 0.03 10*3/MM3 (ref 0–0.05)
IMM GRANULOCYTES NFR BLD AUTO: 0.5 % (ref 0–0.5)
LYMPHOCYTES # BLD AUTO: 1.52 10*3/MM3 (ref 0.7–3.1)
LYMPHOCYTES NFR BLD AUTO: 26.9 % (ref 19.6–45.3)
MCH RBC QN AUTO: 27.5 PG (ref 26.6–33)
MCHC RBC AUTO-ENTMCNC: 29.7 G/DL (ref 31.5–35.7)
MCV RBC AUTO: 92.6 FL (ref 79–97)
MONOCYTES # BLD AUTO: 0.42 10*3/MM3 (ref 0.1–0.9)
MONOCYTES NFR BLD AUTO: 7.4 % (ref 5–12)
NEUTROPHILS NFR BLD AUTO: 3.31 10*3/MM3 (ref 1.7–7)
NEUTROPHILS NFR BLD AUTO: 58.6 % (ref 42.7–76)
NRBC BLD AUTO-RTO: 0 /100 WBC (ref 0–0.2)
PLATELET # BLD AUTO: 150 10*3/MM3 (ref 140–450)
PMV BLD AUTO: 10.5 FL (ref 6–12)
POTASSIUM SERPL-SCNC: 4.4 MMOL/L (ref 3.5–5.2)
PROT SERPL-MCNC: 6.2 G/DL (ref 6–8.5)
RBC # BLD AUTO: 4.07 10*6/MM3 (ref 3.77–5.28)
SODIUM SERPL-SCNC: 136 MMOL/L (ref 136–145)
WBC # BLD AUTO: 5.65 10*3/MM3 (ref 3.4–10.8)

## 2021-03-15 PROCEDURE — 97110 THERAPEUTIC EXERCISES: CPT

## 2021-03-15 PROCEDURE — 99233 SBSQ HOSP IP/OBS HIGH 50: CPT | Performed by: INTERNAL MEDICINE

## 2021-03-15 PROCEDURE — 82962 GLUCOSE BLOOD TEST: CPT

## 2021-03-15 PROCEDURE — 86140 C-REACTIVE PROTEIN: CPT | Performed by: PHYSICIAN ASSISTANT

## 2021-03-15 PROCEDURE — 80053 COMPREHEN METABOLIC PANEL: CPT | Performed by: PHYSICIAN ASSISTANT

## 2021-03-15 PROCEDURE — 92526 ORAL FUNCTION THERAPY: CPT

## 2021-03-15 PROCEDURE — 85025 COMPLETE CBC W/AUTO DIFF WBC: CPT | Performed by: PHYSICIAN ASSISTANT

## 2021-03-15 PROCEDURE — 97530 THERAPEUTIC ACTIVITIES: CPT

## 2021-03-16 ENCOUNTER — OUTSIDE FACILITY SERVICE (OUTPATIENT)
Dept: PULMONOLOGY | Facility: CLINIC | Age: 76
End: 2021-03-16

## 2021-03-16 LAB
ALBUMIN SERPL-MCNC: 2.92 G/DL (ref 3.5–5.2)
ALBUMIN/GLOB SERPL: 1.1 G/DL
ALP SERPL-CCNC: 91 U/L (ref 39–117)
ALT SERPL W P-5'-P-CCNC: 21 U/L (ref 1–33)
ANION GAP SERPL CALCULATED.3IONS-SCNC: 9.1 MMOL/L (ref 5–15)
AST SERPL-CCNC: 20 U/L (ref 1–32)
BASOPHILS # BLD AUTO: 0.02 10*3/MM3 (ref 0–0.2)
BASOPHILS NFR BLD AUTO: 0.3 % (ref 0–1.5)
BILIRUB SERPL-MCNC: 0.3 MG/DL (ref 0–1.2)
BUN SERPL-MCNC: 10 MG/DL (ref 8–23)
BUN/CREAT SERPL: 45.5 (ref 7–25)
CALCIUM SPEC-SCNC: 9.2 MG/DL (ref 8.6–10.5)
CHLORIDE SERPL-SCNC: 99 MMOL/L (ref 98–107)
CO2 SERPL-SCNC: 28.9 MMOL/L (ref 22–29)
CREAT SERPL-MCNC: 0.22 MG/DL (ref 0.57–1)
CRP SERPL-MCNC: 1.03 MG/DL (ref 0–0.5)
DEPRECATED RDW RBC AUTO: 53.2 FL (ref 37–54)
EOSINOPHIL # BLD AUTO: 0.44 10*3/MM3 (ref 0–0.4)
EOSINOPHIL NFR BLD AUTO: 7 % (ref 0.3–6.2)
ERYTHROCYTE [DISTWIDTH] IN BLOOD BY AUTOMATED COUNT: 15.9 % (ref 12.3–15.4)
GFR SERPL CREATININE-BSD FRML MDRD: >150 ML/MIN/1.73
GLOBULIN UR ELPH-MCNC: 2.7 GM/DL
GLUCOSE BLDC GLUCOMTR-MCNC: 124 MG/DL (ref 70–130)
GLUCOSE BLDC GLUCOMTR-MCNC: 125 MG/DL (ref 70–130)
GLUCOSE BLDC GLUCOMTR-MCNC: 163 MG/DL (ref 70–130)
GLUCOSE BLDC GLUCOMTR-MCNC: 245 MG/DL (ref 70–130)
GLUCOSE SERPL-MCNC: 51 MG/DL (ref 65–99)
HCT VFR BLD AUTO: 35.8 % (ref 34–46.6)
HGB BLD-MCNC: 10.9 G/DL (ref 12–15.9)
IMM GRANULOCYTES # BLD AUTO: 0.03 10*3/MM3 (ref 0–0.05)
IMM GRANULOCYTES NFR BLD AUTO: 0.5 % (ref 0–0.5)
LYMPHOCYTES # BLD AUTO: 2.07 10*3/MM3 (ref 0.7–3.1)
LYMPHOCYTES NFR BLD AUTO: 32.7 % (ref 19.6–45.3)
MCH RBC QN AUTO: 27.7 PG (ref 26.6–33)
MCHC RBC AUTO-ENTMCNC: 30.4 G/DL (ref 31.5–35.7)
MCV RBC AUTO: 90.9 FL (ref 79–97)
MONOCYTES # BLD AUTO: 0.47 10*3/MM3 (ref 0.1–0.9)
MONOCYTES NFR BLD AUTO: 7.4 % (ref 5–12)
NEUTROPHILS NFR BLD AUTO: 3.3 10*3/MM3 (ref 1.7–7)
NEUTROPHILS NFR BLD AUTO: 52.1 % (ref 42.7–76)
NRBC BLD AUTO-RTO: 0 /100 WBC (ref 0–0.2)
PLATELET # BLD AUTO: 146 10*3/MM3 (ref 140–450)
PMV BLD AUTO: 10.7 FL (ref 6–12)
POTASSIUM SERPL-SCNC: 4.1 MMOL/L (ref 3.5–5.2)
PROT SERPL-MCNC: 5.6 G/DL (ref 6–8.5)
RBC # BLD AUTO: 3.94 10*6/MM3 (ref 3.77–5.28)
SODIUM SERPL-SCNC: 137 MMOL/L (ref 136–145)
WBC # BLD AUTO: 6.33 10*3/MM3 (ref 3.4–10.8)

## 2021-03-16 PROCEDURE — 82962 GLUCOSE BLOOD TEST: CPT

## 2021-03-16 PROCEDURE — 97530 THERAPEUTIC ACTIVITIES: CPT

## 2021-03-16 PROCEDURE — 85025 COMPLETE CBC W/AUTO DIFF WBC: CPT | Performed by: PHYSICIAN ASSISTANT

## 2021-03-16 PROCEDURE — 99233 SBSQ HOSP IP/OBS HIGH 50: CPT | Performed by: INTERNAL MEDICINE

## 2021-03-16 PROCEDURE — 86140 C-REACTIVE PROTEIN: CPT | Performed by: PHYSICIAN ASSISTANT

## 2021-03-16 PROCEDURE — 97110 THERAPEUTIC EXERCISES: CPT

## 2021-03-16 PROCEDURE — 80053 COMPREHEN METABOLIC PANEL: CPT | Performed by: PHYSICIAN ASSISTANT

## 2021-03-17 ENCOUNTER — OUTSIDE FACILITY SERVICE (OUTPATIENT)
Dept: PULMONOLOGY | Facility: CLINIC | Age: 76
End: 2021-03-17

## 2021-03-17 LAB
ALBUMIN SERPL-MCNC: 2.88 G/DL (ref 3.5–5.2)
ALBUMIN/GLOB SERPL: 1.1 G/DL
ALP SERPL-CCNC: 92 U/L (ref 39–117)
ALT SERPL W P-5'-P-CCNC: 19 U/L (ref 1–33)
ANION GAP SERPL CALCULATED.3IONS-SCNC: 10.9 MMOL/L (ref 5–15)
AST SERPL-CCNC: 17 U/L (ref 1–32)
BASOPHILS # BLD AUTO: 0.03 10*3/MM3 (ref 0–0.2)
BASOPHILS NFR BLD AUTO: 0.5 % (ref 0–1.5)
BILIRUB SERPL-MCNC: 0.3 MG/DL (ref 0–1.2)
BUN SERPL-MCNC: 8 MG/DL (ref 8–23)
BUN/CREAT SERPL: 36.4 (ref 7–25)
CALCIUM SPEC-SCNC: 8.9 MG/DL (ref 8.6–10.5)
CHLORIDE SERPL-SCNC: 99 MMOL/L (ref 98–107)
CO2 SERPL-SCNC: 27.1 MMOL/L (ref 22–29)
CREAT SERPL-MCNC: 0.22 MG/DL (ref 0.57–1)
CRP SERPL-MCNC: 0.98 MG/DL (ref 0–0.5)
DEPRECATED RDW RBC AUTO: 54.4 FL (ref 37–54)
EOSINOPHIL # BLD AUTO: 0.46 10*3/MM3 (ref 0–0.4)
EOSINOPHIL NFR BLD AUTO: 7.3 % (ref 0.3–6.2)
ERYTHROCYTE [DISTWIDTH] IN BLOOD BY AUTOMATED COUNT: 16 % (ref 12.3–15.4)
GFR SERPL CREATININE-BSD FRML MDRD: >150 ML/MIN/1.73
GLOBULIN UR ELPH-MCNC: 2.7 GM/DL
GLUCOSE BLDC GLUCOMTR-MCNC: 151 MG/DL (ref 70–130)
GLUCOSE BLDC GLUCOMTR-MCNC: 156 MG/DL (ref 70–130)
GLUCOSE BLDC GLUCOMTR-MCNC: 158 MG/DL (ref 70–130)
GLUCOSE BLDC GLUCOMTR-MCNC: 194 MG/DL (ref 70–130)
GLUCOSE BLDC GLUCOMTR-MCNC: 99 MG/DL (ref 70–130)
GLUCOSE SERPL-MCNC: 87 MG/DL (ref 65–99)
HCT VFR BLD AUTO: 34.3 % (ref 34–46.6)
HGB BLD-MCNC: 10.2 G/DL (ref 12–15.9)
IMM GRANULOCYTES # BLD AUTO: 0.03 10*3/MM3 (ref 0–0.05)
IMM GRANULOCYTES NFR BLD AUTO: 0.5 % (ref 0–0.5)
LYMPHOCYTES # BLD AUTO: 1.93 10*3/MM3 (ref 0.7–3.1)
LYMPHOCYTES NFR BLD AUTO: 30.7 % (ref 19.6–45.3)
MCH RBC QN AUTO: 27.3 PG (ref 26.6–33)
MCHC RBC AUTO-ENTMCNC: 29.7 G/DL (ref 31.5–35.7)
MCV RBC AUTO: 92 FL (ref 79–97)
MONOCYTES # BLD AUTO: 0.49 10*3/MM3 (ref 0.1–0.9)
MONOCYTES NFR BLD AUTO: 7.8 % (ref 5–12)
NEUTROPHILS NFR BLD AUTO: 3.35 10*3/MM3 (ref 1.7–7)
NEUTROPHILS NFR BLD AUTO: 53.2 % (ref 42.7–76)
NRBC BLD AUTO-RTO: 0 /100 WBC (ref 0–0.2)
PLATELET # BLD AUTO: 152 10*3/MM3 (ref 140–450)
PMV BLD AUTO: 10.8 FL (ref 6–12)
POTASSIUM SERPL-SCNC: 4 MMOL/L (ref 3.5–5.2)
PROT SERPL-MCNC: 5.6 G/DL (ref 6–8.5)
RBC # BLD AUTO: 3.73 10*6/MM3 (ref 3.77–5.28)
SODIUM SERPL-SCNC: 137 MMOL/L (ref 136–145)
WBC # BLD AUTO: 6.29 10*3/MM3 (ref 3.4–10.8)

## 2021-03-17 PROCEDURE — 82962 GLUCOSE BLOOD TEST: CPT

## 2021-03-17 PROCEDURE — 99233 SBSQ HOSP IP/OBS HIGH 50: CPT | Performed by: INTERNAL MEDICINE

## 2021-03-17 PROCEDURE — 85025 COMPLETE CBC W/AUTO DIFF WBC: CPT | Performed by: PHYSICIAN ASSISTANT

## 2021-03-17 PROCEDURE — 97110 THERAPEUTIC EXERCISES: CPT

## 2021-03-17 PROCEDURE — 92526 ORAL FUNCTION THERAPY: CPT

## 2021-03-17 PROCEDURE — 80053 COMPREHEN METABOLIC PANEL: CPT | Performed by: PHYSICIAN ASSISTANT

## 2021-03-17 PROCEDURE — 86140 C-REACTIVE PROTEIN: CPT | Performed by: PHYSICIAN ASSISTANT

## 2021-03-17 PROCEDURE — 97530 THERAPEUTIC ACTIVITIES: CPT

## 2021-03-18 ENCOUNTER — HOSPITAL ENCOUNTER (INPATIENT)
Facility: HOSPITAL | Age: 76
LOS: 24 days | Discharge: ANOTHER HOSPITAL | End: 2021-04-11
Attending: INTERNAL MEDICINE | Admitting: INTERNAL MEDICINE

## 2021-03-18 ENCOUNTER — OUTSIDE FACILITY SERVICE (OUTPATIENT)
Dept: PULMONOLOGY | Facility: CLINIC | Age: 76
End: 2021-03-18

## 2021-03-18 DIAGNOSIS — F41.1 GENERALIZED ANXIETY DISORDER: Primary | ICD-10-CM

## 2021-03-18 DIAGNOSIS — M79.2 NEUROPATHIC PAIN: ICD-10-CM

## 2021-03-18 PROBLEM — R53.81 DEBILITY: Status: ACTIVE | Noted: 2021-03-18

## 2021-03-18 LAB
ALBUMIN SERPL-MCNC: 3.71 G/DL (ref 3.5–5.2)
ALBUMIN/GLOB SERPL: 1.6 G/DL
ALP SERPL-CCNC: 95 U/L (ref 39–117)
ALT SERPL W P-5'-P-CCNC: 18 U/L (ref 1–33)
ANION GAP SERPL CALCULATED.3IONS-SCNC: 7.7 MMOL/L (ref 5–15)
AST SERPL-CCNC: 20 U/L (ref 1–32)
BASOPHILS # BLD AUTO: 0.03 10*3/MM3 (ref 0–0.2)
BASOPHILS NFR BLD AUTO: 0.6 % (ref 0–1.5)
BILIRUB SERPL-MCNC: 0.3 MG/DL (ref 0–1.2)
BUN SERPL-MCNC: 8 MG/DL (ref 8–23)
BUN/CREAT SERPL: 38.1 (ref 7–25)
CALCIUM SPEC-SCNC: 9.3 MG/DL (ref 8.6–10.5)
CHLORIDE SERPL-SCNC: 97 MMOL/L (ref 98–107)
CO2 SERPL-SCNC: 30.3 MMOL/L (ref 22–29)
CREAT SERPL-MCNC: 0.21 MG/DL (ref 0.57–1)
CRP SERPL-MCNC: 0.83 MG/DL (ref 0–0.5)
DEPRECATED RDW RBC AUTO: 55.2 FL (ref 37–54)
EOSINOPHIL # BLD AUTO: 0.34 10*3/MM3 (ref 0–0.4)
EOSINOPHIL NFR BLD AUTO: 6.5 % (ref 0.3–6.2)
ERYTHROCYTE [DISTWIDTH] IN BLOOD BY AUTOMATED COUNT: 16 % (ref 12.3–15.4)
GFR SERPL CREATININE-BSD FRML MDRD: >150 ML/MIN/1.73
GLOBULIN UR ELPH-MCNC: 2.4 GM/DL
GLUCOSE BLDC GLUCOMTR-MCNC: 116 MG/DL (ref 70–130)
GLUCOSE BLDC GLUCOMTR-MCNC: 141 MG/DL (ref 70–130)
GLUCOSE BLDC GLUCOMTR-MCNC: 150 MG/DL (ref 70–130)
GLUCOSE BLDC GLUCOMTR-MCNC: 98 MG/DL (ref 70–130)
GLUCOSE SERPL-MCNC: 109 MG/DL (ref 65–99)
HCT VFR BLD AUTO: 34.1 % (ref 34–46.6)
HGB BLD-MCNC: 10.2 G/DL (ref 12–15.9)
IMM GRANULOCYTES # BLD AUTO: 0.01 10*3/MM3 (ref 0–0.05)
IMM GRANULOCYTES NFR BLD AUTO: 0.2 % (ref 0–0.5)
LYMPHOCYTES # BLD AUTO: 1.4 10*3/MM3 (ref 0.7–3.1)
LYMPHOCYTES NFR BLD AUTO: 26.8 % (ref 19.6–45.3)
MCH RBC QN AUTO: 28.1 PG (ref 26.6–33)
MCHC RBC AUTO-ENTMCNC: 29.9 G/DL (ref 31.5–35.7)
MCV RBC AUTO: 93.9 FL (ref 79–97)
MONOCYTES # BLD AUTO: 0.37 10*3/MM3 (ref 0.1–0.9)
MONOCYTES NFR BLD AUTO: 7.1 % (ref 5–12)
NEUTROPHILS NFR BLD AUTO: 3.08 10*3/MM3 (ref 1.7–7)
NEUTROPHILS NFR BLD AUTO: 58.8 % (ref 42.7–76)
NRBC BLD AUTO-RTO: 0 /100 WBC (ref 0–0.2)
PLATELET # BLD AUTO: 130 10*3/MM3 (ref 140–450)
PMV BLD AUTO: 11.3 FL (ref 6–12)
POTASSIUM SERPL-SCNC: 3.8 MMOL/L (ref 3.5–5.2)
PROT SERPL-MCNC: 6.1 G/DL (ref 6–8.5)
RBC # BLD AUTO: 3.63 10*6/MM3 (ref 3.77–5.28)
SODIUM SERPL-SCNC: 135 MMOL/L (ref 136–145)
WBC # BLD AUTO: 5.23 10*3/MM3 (ref 3.4–10.8)

## 2021-03-18 PROCEDURE — 92526 ORAL FUNCTION THERAPY: CPT

## 2021-03-18 PROCEDURE — 63710000001 INSULIN DETEMIR PER 5 UNITS: Performed by: INTERNAL MEDICINE

## 2021-03-18 PROCEDURE — 94799 UNLISTED PULMONARY SVC/PX: CPT

## 2021-03-18 PROCEDURE — 85025 COMPLETE CBC W/AUTO DIFF WBC: CPT | Performed by: PHYSICIAN ASSISTANT

## 2021-03-18 PROCEDURE — 97530 THERAPEUTIC ACTIVITIES: CPT

## 2021-03-18 PROCEDURE — 99232 SBSQ HOSP IP/OBS MODERATE 35: CPT | Performed by: INTERNAL MEDICINE

## 2021-03-18 PROCEDURE — 86140 C-REACTIVE PROTEIN: CPT | Performed by: PHYSICIAN ASSISTANT

## 2021-03-18 PROCEDURE — 94640 AIRWAY INHALATION TREATMENT: CPT

## 2021-03-18 PROCEDURE — 97110 THERAPEUTIC EXERCISES: CPT

## 2021-03-18 PROCEDURE — 63710000001 INSULIN ASPART PER 5 UNITS: Performed by: INTERNAL MEDICINE

## 2021-03-18 PROCEDURE — 82962 GLUCOSE BLOOD TEST: CPT

## 2021-03-18 PROCEDURE — 80053 COMPREHEN METABOLIC PANEL: CPT | Performed by: PHYSICIAN ASSISTANT

## 2021-03-18 RX ORDER — ATORVASTATIN CALCIUM 20 MG/1
20 TABLET, FILM COATED ORAL NIGHTLY
Status: DISCONTINUED | OUTPATIENT
Start: 2021-03-18 | End: 2021-04-12 | Stop reason: HOSPADM

## 2021-03-18 RX ORDER — POTASSIUM CHLORIDE 1.5 G/1.77G
40 POWDER, FOR SOLUTION ORAL AS NEEDED
Status: DISCONTINUED | OUTPATIENT
Start: 2021-03-18 | End: 2021-04-12 | Stop reason: HOSPADM

## 2021-03-18 RX ORDER — IPRATROPIUM BROMIDE AND ALBUTEROL SULFATE 2.5; .5 MG/3ML; MG/3ML
3 SOLUTION RESPIRATORY (INHALATION)
Status: DISCONTINUED | OUTPATIENT
Start: 2021-03-18 | End: 2021-03-27

## 2021-03-18 RX ORDER — AMOXICILLIN 250 MG
2 CAPSULE ORAL 2 TIMES DAILY PRN
Status: DISCONTINUED | OUTPATIENT
Start: 2021-03-18 | End: 2021-04-12 | Stop reason: HOSPADM

## 2021-03-18 RX ORDER — CHOLECALCIFEROL (VITAMIN D3) 125 MCG
5 CAPSULE ORAL NIGHTLY PRN
Status: DISCONTINUED | OUTPATIENT
Start: 2021-03-18 | End: 2021-04-12 | Stop reason: HOSPADM

## 2021-03-18 RX ORDER — FLUTICASONE PROPIONATE 50 MCG
2 SPRAY, SUSPENSION (ML) NASAL DAILY
COMMUNITY
End: 2021-04-12 | Stop reason: HOSPADM

## 2021-03-18 RX ORDER — POTASSIUM CHLORIDE 7.45 MG/ML
10 INJECTION INTRAVENOUS
Status: DISCONTINUED | OUTPATIENT
Start: 2021-03-18 | End: 2021-04-12 | Stop reason: HOSPADM

## 2021-03-18 RX ORDER — GABAPENTIN 400 MG/1
400 CAPSULE ORAL 3 TIMES DAILY PRN
COMMUNITY
End: 2021-04-12 | Stop reason: HOSPADM

## 2021-03-18 RX ORDER — SIMVASTATIN 40 MG
40 TABLET ORAL NIGHTLY
COMMUNITY
End: 2021-04-12 | Stop reason: HOSPADM

## 2021-03-18 RX ORDER — FENTANYL 12 UG/H
1 PATCH TRANSDERMAL
Status: DISCONTINUED | OUTPATIENT
Start: 2021-03-18 | End: 2021-03-24

## 2021-03-18 RX ORDER — NICOTINE POLACRILEX 4 MG
15 LOZENGE BUCCAL
Status: DISCONTINUED | OUTPATIENT
Start: 2021-03-18 | End: 2021-04-12 | Stop reason: HOSPADM

## 2021-03-18 RX ORDER — PANTOPRAZOLE SODIUM 40 MG/1
40 TABLET, DELAYED RELEASE ORAL
Status: DISCONTINUED | OUTPATIENT
Start: 2021-03-19 | End: 2021-04-12 | Stop reason: HOSPADM

## 2021-03-18 RX ORDER — ONDANSETRON 4 MG/1
4 TABLET, FILM COATED ORAL EVERY 6 HOURS PRN
Status: DISCONTINUED | OUTPATIENT
Start: 2021-03-18 | End: 2021-04-12 | Stop reason: HOSPADM

## 2021-03-18 RX ORDER — POLYETHYLENE GLYCOL 3350 17 G/17G
17 POWDER, FOR SOLUTION ORAL DAILY
Status: DISCONTINUED | OUTPATIENT
Start: 2021-03-18 | End: 2021-04-12 | Stop reason: HOSPADM

## 2021-03-18 RX ORDER — QUETIAPINE FUMARATE 25 MG/1
50 TABLET, FILM COATED ORAL EVERY 12 HOURS SCHEDULED
Status: DISCONTINUED | OUTPATIENT
Start: 2021-03-18 | End: 2021-04-12 | Stop reason: HOSPADM

## 2021-03-18 RX ORDER — CALCIUM CARBONATE 200(500)MG
1 TABLET,CHEWABLE ORAL 2 TIMES DAILY PRN
Status: DISCONTINUED | OUTPATIENT
Start: 2021-03-18 | End: 2021-04-12 | Stop reason: HOSPADM

## 2021-03-18 RX ORDER — ASPIRIN 81 MG/1
81 TABLET ORAL DAILY
COMMUNITY
End: 2021-04-12 | Stop reason: HOSPADM

## 2021-03-18 RX ORDER — MAGNESIUM SULFATE HEPTAHYDRATE 40 MG/ML
2 INJECTION, SOLUTION INTRAVENOUS AS NEEDED
Status: DISCONTINUED | OUTPATIENT
Start: 2021-03-18 | End: 2021-04-12 | Stop reason: HOSPADM

## 2021-03-18 RX ORDER — ACETAMINOPHEN 325 MG/1
650 TABLET ORAL EVERY 4 HOURS PRN
Status: DISCONTINUED | OUTPATIENT
Start: 2021-03-18 | End: 2021-04-12 | Stop reason: HOSPADM

## 2021-03-18 RX ORDER — GABAPENTIN 100 MG/1
100 CAPSULE ORAL EVERY 8 HOURS SCHEDULED
Status: DISCONTINUED | OUTPATIENT
Start: 2021-03-18 | End: 2021-04-12 | Stop reason: HOSPADM

## 2021-03-18 RX ORDER — LISINOPRIL 30 MG/1
30 TABLET ORAL DAILY
COMMUNITY
End: 2021-04-12 | Stop reason: HOSPADM

## 2021-03-18 RX ORDER — MAGNESIUM SULFATE HEPTAHYDRATE 40 MG/ML
4 INJECTION, SOLUTION INTRAVENOUS AS NEEDED
Status: DISCONTINUED | OUTPATIENT
Start: 2021-03-18 | End: 2021-04-12 | Stop reason: HOSPADM

## 2021-03-18 RX ORDER — POTASSIUM CHLORIDE 20 MEQ/1
40 TABLET, EXTENDED RELEASE ORAL AS NEEDED
Status: DISCONTINUED | OUTPATIENT
Start: 2021-03-18 | End: 2021-04-12 | Stop reason: HOSPADM

## 2021-03-18 RX ORDER — ERGOCALCIFEROL 1.25 MG/1
50000 CAPSULE ORAL WEEKLY
COMMUNITY
End: 2021-04-12 | Stop reason: HOSPADM

## 2021-03-18 RX ORDER — LEVOTHYROXINE SODIUM 0.05 MG/1
50 TABLET ORAL
Status: DISCONTINUED | OUTPATIENT
Start: 2021-03-19 | End: 2021-04-12 | Stop reason: HOSPADM

## 2021-03-18 RX ORDER — MONTELUKAST SODIUM 10 MG/1
10 TABLET ORAL NIGHTLY
COMMUNITY
End: 2021-04-12 | Stop reason: HOSPADM

## 2021-03-18 RX ORDER — DEXTROSE MONOHYDRATE 25 G/50ML
25 INJECTION, SOLUTION INTRAVENOUS
Status: DISCONTINUED | OUTPATIENT
Start: 2021-03-18 | End: 2021-04-12 | Stop reason: HOSPADM

## 2021-03-18 RX ORDER — CLONAZEPAM 0.5 MG/1
0.25 TABLET ORAL NIGHTLY PRN
Status: DISCONTINUED | OUTPATIENT
Start: 2021-03-18 | End: 2021-04-12 | Stop reason: HOSPADM

## 2021-03-18 RX ORDER — HYDROCODONE BITARTRATE AND ACETAMINOPHEN 7.5; 325 MG/1; MG/1
1 TABLET ORAL 2 TIMES DAILY PRN
COMMUNITY
End: 2021-04-12 | Stop reason: HOSPADM

## 2021-03-18 RX ORDER — LEVOTHYROXINE SODIUM 0.05 MG/1
50 TABLET ORAL DAILY
COMMUNITY
End: 2021-04-12 | Stop reason: HOSPADM

## 2021-03-18 RX ORDER — OXYCODONE HYDROCHLORIDE 5 MG/1
5 TABLET ORAL EVERY 8 HOURS PRN
Status: DISCONTINUED | OUTPATIENT
Start: 2021-03-18 | End: 2021-04-12 | Stop reason: HOSPADM

## 2021-03-18 RX ADMIN — ATORVASTATIN CALCIUM 20 MG: 20 TABLET, FILM COATED ORAL at 21:16

## 2021-03-18 RX ADMIN — OXYCODONE HYDROCHLORIDE 5 MG: 5 TABLET ORAL at 19:53

## 2021-03-18 RX ADMIN — CLONAZEPAM 0.25 MG: 0.5 TABLET ORAL at 21:17

## 2021-03-18 RX ADMIN — GABAPENTIN 100 MG: 100 CAPSULE ORAL at 21:15

## 2021-03-18 RX ADMIN — QUETIAPINE FUMARATE 50 MG: 25 TABLET, FILM COATED ORAL at 21:16

## 2021-03-18 RX ADMIN — INSULIN DETEMIR 24 UNITS: 100 INJECTION, SOLUTION SUBCUTANEOUS at 21:22

## 2021-03-18 RX ADMIN — INSULIN ASPART 2 UNITS: 100 INJECTION, SOLUTION INTRAVENOUS; SUBCUTANEOUS at 18:40

## 2021-03-18 RX ADMIN — IPRATROPIUM BROMIDE AND ALBUTEROL SULFATE 3 ML: .5; 3 SOLUTION RESPIRATORY (INHALATION) at 19:42

## 2021-03-19 ENCOUNTER — APPOINTMENT (OUTPATIENT)
Dept: GENERAL RADIOLOGY | Facility: HOSPITAL | Age: 76
End: 2021-03-19

## 2021-03-19 LAB
ALBUMIN SERPL-MCNC: 3.74 G/DL (ref 3.5–5.2)
ALBUMIN/GLOB SERPL: 1.7 G/DL
ALP SERPL-CCNC: 85 U/L (ref 39–117)
ALT SERPL W P-5'-P-CCNC: 16 U/L (ref 1–33)
ANION GAP SERPL CALCULATED.3IONS-SCNC: 11.1 MMOL/L (ref 5–15)
AST SERPL-CCNC: 15 U/L (ref 1–32)
BASOPHILS # BLD AUTO: 0.04 10*3/MM3 (ref 0–0.2)
BASOPHILS NFR BLD AUTO: 0.7 % (ref 0–1.5)
BILIRUB SERPL-MCNC: 0.3 MG/DL (ref 0–1.2)
BUN SERPL-MCNC: 11 MG/DL (ref 8–23)
BUN/CREAT SERPL: 35.5 (ref 7–25)
CALCIUM SPEC-SCNC: 9.1 MG/DL (ref 8.6–10.5)
CHLORIDE SERPL-SCNC: 100 MMOL/L (ref 98–107)
CO2 SERPL-SCNC: 28.9 MMOL/L (ref 22–29)
CREAT SERPL-MCNC: 0.31 MG/DL (ref 0.57–1)
DEPRECATED RDW RBC AUTO: 54 FL (ref 37–54)
EOSINOPHIL # BLD AUTO: 0.37 10*3/MM3 (ref 0–0.4)
EOSINOPHIL NFR BLD AUTO: 6.3 % (ref 0.3–6.2)
ERYTHROCYTE [DISTWIDTH] IN BLOOD BY AUTOMATED COUNT: 15.9 % (ref 12.3–15.4)
GFR SERPL CREATININE-BSD FRML MDRD: >150 ML/MIN/1.73
GLOBULIN UR ELPH-MCNC: 2.3 GM/DL
GLUCOSE BLDC GLUCOMTR-MCNC: 125 MG/DL (ref 70–130)
GLUCOSE BLDC GLUCOMTR-MCNC: 138 MG/DL (ref 70–130)
GLUCOSE BLDC GLUCOMTR-MCNC: 141 MG/DL (ref 70–130)
GLUCOSE BLDC GLUCOMTR-MCNC: 156 MG/DL (ref 70–130)
GLUCOSE SERPL-MCNC: 97 MG/DL (ref 65–99)
HCT VFR BLD AUTO: 34.8 % (ref 34–46.6)
HGB BLD-MCNC: 10.3 G/DL (ref 12–15.9)
IMM GRANULOCYTES # BLD AUTO: 0.02 10*3/MM3 (ref 0–0.05)
IMM GRANULOCYTES NFR BLD AUTO: 0.3 % (ref 0–0.5)
LYMPHOCYTES # BLD AUTO: 1.16 10*3/MM3 (ref 0.7–3.1)
LYMPHOCYTES NFR BLD AUTO: 19.8 % (ref 19.6–45.3)
MAGNESIUM SERPL-MCNC: 1.9 MG/DL (ref 1.6–2.4)
MCH RBC QN AUTO: 27.5 PG (ref 26.6–33)
MCHC RBC AUTO-ENTMCNC: 29.6 G/DL (ref 31.5–35.7)
MCV RBC AUTO: 92.8 FL (ref 79–97)
MONOCYTES # BLD AUTO: 0.38 10*3/MM3 (ref 0.1–0.9)
MONOCYTES NFR BLD AUTO: 6.5 % (ref 5–12)
NEUTROPHILS NFR BLD AUTO: 3.88 10*3/MM3 (ref 1.7–7)
NEUTROPHILS NFR BLD AUTO: 66.4 % (ref 42.7–76)
NRBC BLD AUTO-RTO: 0 /100 WBC (ref 0–0.2)
PLATELET # BLD AUTO: 133 10*3/MM3 (ref 140–450)
PMV BLD AUTO: 10.2 FL (ref 6–12)
POTASSIUM SERPL-SCNC: 3.9 MMOL/L (ref 3.5–5.2)
PROT SERPL-MCNC: 6 G/DL (ref 6–8.5)
RBC # BLD AUTO: 3.75 10*6/MM3 (ref 3.77–5.28)
SODIUM SERPL-SCNC: 140 MMOL/L (ref 136–145)
WBC # BLD AUTO: 5.85 10*3/MM3 (ref 3.4–10.8)

## 2021-03-19 PROCEDURE — 25010000003 MAGNESIUM SULFATE 4 GM/100ML SOLUTION: Performed by: INTERNAL MEDICINE

## 2021-03-19 PROCEDURE — 74230 X-RAY XM SWLNG FUNCJ C+: CPT

## 2021-03-19 PROCEDURE — 63710000001 INSULIN ASPART PER 5 UNITS: Performed by: INTERNAL MEDICINE

## 2021-03-19 PROCEDURE — 94799 UNLISTED PULMONARY SVC/PX: CPT

## 2021-03-19 PROCEDURE — 80053 COMPREHEN METABOLIC PANEL: CPT | Performed by: INTERNAL MEDICINE

## 2021-03-19 PROCEDURE — 97162 PT EVAL MOD COMPLEX 30 MIN: CPT

## 2021-03-19 PROCEDURE — 83735 ASSAY OF MAGNESIUM: CPT | Performed by: INTERNAL MEDICINE

## 2021-03-19 PROCEDURE — 97535 SELF CARE MNGMENT TRAINING: CPT | Performed by: OCCUPATIONAL THERAPIST

## 2021-03-19 PROCEDURE — 25010000002 ENOXAPARIN PER 10 MG: Performed by: INTERNAL MEDICINE

## 2021-03-19 PROCEDURE — 97110 THERAPEUTIC EXERCISES: CPT

## 2021-03-19 PROCEDURE — 63710000001 INSULIN DETEMIR PER 5 UNITS: Performed by: INTERNAL MEDICINE

## 2021-03-19 PROCEDURE — 92611 MOTION FLUOROSCOPY/SWALLOW: CPT

## 2021-03-19 PROCEDURE — 82962 GLUCOSE BLOOD TEST: CPT

## 2021-03-19 PROCEDURE — 85025 COMPLETE CBC W/AUTO DIFF WBC: CPT | Performed by: INTERNAL MEDICINE

## 2021-03-19 PROCEDURE — 97530 THERAPEUTIC ACTIVITIES: CPT

## 2021-03-19 PROCEDURE — 74230 X-RAY XM SWLNG FUNCJ C+: CPT | Performed by: RADIOLOGY

## 2021-03-19 PROCEDURE — 63710000001 ONDANSETRON PER 8 MG: Performed by: INTERNAL MEDICINE

## 2021-03-19 PROCEDURE — 97530 THERAPEUTIC ACTIVITIES: CPT | Performed by: OCCUPATIONAL THERAPIST

## 2021-03-19 PROCEDURE — 25010000002 ALTEPLASE 2 MG RECONSTITUTED SOLUTION 1 EACH VIAL: Performed by: INTERNAL MEDICINE

## 2021-03-19 PROCEDURE — 97167 OT EVAL HIGH COMPLEX 60 MIN: CPT | Performed by: OCCUPATIONAL THERAPIST

## 2021-03-19 RX ORDER — MAGNESIUM SULFATE HEPTAHYDRATE 40 MG/ML
4 INJECTION, SOLUTION INTRAVENOUS ONCE
Status: COMPLETED | OUTPATIENT
Start: 2021-03-19 | End: 2021-03-19

## 2021-03-19 RX ORDER — SIMETHICONE 80 MG
80 TABLET,CHEWABLE ORAL 4 TIMES DAILY PRN
Status: DISCONTINUED | OUTPATIENT
Start: 2021-03-19 | End: 2021-04-12 | Stop reason: HOSPADM

## 2021-03-19 RX ADMIN — MAGNESIUM SULFATE HEPTAHYDRATE 4 G: 40 INJECTION, SOLUTION INTRAVENOUS at 11:32

## 2021-03-19 RX ADMIN — Medication 5 MG: at 21:26

## 2021-03-19 RX ADMIN — INSULIN ASPART 2 UNITS: 100 INJECTION, SOLUTION INTRAVENOUS; SUBCUTANEOUS at 07:52

## 2021-03-19 RX ADMIN — CALCIUM CARBONATE 1 TABLET: 500 TABLET, CHEWABLE ORAL at 00:19

## 2021-03-19 RX ADMIN — ATORVASTATIN CALCIUM 20 MG: 20 TABLET, FILM COATED ORAL at 21:25

## 2021-03-19 RX ADMIN — GABAPENTIN 100 MG: 100 CAPSULE ORAL at 13:51

## 2021-03-19 RX ADMIN — ONDANSETRON HYDROCHLORIDE 4 MG: 4 TABLET, FILM COATED ORAL at 15:27

## 2021-03-19 RX ADMIN — ALTEPLASE: 2.2 INJECTION, POWDER, LYOPHILIZED, FOR SOLUTION INTRAVENOUS at 09:00

## 2021-03-19 RX ADMIN — METOPROLOL TARTRATE 12.5 MG: 25 TABLET, FILM COATED ORAL at 08:02

## 2021-03-19 RX ADMIN — OXYCODONE HYDROCHLORIDE 5 MG: 5 TABLET ORAL at 05:23

## 2021-03-19 RX ADMIN — ACETAMINOPHEN 650 MG: 325 TABLET, COATED ORAL at 19:37

## 2021-03-19 RX ADMIN — IPRATROPIUM BROMIDE AND ALBUTEROL SULFATE 3 ML: .5; 3 SOLUTION RESPIRATORY (INHALATION) at 12:47

## 2021-03-19 RX ADMIN — POLYETHYLENE GLYCOL (3350) 17 G: 17 POWDER, FOR SOLUTION ORAL at 08:01

## 2021-03-19 RX ADMIN — GABAPENTIN 100 MG: 100 CAPSULE ORAL at 21:26

## 2021-03-19 RX ADMIN — QUETIAPINE FUMARATE 50 MG: 25 TABLET, FILM COATED ORAL at 08:01

## 2021-03-19 RX ADMIN — CLONAZEPAM 0.25 MG: 0.5 TABLET ORAL at 21:26

## 2021-03-19 RX ADMIN — SIMETHICONE 80 MG: 80 TABLET, CHEWABLE ORAL at 07:52

## 2021-03-19 RX ADMIN — LEVOTHYROXINE SODIUM 50 MCG: 88 TABLET ORAL at 05:23

## 2021-03-19 RX ADMIN — IPRATROPIUM BROMIDE AND ALBUTEROL SULFATE 3 ML: .5; 3 SOLUTION RESPIRATORY (INHALATION) at 07:45

## 2021-03-19 RX ADMIN — ENOXAPARIN SODIUM 40 MG: 40 INJECTION SUBCUTANEOUS at 08:01

## 2021-03-19 RX ADMIN — IPRATROPIUM BROMIDE AND ALBUTEROL SULFATE 3 ML: .5; 3 SOLUTION RESPIRATORY (INHALATION) at 01:17

## 2021-03-19 RX ADMIN — QUETIAPINE FUMARATE 50 MG: 25 TABLET, FILM COATED ORAL at 21:25

## 2021-03-19 RX ADMIN — INSULIN DETEMIR 24 UNITS: 100 INJECTION, SOLUTION SUBCUTANEOUS at 21:27

## 2021-03-19 RX ADMIN — GABAPENTIN 100 MG: 100 CAPSULE ORAL at 05:24

## 2021-03-19 RX ADMIN — OXYCODONE HYDROCHLORIDE 5 MG: 5 TABLET ORAL at 21:26

## 2021-03-19 RX ADMIN — IPRATROPIUM BROMIDE AND ALBUTEROL SULFATE 3 ML: .5; 3 SOLUTION RESPIRATORY (INHALATION) at 19:33

## 2021-03-19 RX ADMIN — OXYCODONE HYDROCHLORIDE 5 MG: 5 TABLET ORAL at 13:51

## 2021-03-19 RX ADMIN — PANTOPRAZOLE SODIUM 40 MG: 40 TABLET, DELAYED RELEASE ORAL at 05:23

## 2021-03-20 LAB
GLUCOSE BLDC GLUCOMTR-MCNC: 133 MG/DL (ref 70–130)
GLUCOSE BLDC GLUCOMTR-MCNC: 143 MG/DL (ref 70–130)
GLUCOSE BLDC GLUCOMTR-MCNC: 75 MG/DL (ref 70–130)
GLUCOSE BLDC GLUCOMTR-MCNC: 93 MG/DL (ref 70–130)
MAGNESIUM SERPL-MCNC: 2.1 MG/DL (ref 1.6–2.4)

## 2021-03-20 PROCEDURE — 97530 THERAPEUTIC ACTIVITIES: CPT

## 2021-03-20 PROCEDURE — 94799 UNLISTED PULMONARY SVC/PX: CPT

## 2021-03-20 PROCEDURE — 63710000001 INSULIN DETEMIR PER 5 UNITS: Performed by: INTERNAL MEDICINE

## 2021-03-20 PROCEDURE — 97535 SELF CARE MNGMENT TRAINING: CPT

## 2021-03-20 PROCEDURE — 82962 GLUCOSE BLOOD TEST: CPT

## 2021-03-20 PROCEDURE — 97110 THERAPEUTIC EXERCISES: CPT

## 2021-03-20 PROCEDURE — 25010000002 ENOXAPARIN PER 10 MG: Performed by: INTERNAL MEDICINE

## 2021-03-20 PROCEDURE — 83735 ASSAY OF MAGNESIUM: CPT | Performed by: INTERNAL MEDICINE

## 2021-03-20 PROCEDURE — 63710000001 ONDANSETRON PER 8 MG: Performed by: INTERNAL MEDICINE

## 2021-03-20 RX ADMIN — GABAPENTIN 100 MG: 100 CAPSULE ORAL at 21:04

## 2021-03-20 RX ADMIN — SIMETHICONE 80 MG: 80 TABLET, CHEWABLE ORAL at 06:05

## 2021-03-20 RX ADMIN — GABAPENTIN 100 MG: 100 CAPSULE ORAL at 14:57

## 2021-03-20 RX ADMIN — INSULIN DETEMIR 24 UNITS: 100 INJECTION, SOLUTION SUBCUTANEOUS at 20:28

## 2021-03-20 RX ADMIN — LEVOTHYROXINE SODIUM 50 MCG: 88 TABLET ORAL at 05:55

## 2021-03-20 RX ADMIN — POLYETHYLENE GLYCOL (3350) 17 G: 17 POWDER, FOR SOLUTION ORAL at 10:01

## 2021-03-20 RX ADMIN — ENOXAPARIN SODIUM 40 MG: 40 INJECTION SUBCUTANEOUS at 10:01

## 2021-03-20 RX ADMIN — PANTOPRAZOLE SODIUM 40 MG: 40 TABLET, DELAYED RELEASE ORAL at 05:55

## 2021-03-20 RX ADMIN — QUETIAPINE FUMARATE 50 MG: 25 TABLET, FILM COATED ORAL at 10:01

## 2021-03-20 RX ADMIN — CLONAZEPAM 0.25 MG: 0.5 TABLET ORAL at 20:23

## 2021-03-20 RX ADMIN — METOPROLOL TARTRATE 12.5 MG: 25 TABLET, FILM COATED ORAL at 10:01

## 2021-03-20 RX ADMIN — QUETIAPINE FUMARATE 50 MG: 25 TABLET, FILM COATED ORAL at 20:23

## 2021-03-20 RX ADMIN — ACETAMINOPHEN 650 MG: 325 TABLET, COATED ORAL at 12:25

## 2021-03-20 RX ADMIN — METOPROLOL TARTRATE 12.5 MG: 25 TABLET, FILM COATED ORAL at 20:23

## 2021-03-20 RX ADMIN — ONDANSETRON HYDROCHLORIDE 4 MG: 4 TABLET, FILM COATED ORAL at 18:04

## 2021-03-20 RX ADMIN — OXYCODONE HYDROCHLORIDE 5 MG: 5 TABLET ORAL at 05:55

## 2021-03-20 RX ADMIN — ATORVASTATIN CALCIUM 20 MG: 20 TABLET, FILM COATED ORAL at 20:23

## 2021-03-20 RX ADMIN — GABAPENTIN 100 MG: 100 CAPSULE ORAL at 05:55

## 2021-03-20 RX ADMIN — IPRATROPIUM BROMIDE AND ALBUTEROL SULFATE 3 ML: .5; 3 SOLUTION RESPIRATORY (INHALATION) at 08:17

## 2021-03-21 ENCOUNTER — APPOINTMENT (OUTPATIENT)
Dept: GENERAL RADIOLOGY | Facility: HOSPITAL | Age: 76
End: 2021-03-21

## 2021-03-21 LAB
BILIRUB UR QL STRIP: NEGATIVE
CLARITY UR: CLEAR
COLOR UR: YELLOW
GLUCOSE BLDC GLUCOMTR-MCNC: 104 MG/DL (ref 70–130)
GLUCOSE BLDC GLUCOMTR-MCNC: 123 MG/DL (ref 70–130)
GLUCOSE BLDC GLUCOMTR-MCNC: 132 MG/DL (ref 70–130)
GLUCOSE BLDC GLUCOMTR-MCNC: 95 MG/DL (ref 70–130)
GLUCOSE UR STRIP-MCNC: NEGATIVE MG/DL
HGB UR QL STRIP.AUTO: NEGATIVE
KETONES UR QL STRIP: NEGATIVE
LEUKOCYTE ESTERASE UR QL STRIP.AUTO: NEGATIVE
NITRITE UR QL STRIP: NEGATIVE
PH UR STRIP.AUTO: 6.5 [PH] (ref 5–8)
PROT UR QL STRIP: NEGATIVE
SP GR UR STRIP: <=1.005 (ref 1–1.03)
UROBILINOGEN UR QL STRIP: NORMAL

## 2021-03-21 PROCEDURE — 94799 UNLISTED PULMONARY SVC/PX: CPT

## 2021-03-21 PROCEDURE — 87040 BLOOD CULTURE FOR BACTERIA: CPT | Performed by: INTERNAL MEDICINE

## 2021-03-21 PROCEDURE — 82962 GLUCOSE BLOOD TEST: CPT

## 2021-03-21 PROCEDURE — 63710000001 ONDANSETRON PER 8 MG: Performed by: INTERNAL MEDICINE

## 2021-03-21 PROCEDURE — 87186 SC STD MICRODIL/AGAR DIL: CPT | Performed by: INTERNAL MEDICINE

## 2021-03-21 PROCEDURE — 71045 X-RAY EXAM CHEST 1 VIEW: CPT

## 2021-03-21 PROCEDURE — 81003 URINALYSIS AUTO W/O SCOPE: CPT | Performed by: INTERNAL MEDICINE

## 2021-03-21 PROCEDURE — 63710000001 INSULIN DETEMIR PER 5 UNITS: Performed by: INTERNAL MEDICINE

## 2021-03-21 PROCEDURE — 87150 DNA/RNA AMPLIFIED PROBE: CPT | Performed by: INTERNAL MEDICINE

## 2021-03-21 PROCEDURE — 25010000002 ENOXAPARIN PER 10 MG: Performed by: INTERNAL MEDICINE

## 2021-03-21 RX ADMIN — OXYCODONE HYDROCHLORIDE 5 MG: 5 TABLET ORAL at 14:32

## 2021-03-21 RX ADMIN — CALCIUM CARBONATE 1 TABLET: 500 TABLET, CHEWABLE ORAL at 00:47

## 2021-03-21 RX ADMIN — ATORVASTATIN CALCIUM 20 MG: 20 TABLET, FILM COATED ORAL at 20:27

## 2021-03-21 RX ADMIN — ENOXAPARIN SODIUM 40 MG: 40 INJECTION SUBCUTANEOUS at 08:39

## 2021-03-21 RX ADMIN — QUETIAPINE FUMARATE 50 MG: 25 TABLET, FILM COATED ORAL at 20:28

## 2021-03-21 RX ADMIN — IPRATROPIUM BROMIDE AND ALBUTEROL SULFATE 3 ML: .5; 3 SOLUTION RESPIRATORY (INHALATION) at 12:23

## 2021-03-21 RX ADMIN — METRONIDAZOLE 500 MG: 500 INJECTION, SOLUTION INTRAVENOUS at 23:04

## 2021-03-21 RX ADMIN — IPRATROPIUM BROMIDE AND ALBUTEROL SULFATE 3 ML: .5; 3 SOLUTION RESPIRATORY (INHALATION) at 18:52

## 2021-03-21 RX ADMIN — CEFTRIAXONE 2 G: 2 INJECTION, POWDER, FOR SOLUTION INTRAMUSCULAR; INTRAVENOUS at 23:03

## 2021-03-21 RX ADMIN — GABAPENTIN 100 MG: 100 CAPSULE ORAL at 13:30

## 2021-03-21 RX ADMIN — FENTANYL 1 PATCH: 12 PATCH TRANSDERMAL at 18:01

## 2021-03-21 RX ADMIN — CLONAZEPAM 0.25 MG: 0.5 TABLET ORAL at 20:27

## 2021-03-21 RX ADMIN — IPRATROPIUM BROMIDE AND ALBUTEROL SULFATE 3 ML: .5; 3 SOLUTION RESPIRATORY (INHALATION) at 07:15

## 2021-03-21 RX ADMIN — POLYETHYLENE GLYCOL (3350) 17 G: 17 POWDER, FOR SOLUTION ORAL at 08:39

## 2021-03-21 RX ADMIN — PANTOPRAZOLE SODIUM 40 MG: 40 TABLET, DELAYED RELEASE ORAL at 05:09

## 2021-03-21 RX ADMIN — ACETAMINOPHEN 650 MG: 325 TABLET, COATED ORAL at 18:43

## 2021-03-21 RX ADMIN — GABAPENTIN 100 MG: 100 CAPSULE ORAL at 21:28

## 2021-03-21 RX ADMIN — QUETIAPINE FUMARATE 50 MG: 25 TABLET, FILM COATED ORAL at 08:39

## 2021-03-21 RX ADMIN — INSULIN DETEMIR 24 UNITS: 100 INJECTION, SOLUTION SUBCUTANEOUS at 20:28

## 2021-03-21 RX ADMIN — SIMETHICONE 80 MG: 80 TABLET, CHEWABLE ORAL at 17:54

## 2021-03-21 RX ADMIN — MAGNESIUM HYDROXIDE 5 ML: 2400 SUSPENSION ORAL at 12:16

## 2021-03-21 RX ADMIN — LEVOTHYROXINE SODIUM 50 MCG: 88 TABLET ORAL at 05:10

## 2021-03-21 RX ADMIN — GABAPENTIN 100 MG: 100 CAPSULE ORAL at 05:09

## 2021-03-21 RX ADMIN — ONDANSETRON HYDROCHLORIDE 4 MG: 4 TABLET, FILM COATED ORAL at 12:21

## 2021-03-22 ENCOUNTER — APPOINTMENT (OUTPATIENT)
Dept: ULTRASOUND IMAGING | Facility: HOSPITAL | Age: 76
End: 2021-03-22

## 2021-03-22 ENCOUNTER — APPOINTMENT (OUTPATIENT)
Dept: CARDIOLOGY | Facility: HOSPITAL | Age: 76
End: 2021-03-22

## 2021-03-22 LAB
BACTERIA BLD CULT: ABNORMAL
BACTERIA ID TEST ISLT QL CULT: ABNORMAL
BH CV ECHO MEAS - % IVS THICK: -22.7 %
BH CV ECHO MEAS - % LVPW THICK: -2.1 %
BH CV ECHO MEAS - ACS: 1.8 CM
BH CV ECHO MEAS - AO MAX PG (FULL): 9.9 MMHG
BH CV ECHO MEAS - AO MAX PG: 18 MMHG
BH CV ECHO MEAS - AO MEAN PG (FULL): 4 MMHG
BH CV ECHO MEAS - AO MEAN PG: 7 MMHG
BH CV ECHO MEAS - AO ROOT AREA (BSA CORRECTED): 1.5
BH CV ECHO MEAS - AO ROOT AREA: 6.2 CM^2
BH CV ECHO MEAS - AO ROOT DIAM: 2.8 CM
BH CV ECHO MEAS - AO V2 MAX: 212 CM/SEC
BH CV ECHO MEAS - AO V2 MEAN: 125 CM/SEC
BH CV ECHO MEAS - AO V2 VTI: 43.2 CM
BH CV ECHO MEAS - AVA(I,A): 2.1 CM^2
BH CV ECHO MEAS - AVA(I,D): 2.1 CM^2
BH CV ECHO MEAS - AVA(V,A): 2.5 CM^2
BH CV ECHO MEAS - AVA(V,D): 2.5 CM^2
BH CV ECHO MEAS - BSA(HAYCOCK): 1.9 M^2
BH CV ECHO MEAS - BSA: 1.9 M^2
BH CV ECHO MEAS - BZI_BMI: 25.5 KILOGRAMS/M^2
BH CV ECHO MEAS - BZI_METRIC_HEIGHT: 172.7 CM
BH CV ECHO MEAS - BZI_METRIC_WEIGHT: 76.2 KG
BH CV ECHO MEAS - EDV(CUBED): 86.6 ML
BH CV ECHO MEAS - EDV(MOD-SP4): 54.7 ML
BH CV ECHO MEAS - EDV(TEICH): 88.9 ML
BH CV ECHO MEAS - EF(CUBED): 73 %
BH CV ECHO MEAS - EF(MOD-SP4): 63.8 %
BH CV ECHO MEAS - EF(TEICH): 65 %
BH CV ECHO MEAS - ESV(CUBED): 23.4 ML
BH CV ECHO MEAS - ESV(MOD-SP4): 19.8 ML
BH CV ECHO MEAS - ESV(TEICH): 31.1 ML
BH CV ECHO MEAS - FS: 35.4 %
BH CV ECHO MEAS - IVS/LVPW: 1.1
BH CV ECHO MEAS - IVSD: 1.4 CM
BH CV ECHO MEAS - IVSS: 1.1 CM
BH CV ECHO MEAS - LA DIMENSION: 3.7 CM
BH CV ECHO MEAS - LA/AO: 1.3
BH CV ECHO MEAS - LV DIASTOLIC VOL/BSA (35-75): 28.8 ML/M^2
BH CV ECHO MEAS - LV MASS(C)D: 215.7 GRAMS
BH CV ECHO MEAS - LV MASS(C)DI: 113.7 GRAMS/M^2
BH CV ECHO MEAS - LV MASS(C)S: 92.2 GRAMS
BH CV ECHO MEAS - LV MASS(C)SI: 48.6 GRAMS/M^2
BH CV ECHO MEAS - LV MAX PG: 8.1 MMHG
BH CV ECHO MEAS - LV MEAN PG: 3 MMHG
BH CV ECHO MEAS - LV SYSTOLIC VOL/BSA (12-30): 10.4 ML/M^2
BH CV ECHO MEAS - LV V1 MAX: 142 CM/SEC
BH CV ECHO MEAS - LV V1 MEAN: 79.3 CM/SEC
BH CV ECHO MEAS - LV V1 VTI: 23.9 CM
BH CV ECHO MEAS - LVIDD: 4.4 CM
BH CV ECHO MEAS - LVIDS: 2.9 CM
BH CV ECHO MEAS - LVLD AP4: 6.3 CM
BH CV ECHO MEAS - LVLS AP4: 5.2 CM
BH CV ECHO MEAS - LVOT AREA (M): 3.8 CM^2
BH CV ECHO MEAS - LVOT AREA: 3.8 CM^2
BH CV ECHO MEAS - LVOT DIAM: 2.2 CM
BH CV ECHO MEAS - LVPWD: 1.2 CM
BH CV ECHO MEAS - LVPWS: 1.2 CM
BH CV ECHO MEAS - MV A MAX VEL: 110 CM/SEC
BH CV ECHO MEAS - MV E MAX VEL: 75.1 CM/SEC
BH CV ECHO MEAS - MV E/A: 0.68
BH CV ECHO MEAS - PA ACC TIME: 0.1 SEC
BH CV ECHO MEAS - PA PR(ACCEL): 36.3 MMHG
BH CV ECHO MEAS - RAP SYSTOLE: 10 MMHG
BH CV ECHO MEAS - RVSP: 78.2 MMHG
BH CV ECHO MEAS - SI(AO): 140.2 ML/M^2
BH CV ECHO MEAS - SI(CUBED): 33.3 ML/M^2
BH CV ECHO MEAS - SI(LVOT): 47.9 ML/M^2
BH CV ECHO MEAS - SI(MOD-SP4): 18.4 ML/M^2
BH CV ECHO MEAS - SI(TEICH): 30.4 ML/M^2
BH CV ECHO MEAS - SV(AO): 266 ML
BH CV ECHO MEAS - SV(CUBED): 63.3 ML
BH CV ECHO MEAS - SV(LVOT): 90.9 ML
BH CV ECHO MEAS - SV(MOD-SP4): 34.9 ML
BH CV ECHO MEAS - SV(TEICH): 57.7 ML
BH CV ECHO MEAS - TR MAX VEL: 413 CM/SEC
CRP SERPL-MCNC: 7.62 MG/DL (ref 0–0.5)
D-LACTATE SERPL-SCNC: 1.2 MMOL/L (ref 0.5–2)
D-LACTATE SERPL-SCNC: 2.6 MMOL/L (ref 0.5–2)
DEPRECATED RDW RBC AUTO: 54 FL (ref 37–54)
ERYTHROCYTE [DISTWIDTH] IN BLOOD BY AUTOMATED COUNT: 16 % (ref 12.3–15.4)
GLUCOSE BLDC GLUCOMTR-MCNC: 112 MG/DL (ref 70–130)
GLUCOSE BLDC GLUCOMTR-MCNC: 120 MG/DL (ref 70–130)
GLUCOSE BLDC GLUCOMTR-MCNC: 120 MG/DL (ref 70–130)
GLUCOSE BLDC GLUCOMTR-MCNC: 147 MG/DL (ref 70–130)
HCT VFR BLD AUTO: 34 % (ref 34–46.6)
HGB BLD-MCNC: 10.4 G/DL (ref 12–15.9)
MAXIMAL PREDICTED HEART RATE: 145 BPM
MCH RBC QN AUTO: 28 PG (ref 26.6–33)
MCHC RBC AUTO-ENTMCNC: 30.6 G/DL (ref 31.5–35.7)
MCV RBC AUTO: 91.4 FL (ref 79–97)
PLATELET # BLD AUTO: 98 10*3/MM3 (ref 140–450)
PMV BLD AUTO: 10.2 FL (ref 6–12)
RBC # BLD AUTO: 3.72 10*6/MM3 (ref 3.77–5.28)
STRESS TARGET HR: 123 BPM
WBC # BLD AUTO: 2.35 10*3/MM3 (ref 3.4–10.8)

## 2021-03-22 PROCEDURE — 25010000002 PIPERACILLIN SOD-TAZOBACTAM PER 1 G: Performed by: FAMILY MEDICINE

## 2021-03-22 PROCEDURE — 94799 UNLISTED PULMONARY SVC/PX: CPT

## 2021-03-22 PROCEDURE — 99233 SBSQ HOSP IP/OBS HIGH 50: CPT | Performed by: FAMILY MEDICINE

## 2021-03-22 PROCEDURE — 82962 GLUCOSE BLOOD TEST: CPT

## 2021-03-22 PROCEDURE — 93306 TTE W/DOPPLER COMPLETE: CPT

## 2021-03-22 PROCEDURE — 93970 EXTREMITY STUDY: CPT | Performed by: RADIOLOGY

## 2021-03-22 PROCEDURE — 25010000002 CEFTRIAXONE PER 250 MG: Performed by: INTERNAL MEDICINE

## 2021-03-22 PROCEDURE — 93970 EXTREMITY STUDY: CPT

## 2021-03-22 PROCEDURE — 25010000002 ENOXAPARIN PER 10 MG: Performed by: INTERNAL MEDICINE

## 2021-03-22 PROCEDURE — 63710000001 ONDANSETRON PER 8 MG: Performed by: INTERNAL MEDICINE

## 2021-03-22 PROCEDURE — 86140 C-REACTIVE PROTEIN: CPT | Performed by: INTERNAL MEDICINE

## 2021-03-22 PROCEDURE — 83605 ASSAY OF LACTIC ACID: CPT | Performed by: FAMILY MEDICINE

## 2021-03-22 PROCEDURE — 63710000001 INSULIN DETEMIR PER 5 UNITS: Performed by: INTERNAL MEDICINE

## 2021-03-22 PROCEDURE — 87040 BLOOD CULTURE FOR BACTERIA: CPT | Performed by: NURSE PRACTITIONER

## 2021-03-22 PROCEDURE — 25010000002 VANCOMYCIN: Performed by: INTERNAL MEDICINE

## 2021-03-22 PROCEDURE — 93306 TTE W/DOPPLER COMPLETE: CPT | Performed by: INTERNAL MEDICINE

## 2021-03-22 PROCEDURE — 85027 COMPLETE CBC AUTOMATED: CPT | Performed by: INTERNAL MEDICINE

## 2021-03-22 RX ORDER — L.ACID,PARA/B.BIFIDUM/S.THERM 8B CELL
1 CAPSULE ORAL DAILY
Status: DISCONTINUED | OUTPATIENT
Start: 2021-03-22 | End: 2021-04-05

## 2021-03-22 RX ORDER — SODIUM CHLORIDE 9 MG/ML
75 INJECTION, SOLUTION INTRAVENOUS CONTINUOUS
Status: DISCONTINUED | OUTPATIENT
Start: 2021-03-22 | End: 2021-03-23

## 2021-03-22 RX ADMIN — SODIUM CHLORIDE 75 ML/HR: 9 INJECTION, SOLUTION INTRAVENOUS at 10:57

## 2021-03-22 RX ADMIN — ENOXAPARIN SODIUM 40 MG: 40 INJECTION SUBCUTANEOUS at 08:56

## 2021-03-22 RX ADMIN — QUETIAPINE FUMARATE 50 MG: 25 TABLET, FILM COATED ORAL at 09:32

## 2021-03-22 RX ADMIN — LEVOTHYROXINE SODIUM 50 MCG: 88 TABLET ORAL at 05:12

## 2021-03-22 RX ADMIN — ACETAMINOPHEN 650 MG: 325 TABLET, COATED ORAL at 18:09

## 2021-03-22 RX ADMIN — ACETAMINOPHEN 650 MG: 325 TABLET, COATED ORAL at 00:14

## 2021-03-22 RX ADMIN — Medication 1 CAPSULE: at 15:52

## 2021-03-22 RX ADMIN — METRONIDAZOLE 500 MG: 500 INJECTION, SOLUTION INTRAVENOUS at 07:43

## 2021-03-22 RX ADMIN — PIPERACILLIN SODIUM AND TAZOBACTAM SODIUM 3.38 G: 3; .375 INJECTION, POWDER, LYOPHILIZED, FOR SOLUTION INTRAVENOUS at 18:11

## 2021-03-22 RX ADMIN — SODIUM CHLORIDE 250 ML: 9 INJECTION, SOLUTION INTRAVENOUS at 08:59

## 2021-03-22 RX ADMIN — IPRATROPIUM BROMIDE AND ALBUTEROL SULFATE 3 ML: .5; 3 SOLUTION RESPIRATORY (INHALATION) at 13:52

## 2021-03-22 RX ADMIN — INSULIN DETEMIR 24 UNITS: 100 INJECTION, SOLUTION SUBCUTANEOUS at 20:56

## 2021-03-22 RX ADMIN — ONDANSETRON HYDROCHLORIDE 4 MG: 4 TABLET, FILM COATED ORAL at 00:47

## 2021-03-22 RX ADMIN — IPRATROPIUM BROMIDE AND ALBUTEROL SULFATE 3 ML: .5; 3 SOLUTION RESPIRATORY (INHALATION) at 06:51

## 2021-03-22 RX ADMIN — GABAPENTIN 100 MG: 100 CAPSULE ORAL at 05:12

## 2021-03-22 RX ADMIN — PANTOPRAZOLE SODIUM 40 MG: 40 TABLET, DELAYED RELEASE ORAL at 05:12

## 2021-03-22 RX ADMIN — QUETIAPINE FUMARATE 50 MG: 25 TABLET, FILM COATED ORAL at 20:52

## 2021-03-22 RX ADMIN — DOXYCYCLINE 100 MG: 100 INJECTION, POWDER, LYOPHILIZED, FOR SOLUTION INTRAVENOUS at 10:34

## 2021-03-22 RX ADMIN — Medication 5 MG: at 20:52

## 2021-03-22 RX ADMIN — PIPERACILLIN SODIUM AND TAZOBACTAM SODIUM 3.38 G: 3; .375 INJECTION, POWDER, LYOPHILIZED, FOR SOLUTION INTRAVENOUS at 14:31

## 2021-03-22 RX ADMIN — IPRATROPIUM BROMIDE AND ALBUTEROL SULFATE 3 ML: .5; 3 SOLUTION RESPIRATORY (INHALATION) at 19:19

## 2021-03-22 RX ADMIN — VANCOMYCIN HYDROCHLORIDE 1500 MG: 10 INJECTION, POWDER, LYOPHILIZED, FOR SOLUTION INTRAVENOUS at 15:52

## 2021-03-22 RX ADMIN — ATORVASTATIN CALCIUM 20 MG: 20 TABLET, FILM COATED ORAL at 20:52

## 2021-03-23 ENCOUNTER — APPOINTMENT (OUTPATIENT)
Dept: GENERAL RADIOLOGY | Facility: HOSPITAL | Age: 76
End: 2021-03-23

## 2021-03-23 LAB
ANION GAP SERPL CALCULATED.3IONS-SCNC: 6.9 MMOL/L (ref 5–15)
BASOPHILS # BLD AUTO: 0.01 10*3/MM3 (ref 0–0.2)
BASOPHILS NFR BLD AUTO: 0.2 % (ref 0–1.5)
BUN SERPL-MCNC: 7 MG/DL (ref 8–23)
BUN/CREAT SERPL: 29.2 (ref 7–25)
CALCIUM SPEC-SCNC: 8.4 MG/DL (ref 8.6–10.5)
CHLORIDE SERPL-SCNC: 102 MMOL/L (ref 98–107)
CO2 SERPL-SCNC: 28.1 MMOL/L (ref 22–29)
CREAT SERPL-MCNC: 0.24 MG/DL (ref 0.57–1)
CRP SERPL-MCNC: 13.73 MG/DL (ref 0–0.5)
DEPRECATED RDW RBC AUTO: 54.9 FL (ref 37–54)
EOSINOPHIL # BLD AUTO: 0.2 10*3/MM3 (ref 0–0.4)
EOSINOPHIL NFR BLD AUTO: 4.1 % (ref 0.3–6.2)
ERYTHROCYTE [DISTWIDTH] IN BLOOD BY AUTOMATED COUNT: 16.1 % (ref 12.3–15.4)
GFR SERPL CREATININE-BSD FRML MDRD: >150 ML/MIN/1.73
GLUCOSE BLDC GLUCOMTR-MCNC: 115 MG/DL (ref 70–130)
GLUCOSE BLDC GLUCOMTR-MCNC: 133 MG/DL (ref 70–130)
GLUCOSE BLDC GLUCOMTR-MCNC: 76 MG/DL (ref 70–130)
GLUCOSE BLDC GLUCOMTR-MCNC: 95 MG/DL (ref 70–130)
GLUCOSE SERPL-MCNC: 93 MG/DL (ref 65–99)
HCT VFR BLD AUTO: 29.3 % (ref 34–46.6)
HGB BLD-MCNC: 8.7 G/DL (ref 12–15.9)
IMM GRANULOCYTES # BLD AUTO: 0.03 10*3/MM3 (ref 0–0.05)
IMM GRANULOCYTES NFR BLD AUTO: 0.6 % (ref 0–0.5)
LYMPHOCYTES # BLD AUTO: 0.66 10*3/MM3 (ref 0.7–3.1)
LYMPHOCYTES NFR BLD AUTO: 13.4 % (ref 19.6–45.3)
MCH RBC QN AUTO: 27.4 PG (ref 26.6–33)
MCHC RBC AUTO-ENTMCNC: 29.7 G/DL (ref 31.5–35.7)
MCV RBC AUTO: 92.1 FL (ref 79–97)
MONOCYTES # BLD AUTO: 0.46 10*3/MM3 (ref 0.1–0.9)
MONOCYTES NFR BLD AUTO: 9.4 % (ref 5–12)
NEUTROPHILS NFR BLD AUTO: 3.55 10*3/MM3 (ref 1.7–7)
NEUTROPHILS NFR BLD AUTO: 72.3 % (ref 42.7–76)
NRBC BLD AUTO-RTO: 0 /100 WBC (ref 0–0.2)
PLATELET # BLD AUTO: 86 10*3/MM3 (ref 140–450)
PMV BLD AUTO: 11 FL (ref 6–12)
POTASSIUM SERPL-SCNC: 3.5 MMOL/L (ref 3.5–5.2)
POTASSIUM SERPL-SCNC: 4.6 MMOL/L (ref 3.5–5.2)
RBC # BLD AUTO: 3.18 10*6/MM3 (ref 3.77–5.28)
SODIUM SERPL-SCNC: 137 MMOL/L (ref 136–145)
WBC # BLD AUTO: 4.91 10*3/MM3 (ref 3.4–10.8)

## 2021-03-23 PROCEDURE — 97530 THERAPEUTIC ACTIVITIES: CPT

## 2021-03-23 PROCEDURE — 99232 SBSQ HOSP IP/OBS MODERATE 35: CPT | Performed by: FAMILY MEDICINE

## 2021-03-23 PROCEDURE — 82962 GLUCOSE BLOOD TEST: CPT

## 2021-03-23 PROCEDURE — 25010000002 PIPERACILLIN SOD-TAZOBACTAM PER 1 G: Performed by: FAMILY MEDICINE

## 2021-03-23 PROCEDURE — 25010000002 ENOXAPARIN PER 10 MG: Performed by: INTERNAL MEDICINE

## 2021-03-23 PROCEDURE — 80048 BASIC METABOLIC PNL TOTAL CA: CPT | Performed by: FAMILY MEDICINE

## 2021-03-23 PROCEDURE — 97535 SELF CARE MNGMENT TRAINING: CPT

## 2021-03-23 PROCEDURE — 94799 UNLISTED PULMONARY SVC/PX: CPT

## 2021-03-23 PROCEDURE — 25010000002 VANCOMYCIN 5 G RECONSTITUTED SOLUTION: Performed by: INTERNAL MEDICINE

## 2021-03-23 PROCEDURE — 97110 THERAPEUTIC EXERCISES: CPT

## 2021-03-23 PROCEDURE — 97530 THERAPEUTIC ACTIVITIES: CPT | Performed by: OCCUPATIONAL THERAPIST

## 2021-03-23 PROCEDURE — 71045 X-RAY EXAM CHEST 1 VIEW: CPT

## 2021-03-23 PROCEDURE — 97110 THERAPEUTIC EXERCISES: CPT | Performed by: OCCUPATIONAL THERAPIST

## 2021-03-23 PROCEDURE — 84132 ASSAY OF SERUM POTASSIUM: CPT | Performed by: FAMILY MEDICINE

## 2021-03-23 PROCEDURE — 86140 C-REACTIVE PROTEIN: CPT | Performed by: NURSE PRACTITIONER

## 2021-03-23 PROCEDURE — 25010000002 VANCOMYCIN: Performed by: INTERNAL MEDICINE

## 2021-03-23 PROCEDURE — 97535 SELF CARE MNGMENT TRAINING: CPT | Performed by: OCCUPATIONAL THERAPIST

## 2021-03-23 PROCEDURE — 85025 COMPLETE CBC W/AUTO DIFF WBC: CPT | Performed by: FAMILY MEDICINE

## 2021-03-23 PROCEDURE — 63710000001 INSULIN DETEMIR PER 5 UNITS: Performed by: INTERNAL MEDICINE

## 2021-03-23 RX ORDER — FUROSEMIDE 10 MG/ML
20 INJECTION INTRAMUSCULAR; INTRAVENOUS ONCE
Status: COMPLETED | OUTPATIENT
Start: 2021-03-24 | End: 2021-03-23

## 2021-03-23 RX ORDER — SUCRALFATE 1 G/1
1 TABLET ORAL
Status: DISPENSED | OUTPATIENT
Start: 2021-03-23 | End: 2021-04-02

## 2021-03-23 RX ORDER — IPRATROPIUM BROMIDE AND ALBUTEROL SULFATE 2.5; .5 MG/3ML; MG/3ML
3 SOLUTION RESPIRATORY (INHALATION) 4 TIMES DAILY PRN
Status: DISCONTINUED | OUTPATIENT
Start: 2021-03-23 | End: 2021-04-04

## 2021-03-23 RX ORDER — POTASSIUM CHLORIDE 20 MEQ/1
40 TABLET, EXTENDED RELEASE ORAL EVERY 4 HOURS
Status: COMPLETED | OUTPATIENT
Start: 2021-03-23 | End: 2021-03-23

## 2021-03-23 RX ADMIN — ENOXAPARIN SODIUM 40 MG: 40 INJECTION SUBCUTANEOUS at 09:09

## 2021-03-23 RX ADMIN — IPRATROPIUM BROMIDE AND ALBUTEROL SULFATE 3 ML: .5; 3 SOLUTION RESPIRATORY (INHALATION) at 01:52

## 2021-03-23 RX ADMIN — QUETIAPINE FUMARATE 50 MG: 25 TABLET, FILM COATED ORAL at 09:09

## 2021-03-23 RX ADMIN — ACETAMINOPHEN 650 MG: 325 TABLET, COATED ORAL at 11:55

## 2021-03-23 RX ADMIN — IPRATROPIUM BROMIDE AND ALBUTEROL SULFATE 3 ML: .5; 3 SOLUTION RESPIRATORY (INHALATION) at 07:03

## 2021-03-23 RX ADMIN — ATORVASTATIN CALCIUM 20 MG: 20 TABLET, FILM COATED ORAL at 20:41

## 2021-03-23 RX ADMIN — FUROSEMIDE 20 MG: 10 INJECTION, SOLUTION INTRAMUSCULAR; INTRAVENOUS at 23:32

## 2021-03-23 RX ADMIN — SUCRALFATE 1 G: 1 TABLET ORAL at 17:58

## 2021-03-23 RX ADMIN — SUCRALFATE 1 G: 1 TABLET ORAL at 11:55

## 2021-03-23 RX ADMIN — POTASSIUM CHLORIDE 40 MEQ: 20 TABLET, EXTENDED RELEASE ORAL at 11:56

## 2021-03-23 RX ADMIN — PANTOPRAZOLE SODIUM 40 MG: 40 TABLET, DELAYED RELEASE ORAL at 05:40

## 2021-03-23 RX ADMIN — OXYCODONE HYDROCHLORIDE 5 MG: 5 TABLET ORAL at 20:45

## 2021-03-23 RX ADMIN — SUCRALFATE 1 G: 1 TABLET ORAL at 20:42

## 2021-03-23 RX ADMIN — PIPERACILLIN SODIUM AND TAZOBACTAM SODIUM 3.38 G: 3; .375 INJECTION, POWDER, LYOPHILIZED, FOR SOLUTION INTRAVENOUS at 11:55

## 2021-03-23 RX ADMIN — IPRATROPIUM BROMIDE AND ALBUTEROL SULFATE 3 ML: .5; 3 SOLUTION RESPIRATORY (INHALATION) at 13:10

## 2021-03-23 RX ADMIN — OXYCODONE HYDROCHLORIDE 5 MG: 5 TABLET ORAL at 02:09

## 2021-03-23 RX ADMIN — POTASSIUM CHLORIDE 40 MEQ: 20 TABLET, EXTENDED RELEASE ORAL at 09:11

## 2021-03-23 RX ADMIN — Medication 1 CAPSULE: at 09:09

## 2021-03-23 RX ADMIN — GABAPENTIN 100 MG: 100 CAPSULE ORAL at 21:01

## 2021-03-23 RX ADMIN — LEVOTHYROXINE SODIUM 50 MCG: 88 TABLET ORAL at 05:39

## 2021-03-23 RX ADMIN — CLONAZEPAM 0.25 MG: 0.5 TABLET ORAL at 20:40

## 2021-03-23 RX ADMIN — VANCOMYCIN HYDROCHLORIDE 1500 MG: 10 INJECTION, POWDER, LYOPHILIZED, FOR SOLUTION INTRAVENOUS at 15:14

## 2021-03-23 RX ADMIN — GABAPENTIN 100 MG: 100 CAPSULE ORAL at 06:19

## 2021-03-23 RX ADMIN — VANCOMYCIN HYDROCHLORIDE 1500 MG: 10 INJECTION, POWDER, LYOPHILIZED, FOR SOLUTION INTRAVENOUS at 03:03

## 2021-03-23 RX ADMIN — METOPROLOL TARTRATE 12.5 MG: 25 TABLET, FILM COATED ORAL at 09:09

## 2021-03-23 RX ADMIN — PIPERACILLIN SODIUM AND TAZOBACTAM SODIUM 3.38 G: 3; .375 INJECTION, POWDER, LYOPHILIZED, FOR SOLUTION INTRAVENOUS at 18:00

## 2021-03-23 RX ADMIN — IPRATROPIUM BROMIDE AND ALBUTEROL SULFATE 3 ML: .5; 3 SOLUTION RESPIRATORY (INHALATION) at 22:00

## 2021-03-23 RX ADMIN — SODIUM CHLORIDE 75 ML/HR: 9 INJECTION, SOLUTION INTRAVENOUS at 00:46

## 2021-03-23 RX ADMIN — Medication 5 MG: at 20:40

## 2021-03-23 RX ADMIN — GABAPENTIN 100 MG: 100 CAPSULE ORAL at 15:14

## 2021-03-23 RX ADMIN — INSULIN DETEMIR 24 UNITS: 100 INJECTION, SOLUTION SUBCUTANEOUS at 20:42

## 2021-03-23 RX ADMIN — PIPERACILLIN SODIUM AND TAZOBACTAM SODIUM 3.38 G: 3; .375 INJECTION, POWDER, LYOPHILIZED, FOR SOLUTION INTRAVENOUS at 02:09

## 2021-03-23 RX ADMIN — QUETIAPINE FUMARATE 50 MG: 25 TABLET, FILM COATED ORAL at 20:41

## 2021-03-24 LAB
ANION GAP SERPL CALCULATED.3IONS-SCNC: 7.6 MMOL/L (ref 5–15)
BASOPHILS # BLD AUTO: 0.02 10*3/MM3 (ref 0–0.2)
BASOPHILS NFR BLD AUTO: 0.3 % (ref 0–1.5)
BUN SERPL-MCNC: 5 MG/DL (ref 8–23)
BUN/CREAT SERPL: 23.8 (ref 7–25)
CALCIUM SPEC-SCNC: 8.6 MG/DL (ref 8.6–10.5)
CHLORIDE SERPL-SCNC: 101 MMOL/L (ref 98–107)
CO2 SERPL-SCNC: 26.4 MMOL/L (ref 22–29)
CREAT SERPL-MCNC: 0.21 MG/DL (ref 0.57–1)
CRP SERPL-MCNC: 12.14 MG/DL (ref 0–0.5)
DEPRECATED RDW RBC AUTO: 53.9 FL (ref 37–54)
EOSINOPHIL # BLD AUTO: 0.44 10*3/MM3 (ref 0–0.4)
EOSINOPHIL NFR BLD AUTO: 6.7 % (ref 0.3–6.2)
ERYTHROCYTE [DISTWIDTH] IN BLOOD BY AUTOMATED COUNT: 16.2 % (ref 12.3–15.4)
GFR SERPL CREATININE-BSD FRML MDRD: >150 ML/MIN/1.73
GLUCOSE BLDC GLUCOMTR-MCNC: 108 MG/DL (ref 70–130)
GLUCOSE BLDC GLUCOMTR-MCNC: 123 MG/DL (ref 70–130)
GLUCOSE BLDC GLUCOMTR-MCNC: 137 MG/DL (ref 70–130)
GLUCOSE BLDC GLUCOMTR-MCNC: 184 MG/DL (ref 70–130)
GLUCOSE BLDC GLUCOMTR-MCNC: 60 MG/DL (ref 70–130)
GLUCOSE SERPL-MCNC: 120 MG/DL (ref 65–99)
HCT VFR BLD AUTO: 31.7 % (ref 34–46.6)
HGB BLD-MCNC: 9.7 G/DL (ref 12–15.9)
IMM GRANULOCYTES # BLD AUTO: 0.04 10*3/MM3 (ref 0–0.05)
IMM GRANULOCYTES NFR BLD AUTO: 0.6 % (ref 0–0.5)
LYMPHOCYTES # BLD AUTO: 1.2 10*3/MM3 (ref 0.7–3.1)
LYMPHOCYTES NFR BLD AUTO: 18.3 % (ref 19.6–45.3)
MCH RBC QN AUTO: 27.6 PG (ref 26.6–33)
MCHC RBC AUTO-ENTMCNC: 30.6 G/DL (ref 31.5–35.7)
MCV RBC AUTO: 90.1 FL (ref 79–97)
MONOCYTES # BLD AUTO: 0.54 10*3/MM3 (ref 0.1–0.9)
MONOCYTES NFR BLD AUTO: 8.3 % (ref 5–12)
NEUTROPHILS NFR BLD AUTO: 4.3 10*3/MM3 (ref 1.7–7)
NEUTROPHILS NFR BLD AUTO: 65.8 % (ref 42.7–76)
NRBC BLD AUTO-RTO: 0 /100 WBC (ref 0–0.2)
PLATELET # BLD AUTO: 92 10*3/MM3 (ref 140–450)
PMV BLD AUTO: 11.2 FL (ref 6–12)
POTASSIUM SERPL-SCNC: 3.8 MMOL/L (ref 3.5–5.2)
RBC # BLD AUTO: 3.52 10*6/MM3 (ref 3.77–5.28)
SODIUM SERPL-SCNC: 135 MMOL/L (ref 136–145)
VANCOMYCIN TROUGH SERPL-MCNC: 9.8 MCG/ML (ref 5–20)
WBC # BLD AUTO: 6.54 10*3/MM3 (ref 3.4–10.8)

## 2021-03-24 PROCEDURE — 87070 CULTURE OTHR SPECIMN AEROBIC: CPT | Performed by: FAMILY MEDICINE

## 2021-03-24 PROCEDURE — 97110 THERAPEUTIC EXERCISES: CPT | Performed by: OCCUPATIONAL THERAPIST

## 2021-03-24 PROCEDURE — 94799 UNLISTED PULMONARY SVC/PX: CPT

## 2021-03-24 PROCEDURE — 97535 SELF CARE MNGMENT TRAINING: CPT | Performed by: OCCUPATIONAL THERAPIST

## 2021-03-24 PROCEDURE — 25010000002 FUROSEMIDE PER 20 MG: Performed by: FAMILY MEDICINE

## 2021-03-24 PROCEDURE — 25010000002 VANCOMYCIN 5 G RECONSTITUTED SOLUTION: Performed by: INTERNAL MEDICINE

## 2021-03-24 PROCEDURE — 85025 COMPLETE CBC W/AUTO DIFF WBC: CPT | Performed by: FAMILY MEDICINE

## 2021-03-24 PROCEDURE — 87186 SC STD MICRODIL/AGAR DIL: CPT | Performed by: FAMILY MEDICINE

## 2021-03-24 PROCEDURE — 97530 THERAPEUTIC ACTIVITIES: CPT

## 2021-03-24 PROCEDURE — 99232 SBSQ HOSP IP/OBS MODERATE 35: CPT | Performed by: FAMILY MEDICINE

## 2021-03-24 PROCEDURE — 97530 THERAPEUTIC ACTIVITIES: CPT | Performed by: OCCUPATIONAL THERAPIST

## 2021-03-24 PROCEDURE — 25010000002 ENOXAPARIN PER 10 MG: Performed by: INTERNAL MEDICINE

## 2021-03-24 PROCEDURE — 80048 BASIC METABOLIC PNL TOTAL CA: CPT | Performed by: FAMILY MEDICINE

## 2021-03-24 PROCEDURE — 80202 ASSAY OF VANCOMYCIN: CPT | Performed by: FAMILY MEDICINE

## 2021-03-24 PROCEDURE — 86140 C-REACTIVE PROTEIN: CPT | Performed by: NURSE PRACTITIONER

## 2021-03-24 PROCEDURE — 82962 GLUCOSE BLOOD TEST: CPT

## 2021-03-24 PROCEDURE — 25010000002 PIPERACILLIN SOD-TAZOBACTAM PER 1 G: Performed by: FAMILY MEDICINE

## 2021-03-24 PROCEDURE — 87205 SMEAR GRAM STAIN: CPT | Performed by: FAMILY MEDICINE

## 2021-03-24 PROCEDURE — 97110 THERAPEUTIC EXERCISES: CPT

## 2021-03-24 RX ORDER — FENTANYL 12 UG/H
1 PATCH TRANSDERMAL
Status: COMPLETED | OUTPATIENT
Start: 2021-03-24 | End: 2021-04-02

## 2021-03-24 RX ORDER — FUROSEMIDE 10 MG/ML
20 INJECTION INTRAMUSCULAR; INTRAVENOUS ONCE
Status: COMPLETED | OUTPATIENT
Start: 2021-03-24 | End: 2021-03-24

## 2021-03-24 RX ADMIN — LEVOTHYROXINE SODIUM 50 MCG: 88 TABLET ORAL at 05:59

## 2021-03-24 RX ADMIN — ATORVASTATIN CALCIUM 20 MG: 20 TABLET, FILM COATED ORAL at 21:49

## 2021-03-24 RX ADMIN — GABAPENTIN 100 MG: 100 CAPSULE ORAL at 14:15

## 2021-03-24 RX ADMIN — ENOXAPARIN SODIUM 40 MG: 40 INJECTION SUBCUTANEOUS at 09:16

## 2021-03-24 RX ADMIN — IPRATROPIUM BROMIDE AND ALBUTEROL SULFATE 3 ML: .5; 3 SOLUTION RESPIRATORY (INHALATION) at 13:32

## 2021-03-24 RX ADMIN — MUPIROCIN: 20 OINTMENT TOPICAL at 21:54

## 2021-03-24 RX ADMIN — IPRATROPIUM BROMIDE AND ALBUTEROL SULFATE 3 ML: .5; 3 SOLUTION RESPIRATORY (INHALATION) at 01:04

## 2021-03-24 RX ADMIN — SUCRALFATE 1 G: 1 TABLET ORAL at 21:49

## 2021-03-24 RX ADMIN — MUPIROCIN: 20 OINTMENT TOPICAL at 10:01

## 2021-03-24 RX ADMIN — PIPERACILLIN SODIUM AND TAZOBACTAM SODIUM 3.38 G: 3; .375 INJECTION, POWDER, LYOPHILIZED, FOR SOLUTION INTRAVENOUS at 18:05

## 2021-03-24 RX ADMIN — OXYCODONE HYDROCHLORIDE 5 MG: 5 TABLET ORAL at 20:05

## 2021-03-24 RX ADMIN — OXYCODONE HYDROCHLORIDE 5 MG: 5 TABLET ORAL at 11:49

## 2021-03-24 RX ADMIN — POLYETHYLENE GLYCOL (3350) 17 G: 17 POWDER, FOR SOLUTION ORAL at 09:16

## 2021-03-24 RX ADMIN — GABAPENTIN 100 MG: 100 CAPSULE ORAL at 21:50

## 2021-03-24 RX ADMIN — FENTANYL 1 PATCH: 12 PATCH TRANSDERMAL at 14:15

## 2021-03-24 RX ADMIN — Medication 1 CAPSULE: at 09:15

## 2021-03-24 RX ADMIN — VANCOMYCIN HYDROCHLORIDE 1500 MG: 10 INJECTION, POWDER, LYOPHILIZED, FOR SOLUTION INTRAVENOUS at 02:09

## 2021-03-24 RX ADMIN — GABAPENTIN 100 MG: 100 CAPSULE ORAL at 05:59

## 2021-03-24 RX ADMIN — PIPERACILLIN SODIUM AND TAZOBACTAM SODIUM 3.38 G: 3; .375 INJECTION, POWDER, LYOPHILIZED, FOR SOLUTION INTRAVENOUS at 10:01

## 2021-03-24 RX ADMIN — IPRATROPIUM BROMIDE AND ALBUTEROL SULFATE 3 ML: .5; 3 SOLUTION RESPIRATORY (INHALATION) at 08:01

## 2021-03-24 RX ADMIN — IPRATROPIUM BROMIDE AND ALBUTEROL SULFATE 3 ML: .5; 3 SOLUTION RESPIRATORY (INHALATION) at 20:17

## 2021-03-24 RX ADMIN — CLONAZEPAM 0.25 MG: 0.5 TABLET ORAL at 21:49

## 2021-03-24 RX ADMIN — QUETIAPINE FUMARATE 50 MG: 25 TABLET, FILM COATED ORAL at 09:15

## 2021-03-24 RX ADMIN — SUCRALFATE 1 G: 1 TABLET ORAL at 09:16

## 2021-03-24 RX ADMIN — PANTOPRAZOLE SODIUM 40 MG: 40 TABLET, DELAYED RELEASE ORAL at 05:59

## 2021-03-24 RX ADMIN — QUETIAPINE FUMARATE 50 MG: 25 TABLET, FILM COATED ORAL at 21:49

## 2021-03-24 RX ADMIN — PIPERACILLIN SODIUM AND TAZOBACTAM SODIUM 3.38 G: 3; .375 INJECTION, POWDER, LYOPHILIZED, FOR SOLUTION INTRAVENOUS at 02:09

## 2021-03-24 RX ADMIN — SUCRALFATE 1 G: 1 TABLET ORAL at 18:05

## 2021-03-24 RX ADMIN — FUROSEMIDE 20 MG: 10 INJECTION, SOLUTION INTRAMUSCULAR; INTRAVENOUS at 14:14

## 2021-03-24 RX ADMIN — METOPROLOL TARTRATE 12.5 MG: 25 TABLET, FILM COATED ORAL at 09:16

## 2021-03-24 RX ADMIN — SUCRALFATE 1 G: 1 TABLET ORAL at 11:49

## 2021-03-24 RX ADMIN — VANCOMYCIN HYDROCHLORIDE 1500 MG: 10 INJECTION, POWDER, LYOPHILIZED, FOR SOLUTION INTRAVENOUS at 15:55

## 2021-03-25 LAB
ANION GAP SERPL CALCULATED.3IONS-SCNC: 10.6 MMOL/L (ref 5–15)
BACTERIA SPEC AEROBE CULT: ABNORMAL
BASOPHILS # BLD AUTO: 0.03 10*3/MM3 (ref 0–0.2)
BASOPHILS NFR BLD AUTO: 0.6 % (ref 0–1.5)
BUN SERPL-MCNC: 4 MG/DL (ref 8–23)
BUN/CREAT SERPL: 16.7 (ref 7–25)
CALCIUM SPEC-SCNC: 8.6 MG/DL (ref 8.6–10.5)
CHLORIDE SERPL-SCNC: 101 MMOL/L (ref 98–107)
CO2 SERPL-SCNC: 26.4 MMOL/L (ref 22–29)
CREAT SERPL-MCNC: 0.24 MG/DL (ref 0.57–1)
CRP SERPL-MCNC: 8.1 MG/DL (ref 0–0.5)
DEPRECATED RDW RBC AUTO: 53.5 FL (ref 37–54)
EOSINOPHIL # BLD AUTO: 0.57 10*3/MM3 (ref 0–0.4)
EOSINOPHIL NFR BLD AUTO: 10.7 % (ref 0.3–6.2)
ERYTHROCYTE [DISTWIDTH] IN BLOOD BY AUTOMATED COUNT: 16.1 % (ref 12.3–15.4)
GFR SERPL CREATININE-BSD FRML MDRD: >150 ML/MIN/1.73
GLUCOSE BLDC GLUCOMTR-MCNC: 127 MG/DL (ref 70–130)
GLUCOSE BLDC GLUCOMTR-MCNC: 132 MG/DL (ref 70–130)
GLUCOSE BLDC GLUCOMTR-MCNC: 137 MG/DL (ref 70–130)
GLUCOSE BLDC GLUCOMTR-MCNC: 96 MG/DL (ref 70–130)
GLUCOSE SERPL-MCNC: 99 MG/DL (ref 65–99)
GRAM STN SPEC: ABNORMAL
HCT VFR BLD AUTO: 29.8 % (ref 34–46.6)
HGB BLD-MCNC: 9 G/DL (ref 12–15.9)
IMM GRANULOCYTES # BLD AUTO: 0.04 10*3/MM3 (ref 0–0.05)
IMM GRANULOCYTES NFR BLD AUTO: 0.7 % (ref 0–0.5)
LYMPHOCYTES # BLD AUTO: 1.02 10*3/MM3 (ref 0.7–3.1)
LYMPHOCYTES NFR BLD AUTO: 19.1 % (ref 19.6–45.3)
MCH RBC QN AUTO: 27.3 PG (ref 26.6–33)
MCHC RBC AUTO-ENTMCNC: 30.2 G/DL (ref 31.5–35.7)
MCV RBC AUTO: 90.3 FL (ref 79–97)
MONOCYTES # BLD AUTO: 0.45 10*3/MM3 (ref 0.1–0.9)
MONOCYTES NFR BLD AUTO: 8.4 % (ref 5–12)
NEUTROPHILS NFR BLD AUTO: 3.24 10*3/MM3 (ref 1.7–7)
NEUTROPHILS NFR BLD AUTO: 60.5 % (ref 42.7–76)
NRBC BLD AUTO-RTO: 0 /100 WBC (ref 0–0.2)
PLATELET # BLD AUTO: 117 10*3/MM3 (ref 140–450)
PMV BLD AUTO: 10.6 FL (ref 6–12)
POTASSIUM SERPL-SCNC: 3.9 MMOL/L (ref 3.5–5.2)
RBC # BLD AUTO: 3.3 10*6/MM3 (ref 3.77–5.28)
SODIUM SERPL-SCNC: 138 MMOL/L (ref 136–145)
WBC # BLD AUTO: 5.35 10*3/MM3 (ref 3.4–10.8)

## 2021-03-25 PROCEDURE — 97530 THERAPEUTIC ACTIVITIES: CPT

## 2021-03-25 PROCEDURE — 25010000002 ENOXAPARIN PER 10 MG: Performed by: INTERNAL MEDICINE

## 2021-03-25 PROCEDURE — 99232 SBSQ HOSP IP/OBS MODERATE 35: CPT | Performed by: FAMILY MEDICINE

## 2021-03-25 PROCEDURE — 85025 COMPLETE CBC W/AUTO DIFF WBC: CPT | Performed by: FAMILY MEDICINE

## 2021-03-25 PROCEDURE — 94799 UNLISTED PULMONARY SVC/PX: CPT

## 2021-03-25 PROCEDURE — 25010000002 VANCOMYCIN: Performed by: INTERNAL MEDICINE

## 2021-03-25 PROCEDURE — 99231 SBSQ HOSP IP/OBS SF/LOW 25: CPT | Performed by: SURGERY

## 2021-03-25 PROCEDURE — 0DP6XUZ REMOVAL OF FEEDING DEVICE FROM STOMACH, EXTERNAL APPROACH: ICD-10-PCS | Performed by: SURGERY

## 2021-03-25 PROCEDURE — 82962 GLUCOSE BLOOD TEST: CPT

## 2021-03-25 PROCEDURE — 25010000002 CEFTAZIDIME PER 500 MG: Performed by: FAMILY MEDICINE

## 2021-03-25 PROCEDURE — 25010000002 PIPERACILLIN SOD-TAZOBACTAM PER 1 G: Performed by: FAMILY MEDICINE

## 2021-03-25 PROCEDURE — 80048 BASIC METABOLIC PNL TOTAL CA: CPT | Performed by: FAMILY MEDICINE

## 2021-03-25 PROCEDURE — 97110 THERAPEUTIC EXERCISES: CPT

## 2021-03-25 PROCEDURE — 86140 C-REACTIVE PROTEIN: CPT | Performed by: NURSE PRACTITIONER

## 2021-03-25 RX ADMIN — Medication 1 CAPSULE: at 08:38

## 2021-03-25 RX ADMIN — CLONAZEPAM 0.25 MG: 0.5 TABLET ORAL at 21:20

## 2021-03-25 RX ADMIN — CEFTAZIDIME 2 G: 1 INJECTION, POWDER, FOR SOLUTION INTRAMUSCULAR; INTRAVENOUS at 21:24

## 2021-03-25 RX ADMIN — ATORVASTATIN CALCIUM 20 MG: 20 TABLET, FILM COATED ORAL at 21:22

## 2021-03-25 RX ADMIN — SUCRALFATE 1 G: 1 TABLET ORAL at 11:09

## 2021-03-25 RX ADMIN — ENOXAPARIN SODIUM 40 MG: 40 INJECTION SUBCUTANEOUS at 08:38

## 2021-03-25 RX ADMIN — PIPERACILLIN SODIUM AND TAZOBACTAM SODIUM 3.38 G: 3; .375 INJECTION, POWDER, LYOPHILIZED, FOR SOLUTION INTRAVENOUS at 03:18

## 2021-03-25 RX ADMIN — OXYCODONE HYDROCHLORIDE 5 MG: 5 TABLET ORAL at 04:00

## 2021-03-25 RX ADMIN — CEFTAZIDIME 2 G: 1 INJECTION, POWDER, FOR SOLUTION INTRAMUSCULAR; INTRAVENOUS at 13:26

## 2021-03-25 RX ADMIN — VANCOMYCIN HYDROCHLORIDE 1500 MG: 10 INJECTION, POWDER, LYOPHILIZED, FOR SOLUTION INTRAVENOUS at 03:18

## 2021-03-25 RX ADMIN — GABAPENTIN 100 MG: 100 CAPSULE ORAL at 05:49

## 2021-03-25 RX ADMIN — SUCRALFATE 1 G: 1 TABLET ORAL at 17:14

## 2021-03-25 RX ADMIN — POLYETHYLENE GLYCOL (3350) 17 G: 17 POWDER, FOR SOLUTION ORAL at 08:39

## 2021-03-25 RX ADMIN — VANCOMYCIN HYDROCHLORIDE 1500 MG: 10 INJECTION, POWDER, LYOPHILIZED, FOR SOLUTION INTRAVENOUS at 14:29

## 2021-03-25 RX ADMIN — PANTOPRAZOLE SODIUM 40 MG: 40 TABLET, DELAYED RELEASE ORAL at 05:39

## 2021-03-25 RX ADMIN — METOPROLOL TARTRATE 12.5 MG: 25 TABLET, FILM COATED ORAL at 08:38

## 2021-03-25 RX ADMIN — MUPIROCIN: 20 OINTMENT TOPICAL at 08:39

## 2021-03-25 RX ADMIN — GABAPENTIN 100 MG: 100 CAPSULE ORAL at 21:20

## 2021-03-25 RX ADMIN — LEVOTHYROXINE SODIUM 50 MCG: 88 TABLET ORAL at 05:39

## 2021-03-25 RX ADMIN — GABAPENTIN 100 MG: 100 CAPSULE ORAL at 13:26

## 2021-03-25 RX ADMIN — SUCRALFATE 1 G: 1 TABLET ORAL at 08:39

## 2021-03-25 RX ADMIN — OXYCODONE HYDROCHLORIDE 5 MG: 5 TABLET ORAL at 19:18

## 2021-03-25 RX ADMIN — QUETIAPINE FUMARATE 50 MG: 25 TABLET, FILM COATED ORAL at 08:38

## 2021-03-25 RX ADMIN — IPRATROPIUM BROMIDE AND ALBUTEROL SULFATE 3 ML: .5; 3 SOLUTION RESPIRATORY (INHALATION) at 07:59

## 2021-03-25 RX ADMIN — Medication 5 MG: at 21:20

## 2021-03-25 RX ADMIN — MUPIROCIN: 20 OINTMENT TOPICAL at 21:24

## 2021-03-25 RX ADMIN — PIPERACILLIN SODIUM AND TAZOBACTAM SODIUM 3.38 G: 3; .375 INJECTION, POWDER, LYOPHILIZED, FOR SOLUTION INTRAVENOUS at 11:07

## 2021-03-25 RX ADMIN — QUETIAPINE FUMARATE 50 MG: 25 TABLET, FILM COATED ORAL at 21:22

## 2021-03-25 RX ADMIN — IPRATROPIUM BROMIDE AND ALBUTEROL SULFATE 3 ML: .5; 3 SOLUTION RESPIRATORY (INHALATION) at 13:56

## 2021-03-25 RX ADMIN — ACETAMINOPHEN 650 MG: 325 TABLET, COATED ORAL at 11:06

## 2021-03-25 RX ADMIN — SUCRALFATE 1 G: 1 TABLET ORAL at 21:22

## 2021-03-26 LAB
ANION GAP SERPL CALCULATED.3IONS-SCNC: 8.7 MMOL/L (ref 5–15)
BACTERIA SPEC AEROBE CULT: ABNORMAL
BASOPHILS # BLD AUTO: 0.02 10*3/MM3 (ref 0–0.2)
BASOPHILS NFR BLD AUTO: 0.4 % (ref 0–1.5)
BUN SERPL-MCNC: 4 MG/DL (ref 8–23)
BUN/CREAT SERPL: 17.4 (ref 7–25)
CALCIUM SPEC-SCNC: 8.9 MG/DL (ref 8.6–10.5)
CHLORIDE SERPL-SCNC: 102 MMOL/L (ref 98–107)
CO2 SERPL-SCNC: 26.3 MMOL/L (ref 22–29)
CREAT SERPL-MCNC: 0.23 MG/DL (ref 0.57–1)
CRP SERPL-MCNC: 4.47 MG/DL (ref 0–0.5)
DEPRECATED RDW RBC AUTO: 54.4 FL (ref 37–54)
EOSINOPHIL # BLD AUTO: 0.51 10*3/MM3 (ref 0–0.4)
EOSINOPHIL NFR BLD AUTO: 9.7 % (ref 0.3–6.2)
ERYTHROCYTE [DISTWIDTH] IN BLOOD BY AUTOMATED COUNT: 16 % (ref 12.3–15.4)
GFR SERPL CREATININE-BSD FRML MDRD: >150 ML/MIN/1.73
GLUCOSE BLDC GLUCOMTR-MCNC: 101 MG/DL (ref 70–130)
GLUCOSE BLDC GLUCOMTR-MCNC: 111 MG/DL (ref 70–130)
GLUCOSE BLDC GLUCOMTR-MCNC: 121 MG/DL (ref 70–130)
GLUCOSE BLDC GLUCOMTR-MCNC: 87 MG/DL (ref 70–130)
GLUCOSE SERPL-MCNC: 111 MG/DL (ref 65–99)
GRAM STN SPEC: ABNORMAL
GRAM STN SPEC: ABNORMAL
HCT VFR BLD AUTO: 30.9 % (ref 34–46.6)
HGB BLD-MCNC: 9 G/DL (ref 12–15.9)
IMM GRANULOCYTES # BLD AUTO: 0.12 10*3/MM3 (ref 0–0.05)
IMM GRANULOCYTES NFR BLD AUTO: 2.3 % (ref 0–0.5)
LYMPHOCYTES # BLD AUTO: 1 10*3/MM3 (ref 0.7–3.1)
LYMPHOCYTES NFR BLD AUTO: 19 % (ref 19.6–45.3)
MCH RBC QN AUTO: 26.8 PG (ref 26.6–33)
MCHC RBC AUTO-ENTMCNC: 29.1 G/DL (ref 31.5–35.7)
MCV RBC AUTO: 92 FL (ref 79–97)
MONOCYTES # BLD AUTO: 0.41 10*3/MM3 (ref 0.1–0.9)
MONOCYTES NFR BLD AUTO: 7.8 % (ref 5–12)
NEUTROPHILS NFR BLD AUTO: 3.19 10*3/MM3 (ref 1.7–7)
NEUTROPHILS NFR BLD AUTO: 60.8 % (ref 42.7–76)
NRBC BLD AUTO-RTO: 0 /100 WBC (ref 0–0.2)
PLATELET # BLD AUTO: 140 10*3/MM3 (ref 140–450)
PMV BLD AUTO: 9.7 FL (ref 6–12)
POTASSIUM SERPL-SCNC: 3.9 MMOL/L (ref 3.5–5.2)
RBC # BLD AUTO: 3.36 10*6/MM3 (ref 3.77–5.28)
SODIUM SERPL-SCNC: 137 MMOL/L (ref 136–145)
WBC # BLD AUTO: 5.25 10*3/MM3 (ref 3.4–10.8)

## 2021-03-26 PROCEDURE — 25010000002 VANCOMYCIN: Performed by: INTERNAL MEDICINE

## 2021-03-26 PROCEDURE — 85025 COMPLETE CBC W/AUTO DIFF WBC: CPT | Performed by: FAMILY MEDICINE

## 2021-03-26 PROCEDURE — 99232 SBSQ HOSP IP/OBS MODERATE 35: CPT | Performed by: FAMILY MEDICINE

## 2021-03-26 PROCEDURE — 82962 GLUCOSE BLOOD TEST: CPT

## 2021-03-26 PROCEDURE — 94799 UNLISTED PULMONARY SVC/PX: CPT

## 2021-03-26 PROCEDURE — 97112 NEUROMUSCULAR REEDUCATION: CPT

## 2021-03-26 PROCEDURE — 25010000002 CEFTAZIDIME PER 500 MG: Performed by: FAMILY MEDICINE

## 2021-03-26 PROCEDURE — 80048 BASIC METABOLIC PNL TOTAL CA: CPT | Performed by: FAMILY MEDICINE

## 2021-03-26 PROCEDURE — 97110 THERAPEUTIC EXERCISES: CPT | Performed by: OCCUPATIONAL THERAPIST

## 2021-03-26 PROCEDURE — 97535 SELF CARE MNGMENT TRAINING: CPT | Performed by: OCCUPATIONAL THERAPIST

## 2021-03-26 PROCEDURE — 97530 THERAPEUTIC ACTIVITIES: CPT | Performed by: OCCUPATIONAL THERAPIST

## 2021-03-26 PROCEDURE — 25010000002 ENOXAPARIN PER 10 MG: Performed by: INTERNAL MEDICINE

## 2021-03-26 PROCEDURE — 97530 THERAPEUTIC ACTIVITIES: CPT

## 2021-03-26 PROCEDURE — 97110 THERAPEUTIC EXERCISES: CPT

## 2021-03-26 PROCEDURE — 86140 C-REACTIVE PROTEIN: CPT | Performed by: NURSE PRACTITIONER

## 2021-03-26 RX ORDER — LIDOCAINE HYDROCHLORIDE AND EPINEPHRINE 10; 10 MG/ML; UG/ML
10 INJECTION, SOLUTION INFILTRATION; PERINEURAL ONCE
Status: DISCONTINUED | OUTPATIENT
Start: 2021-03-26 | End: 2021-04-12 | Stop reason: HOSPADM

## 2021-03-26 RX ADMIN — MUPIROCIN: 20 OINTMENT TOPICAL at 22:18

## 2021-03-26 RX ADMIN — OXYCODONE HYDROCHLORIDE 5 MG: 5 TABLET ORAL at 10:54

## 2021-03-26 RX ADMIN — CEFTAZIDIME 2 G: 1 INJECTION, POWDER, FOR SOLUTION INTRAMUSCULAR; INTRAVENOUS at 21:44

## 2021-03-26 RX ADMIN — SUCRALFATE 1 G: 1 TABLET ORAL at 17:29

## 2021-03-26 RX ADMIN — ATORVASTATIN CALCIUM 20 MG: 20 TABLET, FILM COATED ORAL at 21:33

## 2021-03-26 RX ADMIN — CLONAZEPAM 0.25 MG: 0.5 TABLET ORAL at 21:37

## 2021-03-26 RX ADMIN — OXYCODONE HYDROCHLORIDE 5 MG: 5 TABLET ORAL at 02:53

## 2021-03-26 RX ADMIN — LEVOTHYROXINE SODIUM 50 MCG: 88 TABLET ORAL at 05:32

## 2021-03-26 RX ADMIN — ENOXAPARIN SODIUM 40 MG: 40 INJECTION SUBCUTANEOUS at 09:24

## 2021-03-26 RX ADMIN — VANCOMYCIN HYDROCHLORIDE 1500 MG: 10 INJECTION, POWDER, LYOPHILIZED, FOR SOLUTION INTRAVENOUS at 03:16

## 2021-03-26 RX ADMIN — IPRATROPIUM BROMIDE AND ALBUTEROL SULFATE 3 ML: .5; 3 SOLUTION RESPIRATORY (INHALATION) at 19:05

## 2021-03-26 RX ADMIN — IPRATROPIUM BROMIDE AND ALBUTEROL SULFATE 3 ML: .5; 3 SOLUTION RESPIRATORY (INHALATION) at 13:57

## 2021-03-26 RX ADMIN — SUCRALFATE 1 G: 1 TABLET ORAL at 10:54

## 2021-03-26 RX ADMIN — CEFTAZIDIME 2 G: 1 INJECTION, POWDER, FOR SOLUTION INTRAMUSCULAR; INTRAVENOUS at 05:32

## 2021-03-26 RX ADMIN — GABAPENTIN 100 MG: 100 CAPSULE ORAL at 14:21

## 2021-03-26 RX ADMIN — MUPIROCIN: 20 OINTMENT TOPICAL at 09:25

## 2021-03-26 RX ADMIN — QUETIAPINE FUMARATE 50 MG: 25 TABLET, FILM COATED ORAL at 21:34

## 2021-03-26 RX ADMIN — QUETIAPINE FUMARATE 50 MG: 25 TABLET, FILM COATED ORAL at 09:24

## 2021-03-26 RX ADMIN — CEFTAZIDIME 2 G: 1 INJECTION, POWDER, FOR SOLUTION INTRAMUSCULAR; INTRAVENOUS at 14:21

## 2021-03-26 RX ADMIN — OXYCODONE HYDROCHLORIDE 5 MG: 5 TABLET ORAL at 19:26

## 2021-03-26 RX ADMIN — GABAPENTIN 100 MG: 100 CAPSULE ORAL at 05:32

## 2021-03-26 RX ADMIN — GABAPENTIN 100 MG: 100 CAPSULE ORAL at 21:36

## 2021-03-26 RX ADMIN — Medication 1 CAPSULE: at 09:24

## 2021-03-26 RX ADMIN — VANCOMYCIN HYDROCHLORIDE 1500 MG: 10 INJECTION, POWDER, LYOPHILIZED, FOR SOLUTION INTRAVENOUS at 14:52

## 2021-03-26 RX ADMIN — SUCRALFATE 1 G: 1 TABLET ORAL at 09:24

## 2021-03-26 RX ADMIN — PANTOPRAZOLE SODIUM 40 MG: 40 TABLET, DELAYED RELEASE ORAL at 05:32

## 2021-03-26 RX ADMIN — METOPROLOL TARTRATE 12.5 MG: 25 TABLET, FILM COATED ORAL at 09:24

## 2021-03-27 LAB
BACTERIA SPEC AEROBE CULT: NORMAL
BACTERIA SPEC AEROBE CULT: NORMAL
CRP SERPL-MCNC: 2.3 MG/DL (ref 0–0.5)
GLUCOSE BLDC GLUCOMTR-MCNC: 117 MG/DL (ref 70–130)
GLUCOSE BLDC GLUCOMTR-MCNC: 91 MG/DL (ref 70–130)
GLUCOSE BLDC GLUCOMTR-MCNC: 93 MG/DL (ref 70–130)
GLUCOSE BLDC GLUCOMTR-MCNC: 99 MG/DL (ref 70–130)

## 2021-03-27 PROCEDURE — 82962 GLUCOSE BLOOD TEST: CPT

## 2021-03-27 PROCEDURE — 94799 UNLISTED PULMONARY SVC/PX: CPT

## 2021-03-27 PROCEDURE — 25010000002 VANCOMYCIN: Performed by: INTERNAL MEDICINE

## 2021-03-27 PROCEDURE — 86140 C-REACTIVE PROTEIN: CPT | Performed by: NURSE PRACTITIONER

## 2021-03-27 PROCEDURE — 25010000002 VANCOMYCIN 5 G RECONSTITUTED SOLUTION: Performed by: INTERNAL MEDICINE

## 2021-03-27 PROCEDURE — 25010000002 ENOXAPARIN PER 10 MG: Performed by: INTERNAL MEDICINE

## 2021-03-27 PROCEDURE — 25010000002 CEFTAZIDIME PER 500 MG: Performed by: FAMILY MEDICINE

## 2021-03-27 PROCEDURE — 99232 SBSQ HOSP IP/OBS MODERATE 35: CPT | Performed by: FAMILY MEDICINE

## 2021-03-27 RX ORDER — BENZONATATE 100 MG/1
200 CAPSULE ORAL EVERY 4 HOURS PRN
Status: DISCONTINUED | OUTPATIENT
Start: 2021-03-27 | End: 2021-04-12 | Stop reason: HOSPADM

## 2021-03-27 RX ORDER — IPRATROPIUM BROMIDE AND ALBUTEROL SULFATE 2.5; .5 MG/3ML; MG/3ML
3 SOLUTION RESPIRATORY (INHALATION) EVERY 4 HOURS PRN
Status: DISCONTINUED | OUTPATIENT
Start: 2021-03-27 | End: 2021-04-04

## 2021-03-27 RX ADMIN — PANTOPRAZOLE SODIUM 40 MG: 40 TABLET, DELAYED RELEASE ORAL at 05:58

## 2021-03-27 RX ADMIN — SUCRALFATE 1 G: 1 TABLET ORAL at 12:00

## 2021-03-27 RX ADMIN — CEFTAZIDIME 2 G: 1 INJECTION, POWDER, FOR SOLUTION INTRAMUSCULAR; INTRAVENOUS at 20:26

## 2021-03-27 RX ADMIN — GABAPENTIN 100 MG: 100 CAPSULE ORAL at 13:14

## 2021-03-27 RX ADMIN — ENOXAPARIN SODIUM 40 MG: 40 INJECTION SUBCUTANEOUS at 09:17

## 2021-03-27 RX ADMIN — LEVOTHYROXINE SODIUM 50 MCG: 88 TABLET ORAL at 05:58

## 2021-03-27 RX ADMIN — FENTANYL 1 PATCH: 12 PATCH TRANSDERMAL at 13:13

## 2021-03-27 RX ADMIN — OXYCODONE HYDROCHLORIDE 5 MG: 5 TABLET ORAL at 18:43

## 2021-03-27 RX ADMIN — CEFTAZIDIME 2 G: 1 INJECTION, POWDER, FOR SOLUTION INTRAMUSCULAR; INTRAVENOUS at 05:58

## 2021-03-27 RX ADMIN — VANCOMYCIN HYDROCHLORIDE 1500 MG: 10 INJECTION, POWDER, LYOPHILIZED, FOR SOLUTION INTRAVENOUS at 15:21

## 2021-03-27 RX ADMIN — METOPROLOL TARTRATE 12.5 MG: 25 TABLET, FILM COATED ORAL at 09:19

## 2021-03-27 RX ADMIN — METOPROLOL TARTRATE 12.5 MG: 25 TABLET, FILM COATED ORAL at 20:25

## 2021-03-27 RX ADMIN — QUETIAPINE FUMARATE 50 MG: 25 TABLET, FILM COATED ORAL at 20:23

## 2021-03-27 RX ADMIN — POLYETHYLENE GLYCOL (3350) 17 G: 17 POWDER, FOR SOLUTION ORAL at 09:20

## 2021-03-27 RX ADMIN — MUPIROCIN: 20 OINTMENT TOPICAL at 09:20

## 2021-03-27 RX ADMIN — MUPIROCIN: 20 OINTMENT TOPICAL at 20:26

## 2021-03-27 RX ADMIN — ACETAMINOPHEN 650 MG: 325 TABLET, COATED ORAL at 06:21

## 2021-03-27 RX ADMIN — GABAPENTIN 100 MG: 100 CAPSULE ORAL at 20:47

## 2021-03-27 RX ADMIN — SUCRALFATE 1 G: 1 TABLET ORAL at 09:19

## 2021-03-27 RX ADMIN — QUETIAPINE FUMARATE 50 MG: 25 TABLET, FILM COATED ORAL at 09:20

## 2021-03-27 RX ADMIN — Medication 1 CAPSULE: at 09:20

## 2021-03-27 RX ADMIN — GABAPENTIN 100 MG: 100 CAPSULE ORAL at 05:58

## 2021-03-27 RX ADMIN — OXYCODONE HYDROCHLORIDE 5 MG: 5 TABLET ORAL at 03:45

## 2021-03-27 RX ADMIN — CLONAZEPAM 0.25 MG: 0.5 TABLET ORAL at 19:08

## 2021-03-27 RX ADMIN — ATORVASTATIN CALCIUM 20 MG: 20 TABLET, FILM COATED ORAL at 20:25

## 2021-03-27 RX ADMIN — IPRATROPIUM BROMIDE AND ALBUTEROL SULFATE 3 ML: .5; 3 SOLUTION RESPIRATORY (INHALATION) at 18:55

## 2021-03-27 RX ADMIN — CEFTAZIDIME 2 G: 1 INJECTION, POWDER, FOR SOLUTION INTRAMUSCULAR; INTRAVENOUS at 13:14

## 2021-03-27 RX ADMIN — VANCOMYCIN HYDROCHLORIDE 1500 MG: 10 INJECTION, POWDER, LYOPHILIZED, FOR SOLUTION INTRAVENOUS at 03:48

## 2021-03-28 ENCOUNTER — APPOINTMENT (OUTPATIENT)
Dept: GENERAL RADIOLOGY | Facility: HOSPITAL | Age: 76
End: 2021-03-28

## 2021-03-28 LAB
ANION GAP SERPL CALCULATED.3IONS-SCNC: 11.4 MMOL/L (ref 5–15)
BASOPHILS # BLD AUTO: 0.05 10*3/MM3 (ref 0–0.2)
BASOPHILS NFR BLD AUTO: 0.7 % (ref 0–1.5)
BUN SERPL-MCNC: 4 MG/DL (ref 8–23)
BUN/CREAT SERPL: 15.4 (ref 7–25)
CALCIUM SPEC-SCNC: 8.8 MG/DL (ref 8.6–10.5)
CHLORIDE SERPL-SCNC: 101 MMOL/L (ref 98–107)
CO2 SERPL-SCNC: 25.6 MMOL/L (ref 22–29)
CREAT SERPL-MCNC: 0.26 MG/DL (ref 0.57–1)
CRP SERPL-MCNC: 1.64 MG/DL (ref 0–0.5)
DEPRECATED RDW RBC AUTO: 55.8 FL (ref 37–54)
EOSINOPHIL # BLD AUTO: 0.72 10*3/MM3 (ref 0–0.4)
EOSINOPHIL NFR BLD AUTO: 10 % (ref 0.3–6.2)
ERYTHROCYTE [DISTWIDTH] IN BLOOD BY AUTOMATED COUNT: 16.2 % (ref 12.3–15.4)
GFR SERPL CREATININE-BSD FRML MDRD: >150 ML/MIN/1.73
GLUCOSE BLDC GLUCOMTR-MCNC: 108 MG/DL (ref 70–130)
GLUCOSE BLDC GLUCOMTR-MCNC: 125 MG/DL (ref 70–130)
GLUCOSE BLDC GLUCOMTR-MCNC: 88 MG/DL (ref 70–130)
GLUCOSE BLDC GLUCOMTR-MCNC: 98 MG/DL (ref 70–130)
GLUCOSE SERPL-MCNC: 123 MG/DL (ref 65–99)
HCT VFR BLD AUTO: 31 % (ref 34–46.6)
HGB BLD-MCNC: 9 G/DL (ref 12–15.9)
IMM GRANULOCYTES # BLD AUTO: 0.2 10*3/MM3 (ref 0–0.05)
IMM GRANULOCYTES NFR BLD AUTO: 2.8 % (ref 0–0.5)
LYMPHOCYTES # BLD AUTO: 1.68 10*3/MM3 (ref 0.7–3.1)
LYMPHOCYTES NFR BLD AUTO: 23.3 % (ref 19.6–45.3)
MAGNESIUM SERPL-MCNC: 1.7 MG/DL (ref 1.6–2.4)
MCH RBC QN AUTO: 27.3 PG (ref 26.6–33)
MCHC RBC AUTO-ENTMCNC: 29 G/DL (ref 31.5–35.7)
MCV RBC AUTO: 93.9 FL (ref 79–97)
MONOCYTES # BLD AUTO: 0.47 10*3/MM3 (ref 0.1–0.9)
MONOCYTES NFR BLD AUTO: 6.5 % (ref 5–12)
NEUTROPHILS NFR BLD AUTO: 4.09 10*3/MM3 (ref 1.7–7)
NEUTROPHILS NFR BLD AUTO: 56.7 % (ref 42.7–76)
NRBC BLD AUTO-RTO: 0 /100 WBC (ref 0–0.2)
PLATELET # BLD AUTO: 168 10*3/MM3 (ref 140–450)
PMV BLD AUTO: 9.3 FL (ref 6–12)
POTASSIUM SERPL-SCNC: 3.7 MMOL/L (ref 3.5–5.2)
QT INTERVAL: 420 MS
QTC INTERVAL: 475 MS
RBC # BLD AUTO: 3.3 10*6/MM3 (ref 3.77–5.28)
SODIUM SERPL-SCNC: 138 MMOL/L (ref 136–145)
TROPONIN T SERPL-MCNC: 0.03 NG/ML (ref 0–0.03)
TROPONIN T SERPL-MCNC: 0.03 NG/ML (ref 0–0.03)
WBC # BLD AUTO: 7.21 10*3/MM3 (ref 3.4–10.8)

## 2021-03-28 PROCEDURE — 25010000002 VANCOMYCIN 5 G RECONSTITUTED SOLUTION: Performed by: PHYSICIAN ASSISTANT

## 2021-03-28 PROCEDURE — 94799 UNLISTED PULMONARY SVC/PX: CPT

## 2021-03-28 PROCEDURE — 71045 X-RAY EXAM CHEST 1 VIEW: CPT

## 2021-03-28 PROCEDURE — 84484 ASSAY OF TROPONIN QUANT: CPT | Performed by: FAMILY MEDICINE

## 2021-03-28 PROCEDURE — 80048 BASIC METABOLIC PNL TOTAL CA: CPT | Performed by: FAMILY MEDICINE

## 2021-03-28 PROCEDURE — 25010000002 VANCOMYCIN: Performed by: INTERNAL MEDICINE

## 2021-03-28 PROCEDURE — 25010000003 MAGNESIUM SULFATE 4 GM/100ML SOLUTION: Performed by: FAMILY MEDICINE

## 2021-03-28 PROCEDURE — 99232 SBSQ HOSP IP/OBS MODERATE 35: CPT | Performed by: FAMILY MEDICINE

## 2021-03-28 PROCEDURE — 71045 X-RAY EXAM CHEST 1 VIEW: CPT | Performed by: RADIOLOGY

## 2021-03-28 PROCEDURE — 63710000001 INSULIN DETEMIR PER 5 UNITS: Performed by: INTERNAL MEDICINE

## 2021-03-28 PROCEDURE — 83735 ASSAY OF MAGNESIUM: CPT | Performed by: FAMILY MEDICINE

## 2021-03-28 PROCEDURE — 93010 ELECTROCARDIOGRAM REPORT: CPT | Performed by: INTERNAL MEDICINE

## 2021-03-28 PROCEDURE — 85025 COMPLETE CBC W/AUTO DIFF WBC: CPT | Performed by: FAMILY MEDICINE

## 2021-03-28 PROCEDURE — 25010000002 CEFTAZIDIME PER 500 MG: Performed by: FAMILY MEDICINE

## 2021-03-28 PROCEDURE — 63710000001 ONDANSETRON PER 8 MG: Performed by: INTERNAL MEDICINE

## 2021-03-28 PROCEDURE — 25010000002 ENOXAPARIN PER 10 MG: Performed by: INTERNAL MEDICINE

## 2021-03-28 PROCEDURE — 25010000002 CEFTAZIDIME PER 500 MG: Performed by: PHYSICIAN ASSISTANT

## 2021-03-28 PROCEDURE — 82962 GLUCOSE BLOOD TEST: CPT

## 2021-03-28 PROCEDURE — 86140 C-REACTIVE PROTEIN: CPT | Performed by: NURSE PRACTITIONER

## 2021-03-28 PROCEDURE — 93005 ELECTROCARDIOGRAM TRACING: CPT | Performed by: FAMILY MEDICINE

## 2021-03-28 RX ORDER — MAGNESIUM SULFATE HEPTAHYDRATE 40 MG/ML
4 INJECTION, SOLUTION INTRAVENOUS ONCE
Status: COMPLETED | OUTPATIENT
Start: 2021-03-28 | End: 2021-03-28

## 2021-03-28 RX ADMIN — METOPROLOL TARTRATE 12.5 MG: 25 TABLET, FILM COATED ORAL at 10:05

## 2021-03-28 RX ADMIN — GABAPENTIN 100 MG: 100 CAPSULE ORAL at 05:02

## 2021-03-28 RX ADMIN — MUPIROCIN: 20 OINTMENT TOPICAL at 21:02

## 2021-03-28 RX ADMIN — QUETIAPINE FUMARATE 50 MG: 25 TABLET, FILM COATED ORAL at 21:01

## 2021-03-28 RX ADMIN — CEFTAZIDIME 2 G: 1 INJECTION, POWDER, FOR SOLUTION INTRAMUSCULAR; INTRAVENOUS at 04:47

## 2021-03-28 RX ADMIN — QUETIAPINE FUMARATE 50 MG: 25 TABLET, FILM COATED ORAL at 10:10

## 2021-03-28 RX ADMIN — ATORVASTATIN CALCIUM 20 MG: 20 TABLET, FILM COATED ORAL at 21:01

## 2021-03-28 RX ADMIN — SUCRALFATE 1 G: 1 TABLET ORAL at 07:52

## 2021-03-28 RX ADMIN — VANCOMYCIN HYDROCHLORIDE 1500 MG: 10 INJECTION, POWDER, LYOPHILIZED, FOR SOLUTION INTRAVENOUS at 15:04

## 2021-03-28 RX ADMIN — MUPIROCIN: 20 OINTMENT TOPICAL at 10:09

## 2021-03-28 RX ADMIN — ONDANSETRON HYDROCHLORIDE 4 MG: 4 TABLET, FILM COATED ORAL at 08:52

## 2021-03-28 RX ADMIN — VANCOMYCIN HYDROCHLORIDE 1500 MG: 10 INJECTION, POWDER, LYOPHILIZED, FOR SOLUTION INTRAVENOUS at 02:53

## 2021-03-28 RX ADMIN — INSULIN DETEMIR 24 UNITS: 100 INJECTION, SOLUTION SUBCUTANEOUS at 21:00

## 2021-03-28 RX ADMIN — SUCRALFATE 1 G: 1 TABLET ORAL at 21:01

## 2021-03-28 RX ADMIN — LEVOTHYROXINE SODIUM 50 MCG: 88 TABLET ORAL at 05:03

## 2021-03-28 RX ADMIN — SUCRALFATE 1 G: 1 TABLET ORAL at 17:05

## 2021-03-28 RX ADMIN — Medication 1 CAPSULE: at 10:07

## 2021-03-28 RX ADMIN — GABAPENTIN 100 MG: 100 CAPSULE ORAL at 15:04

## 2021-03-28 RX ADMIN — CEFTAZIDIME 2 G: 1 INJECTION, POWDER, FOR SOLUTION INTRAMUSCULAR; INTRAVENOUS at 13:08

## 2021-03-28 RX ADMIN — POLYETHYLENE GLYCOL (3350) 17 G: 17 POWDER, FOR SOLUTION ORAL at 10:09

## 2021-03-28 RX ADMIN — MAGNESIUM SULFATE HEPTAHYDRATE 4 G: 40 INJECTION, SOLUTION INTRAVENOUS at 18:02

## 2021-03-28 RX ADMIN — SUCRALFATE 1 G: 1 TABLET ORAL at 12:00

## 2021-03-28 RX ADMIN — METOPROLOL TARTRATE 12.5 MG: 25 TABLET, FILM COATED ORAL at 21:01

## 2021-03-28 RX ADMIN — ENOXAPARIN SODIUM 40 MG: 40 INJECTION SUBCUTANEOUS at 10:04

## 2021-03-28 RX ADMIN — ACETAMINOPHEN 650 MG: 325 TABLET, COATED ORAL at 10:48

## 2021-03-28 RX ADMIN — GABAPENTIN 100 MG: 100 CAPSULE ORAL at 21:01

## 2021-03-28 RX ADMIN — PANTOPRAZOLE SODIUM 40 MG: 40 TABLET, DELAYED RELEASE ORAL at 05:02

## 2021-03-28 RX ADMIN — CEFTAZIDIME 2 G: 1 INJECTION, POWDER, FOR SOLUTION INTRAMUSCULAR; INTRAVENOUS at 21:01

## 2021-03-28 RX ADMIN — ONDANSETRON HYDROCHLORIDE 4 MG: 4 TABLET, FILM COATED ORAL at 07:46

## 2021-03-29 LAB
CRP SERPL-MCNC: 1.2 MG/DL (ref 0–0.5)
GLUCOSE BLDC GLUCOMTR-MCNC: 100 MG/DL (ref 70–130)
GLUCOSE BLDC GLUCOMTR-MCNC: 109 MG/DL (ref 70–130)
GLUCOSE BLDC GLUCOMTR-MCNC: 134 MG/DL (ref 70–130)
GLUCOSE BLDC GLUCOMTR-MCNC: 80 MG/DL (ref 70–130)

## 2021-03-29 PROCEDURE — 97110 THERAPEUTIC EXERCISES: CPT

## 2021-03-29 PROCEDURE — 86140 C-REACTIVE PROTEIN: CPT | Performed by: NURSE PRACTITIONER

## 2021-03-29 PROCEDURE — 97530 THERAPEUTIC ACTIVITIES: CPT | Performed by: OCCUPATIONAL THERAPIST

## 2021-03-29 PROCEDURE — 97110 THERAPEUTIC EXERCISES: CPT | Performed by: OCCUPATIONAL THERAPIST

## 2021-03-29 PROCEDURE — 63710000001 INSULIN DETEMIR PER 5 UNITS: Performed by: INTERNAL MEDICINE

## 2021-03-29 PROCEDURE — 82962 GLUCOSE BLOOD TEST: CPT

## 2021-03-29 PROCEDURE — 25010000002 CEFTAZIDIME PER 500 MG: Performed by: PHYSICIAN ASSISTANT

## 2021-03-29 PROCEDURE — 25010000002 VANCOMYCIN: Performed by: PHYSICIAN ASSISTANT

## 2021-03-29 PROCEDURE — 97530 THERAPEUTIC ACTIVITIES: CPT

## 2021-03-29 PROCEDURE — 25010000002 ENOXAPARIN PER 10 MG: Performed by: INTERNAL MEDICINE

## 2021-03-29 PROCEDURE — 97535 SELF CARE MNGMENT TRAINING: CPT | Performed by: OCCUPATIONAL THERAPIST

## 2021-03-29 PROCEDURE — 94799 UNLISTED PULMONARY SVC/PX: CPT

## 2021-03-29 RX ADMIN — CEFTAZIDIME 2 G: 1 INJECTION, POWDER, FOR SOLUTION INTRAMUSCULAR; INTRAVENOUS at 13:28

## 2021-03-29 RX ADMIN — CEFTAZIDIME 2 G: 1 INJECTION, POWDER, FOR SOLUTION INTRAMUSCULAR; INTRAVENOUS at 04:21

## 2021-03-29 RX ADMIN — LEVOTHYROXINE SODIUM 50 MCG: 88 TABLET ORAL at 06:19

## 2021-03-29 RX ADMIN — VANCOMYCIN HYDROCHLORIDE 1500 MG: 10 INJECTION, POWDER, LYOPHILIZED, FOR SOLUTION INTRAVENOUS at 17:23

## 2021-03-29 RX ADMIN — METOPROLOL TARTRATE 12.5 MG: 25 TABLET, FILM COATED ORAL at 09:09

## 2021-03-29 RX ADMIN — QUETIAPINE FUMARATE 50 MG: 25 TABLET, FILM COATED ORAL at 20:40

## 2021-03-29 RX ADMIN — MUPIROCIN: 20 OINTMENT TOPICAL at 20:41

## 2021-03-29 RX ADMIN — SUCRALFATE 1 G: 1 TABLET ORAL at 12:16

## 2021-03-29 RX ADMIN — CEFTAZIDIME 2 G: 1 INJECTION, POWDER, FOR SOLUTION INTRAMUSCULAR; INTRAVENOUS at 20:40

## 2021-03-29 RX ADMIN — MUPIROCIN: 20 OINTMENT TOPICAL at 09:11

## 2021-03-29 RX ADMIN — SUCRALFATE 1 G: 1 TABLET ORAL at 06:19

## 2021-03-29 RX ADMIN — ATORVASTATIN CALCIUM 20 MG: 20 TABLET, FILM COATED ORAL at 20:39

## 2021-03-29 RX ADMIN — OXYCODONE HYDROCHLORIDE 5 MG: 5 TABLET ORAL at 12:07

## 2021-03-29 RX ADMIN — POLYETHYLENE GLYCOL (3350) 17 G: 17 POWDER, FOR SOLUTION ORAL at 09:10

## 2021-03-29 RX ADMIN — GABAPENTIN 100 MG: 100 CAPSULE ORAL at 06:19

## 2021-03-29 RX ADMIN — INSULIN DETEMIR 24 UNITS: 100 INJECTION, SOLUTION SUBCUTANEOUS at 20:51

## 2021-03-29 RX ADMIN — SUCRALFATE 1 G: 1 TABLET ORAL at 17:23

## 2021-03-29 RX ADMIN — VANCOMYCIN HYDROCHLORIDE 1500 MG: 10 INJECTION, POWDER, LYOPHILIZED, FOR SOLUTION INTRAVENOUS at 02:27

## 2021-03-29 RX ADMIN — GABAPENTIN 100 MG: 100 CAPSULE ORAL at 13:28

## 2021-03-29 RX ADMIN — Medication 1 CAPSULE: at 09:09

## 2021-03-29 RX ADMIN — ENOXAPARIN SODIUM 40 MG: 40 INJECTION SUBCUTANEOUS at 09:10

## 2021-03-29 RX ADMIN — METOPROLOL TARTRATE 12.5 MG: 25 TABLET, FILM COATED ORAL at 20:39

## 2021-03-29 RX ADMIN — SUCRALFATE 1 G: 1 TABLET ORAL at 20:40

## 2021-03-29 RX ADMIN — CLONAZEPAM 0.25 MG: 0.5 TABLET ORAL at 20:39

## 2021-03-29 RX ADMIN — PANTOPRAZOLE SODIUM 40 MG: 40 TABLET, DELAYED RELEASE ORAL at 06:19

## 2021-03-29 RX ADMIN — QUETIAPINE FUMARATE 50 MG: 25 TABLET, FILM COATED ORAL at 09:09

## 2021-03-29 RX ADMIN — GABAPENTIN 100 MG: 100 CAPSULE ORAL at 21:00

## 2021-03-30 LAB
CRP SERPL-MCNC: 1.06 MG/DL (ref 0–0.5)
GLUCOSE BLDC GLUCOMTR-MCNC: 109 MG/DL (ref 70–130)
GLUCOSE BLDC GLUCOMTR-MCNC: 144 MG/DL (ref 70–130)
GLUCOSE BLDC GLUCOMTR-MCNC: 152 MG/DL (ref 70–130)
GLUCOSE BLDC GLUCOMTR-MCNC: 73 MG/DL (ref 70–130)
VANCOMYCIN TROUGH SERPL-MCNC: 17.5 MCG/ML (ref 5–20)

## 2021-03-30 PROCEDURE — 82962 GLUCOSE BLOOD TEST: CPT

## 2021-03-30 PROCEDURE — 97110 THERAPEUTIC EXERCISES: CPT

## 2021-03-30 PROCEDURE — 25010000002 VANCOMYCIN: Performed by: PHYSICIAN ASSISTANT

## 2021-03-30 PROCEDURE — 94799 UNLISTED PULMONARY SVC/PX: CPT

## 2021-03-30 PROCEDURE — 97530 THERAPEUTIC ACTIVITIES: CPT

## 2021-03-30 PROCEDURE — 25010000002 CEFTAZIDIME PER 500 MG: Performed by: PHYSICIAN ASSISTANT

## 2021-03-30 PROCEDURE — 97535 SELF CARE MNGMENT TRAINING: CPT | Performed by: OCCUPATIONAL THERAPIST

## 2021-03-30 PROCEDURE — 63710000001 INSULIN DETEMIR PER 5 UNITS: Performed by: INTERNAL MEDICINE

## 2021-03-30 PROCEDURE — 86140 C-REACTIVE PROTEIN: CPT | Performed by: NURSE PRACTITIONER

## 2021-03-30 PROCEDURE — 25010000002 ENOXAPARIN PER 10 MG: Performed by: INTERNAL MEDICINE

## 2021-03-30 PROCEDURE — 25010000002 VANCOMYCIN: Performed by: INTERNAL MEDICINE

## 2021-03-30 PROCEDURE — 97530 THERAPEUTIC ACTIVITIES: CPT | Performed by: OCCUPATIONAL THERAPIST

## 2021-03-30 PROCEDURE — 25010000002 CEFTRIAXONE PER 250 MG: Performed by: INTERNAL MEDICINE

## 2021-03-30 PROCEDURE — 80202 ASSAY OF VANCOMYCIN: CPT

## 2021-03-30 PROCEDURE — 97110 THERAPEUTIC EXERCISES: CPT | Performed by: OCCUPATIONAL THERAPIST

## 2021-03-30 PROCEDURE — 97112 NEUROMUSCULAR REEDUCATION: CPT

## 2021-03-30 RX ADMIN — FENTANYL 1 PATCH: 12 PATCH TRANSDERMAL at 13:24

## 2021-03-30 RX ADMIN — QUETIAPINE FUMARATE 50 MG: 25 TABLET, FILM COATED ORAL at 20:48

## 2021-03-30 RX ADMIN — SUCRALFATE 1 G: 1 TABLET ORAL at 17:14

## 2021-03-30 RX ADMIN — GABAPENTIN 100 MG: 100 CAPSULE ORAL at 21:00

## 2021-03-30 RX ADMIN — LEVOTHYROXINE SODIUM 50 MCG: 88 TABLET ORAL at 05:33

## 2021-03-30 RX ADMIN — CLONAZEPAM 0.25 MG: 0.5 TABLET ORAL at 20:56

## 2021-03-30 RX ADMIN — VANCOMYCIN HYDROCHLORIDE 1500 MG: 10 INJECTION, POWDER, LYOPHILIZED, FOR SOLUTION INTRAVENOUS at 15:48

## 2021-03-30 RX ADMIN — ATORVASTATIN CALCIUM 20 MG: 20 TABLET, FILM COATED ORAL at 20:48

## 2021-03-30 RX ADMIN — ACETAMINOPHEN 650 MG: 325 TABLET, COATED ORAL at 20:48

## 2021-03-30 RX ADMIN — POLYETHYLENE GLYCOL (3350) 17 G: 17 POWDER, FOR SOLUTION ORAL at 08:48

## 2021-03-30 RX ADMIN — GABAPENTIN 100 MG: 100 CAPSULE ORAL at 05:33

## 2021-03-30 RX ADMIN — QUETIAPINE FUMARATE 50 MG: 25 TABLET, FILM COATED ORAL at 08:48

## 2021-03-30 RX ADMIN — MUPIROCIN: 20 OINTMENT TOPICAL at 20:48

## 2021-03-30 RX ADMIN — CEFTAZIDIME 2 G: 1 INJECTION, POWDER, FOR SOLUTION INTRAMUSCULAR; INTRAVENOUS at 04:30

## 2021-03-30 RX ADMIN — SUCRALFATE 1 G: 1 TABLET ORAL at 08:48

## 2021-03-30 RX ADMIN — SUCRALFATE 1 G: 1 TABLET ORAL at 20:48

## 2021-03-30 RX ADMIN — MUPIROCIN 1 APPLICATION: 20 OINTMENT TOPICAL at 08:50

## 2021-03-30 RX ADMIN — INSULIN DETEMIR 24 UNITS: 100 INJECTION, SOLUTION SUBCUTANEOUS at 20:48

## 2021-03-30 RX ADMIN — GABAPENTIN 100 MG: 100 CAPSULE ORAL at 13:24

## 2021-03-30 RX ADMIN — PANTOPRAZOLE SODIUM 40 MG: 40 TABLET, DELAYED RELEASE ORAL at 05:33

## 2021-03-30 RX ADMIN — CEFTRIAXONE 2 G: 2 INJECTION, POWDER, FOR SOLUTION INTRAMUSCULAR; INTRAVENOUS at 08:48

## 2021-03-30 RX ADMIN — METOPROLOL TARTRATE 12.5 MG: 25 TABLET, FILM COATED ORAL at 20:48

## 2021-03-30 RX ADMIN — SUCRALFATE 1 G: 1 TABLET ORAL at 11:43

## 2021-03-30 RX ADMIN — ENOXAPARIN SODIUM 40 MG: 40 INJECTION SUBCUTANEOUS at 08:48

## 2021-03-30 RX ADMIN — VANCOMYCIN HYDROCHLORIDE 1500 MG: 10 INJECTION, POWDER, LYOPHILIZED, FOR SOLUTION INTRAVENOUS at 02:45

## 2021-03-30 RX ADMIN — Medication 1 CAPSULE: at 08:48

## 2021-03-31 ENCOUNTER — APPOINTMENT (OUTPATIENT)
Dept: GENERAL RADIOLOGY | Facility: HOSPITAL | Age: 76
End: 2021-03-31

## 2021-03-31 ENCOUNTER — APPOINTMENT (OUTPATIENT)
Dept: ULTRASOUND IMAGING | Facility: HOSPITAL | Age: 76
End: 2021-03-31

## 2021-03-31 ENCOUNTER — APPOINTMENT (OUTPATIENT)
Dept: NUCLEAR MEDICINE | Facility: HOSPITAL | Age: 76
End: 2021-03-31

## 2021-03-31 LAB
B PARAPERT DNA SPEC QL NAA+PROBE: NOT DETECTED
B PERT DNA SPEC QL NAA+PROBE: NOT DETECTED
C PNEUM DNA NPH QL NAA+NON-PROBE: NOT DETECTED
CRP SERPL-MCNC: 1.31 MG/DL (ref 0–0.5)
D DIMER PPP FEU-MCNC: 1.42 MCGFEU/ML (ref 0–0.5)
FLUAV SUBTYP SPEC NAA+PROBE: NOT DETECTED
FLUBV RNA ISLT QL NAA+PROBE: NOT DETECTED
GLUCOSE BLDC GLUCOMTR-MCNC: 103 MG/DL (ref 70–130)
GLUCOSE BLDC GLUCOMTR-MCNC: 104 MG/DL (ref 70–130)
GLUCOSE BLDC GLUCOMTR-MCNC: 82 MG/DL (ref 70–130)
GLUCOSE BLDC GLUCOMTR-MCNC: 82 MG/DL (ref 70–130)
HADV DNA SPEC NAA+PROBE: NOT DETECTED
HCOV 229E RNA SPEC QL NAA+PROBE: NOT DETECTED
HCOV HKU1 RNA SPEC QL NAA+PROBE: NOT DETECTED
HCOV NL63 RNA SPEC QL NAA+PROBE: NOT DETECTED
HCOV OC43 RNA SPEC QL NAA+PROBE: NOT DETECTED
HMPV RNA NPH QL NAA+NON-PROBE: NOT DETECTED
HPIV1 RNA SPEC QL NAA+PROBE: NOT DETECTED
HPIV2 RNA SPEC QL NAA+PROBE: NOT DETECTED
HPIV3 RNA NPH QL NAA+PROBE: NOT DETECTED
HPIV4 P GENE NPH QL NAA+PROBE: NOT DETECTED
M PNEUMO IGG SER IA-ACNC: NOT DETECTED
NT-PROBNP SERPL-MCNC: 581.2 PG/ML (ref 0–1800)
RHINOVIRUS RNA SPEC NAA+PROBE: NOT DETECTED
RSV RNA NPH QL NAA+NON-PROBE: NOT DETECTED
SARS-COV-2 RNA RESP QL NAA+PROBE: NOT DETECTED

## 2021-03-31 PROCEDURE — A9540 TC99M MAA: HCPCS | Performed by: INTERNAL MEDICINE

## 2021-03-31 PROCEDURE — 86140 C-REACTIVE PROTEIN: CPT | Performed by: NURSE PRACTITIONER

## 2021-03-31 PROCEDURE — 78580 LUNG PERFUSION IMAGING: CPT

## 2021-03-31 PROCEDURE — 97530 THERAPEUTIC ACTIVITIES: CPT

## 2021-03-31 PROCEDURE — 85379 FIBRIN DEGRADATION QUANT: CPT | Performed by: INTERNAL MEDICINE

## 2021-03-31 PROCEDURE — 94799 UNLISTED PULMONARY SVC/PX: CPT

## 2021-03-31 PROCEDURE — 97110 THERAPEUTIC EXERCISES: CPT

## 2021-03-31 PROCEDURE — 93970 EXTREMITY STUDY: CPT

## 2021-03-31 PROCEDURE — 0 TECHNETIUM ALBUMIN AGGREGATED: Performed by: INTERNAL MEDICINE

## 2021-03-31 PROCEDURE — 25010000002 CEFTRIAXONE PER 250 MG: Performed by: INTERNAL MEDICINE

## 2021-03-31 PROCEDURE — 0100U HC BIOFIRE FILMARRAY RESP PANEL 2: CPT | Performed by: INTERNAL MEDICINE

## 2021-03-31 PROCEDURE — 25010000002 ENOXAPARIN PER 10 MG: Performed by: INTERNAL MEDICINE

## 2021-03-31 PROCEDURE — U0003 INFECTIOUS AGENT DETECTION BY NUCLEIC ACID (DNA OR RNA); SEVERE ACUTE RESPIRATORY SYNDROME CORONAVIRUS 2 (SARS-COV-2) (CORONAVIRUS DISEASE [COVID-19]), AMPLIFIED PROBE TECHNIQUE, MAKING USE OF HIGH THROUGHPUT TECHNOLOGIES AS DESCRIBED BY CMS-2020-01-R: HCPCS | Performed by: INTERNAL MEDICINE

## 2021-03-31 PROCEDURE — 25010000002 VANCOMYCIN: Performed by: INTERNAL MEDICINE

## 2021-03-31 PROCEDURE — 82962 GLUCOSE BLOOD TEST: CPT

## 2021-03-31 PROCEDURE — 83880 ASSAY OF NATRIURETIC PEPTIDE: CPT | Performed by: INTERNAL MEDICINE

## 2021-03-31 PROCEDURE — 25010000002 FUROSEMIDE PER 20 MG: Performed by: INTERNAL MEDICINE

## 2021-03-31 PROCEDURE — 25010000002 CEFEPIME PER 500 MG: Performed by: INTERNAL MEDICINE

## 2021-03-31 PROCEDURE — 71045 X-RAY EXAM CHEST 1 VIEW: CPT

## 2021-03-31 PROCEDURE — 78580 LUNG PERFUSION IMAGING: CPT | Performed by: RADIOLOGY

## 2021-03-31 PROCEDURE — 93970 EXTREMITY STUDY: CPT | Performed by: RADIOLOGY

## 2021-03-31 PROCEDURE — 63710000001 INSULIN DETEMIR PER 5 UNITS: Performed by: INTERNAL MEDICINE

## 2021-03-31 PROCEDURE — 97112 NEUROMUSCULAR REEDUCATION: CPT

## 2021-03-31 RX ORDER — FUROSEMIDE 10 MG/ML
40 INJECTION INTRAMUSCULAR; INTRAVENOUS ONCE
Status: COMPLETED | OUTPATIENT
Start: 2021-03-31 | End: 2021-03-31

## 2021-03-31 RX ADMIN — PANTOPRAZOLE SODIUM 40 MG: 40 TABLET, DELAYED RELEASE ORAL at 05:27

## 2021-03-31 RX ADMIN — OXYCODONE HYDROCHLORIDE 5 MG: 5 TABLET ORAL at 10:56

## 2021-03-31 RX ADMIN — GABAPENTIN 100 MG: 100 CAPSULE ORAL at 05:27

## 2021-03-31 RX ADMIN — LEVOTHYROXINE SODIUM 50 MCG: 88 TABLET ORAL at 05:27

## 2021-03-31 RX ADMIN — Medication 1 CAPSULE: at 09:25

## 2021-03-31 RX ADMIN — KIT FOR THE PREPARATION OF TECHNETIUM TC 99M ALBUMIN AGGREGATED 1 DOSE: 2.5 INJECTION, POWDER, FOR SOLUTION INTRAVENOUS at 14:40

## 2021-03-31 RX ADMIN — SUCRALFATE 1 G: 1 TABLET ORAL at 20:25

## 2021-03-31 RX ADMIN — ATORVASTATIN CALCIUM 20 MG: 20 TABLET, FILM COATED ORAL at 20:23

## 2021-03-31 RX ADMIN — ENOXAPARIN SODIUM 40 MG: 40 INJECTION SUBCUTANEOUS at 10:55

## 2021-03-31 RX ADMIN — QUETIAPINE FUMARATE 50 MG: 25 TABLET, FILM COATED ORAL at 09:25

## 2021-03-31 RX ADMIN — VANCOMYCIN HYDROCHLORIDE 1500 MG: 10 INJECTION, POWDER, LYOPHILIZED, FOR SOLUTION INTRAVENOUS at 14:54

## 2021-03-31 RX ADMIN — GABAPENTIN 100 MG: 100 CAPSULE ORAL at 21:02

## 2021-03-31 RX ADMIN — METOPROLOL TARTRATE 12.5 MG: 25 TABLET, FILM COATED ORAL at 09:25

## 2021-03-31 RX ADMIN — MUPIROCIN: 20 OINTMENT TOPICAL at 10:56

## 2021-03-31 RX ADMIN — CEFEPIME 2 G: 2 INJECTION, POWDER, FOR SOLUTION INTRAVENOUS at 20:23

## 2021-03-31 RX ADMIN — MUPIROCIN: 20 OINTMENT TOPICAL at 20:26

## 2021-03-31 RX ADMIN — SUCRALFATE 1 G: 1 TABLET ORAL at 09:25

## 2021-03-31 RX ADMIN — Medication 5 MG: at 20:25

## 2021-03-31 RX ADMIN — SUCRALFATE 1 G: 1 TABLET ORAL at 17:27

## 2021-03-31 RX ADMIN — OXYCODONE HYDROCHLORIDE 5 MG: 5 TABLET ORAL at 19:31

## 2021-03-31 RX ADMIN — CLONAZEPAM 0.25 MG: 0.5 TABLET ORAL at 20:25

## 2021-03-31 RX ADMIN — INSULIN DETEMIR 24 UNITS: 100 INJECTION, SOLUTION SUBCUTANEOUS at 21:44

## 2021-03-31 RX ADMIN — METOPROLOL TARTRATE 12.5 MG: 25 TABLET, FILM COATED ORAL at 20:23

## 2021-03-31 RX ADMIN — FUROSEMIDE 40 MG: 10 INJECTION, SOLUTION INTRAMUSCULAR; INTRAVENOUS at 12:29

## 2021-03-31 RX ADMIN — SUCRALFATE 1 G: 1 TABLET ORAL at 11:05

## 2021-03-31 RX ADMIN — QUETIAPINE FUMARATE 50 MG: 25 TABLET, FILM COATED ORAL at 20:23

## 2021-03-31 RX ADMIN — CEFEPIME 2 G: 2 INJECTION, POWDER, FOR SOLUTION INTRAVENOUS at 12:28

## 2021-03-31 RX ADMIN — VANCOMYCIN HYDROCHLORIDE 1500 MG: 10 INJECTION, POWDER, LYOPHILIZED, FOR SOLUTION INTRAVENOUS at 02:45

## 2021-03-31 RX ADMIN — GABAPENTIN 100 MG: 100 CAPSULE ORAL at 14:54

## 2021-03-31 RX ADMIN — CEFTRIAXONE 2 G: 2 INJECTION, POWDER, FOR SOLUTION INTRAMUSCULAR; INTRAVENOUS at 10:55

## 2021-04-01 LAB
ALBUMIN SERPL-MCNC: 2.76 G/DL (ref 3.5–5.2)
ALBUMIN/GLOB SERPL: 1 G/DL
ALP SERPL-CCNC: 85 U/L (ref 39–117)
ALT SERPL W P-5'-P-CCNC: 10 U/L (ref 1–33)
ANION GAP SERPL CALCULATED.3IONS-SCNC: 10.5 MMOL/L (ref 5–15)
APTT PPP: 30.5 SECONDS (ref 25.6–35.3)
APTT PPP: 43.1 SECONDS (ref 25.6–35.3)
AST SERPL-CCNC: 14 U/L (ref 1–32)
BASOPHILS # BLD AUTO: 0.04 10*3/MM3 (ref 0–0.2)
BASOPHILS # BLD AUTO: 0.05 10*3/MM3 (ref 0–0.2)
BASOPHILS NFR BLD AUTO: 0.6 % (ref 0–1.5)
BASOPHILS NFR BLD AUTO: 0.6 % (ref 0–1.5)
BILIRUB SERPL-MCNC: 0.2 MG/DL (ref 0–1.2)
BUN SERPL-MCNC: 5 MG/DL (ref 8–23)
BUN/CREAT SERPL: 22.7 (ref 7–25)
CALCIUM SPEC-SCNC: 9.1 MG/DL (ref 8.6–10.5)
CHLORIDE SERPL-SCNC: 103 MMOL/L (ref 98–107)
CO2 SERPL-SCNC: 28.5 MMOL/L (ref 22–29)
CREAT SERPL-MCNC: 0.22 MG/DL (ref 0.57–1)
CRP SERPL-MCNC: 1.88 MG/DL (ref 0–0.5)
DEPRECATED RDW RBC AUTO: 54.4 FL (ref 37–54)
DEPRECATED RDW RBC AUTO: 55.5 FL (ref 37–54)
EOSINOPHIL # BLD AUTO: 0.47 10*3/MM3 (ref 0–0.4)
EOSINOPHIL # BLD AUTO: 0.6 10*3/MM3 (ref 0–0.4)
EOSINOPHIL NFR BLD AUTO: 7.5 % (ref 0.3–6.2)
EOSINOPHIL NFR BLD AUTO: 7.5 % (ref 0.3–6.2)
ERYTHROCYTE [DISTWIDTH] IN BLOOD BY AUTOMATED COUNT: 16.1 % (ref 12.3–15.4)
ERYTHROCYTE [DISTWIDTH] IN BLOOD BY AUTOMATED COUNT: 16.3 % (ref 12.3–15.4)
GFR SERPL CREATININE-BSD FRML MDRD: >150 ML/MIN/1.73
GLOBULIN UR ELPH-MCNC: 2.8 GM/DL
GLUCOSE BLDC GLUCOMTR-MCNC: 129 MG/DL (ref 70–130)
GLUCOSE BLDC GLUCOMTR-MCNC: 186 MG/DL (ref 70–130)
GLUCOSE BLDC GLUCOMTR-MCNC: 296 MG/DL (ref 70–130)
GLUCOSE BLDC GLUCOMTR-MCNC: 79 MG/DL (ref 70–130)
GLUCOSE SERPL-MCNC: 81 MG/DL (ref 65–99)
HCT VFR BLD AUTO: 31.2 % (ref 34–46.6)
HCT VFR BLD AUTO: 33.2 % (ref 34–46.6)
HGB BLD-MCNC: 9.1 G/DL (ref 12–15.9)
HGB BLD-MCNC: 9.8 G/DL (ref 12–15.9)
IMM GRANULOCYTES # BLD AUTO: 0.03 10*3/MM3 (ref 0–0.05)
IMM GRANULOCYTES # BLD AUTO: 0.06 10*3/MM3 (ref 0–0.05)
IMM GRANULOCYTES NFR BLD AUTO: 0.5 % (ref 0–0.5)
IMM GRANULOCYTES NFR BLD AUTO: 0.8 % (ref 0–0.5)
INR PPP: 1.08 (ref 0.9–1.1)
LYMPHOCYTES # BLD AUTO: 1.22 10*3/MM3 (ref 0.7–3.1)
LYMPHOCYTES # BLD AUTO: 1.7 10*3/MM3 (ref 0.7–3.1)
LYMPHOCYTES NFR BLD AUTO: 19.6 % (ref 19.6–45.3)
LYMPHOCYTES NFR BLD AUTO: 21.3 % (ref 19.6–45.3)
MCH RBC QN AUTO: 26.9 PG (ref 26.6–33)
MCH RBC QN AUTO: 27 PG (ref 26.6–33)
MCHC RBC AUTO-ENTMCNC: 29.2 G/DL (ref 31.5–35.7)
MCHC RBC AUTO-ENTMCNC: 29.5 G/DL (ref 31.5–35.7)
MCV RBC AUTO: 91.5 FL (ref 79–97)
MCV RBC AUTO: 92.3 FL (ref 79–97)
MONOCYTES # BLD AUTO: 0.3 10*3/MM3 (ref 0.1–0.9)
MONOCYTES # BLD AUTO: 0.46 10*3/MM3 (ref 0.1–0.9)
MONOCYTES NFR BLD AUTO: 4.8 % (ref 5–12)
MONOCYTES NFR BLD AUTO: 5.8 % (ref 5–12)
NEUTROPHILS NFR BLD AUTO: 4.17 10*3/MM3 (ref 1.7–7)
NEUTROPHILS NFR BLD AUTO: 5.1 10*3/MM3 (ref 1.7–7)
NEUTROPHILS NFR BLD AUTO: 64 % (ref 42.7–76)
NEUTROPHILS NFR BLD AUTO: 67 % (ref 42.7–76)
NRBC BLD AUTO-RTO: 0 /100 WBC (ref 0–0.2)
NRBC BLD AUTO-RTO: 0 /100 WBC (ref 0–0.2)
NT-PROBNP SERPL-MCNC: 356.3 PG/ML (ref 0–1800)
PLATELET # BLD AUTO: 166 10*3/MM3 (ref 140–450)
PLATELET # BLD AUTO: 198 10*3/MM3 (ref 140–450)
PMV BLD AUTO: 9.4 FL (ref 6–12)
PMV BLD AUTO: 9.5 FL (ref 6–12)
POTASSIUM SERPL-SCNC: 3.6 MMOL/L (ref 3.5–5.2)
PROT SERPL-MCNC: 5.6 G/DL (ref 6–8.5)
PROTHROMBIN TIME: 13.8 SECONDS (ref 11.9–14.1)
RBC # BLD AUTO: 3.38 10*6/MM3 (ref 3.77–5.28)
RBC # BLD AUTO: 3.63 10*6/MM3 (ref 3.77–5.28)
SODIUM SERPL-SCNC: 142 MMOL/L (ref 136–145)
WBC # BLD AUTO: 6.23 10*3/MM3 (ref 3.4–10.8)
WBC # BLD AUTO: 7.97 10*3/MM3 (ref 3.4–10.8)

## 2021-04-01 PROCEDURE — 63710000001 INSULIN DETEMIR PER 5 UNITS: Performed by: INTERNAL MEDICINE

## 2021-04-01 PROCEDURE — 80053 COMPREHEN METABOLIC PANEL: CPT | Performed by: INTERNAL MEDICINE

## 2021-04-01 PROCEDURE — 97110 THERAPEUTIC EXERCISES: CPT

## 2021-04-01 PROCEDURE — 97116 GAIT TRAINING THERAPY: CPT

## 2021-04-01 PROCEDURE — 63710000001 PREDNISONE PER 1 MG: Performed by: INTERNAL MEDICINE

## 2021-04-01 PROCEDURE — 25010000002 HEPARIN (PORCINE) PER 1000 UNITS: Performed by: INTERNAL MEDICINE

## 2021-04-01 PROCEDURE — 97530 THERAPEUTIC ACTIVITIES: CPT

## 2021-04-01 PROCEDURE — 85610 PROTHROMBIN TIME: CPT | Performed by: INTERNAL MEDICINE

## 2021-04-01 PROCEDURE — 99221 1ST HOSP IP/OBS SF/LOW 40: CPT | Performed by: INTERNAL MEDICINE

## 2021-04-01 PROCEDURE — 83880 ASSAY OF NATRIURETIC PEPTIDE: CPT | Performed by: INTERNAL MEDICINE

## 2021-04-01 PROCEDURE — 94799 UNLISTED PULMONARY SVC/PX: CPT

## 2021-04-01 PROCEDURE — 25010000002 CEFEPIME PER 500 MG: Performed by: INTERNAL MEDICINE

## 2021-04-01 PROCEDURE — 85025 COMPLETE CBC W/AUTO DIFF WBC: CPT | Performed by: INTERNAL MEDICINE

## 2021-04-01 PROCEDURE — 63710000001 PREDNISONE PER 5 MG: Performed by: INTERNAL MEDICINE

## 2021-04-01 PROCEDURE — 97110 THERAPEUTIC EXERCISES: CPT | Performed by: OCCUPATIONAL THERAPIST

## 2021-04-01 PROCEDURE — 25010000002 VANCOMYCIN: Performed by: INTERNAL MEDICINE

## 2021-04-01 PROCEDURE — 86140 C-REACTIVE PROTEIN: CPT | Performed by: INTERNAL MEDICINE

## 2021-04-01 PROCEDURE — 63710000001 INSULIN ASPART PER 5 UNITS: Performed by: INTERNAL MEDICINE

## 2021-04-01 PROCEDURE — 82962 GLUCOSE BLOOD TEST: CPT

## 2021-04-01 PROCEDURE — 97530 THERAPEUTIC ACTIVITIES: CPT | Performed by: OCCUPATIONAL THERAPIST

## 2021-04-01 PROCEDURE — 85730 THROMBOPLASTIN TIME PARTIAL: CPT | Performed by: INTERNAL MEDICINE

## 2021-04-01 PROCEDURE — 97535 SELF CARE MNGMENT TRAINING: CPT | Performed by: OCCUPATIONAL THERAPIST

## 2021-04-01 PROCEDURE — 63710000001 DIPHENHYDRAMINE PER 50 MG: Performed by: INTERNAL MEDICINE

## 2021-04-01 RX ORDER — POTASSIUM CHLORIDE 20 MEQ/1
40 TABLET, EXTENDED RELEASE ORAL EVERY 4 HOURS
Status: COMPLETED | OUTPATIENT
Start: 2021-04-01 | End: 2021-04-01

## 2021-04-01 RX ORDER — HEPARIN SODIUM 5000 [USP'U]/ML
60 INJECTION, SOLUTION INTRAVENOUS; SUBCUTANEOUS AS NEEDED
Status: DISCONTINUED | OUTPATIENT
Start: 2021-04-01 | End: 2021-04-02

## 2021-04-01 RX ORDER — DIPHENHYDRAMINE HCL 50 MG
50 CAPSULE ORAL ONCE
Status: COMPLETED | OUTPATIENT
Start: 2021-04-01 | End: 2021-04-01

## 2021-04-01 RX ORDER — HEPARIN SODIUM 5000 [USP'U]/ML
30 INJECTION, SOLUTION INTRAVENOUS; SUBCUTANEOUS AS NEEDED
Status: DISCONTINUED | OUTPATIENT
Start: 2021-04-01 | End: 2021-04-02

## 2021-04-01 RX ORDER — HEPARIN SODIUM 10000 [USP'U]/100ML
12 INJECTION, SOLUTION INTRAVENOUS
Status: DISCONTINUED | OUTPATIENT
Start: 2021-04-01 | End: 2021-04-02

## 2021-04-01 RX ORDER — HEPARIN SODIUM 5000 [USP'U]/ML
60 INJECTION, SOLUTION INTRAVENOUS; SUBCUTANEOUS ONCE
Status: COMPLETED | OUTPATIENT
Start: 2021-04-01 | End: 2021-04-01

## 2021-04-01 RX ADMIN — CLONAZEPAM 0.25 MG: 0.5 TABLET ORAL at 20:50

## 2021-04-01 RX ADMIN — POTASSIUM CHLORIDE 40 MEQ: 20 TABLET, EXTENDED RELEASE ORAL at 14:10

## 2021-04-01 RX ADMIN — SUCRALFATE 1 G: 1 TABLET ORAL at 09:15

## 2021-04-01 RX ADMIN — HEPARIN SODIUM 4600 UNITS: 5000 INJECTION INTRAVENOUS; SUBCUTANEOUS at 17:41

## 2021-04-01 RX ADMIN — QUETIAPINE FUMARATE 50 MG: 25 TABLET, FILM COATED ORAL at 20:50

## 2021-04-01 RX ADMIN — HEPARIN SODIUM 12 UNITS/KG/HR: 10000 INJECTION, SOLUTION INTRAVENOUS at 10:45

## 2021-04-01 RX ADMIN — INSULIN DETEMIR 24 UNITS: 100 INJECTION, SOLUTION SUBCUTANEOUS at 20:49

## 2021-04-01 RX ADMIN — QUETIAPINE FUMARATE 50 MG: 25 TABLET, FILM COATED ORAL at 09:20

## 2021-04-01 RX ADMIN — SUCRALFATE 1 G: 1 TABLET ORAL at 17:07

## 2021-04-01 RX ADMIN — CEFEPIME 2 G: 2 INJECTION, POWDER, FOR SOLUTION INTRAVENOUS at 04:23

## 2021-04-01 RX ADMIN — OXYCODONE HYDROCHLORIDE 5 MG: 5 TABLET ORAL at 20:50

## 2021-04-01 RX ADMIN — HEPARIN SODIUM 4600 UNITS: 5000 INJECTION INTRAVENOUS; SUBCUTANEOUS at 10:47

## 2021-04-01 RX ADMIN — HEPARIN SODIUM 2300 UNITS: 5000 INJECTION INTRAVENOUS; SUBCUTANEOUS at 17:39

## 2021-04-01 RX ADMIN — VANCOMYCIN HYDROCHLORIDE 1500 MG: 10 INJECTION, POWDER, LYOPHILIZED, FOR SOLUTION INTRAVENOUS at 02:24

## 2021-04-01 RX ADMIN — Medication 5 MG: at 20:50

## 2021-04-01 RX ADMIN — CEFEPIME 2 G: 2 INJECTION, POWDER, FOR SOLUTION INTRAVENOUS at 11:48

## 2021-04-01 RX ADMIN — SUCRALFATE 1 G: 1 TABLET ORAL at 11:47

## 2021-04-01 RX ADMIN — LEVOTHYROXINE SODIUM 50 MCG: 88 TABLET ORAL at 05:02

## 2021-04-01 RX ADMIN — PREDNISONE 50 MG: 20 TABLET ORAL at 15:32

## 2021-04-01 RX ADMIN — OXYCODONE HYDROCHLORIDE 5 MG: 5 TABLET ORAL at 03:08

## 2021-04-01 RX ADMIN — METOPROLOL TARTRATE 12.5 MG: 25 TABLET, FILM COATED ORAL at 09:17

## 2021-04-01 RX ADMIN — SUCRALFATE 1 G: 1 TABLET ORAL at 20:49

## 2021-04-01 RX ADMIN — Medication 1 CAPSULE: at 09:18

## 2021-04-01 RX ADMIN — INSULIN ASPART 2 UNITS: 100 INJECTION, SOLUTION INTRAVENOUS; SUBCUTANEOUS at 17:07

## 2021-04-01 RX ADMIN — ATORVASTATIN CALCIUM 20 MG: 20 TABLET, FILM COATED ORAL at 20:50

## 2021-04-01 RX ADMIN — MUPIROCIN: 20 OINTMENT TOPICAL at 20:51

## 2021-04-01 RX ADMIN — PANTOPRAZOLE SODIUM 40 MG: 40 TABLET, DELAYED RELEASE ORAL at 05:02

## 2021-04-01 RX ADMIN — PREDNISONE 50 MG: 20 TABLET ORAL at 09:22

## 2021-04-01 RX ADMIN — MUPIROCIN: 20 OINTMENT TOPICAL at 09:20

## 2021-04-01 RX ADMIN — PREDNISONE 50 MG: 20 TABLET ORAL at 21:10

## 2021-04-01 RX ADMIN — CEFEPIME 2 G: 2 INJECTION, POWDER, FOR SOLUTION INTRAVENOUS at 20:48

## 2021-04-01 RX ADMIN — VANCOMYCIN HYDROCHLORIDE 1500 MG: 10 INJECTION, POWDER, LYOPHILIZED, FOR SOLUTION INTRAVENOUS at 14:09

## 2021-04-01 RX ADMIN — METOPROLOL TARTRATE 12.5 MG: 25 TABLET, FILM COATED ORAL at 20:50

## 2021-04-01 RX ADMIN — GABAPENTIN 100 MG: 100 CAPSULE ORAL at 14:09

## 2021-04-01 RX ADMIN — GABAPENTIN 100 MG: 100 CAPSULE ORAL at 21:10

## 2021-04-01 RX ADMIN — POTASSIUM CHLORIDE 40 MEQ: 20 TABLET, EXTENDED RELEASE ORAL at 11:47

## 2021-04-01 RX ADMIN — OXYCODONE HYDROCHLORIDE 5 MG: 5 TABLET ORAL at 09:16

## 2021-04-01 RX ADMIN — DIPHENHYDRAMINE HCL 50 MG: 50 CAPSULE ORAL at 09:15

## 2021-04-01 RX ADMIN — GABAPENTIN 100 MG: 100 CAPSULE ORAL at 05:02

## 2021-04-01 NOTE — PROGRESS NOTES
PROGRESS NOTE         Patient Identification:  Name:  Argenis Lay  Age:  76 y.o.  Sex:  female  :  1945  MRN:  [id number]  Visit Number:  [id number]  Primary Care Provider:  Mahi Villarreal APRN         LOS: 14 days       ----------------------------------------------------------------------------------------------------------------------  Subjective       Chief Complaints:    Aspiration pneumonia  Acute on chronic respiratory failure  Type 2 diabetes mellitus  G-tube removal with intermittent drainage      Interval History:      Patient had an uneventful night with no issues reported, vitals are stable with no fever documented overnight.  Patient denies any chest pain, shortness of breath, nausea, vomiting, diarrhea or headache.  Has been participating with physical therapy without any difficulty.  Seems to be fairly comfortable this morning with no evidence of acute distress.    Patient feels significantly better this morning with completely resolved cough and no shortness of breath.  She showed significant improvement after her Lasix yesterday with good urine output.  She does not complain of any further coughing or shortness of breath this morning.  Her venous duplex was negative, her Covid testing was negative but her VQ scan was intermediate for PE and I consulted pulmonology for further recommendations.      Review of Systems:    Constitutional: No fever, no chills and night sweats. No appetite change or unexpected weight change. No fatigue.  Eyes: no eye drainage, itching or redness.  HEENT: no mouth sores, dysphagia or nose bleed.  Respiratory:  Resolved cough and shortness of breath.  Cardiovascular: no chest pain, no palpitations, no orthopnea.  Gastrointestinal: No vomiting. No diarrhea. No abdominal pain, hematemesis or rectal bleeding.  Nausea.  Genitourinary: no dysuria or polyuria.  Hematologic/lymphatic: no lymph node abnormalities, no easy bruising or easy  bleeding.  Musculoskeletal: no muscle or joint pain.  Skin: No rash and no itching.  Neurological: no loss of consciousness, no seizure, no headache.  Behavioral/Psych: no depression or suicidal ideation.   Endocrine: no hot flashes.  Immunologic: negative.    ----------------------------------------------------------------------------------------------------------------------      Objective       [facility] Meds:  atorvastatin, 20 mg, Oral, Nightly  cefepime, 2 g, Intravenous, Q8H  Check Fentanyl Patch Placement, 1 each, Does not apply, Q12H  fentaNYL, 1 patch, Transdermal, Q72H   And  Check Fentanyl Patch Placement, 1 each, Does not apply, Q12H  enoxaparin, 40 mg, Subcutaneous, Q24H  gabapentin, 100 mg, Oral, Q8H  insulin aspart, 0-7 Units, Subcutaneous, TID AC  insulin detemir, 24 Units, Subcutaneous, Nightly  lactobacillus acidophilus, 1 capsule, Oral, Daily  levothyroxine, 50 mcg, Oral, Q AM  lidocaine-EPINEPHrine, 10 mL, Infiltration, Once  metoprolol tartrate, 12.5 mg, Oral, Q12H  mupirocin, , Topical, Q12H  pantoprazole, 40 mg, Oral, Q AM  polyethylene glycol, 17 g, Oral, Daily  QUEtiapine, 50 mg, Oral, Q12H  sucralfate, 1 g, Oral, 4x Daily AC & at Bedtime  vancomycin, 1,500 mg, Intravenous, Q12H      Pharmacy Consult - Pharmacy to dose,       ----------------------------------------------------------------------------------------------------------------------    Vital Signs:  Temp:  [98 °F (36.7 °C)-98.5 °F (36.9 °C)] 98.5 °F (36.9 °C)  Heart Rate:  [71-94] 94  Resp:  [18] 18  BP: (118-124)/(58-84) 118/58  No data found.  SpO2 Percentage    03/31/21 1840 03/31/21 1900 04/01/21 0704   SpO2: 97% 94% 95%     SpO2:  [94 %-97 %] 95 %  on  Flow (L/min):  [4] 4;   Device (Oxygen Therapy): nasal cannula    Body mass index is 25.54 kg/m².  Wt Readings from Last 3 Encounters:   03/19/21 76.2 kg (168 lb)        Intake/Output Summary (Last 24 hours) at 4/1/2021 0757  Last data filed at 4/1/2021 0500  Gross per 24  hour   Intake 2040 ml   Output --   Net 2040 ml     Diet Soft Texture; Ground; Thin  ----------------------------------------------------------------------------------------------------------------------      Physical Exam:    Constitutional:  Patient seems to be much more comfortable without any complaints this morning.  HENT:  Head: Normocephalic and atraumatic.  Mouth:  Moist mucous membranes.    Eyes:  Conjunctivae and EOM are normal.  No scleral icterus.  Neck:  Neck supple.  No JVD present.    Cardiovascular:  Normal rate, regular rhythm and normal heart sounds with no murmur. No edema.  Pulmonary/Chest: Diminished lung sounds in the bases bilaterally.  Abdominal:  Soft.  Bowel sounds are normal.  No distension.  PEG tube has been removed and skin looks normal with no evidence of drainage.  Musculoskeletal:  No edema, no tenderness, and no deformity.  No swelling or redness of joints.  Neurological:  Alert and oriented to person, place, and time.  No facial droop.  No slurred speech.   Skin:  Skin is warm and dry.  No rash noted.  No pallor.    Psychiatric:  Normal mood and affect.  Behavior is normal.     ----------------------------------------------------------------------------------------------------------------------  Results from last 7 days   Lab Units 03/28/21  1659 03/28/21  1017   TROPONIN T ng/mL 0.028 0.029     Results from last 7 days   Lab Units 04/01/21  0202 03/31/21  1150   PROBNP pg/mL 356.3 581.2         Results from last 7 days   Lab Units 04/01/21  0202 03/31/21  0218 03/30/21  0130 03/28/21  0112 03/26/21  0133   CRP mg/dL 1.88* 1.31* 1.06* 1.64* 4.47*   WBC 10*3/mm3 7.97  --   --  7.21 5.25   HEMOGLOBIN g/dL 9.1*  --   --  9.0* 9.0*   HEMATOCRIT % 31.2*  --   --  31.0* 30.9*   MCV fL 92.3  --   --  93.9 92.0   MCHC g/dL 29.2*  --   --  29.0* 29.1*   PLATELETS 10*3/mm3 198  --   --  168 140     Results from last 7 days   Lab Units 04/01/21  0202 03/28/21  1017 03/28/21 0112  03/26/21  0133   SODIUM mmol/L 142  --  138 137   POTASSIUM mmol/L 3.6  --  3.7 3.9   MAGNESIUM mg/dL  --  1.7  --   --    CHLORIDE mmol/L 103  --  101 102   CO2 mmol/L 28.5  --  25.6 26.3   BUN mg/dL 5*  --  4* 4*   CREATININE mg/dL 0.22*  --  0.26* 0.23*   EGFR IF NONAFRICN AM mL/min/1.73 >150  --  >150 >150   CALCIUM mg/dL 9.1  --  8.8 8.9   GLUCOSE mg/dL 81  --  123* 111*   ALBUMIN g/dL 2.76*  --   --   --    BILIRUBIN mg/dL 0.2  --   --   --    ALK PHOS U/L 85  --   --   --    AST (SGOT) U/L 14  --   --   --    ALT (SGPT) U/L 10  --   --   --    Estimated Creatinine Clearance: 73.1 mL/min (A) (by C-G formula based on SCr of 0.22 mg/dL (L)).  No results found for: AMMONIA    Glucose   Date/Time Value Ref Range Status   04/01/2021 0642 79 70 - 130 mg/dL Final   03/31/2021 2023 104 70 - 130 mg/dL Final   03/31/2021 1624 82 70 - 130 mg/dL Final   03/31/2021 1136 103 70 - 130 mg/dL Final   03/31/2021 0633 82 70 - 130 mg/dL Final   03/30/2021 1952 152 (H) 70 - 130 mg/dL Final   03/30/2021 1616 109 70 - 130 mg/dL Final   03/30/2021 1108 144 (H) 70 - 130 mg/dL Final     Lab Results   Component Value Date    HGBA1C 6.40 (H) 02/22/2021     No results found for: TSH, FREET4    Blood Culture   Date Value Ref Range Status   03/21/2021 Gram Negative Bacilli (C)  Preliminary   03/21/2021 Gram Positive Cocci (C)  Preliminary   03/21/2021 Gram Negative Bacilli (C)  Preliminary   03/21/2021 Gram Positive Cocci (C)  Preliminary     No results found for: URINECX  No results found for: WOUNDCX  No results found for: STOOLCX  No results found for: RESPCX  Pain Management Panel    There is no flowsheet data to display.           ----------------------------------------------------------------------------------------------------------------------  Imaging Results (Last 24 Hours)     Procedure Component Value Units Date/Time    NM Lung Scan Perfusion Particulate [[id number]] Collected: 03/31/21 1520     Updated: 03/31/21 1523     Narrative:      EXAMINATION: NM LUNG SCAN PERFUSION PARTICULATE-      CLINICAL INDICATION: Chest pain, acute, PE suspected, intermed prob,  positive D-dimer     TECHNIQUE:  4.4 mCi of Tc-99m MAA were administered IV for a perfusion  lung scan. Ventilation could not be performed.     COMPARISON: Chest XRAY performed on 03/31/2021.     FINDINGS: Segmental type perfusion defects are noted both lungs but also  match with airspace disease on x-ray. Intermediate suspicion of PE.       Impression:      Intermediate suspicion of PE.      This report was finalized on 3/31/2021 3:21 PM by Dr. Alexx Espinosa MD.       US Venous Doppler Lower Extremity Bilateral (duplex) [[id number]] Collected: 03/31/21 1340     Updated: 03/31/21 1342    Narrative:      US VENOUS DOPPLER LOWER EXTREMITY BILATERAL (DUPLEX)-     CLINICAL INDICATION: ddimer positive        COMPARISON: None immediately available      TECHNIQUE: Color Doppler imaging was used with compression and  augmentation to evaluate the lower extremity deep venous system.     FINDINGS:   There is patent spontaneous flow from the common femoral vein through  the posterior tibial veins.  There was no internal clot or area of noncompressibility.  Normal augmentation was elicited where applicable.       Impression:      No DVT in the lower extremities on today's exam.      This report was finalized on 3/31/2021 1:40 PM by Dr. Alan Hargrove MD.             ----------------------------------------------------------------------------------------------------------------------    Assessment/Plan       Assessment/Plan     ASSESSMENT:    Debility  Severe sepsis  Enterococcus and Serratia bacteremia  Aspiration pneumonia  Chest tightness  Acute on chronic respiratory failure  Diabetes mellitus type 2  Hypertension  G-tube removed with resolved drainage      PLAN:    Patient had an uneventful night with no issues reported, vitals are stable with no fever documented overnight.  Patient  denies any chest pain, shortness of breath, nausea, vomiting, diarrhea or headache.  Has been participating with physical therapy without any difficulty.  Seems to be fairly comfortable this morning with no evidence of acute distress.  I spoke with Dr. Tello and he recommended to go ahead and initiate heparin drip and order CT PE protocol after contrast allergy protocol with prednisone and Benadryl as her VQ scan was indeterminate.  Pulmonary consult ordered.    The phone #[phone] is disconnected and I called her daughter page without answer and I left a voice message to call me back to update her on her mom's condition.    Recent severe sepsis and aspiration pneumonia resolve on current antibiotic therapy.    Chest tightness--resolved     Aspiration pneumonia--Much improved on Cefepime and vancomycin.     Severe sepsis with positive blood culture for Serratia and Enterococcus and Pseudomonas from PEG tube site.  Patient is improving     Debility secondary to prolonged COVID-19 infection     Acute on chronic hypoxic respiratory failure--continue 4 L per nasal cannula at this time.     Diabetes mellitus--continue current treatment     Hypertension--controlled    Wound culture on 3/24/2021 finalized as Pseudomonas.  Repeat blood cultures on 3/22/2021 are so far showing no growth.    Bilateral upper extremity venous duplex reports no evidence of DVT.  2D echo unremarkable for vegetation.      Urinalysis from 3/21/2021 unremarkable.  Blood cultures from 3/21/2021 2 out of 2 sets positive for Enterococcus species and Serratia marcescens. Chest x-ray from 3/21/2021 reports severe bilateral pneumonia with possible interval improvement in aeration of the right lung.    Recommend to continue on vancomycin and Rocephin through 4/4/2021.      Code Status:   Code Status and Medical Interventions:   Ordered at: 03/19/21 0766     Level Of Support Discussed With:    Patient     Code Status:    No CPR     Medical Interventions  (Level of Support Prior to Arrest):    Full     Adam Bansal MD  04/01/21  07:44 EDT

## 2021-04-01 NOTE — PLAN OF CARE
Problem: Rehabilitation ([facility]) Plan of Care  Goal: Plan of Care Review  Outcome: Ongoing, Progressing  Flowsheets  Taken 4/1/2021 1511 by Katty Menezes, RN  Progress: improving  Taken 3/31/2021 1623 by Sergo Gonzalez, RN  Outcome Summary: Patient worked well with therapy today, otherwise resting in bed. Several new orders place per dr, see results. No acute events, will continue to monitor.  Taken 3/24/2021 1429 by Genoveva Pike, RN  Plan of Care Reviewed With: patient   Goal Outcome Evaluation:     Progress: improving

## 2021-04-01 NOTE — PROGRESS NOTES
Rehabilitation Nursing  Inpatient Rehabilitation Plan of Care Note    Plan of Care  Pain    Pain Management (Active)  Current Status (3/18/2021 4:16:00 PM): Potential for pain  Weekly Goal: No pain this week  Discharge Goal: No pain    Safety    Potential for Injury (Active)  Current Status (3/18/2021 4:16:00 PM): At risk for injury  Weekly Goal: No injury this week  Discharge Goal: No injury    Signed by: Nurse Zena

## 2021-04-01 NOTE — PROGRESS NOTES
Occupational Therapy:    Physical Therapy: Individual: 90 minutes.    Speech Language Pathology:    Signed by: Barry Santamaria PT

## 2021-04-01 NOTE — PROGRESS NOTES
Primary hospitalist: Dr. Bansal    Reason for Consultation: elevated d-dimer with intermediate risk of PE on VQ scan      Chief complaint: cough, shortness of breath      History of present illness:   Mrs. Lay is a 76 year old female with past medical history including diabetes, hypothyroidism, vitamin D deficiency, hyperlipidemia, hypertension, allergic rhinitis. She was diagnosed with COVID-19 infection and resulting acute hypoxic respiratory failure in January 2021. After initially failing CPAP therapy, she was intubated and later required tracheostomy due to difficult ventilator weaning. Medications included dexamethasone, remdesivir, and antibiotics.  She was hospitalized at that time at [facility]. Tracheostomy was completed on 1/25, and PEG tube placed on 1/26. She was transferred to [facility] on 1/28/2021 for further ventilation weaning.   By mid March 2021, she was able to undergo tracheostomy removal and tolerate low rate nasal cannula therapy. She was transferred to the rehabilitation services on 3/18 due to significant muscle weakness following prolonged illness.   Per chart review, she appears to have tolerated therapy fairly well since admission with use of approximately 3 L/m nasal cannula. Fever developed on 3/21 with desaturations on 3 L on 3/22. Blood cultures were later positive for enterococcus and serratia. She required increase of O2 to 5 L/m. Over the following several days, she was able to be titrated back down to 4 L/m. Ultrasound assessment has been negative for DVT.   On 3/29, she was notable for desaturations when ambulating. D-dimer assessment was elevated at 1.42. VQ scan was performed and showed intermediate probability. Chest xray yesterday notable for bilateral patchy infiltrates, with peripheral predominance.  She continues on 4 L/m O2 today. She states that she does not feel significantly short of breath at this time, but did note increased  shortness of breath and significant coughing over last several days (reported very frequent coughing yesterday but improved today). She previously had only minimal sputum production, but states that she is able to produce more today with clear coloration. She denies any pleuritic pain, chest pain, hemoptysis, swelling, nausea/vomiting. She admits to fatigue as she completed therapy this morning but feels that she is tolerating it well thus far.   She admits to a former smoking history of approximately 42 years (started after age 21, quit approximately 13-14 years ago. She states that she was not heavy smoker, but did not specify the pack average. She does not use inhalers at baseline. She did not require supplemental oxygen prior to this hospitalization.         Review of Systems: Review of Systems - History obtained from chart review and the patient  General ROS: negative for chills, fatigue or fever  Psychological ROS: negative for anxiety or depression  ENT ROS: negative for - headaches, visual changes or vocal changes  Respiratory ROS: positive for cough and shortness of breath  Cardiovascular ROS: no chest pain or dyspnea on exertion  Gastrointestinal ROS: no abdominal pain, change in bowel habits, or black or bloody stools  Musculoskeletal ROS: negative for joint pain, joint stiffness or joint swelling  Neurological ROS: no TIA or stroke symptoms  Heme- no bleeding, no lumps and bumps in armpit  Skin- no bruises, no rash        Past medical history, past surgical history, social history and family history:   has a past medical history of COPD (chronic obstructive pulmonary disease) (CMS/[facility]), Diabetes mellitus (CMS/[facility]), and Hypertension.   has no past surgical history on file.   reports that she has quit smoking. She quit after 42.00 years of use. She does not have any smokeless tobacco history on file. She reports that she does not drink alcohol and does not use drugs.  • family history is not on file.  "    Medication and allergies:  • Prior to admission  Current Outpatient Medications   Medication Instructions   • aspirin 81 mg, Oral, Daily   • fluticasone (FLONASE) 50 MCG/ACT nasal spray 2 sprays, Nasal, Daily   • gabapentin (NEURONTIN) 400 mg, Oral, 3 Times Daily PRN   • HYDROcodone-acetaminophen (NORCO) 7.5-325 MG per tablet 1 tablet, Oral, 2 Times Daily PRN   • levothyroxine (SYNTHROID, LEVOTHROID) 50 mcg, Oral, Daily   • lisinopril (PRINIVIL,ZESTRIL) 30 mg, Oral, Daily   • metFORMIN (GLUCOPHAGE) 500 mg, Oral, 2 Times Daily With Meals   • montelukast (SINGULAIR) 10 mg, Oral, Nightly   • simvastatin (ZOCOR) 40 mg, Oral, Nightly   • vitamin D (ERGOCALCIFEROL) 50,000 Units, Oral, Weekly        • Active medication:  atorvastatin, 20 mg, Oral, Nightly  cefepime, 2 g, Intravenous, Q8H  Check Fentanyl Patch Placement, 1 each, Does not apply, Q12H  fentaNYL, 1 patch, Transdermal, Q72H   And  Check Fentanyl Patch Placement, 1 each, Does not apply, Q12H  gabapentin, 100 mg, Oral, Q8H  insulin aspart, 0-7 Units, Subcutaneous, TID AC  insulin detemir, 24 Units, Subcutaneous, Nightly  lactobacillus acidophilus, 1 capsule, Oral, Daily  levothyroxine, 50 mcg, Oral, Q AM  lidocaine-EPINEPHrine, 10 mL, Infiltration, Once  metoprolol tartrate, 12.5 mg, Oral, Q12H  mupirocin, , Topical, Q12H  pantoprazole, 40 mg, Oral, Q AM  polyethylene glycol, 17 g, Oral, Daily  predniSONE, 50 mg, Oral, Q6H  QUEtiapine, 50 mg, Oral, Q12H  sucralfate, 1 g, Oral, 4x Daily AC & at Bedtime  vancomycin, 1,500 mg, Intravenous, Q12H        • Continuous Infusions:  heparin (porcine), 12 Units/kg/hr, Last Rate: 12 Units/kg/hr (04/01/21 1045)  Pharmacy Consult - Pharmacy to dose,          Allergies:    Ciprofloxacin and Contrast dye      Vital Signs    height is 172.7 cm (68\") and weight is 76.2 kg (168 lb). Her oral temperature is 98.3 °F (36.8 °C). Her blood pressure is 125/78 and her pulse is 96. Her respiration is 18 and oxygen saturation is 97%.  "       Physical Exam:  Constitutional:  oriented to person, place, and time. No distress.   Head: Normocephalic and atraumatic.   Right Ear and left Ear : External ear normal.   Nose: Nose normal.   Eyes: Pupils are equal, round, and reactive to light.  No scleral icterus.   Neck: Normal range of motion. Neck supple.    Cardiovascular: Normal rate, regular rhythm, normal heart sounds. Exam reveals no friction rub.  No murmur heard.  Pulmonary: Bilateral air entry positive. Bilateral coarse crackles, left side greater than the right. No wheezing, rhonchi. Normal effort. On 4 L/m nasal cannula.   Abdominal: Soft. Bowel sounds are normal. exhibits no distension.  Musculoskeletal: Normal range of motion. Exhibits no deformity.   Neurological: alert and oriented to person, place, and time. No cranial nerve deficit. Coordination normal.   Skin: Skin is warm and dry. No erythema. No edema.    Psychiatric:   normal mood and affect.   behavior is normal.       Scribed for Dr. Grant by Tawnya Duron PA-C    Result review:   Serum creatinine.  Elevated CRP.  No leukocytosis.  ANC normal.  Absolute lymphocyte count normal.  Elevated absolute eosinophil count.  Intermediate probability of pulmonary embolism on V/Q.    PROCEDURE: CR Chest 1 Vw     COMPARISON:  March 28 and 3/23/2021 chest x-rays      INDICATIONS: Cough, aspiration/CHF     TECHNIQUE: Single AP  view of the chest     FINDINGS:  Cardiomediastinal silhouette is enlarged but lung volumes are low. Bilateral patchy opacities seen in the lungs, worsened since prior exam. Most of the infiltrate is peripheral. Left PICC line is unchanged. This was last imaged by chest CT  March 2021 and some of the changes are related to chronic fibrosis. Osseous structures are intact.     IMPRESSION:  Worsening pulmonary status with lower lung volumes and continued bilateral peripheral diffuse infiltrates.            Signer Name: Lindsey Duron MD   Signed: 3/31/2021 7:03 AM    Workstation Name: [facility]    [facility]  Venous Doppler lower extremity negative.      Assessment:  • Hypoxia requiring supplemental oxygen  • History of COVID-19 pneumonia complicated by tracheostomy and PEG placement.   • Clinical course was complicated by blood culture positive for Enterococcus and Serratia.  • VQ scan of intermediate probability for PE  • Former smoker    Plan:  1. Pulmonary medicine team was consulted as patient is allergic to contrast dye with high severity and reaction being itching.  VQ scan shows intermediate probability of pulmonary embolism.  Patient is on IV heparin drip.  She is on prednisone protocol for contrast allergy for CT PE tomorrow.  2. Consider involving hematology for evaluation and management of eosinophilia.  3. Ordered upper extremity venous Dopplers  4. On IV antibiotics as per infectious disease team    I have discussed the clinical plan with Dr. Bansal.  I, Omer Grant M.D. attest that the above note accurately reflects the work and decisions made by me.  Patient was seen and evaluated by me, including history of present illness, physical exam, assessment, and treatment plan.  The above note was reviewed and edited by me.    Thank you for involving me in the care of the patient.  Omer Grant M.D  Pulmonary and Critical Care Medicine

## 2021-04-01 NOTE — THERAPY RE-EVALUATION
Inpatient Rehabilitation - Physical Therapy Re-Evaluation and Treatment Note       [facility]     Patient Name: Argenis Lay  : 1945  MRN: [id number]    Today's Date: 2021                    Admit Date: 3/18/2021      Visit Dx: No diagnosis found.    Patient Active Problem List   Diagnosis   • Debility       Past Medical History:   Diagnosis Date   • COPD (chronic obstructive pulmonary disease) (CMS/HCC)    • Diabetes mellitus (CMS/[facility])    • Hypertension        History reviewed. No pertinent surgical history.       PT ASSESSMENT (last 12 hours)      IRF PT Evaluation and Treatment     Row Name 21 1254          PT Time and Intention    Document Type  daily treatment;re-evaluation  -CW     Mode of Treatment  physical therapy  -CW     Patient/Family/Caregiver Comments/Observations  Patient agreeable to treatment this date. She reports that she had a rough day yesterday, she was told she has CHF and her lungs were worse than before. She expresses concern about her current discharge date.  -CW     Total Minutes, Physical Therapy  90  -CW     Row Name 21 1254          General Information    Patient Profile Reviewed  yes  -CW     General Observations of Patient  alert/cooperative  -CW     Existing Precautions/Restrictions  fall;oxygen therapy device and L/min abdominal binder  -CW     Row Name 21 1254          Coping Strategies    Trust Relationship/Rapport  care explained;choices provided;questions answered;reassurance provided;thoughts/feelings acknowledged  -CW     Row Name 21 1254          Cognition/Psychosocial    Affect/Mental Status (Cognitive)  anxious  -CW     Behavioral Issues (Cognitive)  overwhelmed easily  -CW     Orientation Status (Cognition)  oriented x 3  -CW     Follows Commands (Cognition)  follows one-step commands;repetition of directions required;verbal cues/prompting required  -CW     Row Name 21 1254          Pain Scale: Numbers Pre/Post-Treatment     Pre/Posttreatment Pain Comment  Patient reports no pain during physical therapy treatment this date.  -CW     Row Name 04/01/21 1254          Range of Motion Comprehensive    Comment, General Range of Motion  AROM now grossly WFL  -CW     Row Name 04/01/21 1254          Strength Comprehensive (MMT)    Comment, General Manual Muscle Testing (MMT) Assessment  BLE now MMT grossly 4-/5  -CW     Row Name 04/01/21 1254          Bed Mobility    All Activities, [place] (Bed Mobility)  moderate assist (50% patient effort);verbal cues;nonverbal cues (demo/gesture)  -     Bed Mobility, Safety Issues  decreased use of arms for pushing/pulling;decreased use of legs for bridging/pushing  -     Assistive Device (Bed Mobility)  bed rails;draw sheet  -     Row Name 04/01/21 1254          Transfers    Bed-Chair [place] (Transfers)  verbal cues;nonverbal cues (demo/gesture);moderate assist (50% patient effort)  -     Chair-Bed [place] (Transfers)  verbal cues;nonverbal cues (demo/gesture);moderate assist (50% patient effort)  -     Assistive Device (Bed-Chair Transfers)  wheelchair  -CW     Sit-Stand [place] (Transfers)  verbal cues;nonverbal cues (demo/gesture);2 person assist;moderate assist (50% patient effort);1 person assist x 4   -CW     Stand-Sit [place] (Transfers)  verbal cues;nonverbal cues (demo/gesture);2 person assist;moderate assist (50% patient effort)  -CW     Row Name 04/01/21 1254          Chair-Bed Transfer    Assistive Device (Chair-Bed Transfers)  wheelchair  -CW     Row Name 04/01/21 1254          Sit-Stand Transfer    Assistive Device (Sit-Stand Transfers)  other (see comments) parallel bars  -CW     Row Name 04/01/21 1254          Stand-Sit Transfer    Assistive Device (Stand-Sit Transfers)  other (see comments) parallel bars  -CW     Row Name 04/01/21 1254          Gait/Stairs (Locomotion)    [place] Level (Gait)  moderate assist (50% patient effort);2 person  assist;verbal cues;nonverbal cues (demo/gesture)  -CW     Assistive Device (Gait)  parallel bars  -CW     Distance in Feet (Gait)  5 x 2  -CW     Pattern (Gait)  step-to  -CW     Deviations/Abnormal Patterns (Gait)  luiz decreased;gait speed decreased;stride length decreased;weight shifting decreased  -CW     Bilateral Gait Deviations  forward flexed posture;heel strike decreased;leans left;leans right  -CW     Row Name 04/01/21 1254          Safety Issues, Functional Mobility    Safety Issues Affecting Function (Mobility)  insight into deficits/self-awareness;judgment;problem-solving;other (see comments) anxiety, fear of falling  -CW     Impairments Affecting Function (Mobility)  balance;endurance/activity tolerance;shortness of breath;strength  -CW     Row Name 04/01/21 1254          Balance    Static Standing Balance  moderate impairment;supported  -CW     Row Name 04/01/21 1254          Motor Skills    Motor Skills  functional endurance Standing frame 2 x 7 minutes  -CW     Therapeutic Exercise  other (see comments) Seated LE strengthening interventions x2 sets  -CW     Row Name 04/01/21 1254          Bed Mobility Goal 1 (PT-IRF)    Activity/Assistive Device (Bed Mobility Goal 1, PT-IRF)  bed mobility activities, all  -CW     [place] Level (Bed Mobility Goal 1, PT-IRF)  minimum assist (75% or more patient effort)  -CW     Time Frame (Bed Mobility Goal 1, PT-IRF)  by discharge  -CW     Progress/Outcomes (Bed Mobility Goal 1, PT-IRF)  continuing progress toward goal  -CW     Row Name 04/01/21 1254          Transfer Goal 1 (PT-IRF)    Activity/Assistive Device (Transfer Goal 1, PT-IRF)  all transfers  -CW     [place] Level (Transfer Goal 1, PT-IRF)  minimum assist (75% or more patient effort)  -CW     Time Frame (Transfer Goal 1, PT-IRF)  by discharge  -CW     Progress/Outcomes (Transfer Goal 1, PT-IRF)  continuing progress toward goal  -CW     Row Name 04/01/21 1254          Gait/Walking Locomotion  Goal 1 (PT-IRF)    Activity/Assistive Device (Gait/Walking Locomotion Goal 1, PT-IRF)  gait (walking locomotion);assistive device use;decrease fall risk;improve balance and speed;increase endurance/gait distance;walker, rolling  -CW     Gait/Walking Locomotion Distance Goal 1 (PT-IRF)  50  -CW     [place] Level (Gait/Walking Locomotion Goal 1, PT-IRF)  minimum assist (75% or more patient effort)  -CW     Time Frame (Gait/Walking Locomotion Goal 1, PT-IRF)  by discharge  -CW     Progress/Outcomes (Gait/Walking Locomotion Goal 1, PT-IRF)  progress slower than expected;continuing progress toward goal  -CW     Row Name 04/01/21 1254          Positioning and Restraints    Pre-Treatment Position  in bed  -CW     Post Treatment Position  wheelchair  -CW     In Wheelchair  with OT  -CW     Row Name 04/01/21 1259          Therapy Assessment/Plan (PT)    Rehab Potential/Prognosis (PT)  adequate, monitor progress closely  -CW     Estimated Duration of Therapy (PT)  1 week;2 weeks  -CW     Problem List (PT)  problems related to;strength;mobility;balance  -CW     Comment, Therapy Assessment/Plan (PT)  Patient displays progress in ability to achieve and tolerance of functional mobility since her initial evaluation. She still requires significant assistance, but moderate now compared to maximal initially. She demonstrates ability to ambulate this date for the first time during this episode of care. She continues to be limited by anxiety and pessimism regarding her current functional ability, but is reassured by recollection of the progress she has made thus far. Patient will continue to benefit from skilled physical therapy as continued strength, endurance, and mobility training will improve her ability to achieve effective functional mobility. At this time she will require assistance at home and benefit from home health PT.  -CW     Row Name 04/01/21 125          Daily Progress Summary (PT)    Functional Goal Overall  Progress (PT)  progressing toward functional goals slower than expected  -CW     Impairments Still Limiting Function (PT)  ROM decreased;strength decreased;impaired balance;impaired functional activity tolerance  -CW     Row Name 04/01/21 1254          Therapy Plan Review/Discharge Plan (PT)    Therapy Plan Review (PT)  evaluation/treatment results reviewed;care plan/treatment goals reviewed  -CW     Anticipated Equipment Needs at Discharge (PT Eval)  front wheeled walker;hospital bed;shower chair;wheelchair;bedside commode  -CW     Expected Discharge Disposition (PT Eval)  home with caregiver;home with home health care  -CW       User Key  (r) = Recorded By, (t) = Taken By, (c) = Cosigned By    Initials Name Provider Type    CW Barry Santamaria, PT Physical Therapist        Wound 03/26/21 1637 Right lower abdomen Incision (Active)   Dressing Appearance dry;intact 04/01/21 1000   Base dressing in place, unable to visualize 04/01/21 1000     Physical Therapy Education                 Title: PT OT SLP Therapies (Done)     Topic: Physical Therapy (Done)     Point: Mobility training (Done)     Learning Progress Summary           Patient Acceptance, E,D, VU,NR by LL at 3/31/2021 1059    Acceptance, E,D, VU,NR by RG at 3/30/2021 1427    Acceptance, E,D, VU,NR by LL at 3/30/2021 0850    Acceptance, E,D, VU,NR by LL at 3/29/2021 0934    Acceptance, E, VU by EB at 3/29/2021 0755    Acceptance, E,D, VU,NR by LL at 3/26/2021 1336    Acceptance, E,D, VU,NR by AG at 3/25/2021 1517    Acceptance, E,D, VU,NR by AG at 3/24/2021 1537    Acceptance, E,D, VU,NR by AG at 3/23/2021 1506    Acceptance, E,TB, VU by CT at 3/20/2021 1338    Acceptance, E,TB, VU by DG at 3/20/2021 0010    Acceptance, E, NR by CW at 3/19/2021 1330                   Point: Home exercise program (Done)     Learning Progress Summary           Patient Acceptance, E,D, VU,NR by LL at 3/31/2021 1059    Acceptance, E,D, VU,NR by RG at 3/30/2021 1427    Acceptance,  E,D, VU,NR by LL at 3/30/2021 0850    Acceptance, E,D, VU,NR by LL at 3/29/2021 0934    Acceptance, E, VU by EB at 3/29/2021 0755    Acceptance, E,D, VU,NR by LL at 3/26/2021 1336    Acceptance, E,D, VU,NR by AG at 3/25/2021 1517    Acceptance, E,D, VU,NR by AG at 3/24/2021 1537    Acceptance, E,D, VU,NR by AG at 3/23/2021 1506    Acceptance, E,TB, VU by CT at 3/20/2021 1338    Acceptance, E,TB, VU by DG at 3/20/2021 0010    Acceptance, E, NR by CW at 3/19/2021 1330                   Point: Body mechanics (Done)     Learning Progress Summary           Patient Acceptance, E,D, VU,NR by LL at 3/31/2021 1059    Acceptance, E,D, VU,NR by RG at 3/30/2021 1427    Acceptance, E,D, VU,NR by LL at 3/30/2021 0850    Acceptance, E,D, VU,NR by LL at 3/29/2021 0934    Acceptance, E, VU by EB at 3/29/2021 0755    Acceptance, E,D, VU,NR by LL at 3/26/2021 1336    Acceptance, E,D, VU,NR by AG at 3/25/2021 1517    Acceptance, E,D, VU,NR by AG at 3/24/2021 1537    Acceptance, E,D, VU,NR by AG at 3/23/2021 1506    Acceptance, E,TB, VU by CT at 3/20/2021 1338    Acceptance, E,TB, VU by DG at 3/20/2021 0010    Acceptance, E, NR by CW at 3/19/2021 1330                   Point: Precautions (Done)     Learning Progress Summary           Patient Acceptance, E,D, VU,NR by LL at 3/31/2021 1059    Acceptance, E,D, VU,NR by RG at 3/30/2021 1427    Acceptance, E,D, VU,NR by LL at 3/30/2021 0850    Acceptance, E,D, VU,NR by LL at 3/29/2021 0934    Acceptance, E, VU by EB at 3/29/2021 0755    Acceptance, E,D, VU,NR by LL at 3/26/2021 1336    Acceptance, E,D, VU,NR by AG at 3/25/2021 1517    Acceptance, E,D, VU,NR by AG at 3/24/2021 1537    Acceptance, E,D, VU,NR by AG at 3/23/2021 1506    Acceptance, E,TB, VU by CT at 3/20/2021 1338    Acceptance, E,TB, VU by DG at 3/20/2021 0010    Acceptance, E, NR by CW at 3/19/2021 1330                               User Key     Initials Effective Dates Name Provider Type [facility]    ADDIE 06/16/16 -  Tere  Diamond Delcid, RN Registered Nurse Nurse    DG 06/16/16 -  Tonie Mendes RN Registered Nurse Nurse    AG 04/03/18 -  Ivana Carpenter, PT Physical Therapist PT    CT 04/03/18 -  Jing Escalante, PT Physical Therapist PT    LL 05/02/16 -  Martha Schaeffer, PTA Physical Therapy Assistant PT    RG 10/31/19 -  Jonathan Spangler, JENNIFER Physical Therapy Assistant PT    CW 09/11/20 -  Barry Santamaria, PT Physical Therapist PT                PT Recommendation and Plan    Planned Therapy Interventions (PT): balance training, bed mobility training, gait training, home exercise program, patient/family education, strengthening, transfer training, wheelchair management/propulsion training  Frequency of Treatment (PT): 5 times per week, 90 minutes per session  Anticipated Equipment Needs at Discharge (PT Eval): front wheeled walker, hospital bed, shower chair, wheelchair, bedside commode  Daily Progress Summary (PT)  Functional Goal Overall Progress (PT): progressing toward functional goals slower than expected  Impairments Still Limiting Function (PT): ROM decreased, strength decreased, impaired balance, impaired functional activity tolerance               Time Calculation:     PT Charges     Row Name 04/01/21 1320             Time Calculation    Start Time  0830  -CW      Stop Time  1000  -CW      Time Calculation (min)  90 min  -CW      PT Received On  04/01/21  -CW      PT Goal Re-Cert Due Date  04/08/21  -CW         Time Calculation- PT    Total Timed Code Minutes- PT  90 minute(s)  -CW        User Key  (r) = Recorded By, (t) = Taken By, (c) = Cosigned By    Initials Name Provider Type    CW Barry Santamaria, PT Physical Therapist          Therapy Charges for Today     Code Description Service Date Service Provider Modifiers Qty    16362619717 HC GAIT TRAINING EA 15 MIN 4/1/2021 Barry Santamaria, PT GP 1    27416220314 HC PT THERAPEUTIC ACT EA 15 MIN 4/1/2021 Barry Santamaria, PT GP 3    33114605197 HC PT THER PROC EA 15 MIN  4/1/2021 Barry Santamaria, PT GP 2                   Barry Santamaria, PT  4/1/2021

## 2021-04-01 NOTE — PROGRESS NOTES
Occupational Therapy: Individual: 90 minutes.    Physical Therapy:    Speech Language Pathology:    Signed by: Vanesa Hernandez OT

## 2021-04-01 NOTE — THERAPY TREATMENT NOTE
Inpatient Rehabilitation - Occupational Therapy Treatment Note    [facility] [place]     Patient Name: Argenis Lay  : 1945  MRN: [id number]    Today's Date: 2021                 Admit Date: 3/18/2021       No diagnosis found.    Patient Active Problem List   Diagnosis   • Debility       Past Medical History:   Diagnosis Date   • COPD (chronic obstructive pulmonary disease) (CMS/HCC)    • Diabetes mellitus (CMS/HCC)    • Hypertension        History reviewed. No pertinent surgical history.             IRF OT ASSESSMENT FLOWSHEET (last 12 hours)      IRF OT Evaluation and Treatment     Row Name 21 1431          OT Time and Intention    Document Type  daily treatment  -BF     Mode of Treatment  occupational therapy  -BF     Patient Effort  good  -BF     Symptoms Noted During/After Treatment  fatigue  -BF     Row Name 21 1431          General Information    Existing Precautions/Restrictions  fall;oxygen therapy device and L/min abdominal binder  -BF     Row Name 21 143          Cognition/Psychosocial    Orientation Status (Cognition)  oriented x 3  -BF     Follows Commands (Cognition)  follows one-step commands;verbal cues/prompting required;repetition of directions required  -BF     Row Name 21 1431          Motor Skills    Motor Control/Coordination Interventions  fine motor manipulation/dexterity activities;gross motor coordination activities;therapeutic exercise/ROM BUE GMC/FMC theract; light strengthening; AAROM L hand  -BF     Gross Motor Skill, Impairments Detail  Arm bike 2 mins X2  -BF     Row Name 21 1431          Grooming    [place] Level (Grooming)  minimum assist (75% patient effort);verbal cues;nonverbal cues (demo/gesture)  -BF     Position (Grooming)  supported sitting  -BF     Comment (Grooming)  Min A  -BF     Row Name 21 143          Positioning and Restraints    In Wheelchair  sitting;call light within reach;encouraged to call for assist  -BF       User Key   (r) = Recorded By, (t) = Taken By, (c) = Cosigned By    Initials Name Effective Dates    BF HernandezFaribaVanesaamanda Nesbitt OT 07/05/20 -            Occupational Therapy Education                 Title: PT OT SLP Therapies (Done)     Topic: Occupational Therapy (Done)     Point: ADL training (Done)     Description:   Instruct learner(s) on proper safety adaptation and remediation techniques during self care or transfers.   Instruct in proper use of assistive devices.              Learning Progress Summary           Patient Acceptance, E, VU,NR by BF at 4/1/2021 1431    Acceptance, E, VU,NR by BF at 3/30/2021 1233    Acceptance, E, VU,NR by BF at 3/29/2021 1219    Acceptance, E, VU by EB at 3/29/2021 0755    Acceptance, E, VU,NR by BF at 3/26/2021 1357    Acceptance, E, VU,NR by BF at 3/24/2021 1337    Acceptance, E, VU,NR by BF at 3/23/2021 1451    Acceptance, E,D, VU,NR by CJ at 3/23/2021 1444    Acceptance, E,TB, VU by DG at 3/20/2021 0010    Acceptance, E, VU,NR by BF at 3/19/2021 1429                   Point: Home exercise program (Done)     Description:   Instruct learner(s) on appropriate technique for monitoring, assisting and/or progressing therapeutic exercises/activities.              Learning Progress Summary           Patient Acceptance, E, VU by EB at 3/29/2021 0755    Acceptance, E,TB, VU by DG at 3/20/2021 0010                   Point: Precautions (Done)     Description:   Instruct learner(s) on prescribed precautions during self-care and functional transfers.              Learning Progress Summary           Patient Acceptance, E, VU,NR by BF at 3/30/2021 1233    Acceptance, E, VU by EB at 3/29/2021 0755    Acceptance, E, VU,NR by BF at 3/26/2021 1357    Acceptance, E, VU,NR by BF at 3/24/2021 1337    Acceptance, E,D, VU,NR by CJ at 3/23/2021 1444    Acceptance, E,TB, VU by DG at 3/20/2021 0010    Acceptance, E, VU,NR by BF at 3/19/2021 1429                   Point: Body mechanics (Done)     Description:    Instruct learner(s) on proper positioning and spine alignment during self-care, functional mobility activities and/or exercises.              Learning Progress Summary           Patient Acceptance, E, VU by EB at 3/29/2021 0755    Acceptance, E,TB, VU by BRADY at 3/20/2021 0010                               User Key     Initials Effective Dates Name Provider Type Discipline    EB 06/16/16 -  Diamond Carranza, RN Registered Nurse Nurse    DG 06/16/16 -  Tonie Mendes RN Registered Nurse Nurse     07/05/20 -  Vanesa Hernandez OT Occupational Therapist OT     04/03/18 -  Sharon Abbott OT Occupational Therapist OT                    OT Recommendation and Plan    Planned Therapy Interventions (OT): activity tolerance training, adaptive equipment training, BADL retraining, neuromuscular control/coordination retraining, ROM/therapeutic exercise, strengthening exercise, transfer/mobility retraining                    Time Calculation:     Time Calculation- OT     Row Name 04/01/21 1438             Time Calculation- OT    OT Start Time  1010  -BF      OT Stop Time  1140  -BF      OT Time Calculation (min)  90 min  -BF      Total Timed Code Minutes- OT  90 minute(s)  -BF      OT Non-Billable Time (min)  20 min  -BF        User Key  (r) = Recorded By, (t) = Taken By, (c) = Cosigned By    Initials Name Provider Type     Vanesa Hernandez OT Occupational Therapist        Therapy Charges for Today     Code Description Service Date Service Provider Modifiers Qty    20319493944 HC OT THERAPEUTIC ACT EA 15 MIN 4/1/2021 Vaneas Hernandez OT GO 3    79035497973 HC OT SELF CARE/MGMT/TRAIN EA 15 MIN 4/1/2021 Vanesa Hernandez OT GO 1    49198998433 HC OT THER PROC EA 15 MIN 4/1/2021 Vanesa Hernandez OT GO 2                   Vanesa Hernandez OT  4/1/2021

## 2021-04-02 ENCOUNTER — APPOINTMENT (OUTPATIENT)
Dept: ULTRASOUND IMAGING | Facility: HOSPITAL | Age: 76
End: 2021-04-02

## 2021-04-02 ENCOUNTER — APPOINTMENT (OUTPATIENT)
Dept: CT IMAGING | Facility: HOSPITAL | Age: 76
End: 2021-04-02

## 2021-04-02 LAB
APTT PPP: 55.6 SECONDS (ref 25.6–35.3)
APTT PPP: 67.8 SECONDS (ref 25.6–35.3)
GLUCOSE BLDC GLUCOMTR-MCNC: 161 MG/DL (ref 70–130)
GLUCOSE BLDC GLUCOMTR-MCNC: 167 MG/DL (ref 70–130)
GLUCOSE BLDC GLUCOMTR-MCNC: 195 MG/DL (ref 70–130)
GLUCOSE BLDC GLUCOMTR-MCNC: 80 MG/DL (ref 70–130)

## 2021-04-02 PROCEDURE — 93970 EXTREMITY STUDY: CPT

## 2021-04-02 PROCEDURE — 25010000002 ENOXAPARIN PER 10 MG: Performed by: INTERNAL MEDICINE

## 2021-04-02 PROCEDURE — 97110 THERAPEUTIC EXERCISES: CPT

## 2021-04-02 PROCEDURE — 63710000001 INSULIN DETEMIR PER 5 UNITS: Performed by: INTERNAL MEDICINE

## 2021-04-02 PROCEDURE — 97535 SELF CARE MNGMENT TRAINING: CPT | Performed by: OCCUPATIONAL THERAPIST

## 2021-04-02 PROCEDURE — 63710000001 DIPHENHYDRAMINE PER 50 MG: Performed by: INTERNAL MEDICINE

## 2021-04-02 PROCEDURE — 97530 THERAPEUTIC ACTIVITIES: CPT | Performed by: OCCUPATIONAL THERAPIST

## 2021-04-02 PROCEDURE — 99232 SBSQ HOSP IP/OBS MODERATE 35: CPT | Performed by: INTERNAL MEDICINE

## 2021-04-02 PROCEDURE — 63710000001 INSULIN ASPART PER 5 UNITS: Performed by: INTERNAL MEDICINE

## 2021-04-02 PROCEDURE — 97116 GAIT TRAINING THERAPY: CPT

## 2021-04-02 PROCEDURE — 85730 THROMBOPLASTIN TIME PARTIAL: CPT | Performed by: INTERNAL MEDICINE

## 2021-04-02 PROCEDURE — 25010000002 VANCOMYCIN 5 G RECONSTITUTED SOLUTION: Performed by: INTERNAL MEDICINE

## 2021-04-02 PROCEDURE — 25010000002 VANCOMYCIN: Performed by: INTERNAL MEDICINE

## 2021-04-02 PROCEDURE — 25010000002 HEPARIN (PORCINE) PER 1000 UNITS: Performed by: INTERNAL MEDICINE

## 2021-04-02 PROCEDURE — 94799 UNLISTED PULMONARY SVC/PX: CPT

## 2021-04-02 PROCEDURE — 93970 EXTREMITY STUDY: CPT | Performed by: RADIOLOGY

## 2021-04-02 PROCEDURE — 71275 CT ANGIOGRAPHY CHEST: CPT | Performed by: RADIOLOGY

## 2021-04-02 PROCEDURE — 97110 THERAPEUTIC EXERCISES: CPT | Performed by: OCCUPATIONAL THERAPIST

## 2021-04-02 PROCEDURE — 25010000002 CEFEPIME PER 500 MG: Performed by: INTERNAL MEDICINE

## 2021-04-02 PROCEDURE — 71275 CT ANGIOGRAPHY CHEST: CPT

## 2021-04-02 PROCEDURE — 82962 GLUCOSE BLOOD TEST: CPT

## 2021-04-02 PROCEDURE — 97530 THERAPEUTIC ACTIVITIES: CPT

## 2021-04-02 PROCEDURE — 63710000001 DIPHENHYDRAMINE PER 50 MG

## 2021-04-02 PROCEDURE — 0 IOPAMIDOL PER 1 ML: Performed by: INTERNAL MEDICINE

## 2021-04-02 RX ORDER — DIPHENHYDRAMINE HCL 25 MG
CAPSULE ORAL
Status: DISPENSED
Start: 2021-04-02 | End: 2021-04-02

## 2021-04-02 RX ORDER — DIPHENHYDRAMINE HCL 25 MG
25 CAPSULE ORAL EVERY 6 HOURS PRN
Status: DISCONTINUED | OUTPATIENT
Start: 2021-04-02 | End: 2021-04-12 | Stop reason: HOSPADM

## 2021-04-02 RX ADMIN — CEFEPIME 2 G: 2 INJECTION, POWDER, FOR SOLUTION INTRAVENOUS at 21:48

## 2021-04-02 RX ADMIN — GABAPENTIN 100 MG: 100 CAPSULE ORAL at 13:52

## 2021-04-02 RX ADMIN — IOPAMIDOL 70 ML: 755 INJECTION, SOLUTION INTRAVENOUS at 10:32

## 2021-04-02 RX ADMIN — QUETIAPINE FUMARATE 50 MG: 25 TABLET, FILM COATED ORAL at 20:45

## 2021-04-02 RX ADMIN — CEFEPIME 2 G: 2 INJECTION, POWDER, FOR SOLUTION INTRAVENOUS at 12:39

## 2021-04-02 RX ADMIN — VANCOMYCIN HYDROCHLORIDE 1500 MG: 10 INJECTION, POWDER, LYOPHILIZED, FOR SOLUTION INTRAVENOUS at 02:02

## 2021-04-02 RX ADMIN — INSULIN DETEMIR 24 UNITS: 100 INJECTION, SOLUTION SUBCUTANEOUS at 20:05

## 2021-04-02 RX ADMIN — PANTOPRAZOLE SODIUM 40 MG: 40 TABLET, DELAYED RELEASE ORAL at 05:13

## 2021-04-02 RX ADMIN — HEPARIN SODIUM 16.01 UNITS/KG/HR: 10000 INJECTION, SOLUTION INTRAVENOUS at 05:16

## 2021-04-02 RX ADMIN — LEVOTHYROXINE SODIUM 50 MCG: 88 TABLET ORAL at 05:13

## 2021-04-02 RX ADMIN — INSULIN ASPART 2 UNITS: 100 INJECTION, SOLUTION INTRAVENOUS; SUBCUTANEOUS at 12:46

## 2021-04-02 RX ADMIN — MUPIROCIN: 20 OINTMENT TOPICAL at 10:30

## 2021-04-02 RX ADMIN — MUPIROCIN: 20 OINTMENT TOPICAL at 20:44

## 2021-04-02 RX ADMIN — OXYCODONE HYDROCHLORIDE 5 MG: 5 TABLET ORAL at 20:45

## 2021-04-02 RX ADMIN — ENOXAPARIN SODIUM 40 MG: 40 INJECTION SUBCUTANEOUS at 12:40

## 2021-04-02 RX ADMIN — OXYCODONE HYDROCHLORIDE 5 MG: 5 TABLET ORAL at 12:37

## 2021-04-02 RX ADMIN — CEFEPIME 2 G: 2 INJECTION, POWDER, FOR SOLUTION INTRAVENOUS at 03:46

## 2021-04-02 RX ADMIN — GABAPENTIN 100 MG: 100 CAPSULE ORAL at 21:51

## 2021-04-02 RX ADMIN — METOPROLOL TARTRATE 12.5 MG: 25 TABLET, FILM COATED ORAL at 08:41

## 2021-04-02 RX ADMIN — Medication 1 CAPSULE: at 08:41

## 2021-04-02 RX ADMIN — METOPROLOL TARTRATE 12.5 MG: 25 TABLET, FILM COATED ORAL at 20:45

## 2021-04-02 RX ADMIN — GABAPENTIN 100 MG: 100 CAPSULE ORAL at 05:16

## 2021-04-02 RX ADMIN — CLONAZEPAM 0.25 MG: 0.5 TABLET ORAL at 20:45

## 2021-04-02 RX ADMIN — ATORVASTATIN CALCIUM 20 MG: 20 TABLET, FILM COATED ORAL at 20:45

## 2021-04-02 RX ADMIN — VANCOMYCIN HYDROCHLORIDE 1500 MG: 10 INJECTION, POWDER, LYOPHILIZED, FOR SOLUTION INTRAVENOUS at 14:00

## 2021-04-02 RX ADMIN — DIPHENHYDRAMINE HYDROCHLORIDE 25 MG: 25 CAPSULE ORAL at 08:41

## 2021-04-02 RX ADMIN — INSULIN ASPART 2 UNITS: 100 INJECTION, SOLUTION INTRAVENOUS; SUBCUTANEOUS at 08:40

## 2021-04-02 RX ADMIN — QUETIAPINE FUMARATE 50 MG: 25 TABLET, FILM COATED ORAL at 08:41

## 2021-04-02 RX ADMIN — POLYETHYLENE GLYCOL (3350) 17 G: 17 POWDER, FOR SOLUTION ORAL at 08:41

## 2021-04-02 NOTE — THERAPY TREATMENT NOTE
Inpatient Rehabilitation - Physical Therapy Treatment Note        [place]     Patient Name: Argenis Lay  : 1945  MRN: [id number]    Today's Date: 2021                    Admit Date: 3/18/2021      Visit Dx: No diagnosis found.    Patient Active Problem List   Diagnosis   • Debility       Past Medical History:   Diagnosis Date   • COPD (chronic obstructive pulmonary disease) (CMS/HCC)    • Diabetes mellitus (CMS/HCC)    • Hypertension        History reviewed. No pertinent surgical history.       PT ASSESSMENT (last 12 hours)      IRF PT Evaluation and Treatment     Row Name 21 1007          PT Time and Intention    Document Type  daily treatment  -CW     Mode of Treatment  physical therapy  -CW     Patient/Family/Caregiver Comments/Observations  Patient agreeable to treatment this date.  -CW     Total Minutes, Physical Therapy  90  -CW     Row Name 21 1007          General Information    Patient Profile Reviewed  yes  -CW     General Observations of Patient  alert/cooperative  -CW     Existing Precautions/Restrictions  fall;oxygen therapy device and L/min  -CW     Row Name 21 1007          Coping Strategies    Trust Relationship/Rapport  care explained;choices provided;questions answered;thoughts/feelings acknowledged  -CW     Row Name 21 1007          Cognition/Psychosocial    Affect/Mental Status (Cognitive)  anxious;WFL  -CW     Behavioral Issues (Cognitive)  overwhelmed easily  -CW     Orientation Status (Cognition)  oriented x 3  -CW     Follows Commands (Cognition)  follows one-step commands;repetition of directions required;verbal cues/prompting required;physical/tactile prompts required  -CW     Row Name 21 1007          Pain Scale: Numbers Pre/Post-Treatment    Pre/Posttreatment Pain Comment  Patient reports mild knee pain following manual cues for bridges in supine, resolved with cessation of cues.  -CW     Row Name 21 1007          Bed Mobility    All  Activities, [place] (Bed Mobility)  moderate assist (50% patient effort);verbal cues;nonverbal cues (demo/gesture)  -CW     Bed Mobility, Safety Issues  decreased use of arms for pushing/pulling;decreased use of legs for bridging/pushing  -CW     Assistive Device (Bed Mobility)  bed rails;draw sheet  -CW     Row Name 04/02/21 1007          Transfers    Bed-Chair [place] (Transfers)  verbal cues;nonverbal cues (demo/gesture);moderate assist (50% patient effort)  -CW     Chair-Bed [place] (Transfers)  verbal cues;nonverbal cues (demo/gesture);moderate assist (50% patient effort)  -CW     Assistive Device (Bed-Chair Transfers)  wheelchair  -CW     Sit-Stand [place] (Transfers)  verbal cues;nonverbal cues (demo/gesture);2 person assist;moderate assist (50% patient effort);1 person assist x 4   -CW     Stand-Sit [place] (Transfers)  verbal cues;nonverbal cues (demo/gesture);moderate assist (50% patient effort);1 person assist  -CW     Row Name 04/02/21 1007          Chair-Bed Transfer    Assistive Device (Chair-Bed Transfers)  wheelchair  -CW     Row Name 04/02/21 1007          Sit-Stand Transfer    Assistive Device (Sit-Stand Transfers)  other (see comments) parallel bars  -CW     Row Name 04/02/21 1007          Stand-Sit Transfer    Assistive Device (Stand-Sit Transfers)  other (see comments) parallel bars  -CW     Row Name 04/02/21 1007          Gait/Stairs (Locomotion)    [place] Level (Gait)  moderate assist (50% patient effort);2 person assist;verbal cues;nonverbal cues (demo/gesture)  -     Assistive Device (Gait)  parallel bars  -     Distance in Feet (Gait)  6, 5  -CW     Pattern (Gait)  step-to  -CW     Deviations/Abnormal Patterns (Gait)  luiz decreased;gait speed decreased;stride length decreased;weight shifting decreased  -CW     Bilateral Gait Deviations  forward flexed posture;heel strike decreased;leans left;leans right  -CW     Row Name 04/02/21 1007          Safety  Issues, Functional Mobility    Safety Issues Affecting Function (Mobility)  insight into deficits/self-awareness;judgment;problem-solving;other (see comments) anxiety, fear of falling  -CW     Impairments Affecting Function (Mobility)  balance;endurance/activity tolerance;shortness of breath;strength  -CW     Row Name 04/02/21 1007          Motor Skills    Therapeutic Exercise  other (see comments) Seated and supine LE strengthening interventions.  -CW     Row Name 04/02/21 1007          Positioning and Restraints    Pre-Treatment Position  in bed  -CW     Post Treatment Position  wheelchair  -CW     In Wheelchair  notified nsg;with other staff;with nsg  -CW     Row Name 04/02/21 1007          Therapy Assessment/Plan (PT)    Comment, Therapy Assessment/Plan (PT)  Patient displays continued ability to achieve ambulation this date, with slight increase in distance; however, she continues to express doubt about her ability. Discussed impact of ruminating on negative thoughts and possibility of extending rehab stay another week. She is limited in ability to ambulate by difficulty maintaining upright posture while standing, initiated bridging in supine to strengthen hip extensors.  -CW     Row Name 04/02/21 1007          Daily Progress Summary (PT)    Functional Goal Overall Progress (PT)  progressing toward functional goals slower than expected  -CW     Impairments Still Limiting Function (PT)  ROM decreased;strength decreased;impaired balance;impaired functional activity tolerance  -CW       User Key  (r) = Recorded By, (t) = Taken By, (c) = Cosigned By    Initials Name Provider Type    Barry Frederick, PT Physical Therapist        Wound 03/26/21 1637 Right lower abdomen Incision (Active)   Dressing Appearance dry;intact 04/02/21 0800   Closure None 04/02/21 0800   Base scab;red 04/02/21 0800   Periwound intact 04/02/21 0500   Drainage Amount none 04/02/21 0500   Care, Wound cleansed with;antimicrobial agent applied  04/02/21 0500   Dressing Care dressing changed;non-adherent 04/02/21 0500     Physical Therapy Education                 Title: PT OT SLP Therapies (Done)     Topic: Physical Therapy (Done)     Point: Mobility training (Done)     Learning Progress Summary           Patient Acceptance, E,TB, VU by DG at 4/1/2021 2245    Acceptance, E,D, VU,NR by LL at 3/31/2021 1059    Acceptance, E,D, VU,NR by RG at 3/30/2021 1427    Acceptance, E,D, VU,NR by LL at 3/30/2021 0850    Acceptance, E,D, VU,NR by LL at 3/29/2021 0934    Acceptance, E, VU by EB at 3/29/2021 0755    Acceptance, E,D, VU,NR by LL at 3/26/2021 1336    Acceptance, E,D, VU,NR by AG at 3/25/2021 1517    Acceptance, E,D, VU,NR by AG at 3/24/2021 1537    Acceptance, E,D, VU,NR by AG at 3/23/2021 1506    Acceptance, E,TB, VU by CT at 3/20/2021 1338    Acceptance, E,TB, VU by DG at 3/20/2021 0010    Acceptance, E, NR by CW at 3/19/2021 1330                   Point: Home exercise program (Done)     Learning Progress Summary           Patient Acceptance, E,TB, VU by DG at 4/1/2021 2245    Acceptance, E,D, VU,NR by LL at 3/31/2021 1059    Acceptance, E,D, VU,NR by RG at 3/30/2021 1427    Acceptance, E,D, VU,NR by LL at 3/30/2021 0850    Acceptance, E,D, VU,NR by LL at 3/29/2021 0934    Acceptance, E, VU by EB at 3/29/2021 0755    Acceptance, E,D, VU,NR by LL at 3/26/2021 1336    Acceptance, E,D, VU,NR by AG at 3/25/2021 1517    Acceptance, E,D, VU,NR by AG at 3/24/2021 1537    Acceptance, E,D, VU,NR by AG at 3/23/2021 1506    Acceptance, E,TB, VU by CT at 3/20/2021 1338    Acceptance, E,TB, VU by DG at 3/20/2021 0010    Acceptance, E, NR by CW at 3/19/2021 1330                   Point: Body mechanics (Done)     Learning Progress Summary           Patient Acceptance, E,TB, VU by DG at 4/1/2021 2245    Acceptance, E,D, VU,NR by LL at 3/31/2021 1059    Acceptance, E,D, VU,NR by RG at 3/30/2021 1427    Acceptance, E,D, VU,NR by LL at 3/30/2021 0850    Acceptance, E,D,  VU,NR by LL at 3/29/2021 0934    Acceptance, E, VU by EB at 3/29/2021 0755    Acceptance, E,D, VU,NR by LL at 3/26/2021 1336    Acceptance, E,D, VU,NR by AG at 3/25/2021 1517    Acceptance, E,D, VU,NR by AG at 3/24/2021 1537    Acceptance, E,D, VU,NR by AG at 3/23/2021 1506    Acceptance, E,TB, VU by CT at 3/20/2021 1338    Acceptance, E,TB, VU by DG at 3/20/2021 0010    Acceptance, E, NR by CW at 3/19/2021 1330                   Point: Precautions (Done)     Learning Progress Summary           Patient Acceptance, E,TB, VU by DG at 4/1/2021 2245    Acceptance, E,D, VU,NR by LL at 3/31/2021 1059    Acceptance, E,D, VU,NR by RG at 3/30/2021 1427    Acceptance, E,D, VU,NR by LL at 3/30/2021 0850    Acceptance, E,D, VU,NR by LL at 3/29/2021 0934    Acceptance, E, VU by EB at 3/29/2021 0755    Acceptance, E,D, VU,NR by LL at 3/26/2021 1336    Acceptance, E,D, VU,NR by AG at 3/25/2021 1517    Acceptance, E,D, VU,NR by AG at 3/24/2021 1537    Acceptance, E,D, VU,NR by AG at 3/23/2021 1506    Acceptance, E,TB, VU by CT at 3/20/2021 1338    Acceptance, E,TB, VU by DG at 3/20/2021 0010    Acceptance, E, NR by CW at 3/19/2021 1330                               User Key     Initials Effective Dates Name Provider Type Discipline    EB 06/16/16 -  Diamond Carranza, RN Registered Nurse Nurse    DG 06/16/16 -  Tonie Mendes RN Registered Nurse Nurse    AG 04/03/18 -  Ivana Carpenter, PT Physical Therapist PT    CT 04/03/18 -  Jing Escalante, PT Physical Therapist PT    LL 05/02/16 -  Martha Schaeffer, PTA Physical Therapy Assistant PT    RG 10/31/19 -  Jonathan Spangler PTA Physical Therapy Assistant PT    CW 09/11/20 -  Barry Santamaria, JOANNE Physical Therapist PT                PT Recommendation and Plan    Planned Therapy Interventions (PT): balance training, bed mobility training, gait training, home exercise program, patient/family education, strengthening, transfer training, wheelchair management/propulsion  training  Frequency of Treatment (PT): 5 times per week, 90 minutes per session  Anticipated Equipment Needs at Discharge (PT Eval): front wheeled walker, hospital bed, shower chair, wheelchair, bedside commode  Daily Progress Summary (PT)  Functional Goal Overall Progress (PT): progressing toward functional goals slower than expected  Impairments Still Limiting Function (PT): ROM decreased, strength decreased, impaired balance, impaired functional activity tolerance               Time Calculation:     PT Charges     Row Name 04/02/21 1018             Time Calculation    Start Time  0830  -CW      Stop Time  1000  -CW      Time Calculation (min)  90 min  -CW      PT Received On  04/02/21  -CW      PT Goal Re-Cert Due Date  04/08/21  -CW         Time Calculation- PT    Total Timed Code Minutes- PT  90 minute(s)  -CW        User Key  (r) = Recorded By, (t) = Taken By, (c) = Cosigned By    Initials Name Provider Type    CW Barry Santamaria, PT Physical Therapist          Therapy Charges for Today     Code Description Service Date Service Provider Modifiers Qty    40004294310 HC GAIT TRAINING EA 15 MIN 4/1/2021 Barry Santamaria, PT GP 1    17679738745 HC PT THERAPEUTIC ACT EA 15 MIN 4/1/2021 Barry Santamaria, PT GP 3    09598681073 HC PT THER PROC EA 15 MIN 4/1/2021 Barry Santamaria, PT GP 2    73441701022 HC GAIT TRAINING EA 15 MIN 4/2/2021 Barry Santamaria, PT GP 1    32762549982 HC PT THERAPEUTIC ACT EA 15 MIN 4/2/2021 Barry Santamaria, PT GP 3    06724229551 HC PT THER PROC EA 15 MIN 4/2/2021 Barry Santamaria, PT GP 2                   Barry Santamaria PT  4/2/2021

## 2021-04-02 NOTE — PROGRESS NOTES
Rehabilitation Nursing  Inpatient Rehabilitation Plan of Care Note    Plan of Care  Copy from Ryann    Pain Management (Active)  Current Status (3/18/2021 4:16:00 PM): Potential for pain  Weekly Goal: No pain this week  Discharge Goal: No pain    Safety    Potential for Injury (Active)  Current Status (3/18/2021 4:16:00 PM): At risk for injury  Weekly Goal: No injury this week  Discharge Goal: No injury    Signed by: Tracie Mata, Nurse

## 2021-04-02 NOTE — PLAN OF CARE
Goal Outcome Evaluation:         Progressing medically and physically with therapies. Continue current plan of care.

## 2021-04-02 NOTE — PROGRESS NOTES
"Chief Complaint:  Shortness of breath      Interval History:   Ms. Lay is sitting in her wheelchair.  She states that her shortness of breath seems worse today when she is up moving around. She also complains of back pain and that this make her more short of breath. She is currently on 4 liters/minute, saturating 94%.      Review of Systems: Review of Systems - History obtained from chart review and the patient  General ROS: negative for chills, fatigue or fever  Psychological ROS: negative for anxiety or depression  ENT ROS: negative for - headaches, visual changes or vocal changes  Respiratory ROS: Stable shortness of breath.  Negative for cough  Cardiovascular ROS: no chest pain or dyspnea on exertion  Gastrointestinal ROS: no abdominal pain, change in bowel habits, or black or bloody stools  Musculoskeletal ROS: positive for back pain  Neurological ROS: no TIA or stroke symptoms  Heme- no bleeding, no lumps and bumps in armpit  Skin- no bruises, no rash      Vital Signs    height is 172.7 cm (68\") and weight is 76.2 kg (168 lb). Her oral temperature is 98.2 °F (36.8 °C). Her blood pressure is 126/77 and her pulse is 85. Her respiration is 18 and oxygen saturation is 94%.        Physical Exam:  Constitutional:  oriented to person, place, and time. No distress.   Head: Normocephalic and atraumatic.   Right Ear and left Ear : External ear normal.   Nose: Nose normal.   Eyes: Pupils are equal, round, and reactive to light.  No scleral icterus.   Neck: Normal range of motion. Neck supple.    Cardiovascular: Normal rate, regular rhythm, normal heart sounds.  Pulmonary: Bilateral breath sounds present.  No wheezing.  Mild crackles at the bases.  Abdominal: Soft. Bowel sounds are normal.exhibits no distension. There is no tenderness.   Musculoskeletal: Normal range of motion. Exhibits no deformity.   Neurological: alert and oriented to person, place, and time. No cranial nerve deficit. Coordination normal.   Skin: Skin " is warm and dry. No erythema.   Psychiatric:   normal mood and affect.   behavior is normal.        Medication:  • Active medication:  atorvastatin, 20 mg, Oral, Nightly  cefepime, 2 g, Intravenous, Q8H  Check Fentanyl Patch Placement, 1 each, Does not apply, Q12H  fentaNYL, 1 patch, Transdermal, Q72H   And  Check Fentanyl Patch Placement, 1 each, Does not apply, Q12H  gabapentin, 100 mg, Oral, Q8H  insulin aspart, 0-7 Units, Subcutaneous, TID AC  insulin detemir, 24 Units, Subcutaneous, Nightly  lactobacillus acidophilus, 1 capsule, Oral, Daily  levothyroxine, 50 mcg, Oral, Q AM  lidocaine-EPINEPHrine, 10 mL, Infiltration, Once  metoprolol tartrate, 12.5 mg, Oral, Q12H  mupirocin, , Topical, Q12H  pantoprazole, 40 mg, Oral, Q AM  polyethylene glycol, 17 g, Oral, Daily  QUEtiapine, 50 mg, Oral, Q12H  vancomycin, 1,500 mg, Intravenous, Q12H        • Continuous Infusions:  heparin (porcine), 12 Units/kg/hr, Last Rate: 16.01 Units/kg/hr (04/02/21 0516)  Pharmacy Consult - Pharmacy to dose,            I have seen and examined the patient personally and performed a face-to-face diagnostic evaluation. The plan of care was reviewed and developed with APRN and nursing staff. I have addended and/or modified the above history of present illness, physical examination, and assessment/plan to reflect my findings and impressions. Essential elements of the care plan were discussed with APRN above.  I agree with the findings and assessment/plan as documented below.     Result review:    CT scan of the chest showed no pulmonary embolism.  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  Stable pattern of pulmonary fibrosis.  Trace effusion.  Reactive lymphadenopathy.  Ultrasound upper extremity venous Doppler negative for DVT.      Assessment:  · Hypoxia requiring supplemental oxygen  · History of COVID-19 pneumonia complicated by tracheostomy and PEG placement.   · Clinical course was complicated by blood culture positive for  Enterococcus and Serratia.  · VQ scan of intermediate probability for PE  · Former smoker          Plan:  CT scan of the chest showed no pulmonary embolism.  Upper extremity venous Doppler negative for DVT PE.  On IV antibiotics as per infectious disease team.  Consider involving hematology for opinion over eosinophilia.  Off IV heparin drip.  On subcu heparin for DVT prophylaxis.  Recommend outpatient follow-up with pulmonary medicine.  Recommend repeating CT scan of the chest in 3 months to reevaluate pulmonary parenchyma.    We will sign off.  Please call us in case of any other questions.        I have discussed the clinical plan with Dr. Bansal.  Thank you for involving me in the care of the patient.  Omer Grant M.D  Pulmonary and Critical Care Medicine

## 2021-04-02 NOTE — PROGRESS NOTES
PROGRESS NOTE         Patient Identification:  Name:  Argenis Lay  Age:  76 y.o.  Sex:  female  :  1945  MRN:  [id number]  Visit Number:  [id number]  Primary Care Provider:  Mahi Villarreal APRN         LOS: 15 days       ----------------------------------------------------------------------------------------------------------------------  Subjective       Chief Complaints:    Aspiration pneumonia  Acute on chronic respiratory failure  Type 2 diabetes mellitus  G-tube removal with intermittent drainage      Interval History:      Patient feels great this morning without any evidence of acute distress chest pain or shortness of breath and denies any coughing.  Her respiratory symptoms have completely resolved but pulmonary has recommended to initiate heparin drip until CT PE protocol could be performed and patient has received the pre-CT protocol for dye allergy with prednisone and Benadryl and should be getting her CT PE protocol this morning.  Her venous duplex of the lower extremities did not show any evidence of DVT and pulmonary team ordered an upper extremity venous duplex.    Patient is showing significant improvement with therapy since her respiratory status has improved and as of 2021 she was at minimum assist with grooming independence, moderate assist with bed mobility and moderate assist with bed to chair and chair to bed transfers with moderate assist and 2 person assist with sit to stand and stand to sit transfers and was able to ambulate 5 feet twice on parallel bars which is a significant improvement for her.      Review of Systems:    Constitutional: No fever, no chills and night sweats. No appetite change or unexpected weight change. No fatigue.  Eyes: no eye drainage, itching or redness.  HEENT: no mouth sores, dysphagia or nose bleed.  Respiratory:  Resolved cough and shortness of breath.  Cardiovascular: no chest pain, no palpitations, no orthopnea.  Gastrointestinal: No  vomiting. No diarrhea. No abdominal pain, hematemesis or rectal bleeding.  Nausea.  Genitourinary: no dysuria or polyuria.  Hematologic/lymphatic: no lymph node abnormalities, no easy bruising or easy bleeding.  Musculoskeletal: no muscle or joint pain.  Skin: No rash and no itching.  Neurological: no loss of consciousness, no seizure, no headache.  Behavioral/Psych: no depression or suicidal ideation.   Endocrine: no hot flashes.  Immunologic: negative.    ----------------------------------------------------------------------------------------------------------------------      Objective       Current Hospital Meds:  atorvastatin, 20 mg, Oral, Nightly  cefepime, 2 g, Intravenous, Q8H  Check Fentanyl Patch Placement, 1 each, Does not apply, Q12H  fentaNYL, 1 patch, Transdermal, Q72H   And  Check Fentanyl Patch Placement, 1 each, Does not apply, Q12H  gabapentin, 100 mg, Oral, Q8H  insulin aspart, 0-7 Units, Subcutaneous, TID AC  insulin detemir, 24 Units, Subcutaneous, Nightly  lactobacillus acidophilus, 1 capsule, Oral, Daily  levothyroxine, 50 mcg, Oral, Q AM  lidocaine-EPINEPHrine, 10 mL, Infiltration, Once  metoprolol tartrate, 12.5 mg, Oral, Q12H  mupirocin, , Topical, Q12H  pantoprazole, 40 mg, Oral, Q AM  polyethylene glycol, 17 g, Oral, Daily  QUEtiapine, 50 mg, Oral, Q12H  vancomycin, 1,500 mg, Intravenous, Q12H      heparin (porcine), 12 Units/kg/hr, Last Rate: 16.01 Units/kg/hr (04/02/21 0516)  Pharmacy Consult - Pharmacy to dose,       ----------------------------------------------------------------------------------------------------------------------    Vital Signs:  Temp:  [98.2 °F (36.8 °C)-98.6 °F (37 °C)] 98.2 °F (36.8 °C)  Heart Rate:  [] 85  Resp:  [18] 18  BP: (125-126)/(77-78) 126/77  No data found.  SpO2 Percentage    04/01/21 1824 04/01/21 1900 04/02/21 0700   SpO2: 96% 94% 94%     SpO2:  [94 %-97 %] 94 %  on  Flow (L/min):  [4] 4;   Device (Oxygen Therapy): nasal cannula    Body mass  index is 25.54 kg/m².  Wt Readings from Last 3 Encounters:   03/19/21 76.2 kg (168 lb)        Intake/Output Summary (Last 24 hours) at 4/2/2021 0900  Last data filed at 4/2/2021 0500  Gross per 24 hour   Intake 1960 ml   Output --   Net 1960 ml     Diet Soft Texture; Ground; Thin  ----------------------------------------------------------------------------------------------------------------------      Physical Exam:    Constitutional:  Feels significantly better this morning without any shortness of breath or cough.  HENT:  Head: Normocephalic and atraumatic.  Mouth:  Moist mucous membranes.    Eyes:  Conjunctivae and EOM are normal.  No scleral icterus.  Neck:  Neck supple.  No JVD present.    Cardiovascular:  Normal rate, regular rhythm and normal heart sounds with no murmur. No edema.  Pulmonary/Chest: Diminished lung sounds in the bases bilaterally.  Abdominal:  Soft.  Bowel sounds are normal.  No distension.  PEG tube has been removed and skin looks normal with no evidence of drainage.  Musculoskeletal:  No edema, no tenderness, and no deformity.  No swelling or redness of joints.  Neurological:  Alert and oriented to person, place, and time.  No facial droop.  No slurred speech.   Skin:  Skin is warm and dry.  No rash noted.  No pallor.    Psychiatric:  Normal mood and affect.  Behavior is normal.     ----------------------------------------------------------------------------------------------------------------------  Results from last 7 days   Lab Units 03/28/21  1659 03/28/21  1017   TROPONIN T ng/mL 0.028 0.029     Results from last 7 days   Lab Units 04/01/21  0202 03/31/21  1150   PROBNP pg/mL 356.3 581.2         Results from last 7 days   Lab Units 04/01/21  0814 04/01/21  0202 03/31/21  0218 03/30/21  0130 03/28/21  0112   CRP mg/dL  --  1.88* 1.31* 1.06* 1.64*   WBC 10*3/mm3 6.23 7.97  --   --  7.21   HEMOGLOBIN g/dL 9.8* 9.1*  --   --  9.0*   HEMATOCRIT % 33.2* 31.2*  --   --  31.0*   MCV fL 91.5  92.3  --   --  93.9   MCHC g/dL 29.5* 29.2*  --   --  29.0*   PLATELETS 10*3/mm3 166 198  --   --  168   INR  1.08  --   --   --   --      Results from last 7 days   Lab Units 04/01/21  0202 03/28/21  1017 03/28/21  0112   SODIUM mmol/L 142  --  138   POTASSIUM mmol/L 3.6  --  3.7   MAGNESIUM mg/dL  --  1.7  --    CHLORIDE mmol/L 103  --  101   CO2 mmol/L 28.5  --  25.6   BUN mg/dL 5*  --  4*   CREATININE mg/dL 0.22*  --  0.26*   EGFR IF NONAFRICN AM mL/min/1.73 >150  --  >150   CALCIUM mg/dL 9.1  --  8.8   GLUCOSE mg/dL 81  --  123*   ALBUMIN g/dL 2.76*  --   --    BILIRUBIN mg/dL 0.2  --   --    ALK PHOS U/L 85  --   --    AST (SGOT) U/L 14  --   --    ALT (SGPT) U/L 10  --   --    Estimated Creatinine Clearance: 73.1 mL/min (A) (by C-G formula based on SCr of 0.22 mg/dL (L)).  No results found for: AMMONIA    Glucose   Date/Time Value Ref Range Status   04/02/2021 0618 167 (H) 70 - 130 mg/dL Final   04/01/2021 1929 296 (H) 70 - 130 mg/dL Final   04/01/2021 1617 186 (H) 70 - 130 mg/dL Final   04/01/2021 1119 129 70 - 130 mg/dL Final   04/01/2021 0642 79 70 - 130 mg/dL Final   03/31/2021 2023 104 70 - 130 mg/dL Final   03/31/2021 1624 82 70 - 130 mg/dL Final   03/31/2021 1136 103 70 - 130 mg/dL Final     Lab Results   Component Value Date    HGBA1C 6.40 (H) 02/22/2021     No results found for: TSH, FREET4    Blood Culture   Date Value Ref Range Status   03/21/2021 Gram Negative Bacilli (C)  Preliminary   03/21/2021 Gram Positive Cocci (C)  Preliminary   03/21/2021 Gram Negative Bacilli (C)  Preliminary   03/21/2021 Gram Positive Cocci (C)  Preliminary     No results found for: URINECX  No results found for: WOUNDCX  No results found for: STOOLCX  No results found for: RESPCX  Pain Management Panel    There is no flowsheet data to display.           ----------------------------------------------------------------------------------------------------------------------  Imaging Results (Last 24 Hours)     ** No results  found for the last 24 hours. **          ----------------------------------------------------------------------------------------------------------------------    Assessment/Plan       Assessment/Plan     ASSESSMENT:    Debility  Severe sepsis  Enterococcus and Serratia bacteremia  Aspiration pneumonia  Chest tightness  Acute on chronic respiratory failure  Diabetes mellitus type 2  Hypertension  G-tube removed with resolved drainage      PLAN:    Patient feels great this morning without any evidence of acute distress chest pain or shortness of breath and denies any coughing.  Her respiratory symptoms have completely resolved but pulmonary has recommended to initiate heparin drip until CT PE protocol could be performed and patient has received the pre-CT protocol for dye allergy with prednisone and Benadryl and should be getting her CT PE protocol this morning.  Her venous duplex of the lower extremities did not show any evidence of DVT and pulmonary team ordered an upper extremity venous duplex.    Patient is showing significant improvement with therapy since her respiratory status has improved and as of 4/1/2021 she was at minimum assist with grooming independence, moderate assist with bed mobility and moderate assist with bed to chair and chair to bed transfers with moderate assist and 2 person assist with sit to stand and stand to sit transfers and was able to ambulate 5 feet twice on parallel bars which is a significant improvement for her.    Patient is currently on heparin drip until her CT PE protocol rules out PE and when heparin drip is discontinued, would recommend to restart DVT prophylaxis with subcu Lovenox or subcu heparin.    The phone #[phone] is disconnected and I called her daughter page without answer and I left a voice message to call me back to update her on her mom's condition.    Recent severe sepsis and aspiration pneumonia resolve on current antibiotic therapy.    Chest  tightness--resolved     Aspiration pneumonia--Much improved on Cefepime and vancomycin.     Severe sepsis with positive blood culture for Serratia and Enterococcus and Pseudomonas from PEG tube site.  Patient is improving     Debility secondary to prolonged COVID-19 infection     Acute on chronic hypoxic respiratory failure--continue 4 L per nasal cannula at this time.     Diabetes mellitus--continue current treatment     Hypertension--controlled    Wound culture on 3/24/2021 finalized as Pseudomonas.  Repeat blood cultures on 3/22/2021 are so far showing no growth.    Bilateral upper extremity venous duplex reports no evidence of DVT.  2D echo unremarkable for vegetation.      Urinalysis from 3/21/2021 unremarkable.  Blood cultures from 3/21/2021 2 out of 2 sets positive for Enterococcus species and Serratia marcescens. Chest x-ray from 3/21/2021 reports severe bilateral pneumonia with possible interval improvement in aeration of the right lung.    Recommend to continue on vancomycin and Cefepime through 4/4/2021.      Code Status:   Code Status and Medical Interventions:   Ordered at: 03/19/21 9893     Level Of Support Discussed With:    Patient     Code Status:    No CPR     Medical Interventions (Level of Support Prior to Arrest):    Full     Adam Bansal MD  04/02/21  09:23 EDT

## 2021-04-02 NOTE — PLAN OF CARE
Goal Outcome Evaluation:     Progress: improving  Patient currently resting in bed. IV abx infusing. VSS. Will continue to monitor.

## 2021-04-02 NOTE — THERAPY TREATMENT NOTE
Inpatient Rehabilitation - Occupational Therapy Treatment Note    [facility]     Patient Name: Argenis Lay  : 1945  MRN: [id number]    Today's Date: 2021                 Admit Date: 3/18/2021       No diagnosis found.    Patient Active Problem List   Diagnosis   • Debility       Past Medical History:   Diagnosis Date   • COPD (chronic obstructive pulmonary disease) (CMS/HCC)    • Diabetes mellitus (CMS/HCC)    • Hypertension        History reviewed. No pertinent surgical history.             IRF OT ASSESSMENT FLOWSHEET (last 12 hours)      IRF OT Evaluation and Treatment     Row Name 21 1415          OT Time and Intention    Document Type  daily treatment  -BF     Mode of Treatment  occupational therapy  -BF     Patient Effort  good  -BF     Symptoms Noted During/After Treatment  fatigue  -BF     Row Name 21 1415          General Information    Existing Precautions/Restrictions  fall;oxygen therapy device and L/min abdominal binder  -BF     Row Name 21 1415          Cognition/Psychosocial    Orientation Status (Cognition)  oriented x 3  -BF     Follows Commands (Cognition)  follows one-step commands;verbal cues/prompting required;repetition of directions required  -BF     Row Name 21 1415          Transfers    Chair-Bed [place] (Transfers)  maximum assist (25% patient effort);2 person assist;nonverbal cues (demo/gesture);verbal cues  -BF     Row Name 21 1415          Chair-Bed Transfer    Assistive Device (Chair-Bed Transfers)  wheelchair  -BF     Row Name 21 1415          Motor Skills    Motor Control/Coordination Interventions  fine motor manipulation/dexterity activities;gross motor coordination activities;therapeutic exercise/ROM BUE GMC/FMC theract; light fxal activity tolerance theract  -BF     Gross Motor Skill, Impairments Detail  theraputty  -BF     Row Name 21 1415          Lower Body Dressing    [place] Level (Lower Body Dressing)  maximum  assist (25% patient effort);dependent (less than 25% patient effort);verbal cues;nonverbal cues (demo/gesture)  -BF     Position (Lower Body Dressing)  supported sitting  -BF     Comment (Lower Body Dressing)  Max/Total A  -BF     Row Name 04/02/21 1415          Grooming    [place] Level (Grooming)  minimum assist (75% patient effort);verbal cues;nonverbal cues (demo/gesture)  -BF     Position (Grooming)  supported sitting  -BF     Comment (Grooming)  Min A  -BF     Row Name 04/02/21 1415          Positioning and Restraints    In Bed  supine;call light within reach;encouraged to call for assist In PM  -BF     In Wheelchair  sitting;call light within reach;encouraged to call for assist In AM  -BF       User Key  (r) = Recorded By, (t) = Taken By, (c) = Cosigned By    Initials Name Effective Dates    BF Vanesa Hernandez OT 07/05/20 -            Occupational Therapy Education                 Title: PT OT SLP Therapies (Done)     Topic: Occupational Therapy (Done)     Point: ADL training (Done)     Description:   Instruct learner(s) on proper safety adaptation and remediation techniques during self care or transfers.   Instruct in proper use of assistive devices.              Learning Progress Summary           Patient Acceptance, E, VU,NR by BF at 4/2/2021 1419    Acceptance, E,TB, VU by DG at 4/1/2021 2245    Acceptance, E, VU,NR by BF at 4/1/2021 1431    Acceptance, E, VU,NR by BF at 3/30/2021 1233    Acceptance, E, VU,NR by BF at 3/29/2021 1219    Acceptance, E, VU by EB at 3/29/2021 0755    Acceptance, E, VU,NR by BF at 3/26/2021 1357    Acceptance, E, VU,NR by BF at 3/24/2021 1337    Acceptance, E, VU,NR by BF at 3/23/2021 1451    Acceptance, E,D, VU,NR by CJ at 3/23/2021 1444    Acceptance, E,TB, VU by DG at 3/20/2021 0010    Acceptance, E, VU,NR by BF at 3/19/2021 1429                   Point: Home exercise program (Done)     Description:   Instruct learner(s) on appropriate technique for monitoring,  assisting and/or progressing therapeutic exercises/activities.              Learning Progress Summary           Patient Acceptance, E,TB, VU by DG at 4/1/2021 2245    Acceptance, E, VU by EB at 3/29/2021 0755    Acceptance, E,TB, VU by DG at 3/20/2021 0010                   Point: Precautions (Done)     Description:   Instruct learner(s) on prescribed precautions during self-care and functional transfers.              Learning Progress Summary           Patient Acceptance, E, VU,NR by BF at 4/2/2021 1419    Acceptance, E,TB, VU by DG at 4/1/2021 2245    Acceptance, E, VU,NR by BF at 3/30/2021 1233    Acceptance, E, VU by EB at 3/29/2021 0755    Acceptance, E, VU,NR by BF at 3/26/2021 1357    Acceptance, E, VU,NR by BF at 3/24/2021 1337    Acceptance, E,D, VU,NR by CJ at 3/23/2021 1444    Acceptance, E,TB, VU by DG at 3/20/2021 0010    Acceptance, E, VU,NR by BF at 3/19/2021 1429                   Point: Body mechanics (Done)     Description:   Instruct learner(s) on proper positioning and spine alignment during self-care, functional mobility activities and/or exercises.              Learning Progress Summary           Patient Acceptance, E,TB, VU by DG at 4/1/2021 2245    Acceptance, E, VU by EB at 3/29/2021 0755    Acceptance, E,TB, VU by DG at 3/20/2021 0010                               User Key     Initials Effective Dates Name Provider Type Discipline    EB 06/16/16 -  Diamond Carranza, RN Registered Nurse Nurse     06/16/16 -  Tonie Mendes, RN Registered Nurse Nurse     07/05/20 -  Vanesa Hernandez OT Occupational Therapist OT     04/03/18 -  Sharon Abbott OT Occupational Therapist OT                    OT Recommendation and Plan    Planned Therapy Interventions (OT): activity tolerance training, adaptive equipment training, BADL retraining, neuromuscular control/coordination retraining, ROM/therapeutic exercise, strengthening exercise, transfer/mobility retraining                     Time Calculation:     Time Calculation- OT     Row Name 04/02/21 1419 04/02/21 1045          Time Calculation- OT    OT Start Time  1245  -BF  1045  -BF     OT Stop Time  1330  -BF  1130  -BF     OT Time Calculation (min)  45 min  -BF  45 min  -BF     Total Timed Code Minutes- OT  45 minute(s)  -BF  45 minute(s)  -BF     OT Non-Billable Time (min)  --  20 min  -BF       User Key  (r) = Recorded By, (t) = Taken By, (c) = Cosigned By    Initials Name Provider Type    BF Vanesa Hernandez OT Occupational Therapist        Therapy Charges for Today     Code Description Service Date Service Provider Modifiers Qty    81728000399 HC OT THERAPEUTIC ACT EA 15 MIN 4/1/2021 Vanesa Hernandez OT GO 3    84027678914 HC OT SELF CARE/MGMT/TRAIN EA 15 MIN 4/1/2021 Vanesa Hernandez, OT GO 1    99951675456 HC OT THER PROC EA 15 MIN 4/1/2021 Vanesa Hernandez, OT GO 2    88403539025 HC OT SELF CARE/MGMT/TRAIN EA 15 MIN 4/2/2021 Vanesa Hernandez OT GO 1    03297273059 HC OT THERAPEUTIC ACT EA 15 MIN 4/2/2021 Vanesa Hernandez, OT GO 3    61651068794 HC OT THER PROC EA 15 MIN 4/2/2021 Vanesa Hernandez, OT GO 2                   Vanesa Hernandez OT  4/2/2021

## 2021-04-03 LAB
ANION GAP SERPL CALCULATED.3IONS-SCNC: 11.4 MMOL/L (ref 5–15)
APTT PPP: 26.9 SECONDS (ref 25.6–35.3)
BUN SERPL-MCNC: 9 MG/DL (ref 8–23)
BUN/CREAT SERPL: 30 (ref 7–25)
CALCIUM SPEC-SCNC: 8.7 MG/DL (ref 8.6–10.5)
CHLORIDE SERPL-SCNC: 108 MMOL/L (ref 98–107)
CO2 SERPL-SCNC: 22.6 MMOL/L (ref 22–29)
CREAT SERPL-MCNC: 0.3 MG/DL (ref 0.57–1)
GFR SERPL CREATININE-BSD FRML MDRD: >150 ML/MIN/1.73
GLUCOSE BLDC GLUCOMTR-MCNC: 101 MG/DL (ref 70–130)
GLUCOSE BLDC GLUCOMTR-MCNC: 78 MG/DL (ref 70–130)
GLUCOSE BLDC GLUCOMTR-MCNC: 80 MG/DL (ref 70–130)
GLUCOSE BLDC GLUCOMTR-MCNC: 91 MG/DL (ref 70–130)
GLUCOSE SERPL-MCNC: 94 MG/DL (ref 65–99)
POTASSIUM SERPL-SCNC: 3.5 MMOL/L (ref 3.5–5.2)
POTASSIUM SERPL-SCNC: 4 MMOL/L (ref 3.5–5.2)
SODIUM SERPL-SCNC: 142 MMOL/L (ref 136–145)

## 2021-04-03 PROCEDURE — 80048 BASIC METABOLIC PNL TOTAL CA: CPT | Performed by: INTERNAL MEDICINE

## 2021-04-03 PROCEDURE — 25010000002 ENOXAPARIN PER 10 MG: Performed by: INTERNAL MEDICINE

## 2021-04-03 PROCEDURE — 85730 THROMBOPLASTIN TIME PARTIAL: CPT | Performed by: INTERNAL MEDICINE

## 2021-04-03 PROCEDURE — 84132 ASSAY OF SERUM POTASSIUM: CPT | Performed by: INTERNAL MEDICINE

## 2021-04-03 PROCEDURE — 25010000002 VANCOMYCIN: Performed by: INTERNAL MEDICINE

## 2021-04-03 PROCEDURE — 82962 GLUCOSE BLOOD TEST: CPT

## 2021-04-03 PROCEDURE — 25010000002 CEFEPIME PER 500 MG: Performed by: INTERNAL MEDICINE

## 2021-04-03 PROCEDURE — 63710000001 INSULIN DETEMIR PER 5 UNITS: Performed by: INTERNAL MEDICINE

## 2021-04-03 PROCEDURE — 25010000002 VANCOMYCIN 5 G RECONSTITUTED SOLUTION: Performed by: INTERNAL MEDICINE

## 2021-04-03 PROCEDURE — 94799 UNLISTED PULMONARY SVC/PX: CPT

## 2021-04-03 RX ORDER — POTASSIUM CHLORIDE 1.5 G/1.77G
40 POWDER, FOR SOLUTION ORAL EVERY 4 HOURS
Status: ACTIVE | OUTPATIENT
Start: 2021-04-03 | End: 2021-04-03

## 2021-04-03 RX ADMIN — GABAPENTIN 100 MG: 100 CAPSULE ORAL at 20:16

## 2021-04-03 RX ADMIN — POLYETHYLENE GLYCOL (3350) 17 G: 17 POWDER, FOR SOLUTION ORAL at 09:00

## 2021-04-03 RX ADMIN — VANCOMYCIN HYDROCHLORIDE 1500 MG: 10 INJECTION, POWDER, LYOPHILIZED, FOR SOLUTION INTRAVENOUS at 02:02

## 2021-04-03 RX ADMIN — CEFEPIME 2 G: 2 INJECTION, POWDER, FOR SOLUTION INTRAVENOUS at 04:05

## 2021-04-03 RX ADMIN — CEFEPIME 2 G: 2 INJECTION, POWDER, FOR SOLUTION INTRAVENOUS at 11:26

## 2021-04-03 RX ADMIN — POTASSIUM CHLORIDE 40 MEQ: 1.5 POWDER, FOR SOLUTION ORAL at 09:04

## 2021-04-03 RX ADMIN — MUPIROCIN: 20 OINTMENT TOPICAL at 09:00

## 2021-04-03 RX ADMIN — LEVOTHYROXINE SODIUM 50 MCG: 88 TABLET ORAL at 05:26

## 2021-04-03 RX ADMIN — INSULIN DETEMIR 24 UNITS: 100 INJECTION, SOLUTION SUBCUTANEOUS at 20:21

## 2021-04-03 RX ADMIN — GABAPENTIN 100 MG: 100 CAPSULE ORAL at 05:26

## 2021-04-03 RX ADMIN — ENOXAPARIN SODIUM 40 MG: 40 INJECTION SUBCUTANEOUS at 08:59

## 2021-04-03 RX ADMIN — VANCOMYCIN HYDROCHLORIDE 1500 MG: 10 INJECTION, POWDER, LYOPHILIZED, FOR SOLUTION INTRAVENOUS at 14:23

## 2021-04-03 RX ADMIN — MUPIROCIN: 20 OINTMENT TOPICAL at 21:52

## 2021-04-03 RX ADMIN — GABAPENTIN 100 MG: 100 CAPSULE ORAL at 14:23

## 2021-04-03 RX ADMIN — PANTOPRAZOLE SODIUM 40 MG: 40 TABLET, DELAYED RELEASE ORAL at 05:26

## 2021-04-03 RX ADMIN — QUETIAPINE FUMARATE 50 MG: 25 TABLET, FILM COATED ORAL at 08:59

## 2021-04-03 RX ADMIN — ATORVASTATIN CALCIUM 20 MG: 20 TABLET, FILM COATED ORAL at 20:18

## 2021-04-03 RX ADMIN — METOPROLOL TARTRATE 12.5 MG: 25 TABLET, FILM COATED ORAL at 20:17

## 2021-04-03 RX ADMIN — Medication 1 CAPSULE: at 08:59

## 2021-04-03 RX ADMIN — QUETIAPINE FUMARATE 50 MG: 25 TABLET, FILM COATED ORAL at 20:18

## 2021-04-03 RX ADMIN — CEFEPIME 2 G: 2 INJECTION, POWDER, FOR SOLUTION INTRAVENOUS at 20:03

## 2021-04-03 NOTE — PROGRESS NOTES
Rehabilitation Nursing  Inpatient Rehabilitation Plan of Care Note    Plan of Care  Copy from Ryann    Pain Management (Active)  Current Status (3/18/2021 4:16:00 PM): Potential for pain  Weekly Goal: No pain this week  Discharge Goal: No pain    Safety    Potential for Injury (Active)  Current Status (3/18/2021 4:16:00 PM): At risk for injury  Weekly Goal: No injury this week  Discharge Goal: No injury    Signed by: Tonie Mendes, Nurse

## 2021-04-03 NOTE — PLAN OF CARE
Goal Outcome Evaluation:     Progress: improving  Outcome Summary: Pt progressing with therapy. Continue POC

## 2021-04-04 LAB
ANISOCYTOSIS BLD QL: NORMAL
APTT PPP: 26.7 SECONDS (ref 25.6–35.3)
BASOPHILS # BLD AUTO: 0.03 10*3/MM3 (ref 0–0.2)
BASOPHILS NFR BLD AUTO: 0.5 % (ref 0–1.5)
DEPRECATED RDW RBC AUTO: 57.5 FL (ref 37–54)
EOSINOPHIL # BLD AUTO: 0.23 10*3/MM3 (ref 0–0.4)
EOSINOPHIL NFR BLD AUTO: 3.7 % (ref 0.3–6.2)
ERYTHROCYTE [DISTWIDTH] IN BLOOD BY AUTOMATED COUNT: 16.3 % (ref 12.3–15.4)
GLUCOSE BLDC GLUCOMTR-MCNC: 100 MG/DL (ref 70–130)
GLUCOSE BLDC GLUCOMTR-MCNC: 101 MG/DL (ref 70–130)
GLUCOSE BLDC GLUCOMTR-MCNC: 192 MG/DL (ref 70–130)
GLUCOSE BLDC GLUCOMTR-MCNC: 67 MG/DL (ref 70–130)
GLUCOSE BLDC GLUCOMTR-MCNC: 92 MG/DL (ref 70–130)
HCT VFR BLD AUTO: 32.5 % (ref 34–46.6)
HGB BLD-MCNC: 9.3 G/DL (ref 12–15.9)
HYPOCHROMIA BLD QL: NORMAL
IMM GRANULOCYTES # BLD AUTO: 0.03 10*3/MM3 (ref 0–0.05)
IMM GRANULOCYTES NFR BLD AUTO: 0.5 % (ref 0–0.5)
LYMPHOCYTES # BLD AUTO: 1.29 10*3/MM3 (ref 0.7–3.1)
LYMPHOCYTES NFR BLD AUTO: 20.8 % (ref 19.6–45.3)
MACROCYTES BLD QL SMEAR: NORMAL
MCH RBC QN AUTO: 27.2 PG (ref 26.6–33)
MCHC RBC AUTO-ENTMCNC: 28.6 G/DL (ref 31.5–35.7)
MCV RBC AUTO: 95 FL (ref 79–97)
MONOCYTES # BLD AUTO: 0.45 10*3/MM3 (ref 0.1–0.9)
MONOCYTES NFR BLD AUTO: 7.3 % (ref 5–12)
NEUTROPHILS NFR BLD AUTO: 4.16 10*3/MM3 (ref 1.7–7)
NEUTROPHILS NFR BLD AUTO: 67.2 % (ref 42.7–76)
NRBC BLD AUTO-RTO: 0 /100 WBC (ref 0–0.2)
PLAT MORPH BLD: NORMAL
PLATELET # BLD AUTO: 167 10*3/MM3 (ref 140–450)
PMV BLD AUTO: 9.5 FL (ref 6–12)
RBC # BLD AUTO: 3.42 10*6/MM3 (ref 3.77–5.28)
WBC # BLD AUTO: 6.19 10*3/MM3 (ref 3.4–10.8)

## 2021-04-04 PROCEDURE — 85730 THROMBOPLASTIN TIME PARTIAL: CPT | Performed by: INTERNAL MEDICINE

## 2021-04-04 PROCEDURE — 25010000002 VANCOMYCIN 5 G RECONSTITUTED SOLUTION: Performed by: INTERNAL MEDICINE

## 2021-04-04 PROCEDURE — 25010000002 CEFEPIME PER 500 MG: Performed by: INTERNAL MEDICINE

## 2021-04-04 PROCEDURE — 25010000002 ENOXAPARIN PER 10 MG: Performed by: INTERNAL MEDICINE

## 2021-04-04 PROCEDURE — 82962 GLUCOSE BLOOD TEST: CPT

## 2021-04-04 PROCEDURE — 63710000001 INSULIN DETEMIR PER 5 UNITS: Performed by: INTERNAL MEDICINE

## 2021-04-04 PROCEDURE — 85025 COMPLETE CBC W/AUTO DIFF WBC: CPT | Performed by: INTERNAL MEDICINE

## 2021-04-04 PROCEDURE — 85007 BL SMEAR W/DIFF WBC COUNT: CPT | Performed by: INTERNAL MEDICINE

## 2021-04-04 PROCEDURE — 25010000002 VANCOMYCIN: Performed by: INTERNAL MEDICINE

## 2021-04-04 RX ADMIN — GABAPENTIN 100 MG: 100 CAPSULE ORAL at 05:17

## 2021-04-04 RX ADMIN — PANTOPRAZOLE SODIUM 40 MG: 40 TABLET, DELAYED RELEASE ORAL at 05:17

## 2021-04-04 RX ADMIN — LEVOTHYROXINE SODIUM 50 MCG: 88 TABLET ORAL at 05:18

## 2021-04-04 RX ADMIN — ATORVASTATIN CALCIUM 20 MG: 20 TABLET, FILM COATED ORAL at 21:37

## 2021-04-04 RX ADMIN — MUPIROCIN: 20 OINTMENT TOPICAL at 21:37

## 2021-04-04 RX ADMIN — METOPROLOL TARTRATE 12.5 MG: 25 TABLET, FILM COATED ORAL at 21:37

## 2021-04-04 RX ADMIN — MUPIROCIN 1 APPLICATION: 20 OINTMENT TOPICAL at 08:19

## 2021-04-04 RX ADMIN — CLONAZEPAM 0.25 MG: 0.5 TABLET ORAL at 21:47

## 2021-04-04 RX ADMIN — OXYCODONE HYDROCHLORIDE 5 MG: 5 TABLET ORAL at 12:12

## 2021-04-04 RX ADMIN — VANCOMYCIN HYDROCHLORIDE 1500 MG: 10 INJECTION, POWDER, LYOPHILIZED, FOR SOLUTION INTRAVENOUS at 02:07

## 2021-04-04 RX ADMIN — GABAPENTIN 100 MG: 100 CAPSULE ORAL at 14:21

## 2021-04-04 RX ADMIN — CEFEPIME 2 G: 2 INJECTION, POWDER, FOR SOLUTION INTRAVENOUS at 21:00

## 2021-04-04 RX ADMIN — CEFEPIME 2 G: 2 INJECTION, POWDER, FOR SOLUTION INTRAVENOUS at 04:19

## 2021-04-04 RX ADMIN — INSULIN DETEMIR 24 UNITS: 100 INJECTION, SOLUTION SUBCUTANEOUS at 21:52

## 2021-04-04 RX ADMIN — CEFEPIME 2 G: 2 INJECTION, POWDER, FOR SOLUTION INTRAVENOUS at 12:12

## 2021-04-04 RX ADMIN — VANCOMYCIN HYDROCHLORIDE 1500 MG: 10 INJECTION, POWDER, LYOPHILIZED, FOR SOLUTION INTRAVENOUS at 14:21

## 2021-04-04 RX ADMIN — QUETIAPINE FUMARATE 50 MG: 25 TABLET, FILM COATED ORAL at 08:18

## 2021-04-04 RX ADMIN — METOPROLOL TARTRATE 12.5 MG: 25 TABLET, FILM COATED ORAL at 08:18

## 2021-04-04 RX ADMIN — POLYETHYLENE GLYCOL (3350) 17 G: 17 POWDER, FOR SOLUTION ORAL at 08:19

## 2021-04-04 RX ADMIN — QUETIAPINE FUMARATE 50 MG: 25 TABLET, FILM COATED ORAL at 21:37

## 2021-04-04 RX ADMIN — ENOXAPARIN SODIUM 40 MG: 40 INJECTION SUBCUTANEOUS at 08:19

## 2021-04-04 RX ADMIN — Medication 1 CAPSULE: at 08:18

## 2021-04-04 RX ADMIN — GABAPENTIN 100 MG: 100 CAPSULE ORAL at 21:47

## 2021-04-04 NOTE — PROGRESS NOTES
Rehabilitation Nursing  Inpatient Rehabilitation Plan of Care Note    Plan of Care  Copy from Ryann    Pain Management (Active)  Current Status (3/18/2021 4:16:00 PM): Potential for pain  Weekly Goal: No pain this week  Discharge Goal: No pain    Safety    Potential for Injury (Active)  Current Status (3/18/2021 4:16:00 PM): At risk for injury  Weekly Goal: No injury this week  Discharge Goal: No injury    Signed by: Phuong Hernandez, Nurse

## 2021-04-05 LAB
APTT PPP: 27.5 SECONDS (ref 25.6–35.3)
GLUCOSE BLDC GLUCOMTR-MCNC: 107 MG/DL (ref 70–130)
GLUCOSE BLDC GLUCOMTR-MCNC: 118 MG/DL (ref 70–130)
GLUCOSE BLDC GLUCOMTR-MCNC: 124 MG/DL (ref 70–130)
GLUCOSE BLDC GLUCOMTR-MCNC: 84 MG/DL (ref 70–130)

## 2021-04-05 PROCEDURE — 99232 SBSQ HOSP IP/OBS MODERATE 35: CPT | Performed by: FAMILY MEDICINE

## 2021-04-05 PROCEDURE — 82962 GLUCOSE BLOOD TEST: CPT

## 2021-04-05 PROCEDURE — 25010000002 CEFEPIME PER 500 MG: Performed by: INTERNAL MEDICINE

## 2021-04-05 PROCEDURE — 97530 THERAPEUTIC ACTIVITIES: CPT | Performed by: OCCUPATIONAL THERAPIST

## 2021-04-05 PROCEDURE — 63710000001 DIPHENHYDRAMINE PER 50 MG: Performed by: INTERNAL MEDICINE

## 2021-04-05 PROCEDURE — 25010000002 ENOXAPARIN PER 10 MG: Performed by: INTERNAL MEDICINE

## 2021-04-05 PROCEDURE — 97110 THERAPEUTIC EXERCISES: CPT | Performed by: OCCUPATIONAL THERAPIST

## 2021-04-05 PROCEDURE — 97116 GAIT TRAINING THERAPY: CPT

## 2021-04-05 PROCEDURE — 97535 SELF CARE MNGMENT TRAINING: CPT | Performed by: OCCUPATIONAL THERAPIST

## 2021-04-05 PROCEDURE — 85730 THROMBOPLASTIN TIME PARTIAL: CPT | Performed by: INTERNAL MEDICINE

## 2021-04-05 PROCEDURE — 97530 THERAPEUTIC ACTIVITIES: CPT

## 2021-04-05 PROCEDURE — 97110 THERAPEUTIC EXERCISES: CPT

## 2021-04-05 PROCEDURE — 63710000001 INSULIN DETEMIR PER 5 UNITS: Performed by: INTERNAL MEDICINE

## 2021-04-05 RX ORDER — CYCLOBENZAPRINE HCL 10 MG
5 TABLET ORAL 3 TIMES DAILY PRN
Status: DISPENSED | OUTPATIENT
Start: 2021-04-05 | End: 2021-04-08

## 2021-04-05 RX ADMIN — CEFEPIME 2 G: 2 INJECTION, POWDER, FOR SOLUTION INTRAVENOUS at 04:11

## 2021-04-05 RX ADMIN — OXYCODONE HYDROCHLORIDE 5 MG: 5 TABLET ORAL at 19:59

## 2021-04-05 RX ADMIN — MUPIROCIN: 20 OINTMENT TOPICAL at 21:06

## 2021-04-05 RX ADMIN — CEFEPIME 2 G: 2 INJECTION, POWDER, FOR SOLUTION INTRAVENOUS at 11:41

## 2021-04-05 RX ADMIN — ATORVASTATIN CALCIUM 20 MG: 20 TABLET, FILM COATED ORAL at 20:59

## 2021-04-05 RX ADMIN — OXYCODONE HYDROCHLORIDE 5 MG: 5 TABLET ORAL at 11:41

## 2021-04-05 RX ADMIN — METOPROLOL TARTRATE 12.5 MG: 25 TABLET, FILM COATED ORAL at 08:19

## 2021-04-05 RX ADMIN — MUPIROCIN 1 APPLICATION: 20 OINTMENT TOPICAL at 08:21

## 2021-04-05 RX ADMIN — LEVOTHYROXINE SODIUM 50 MCG: 88 TABLET ORAL at 06:01

## 2021-04-05 RX ADMIN — GABAPENTIN 100 MG: 100 CAPSULE ORAL at 06:01

## 2021-04-05 RX ADMIN — INSULIN DETEMIR 24 UNITS: 100 INJECTION, SOLUTION SUBCUTANEOUS at 20:59

## 2021-04-05 RX ADMIN — ENOXAPARIN SODIUM 40 MG: 40 INJECTION SUBCUTANEOUS at 08:19

## 2021-04-05 RX ADMIN — QUETIAPINE FUMARATE 50 MG: 25 TABLET, FILM COATED ORAL at 08:19

## 2021-04-05 RX ADMIN — QUETIAPINE FUMARATE 50 MG: 25 TABLET, FILM COATED ORAL at 20:59

## 2021-04-05 RX ADMIN — PANTOPRAZOLE SODIUM 40 MG: 40 TABLET, DELAYED RELEASE ORAL at 06:01

## 2021-04-05 RX ADMIN — DIPHENHYDRAMINE HYDROCHLORIDE 25 MG: 25 CAPSULE ORAL at 21:00

## 2021-04-05 RX ADMIN — CLONAZEPAM 0.25 MG: 0.5 TABLET ORAL at 20:59

## 2021-04-05 RX ADMIN — Medication 1 CAPSULE: at 08:19

## 2021-04-05 RX ADMIN — GABAPENTIN 100 MG: 100 CAPSULE ORAL at 13:54

## 2021-04-05 RX ADMIN — POLYETHYLENE GLYCOL (3350) 17 G: 17 POWDER, FOR SOLUTION ORAL at 08:21

## 2021-04-05 RX ADMIN — GABAPENTIN 100 MG: 100 CAPSULE ORAL at 21:08

## 2021-04-05 NOTE — THERAPY TREATMENT NOTE
Inpatient Rehabilitation - Occupational Therapy Progress Note     Gary     Patient Name: Argenis Lay  : 1945  MRN: [id number]    Today's Date: 2021                 Admit Date: 3/18/2021       No diagnosis found.    Patient Active Problem List   Diagnosis   • Debility       Past Medical History:   Diagnosis Date   • COPD (chronic obstructive pulmonary disease) (CMS/[facility])    • Diabetes mellitus (CMS/[facility])    • Hypertension        History reviewed. No pertinent surgical history.             IRF OT ASSESSMENT FLOWSHEET (last 12 hours)      IRF OT Evaluation and Treatment     Row Name 21 1419          OT Time and Intention    Document Type  daily treatment;progress note  -BF     Mode of Treatment  occupational therapy  -BF     Patient Effort  good  -BF     Symptoms Noted During/After Treatment  fatigue  -BF     Row Name 21 1419          General Information    Patient/Family/Caregiver Comments/Observations  Daughter and daughter's BF present for self-care education.  -BF     Existing Precautions/Restrictions  fall;oxygen therapy device and L/min  -BF     Row Name 21 1419          Cognition/Psychosocial    Orientation Status (Cognition)  oriented x 3  -BF     Follows Commands (Cognition)  follows one-step commands;verbal cues/prompting required;repetition of directions required  -BF     Row Name 21 1419          Transfer Assessment/Treatment    Transfers  bed-chair transfer;chair-bed transfer  -BF     Row Name 21 1419          Transfers    Bed-Chair [place] (Transfers)  moderate assist (50% patient effort);maximum assist (25% patient effort);2 person assist  -BF     Chair-Bed [place] (Transfers)  moderate assist (50% patient effort);maximum assist (25% patient effort);2 person assist  -BF     Assistive Device (Bed-Chair Transfers)  wheelchair  -BF     Row Name 21 1419          Chair-Bed Transfer    Assistive Device (Chair-Bed Transfers)  wheelchair  -BF     Row  Name 04/05/21 1419          Motor Skills    Motor Control/Coordination Interventions  fine motor manipulation/dexterity activities;gross motor coordination activities;therapeutic exercise/ROM BUE GMC/FMC theract; light strengthening  -BF     Gross Motor Skill, Impairments Detail  Flexbar, wrist rolls  -BF     Row Name 04/05/21 1419          Basic Activities of Daily Living (BADLs)    Basic Activities of Daily Living  bathing;upper body dressing;lower body dressing;toileting;grooming;self-feeding  -BF     Row Name 04/05/21 1419          Bathing    [place] Level (Bathing)  moderate assist (50% patient effort);verbal cues;nonverbal cues (demo/gesture)  -BF     Position (Bathing)  sitting up in bed;edge of bed sitting  -BF     Comment (Bathing)  Mod A  -BF     Row Name 04/05/21 1419          Upper Body Dressing    [place] Level (Upper Body Dressing)  set up assistance;supervision;verbal cues;nonverbal cues (demo/gesture)  -BF     Position (Upper Body Dressing)  edge of bed sitting  -BF     Comment (Upper Body Dressing)  Set-up/supervision  -BF     Row Name 04/05/21 1419          Lower Body Dressing    [place] Level (Lower Body Dressing)  maximum assist (25% patient effort);dependent (less than 25% patient effort);verbal cues;nonverbal cues (demo/gesture)  -BF     Position (Lower Body Dressing)  supine  -BF     Comment (Lower Body Dressing)  Max/Total A  -BF     Row Name 04/05/21 1419          Grooming    [place] Level (Grooming)  set up;supervision;verbal cues;nonverbal cues (demo/gesture)  -BF     Position (Grooming)  supported sitting  -BF     Comment (Grooming)  Set-up/supervision  -BF     Row Name 04/05/21 1419          Toileting    Comment (Toileting)  Total A  -BF     Row Name 04/05/21 1419          Self-Feeding    Comment (Self-Feeding)  Set-up  -BF     Row Name 04/05/21 1419          Positioning and Restraints    In Bed  supine;call light within reach;encouraged to call for assist In PM  -BF      In Wheelchair  sitting;with family/caregiver;with PT In AM  -BF       User Key  (r) = Recorded By, (t) = Taken By, (c) = Cosigned By    Initials Name Effective Dates    BF Ruthann Hernandeztamanda Nesbitt OT 07/05/20 -            Occupational Therapy Education                 Title: PT OT SLP Therapies (Done)     Topic: Occupational Therapy (Done)     Point: ADL training (Done)     Description:   Instruct learner(s) on proper safety adaptation and remediation techniques during self care or transfers.   Instruct in proper use of assistive devices.              Learning Progress Summary           Patient Acceptance, E, VU,NR by BF at 4/5/2021 1418    Comment: Daughter present for ADL    Acceptance, E,TB, VU by DG at 4/3/2021 2231    Acceptance, E,TB, VU by DG at 4/2/2021 2227    Acceptance, E, VU,NR by BF at 4/2/2021 1419    Acceptance, E,TB, VU by DG at 4/1/2021 2245    Acceptance, E, VU,NR by BF at 4/1/2021 1431    Acceptance, E, VU,NR by BF at 3/30/2021 1233    Acceptance, E, VU,NR by BF at 3/29/2021 1219    Acceptance, E, VU by EB at 3/29/2021 0755    Acceptance, E, VU,NR by BF at 3/26/2021 1357    Acceptance, E, VU,NR by BF at 3/24/2021 1337    Acceptance, E, VU,NR by BF at 3/23/2021 1451    Acceptance, E,D, VU,NR by CJ at 3/23/2021 1444    Acceptance, E,TB, VU by DG at 3/20/2021 0010    Acceptance, E, VU,NR by BF at 3/19/2021 1429   Family Acceptance, E, VU,NR by BF at 4/5/2021 1418    Comment: Daughter present for ADL                   Point: Home exercise program (Done)     Description:   Instruct learner(s) on appropriate technique for monitoring, assisting and/or progressing therapeutic exercises/activities.              Learning Progress Summary           Patient Acceptance, E,TB, VU by DG at 4/3/2021 2231    Acceptance, E,TB, VU by DG at 4/2/2021 2227    Acceptance, E,TB, VU by DG at 4/1/2021 2245    Acceptance, E, VU by EB at 3/29/2021 0755    Acceptance, E,TB, VU by BRADY at 3/20/2021 0010                    Point: Precautions (Done)     Description:   Instruct learner(s) on prescribed precautions during self-care and functional transfers.              Learning Progress Summary           Patient Acceptance, E, VU,NR by BF at 4/5/2021 1418    Comment: Daughter present for ADL    Acceptance, E,TB, VU by DG at 4/3/2021 2231    Acceptance, E,TB, VU by DG at 4/2/2021 2227    Acceptance, E, VU,NR by BF at 4/2/2021 1419    Acceptance, E,TB, VU by DG at 4/1/2021 2245    Acceptance, E, VU,NR by BF at 3/30/2021 1233    Acceptance, E, VU by EB at 3/29/2021 0755    Acceptance, E, VU,NR by BF at 3/26/2021 1357    Acceptance, E, VU,NR by BF at 3/24/2021 1337    Acceptance, E,D, VU,NR by  at 3/23/2021 1444    Acceptance, E,TB, VU by DG at 3/20/2021 0010    Acceptance, E, VU,NR by BF at 3/19/2021 1429   Family Acceptance, E, VU,NR by BF at 4/5/2021 1418    Comment: Daughter present for ADL                   Point: Body mechanics (Done)     Description:   Instruct learner(s) on proper positioning and spine alignment during self-care, functional mobility activities and/or exercises.              Learning Progress Summary           Patient Acceptance, E,TB, VU by DG at 4/3/2021 2231    Acceptance, E,TB, VU by DG at 4/2/2021 2227    Acceptance, E,TB, VU by DG at 4/1/2021 2245    Acceptance, E, VU by EB at 3/29/2021 0755    Acceptance, E,TB, VU by DG at 3/20/2021 0010                               User Key     Initials Effective Dates Name Provider Type Discipline    EB 06/16/16 -  Diamond Carranza, RN Registered Nurse Nurse    BRADY 06/16/16 -  Tonie Mendes, RN Registered Nurse Nurse     07/05/20 -  Vanesa Hernandez, OT Occupational Therapist OT     04/03/18 -  Sharon Abbott OT Occupational Therapist OT                    OT Recommendation and Plan    Planned Therapy Interventions (OT): activity tolerance training, adaptive equipment training, BADL retraining, neuromuscular control/coordination retraining,  ROM/therapeutic exercise, strengthening exercise, transfer/mobility retraining                    Time Calculation:     Time Calculation- OT     Row Name 04/05/21 1426 04/05/21 0915          Time Calculation- OT    OT Start Time  1245  -BF  0915  -BF     OT Stop Time  1330  -BF  1000  -BF     OT Time Calculation (min)  45 min  -BF  45 min  -BF     Total Timed Code Minutes- OT  45 minute(s)  -BF  45 minute(s)  -BF     OT Non-Billable Time (min)  --  20 min  -BF       User Key  (r) = Recorded By, (t) = Taken By, (c) = Cosigned By    Initials Name Provider Type    BF Vanesa Hernandez OT Occupational Therapist        Therapy Charges for Today     Code Description Service Date Service Provider Modifiers Qty    21311677248 HC OT SELF CARE/MGMT/TRAIN EA 15 MIN 4/5/2021 Vanesa Hernandez OT GO 3    17798141725 HC OT THERAPEUTIC ACT EA 15 MIN 4/5/2021 Vanesa Hernandez OT GO 2    [id number]  OT THER PROC EA 15 MIN 4/5/2021 Vanesa Hernandez OT GO 1                   Vanesa Hernandez OT  4/5/2021

## 2021-04-05 NOTE — THERAPY TREATMENT NOTE
Inpatient Rehabilitation - Physical Therapy Treatment Note       T[facility]     Patient Name: Argenis Lay  : 1945  MRN: [id number]    Today's Date: 2021                    Admit Date: 3/18/2021      Visit Dx: No diagnosis found.    Patient Active Problem List   Diagnosis   • Debility       Past Medical History:   Diagnosis Date   • COPD (chronic obstructive pulmonary disease) (CMS/[facility])    • Diabetes mellitus (CMS/[facility])    • Hypertension        History reviewed. No pertinent surgical history.       PT ASSESSMENT (last 12 hours)      IRF PT Evaluation and Treatment     Row Name 21 1151          PT Time and Intention    Document Type  daily treatment  -CW     Mode of Treatment  physical therapy  -CW     Patient/Family/Caregiver Comments/Observations  Patient agreeable to physical therapy treatment this date. She informs therapist that her daughter is hear today to see how she is doing and observe treatment. Patient's daughter asks therapist's opinion on whether patient is ready to be discharged this week, and expresses concern about their ability to take care of her.  -CW     Total Minutes, Physical Therapy  90  -CW     Row Name 21 1151          General Information    Patient Profile Reviewed  yes  -CW     General Observations of Patient  alert/cooperative  -CW     Existing Precautions/Restrictions  fall;oxygen therapy device and L/min  -CW     Row Name 21 1151          Coping Strategies    Trust Relationship/Rapport  care explained;choices provided;questions answered;thoughts/feelings acknowledged;reassurance provided  -CW     Row Name 21 1151          Cognition/Psychosocial    Affect/Mental Status (Cognitive)  anxious;WFL  -CW     Behavioral Issues (Cognitive)  overwhelmed easily  -CW     Orientation Status (Cognition)  oriented x 3  -CW     Follows Commands (Cognition)  follows one-step commands;repetition of directions required;verbal cues/prompting required;physical/tactile prompts  required  -     Row Name 04/05/21 1151          Pain Scale: Numbers Pre/Post-Treatment    Pre/Posttreatment Pain Comment  Patient reports back pain, feeling stiff, today during physical therapy treatment. She states that she thinks she could do better if her back wasn't hurting.  -     Row Name 04/05/21 1151          Bed Mobility    All Activities, [place] (Bed Mobility)  moderate assist (50% patient effort);verbal cues;nonverbal cues (demo/gesture)  -     Bed Mobility, Safety Issues  decreased use of arms for pushing/pulling;decreased use of legs for bridging/pushing  -     Assistive Device (Bed Mobility)  bed rails;draw sheet  -     Row Name 04/05/21 1151          Transfers    Bed-Chair [place] (Transfers)  verbal cues;nonverbal cues (demo/gesture);moderate assist (50% patient effort)  -     Chair-Bed [place] (Transfers)  verbal cues;nonverbal cues (demo/gesture);moderate assist (50% patient effort)  -     Assistive Device (Bed-Chair Transfers)  wheelchair  -     Sit-Stand [place] (Transfers)  verbal cues;nonverbal cues (demo/gesture);2 person assist;moderate assist (50% patient effort);1 person assist x 4   -CW     Stand-Sit [place] (Transfers)  verbal cues;nonverbal cues (demo/gesture);moderate assist (50% patient effort);1 person assist  -[facility] Name 04/05/21 1151          Chair-Bed Transfer    Assistive Device (Chair-Bed Transfers)  wheelchair  -[facility] Name 04/05/21 1151          Sit-Stand Transfer    Assistive Device (Sit-Stand Transfers)  other (see comments) parallel bars  -     Row Name 04/05/21 1151          Stand-Sit Transfer    Assistive Device (Stand-Sit Transfers)  other (see comments) parallel bars  [facility] Name 04/05/21 1151          Gait/Stairs (Locomotion)    [place] Level (Gait)  moderate assist (50% patient effort);2 person assist;verbal cues;nonverbal cues (demo/gesture)  -     Assistive Device (Gait)  parallel [facility]     Distance  in Feet (Gait)  4 x 2, 6  -CW     Pattern (Gait)  step-to  -CW     Deviations/Abnormal Patterns (Gait)  luiz decreased;gait speed decreased;stride length decreased;weight shifting decreased  -CW     Bilateral Gait Deviations  forward flexed posture;heel strike decreased;leans left;leans right  -CW     Row Name 04/05/21 1151          Safety Issues, Functional Mobility    Safety Issues Affecting Function (Mobility)  insight into deficits/self-awareness;judgment;problem-solving;other (see comments) anxiety/fear  -CW     Impairments Affecting Function (Mobility)  balance;endurance/activity tolerance;shortness of breath;strength  -CW     Row Name 04/05/21 1151          Motor Skills    Functional Endurance  Standing frame 2 x 7'  -CW     Therapeutic Exercise  other (see comments) Seated and supine LE strengthening interventions  -CW     Row Name 04/05/21 1151          Positioning and Restraints    Pre-Treatment Position  sitting in chair/recliner  -CW     In Wheelchair  sitting;call light within reach;encouraged to call for assist  -CW     Row Name 04/05/21 1151          Therapy Assessment/Plan (PT)    Comment, Therapy Assessment/Plan (PT)  Patient demonstrates continued and improving ability to achieve ambulation in parallel bars this date. Her tolerance of overall interventions this date is also improving with less lengthy rest breaks.  -CW     Row Name 04/05/21 1151          Daily Progress Summary (PT)    Impairments Still Limiting Function (PT)  ROM decreased;strength decreased;impaired balance;impaired functional activity tolerance  -CW       User Key  (r) = Recorded By, (t) = Taken By, (c) = Cosigned By    Initials Name Provider Type    CW Barry Santamaria, JOANNE Physical Therapist        Wound 03/26/21 1637 Right lower abdomen Incision (Active)   Dressing Appearance dry;intact 04/05/21 1013     Physical Therapy Education                 Title: PT OT SLP Therapies (Done)     Topic: Physical Therapy (Done)     Point:  Mobility training (Done)     Learning Progress Summary           Patient Acceptance, E,TB, VU by DG at 4/3/2021 2231    Acceptance, E,TB, VU by DG at 4/2/2021 2227    Acceptance, E,TB, VU by DG at 4/1/2021 2245    Acceptance, E,D, VU,NR by LL at 3/31/2021 1059    Acceptance, E,D, VU,NR by RG at 3/30/2021 1427    Acceptance, E,D, VU,NR by LL at 3/30/2021 0850    Acceptance, E,D, VU,NR by LL at 3/29/2021 0934    Acceptance, E, VU by EB at 3/29/2021 0755    Acceptance, E,D, VU,NR by LL at 3/26/2021 1336    Acceptance, E,D, VU,NR by AG at 3/25/2021 1517    Acceptance, E,D, VU,NR by AG at 3/24/2021 1537    Acceptance, E,D, VU,NR by AG at 3/23/2021 1506    Acceptance, E,TB, VU by CT at 3/20/2021 1338    Acceptance, E,TB, VU by DG at 3/20/2021 0010    Acceptance, E, NR by CW at 3/19/2021 1330                   Point: Home exercise program (Done)     Learning Progress Summary           Patient Acceptance, E,TB, VU by DG at 4/3/2021 2231    Acceptance, E,TB, VU by DG at 4/2/2021 2227    Acceptance, E,TB, VU by DG at 4/1/2021 2245    Acceptance, E,D, VU,NR by LL at 3/31/2021 1059    Acceptance, E,D, VU,NR by RG at 3/30/2021 1427    Acceptance, E,D, VU,NR by LL at 3/30/2021 0850    Acceptance, E,D, VU,NR by LL at 3/29/2021 0934    Acceptance, E, VU by EB at 3/29/2021 0755    Acceptance, E,D, VU,NR by LL at 3/26/2021 1336    Acceptance, E,D, VU,NR by AG at 3/25/2021 1517    Acceptance, E,D, VU,NR by AG at 3/24/2021 1537    Acceptance, E,D, VU,NR by AG at 3/23/2021 1506    Acceptance, E,TB, VU by CT at 3/20/2021 1338    Acceptance, E,TB, VU by DG at 3/20/2021 0010    Acceptance, E, NR by CW at 3/19/2021 1330                   Point: Body mechanics (Done)     Learning Progress Summary           Patient Acceptance, E,TB, VU by DG at 4/3/2021 2231    Acceptance, E,TB, VU by DG at 4/2/2021 2227    Acceptance, E,TB, VU by DG at 4/1/2021 2245    Acceptance, E,D, VU,NR by LL at 3/31/2021 1059    Acceptance, E,D, VU,NR by RG at  3/30/2021 1427    Acceptance, E,D, VU,NR by LL at 3/30/2021 0850    Acceptance, E,D, VU,NR by LL at 3/29/2021 0934    Acceptance, E, VU by EB at 3/29/2021 0755    Acceptance, E,D, VU,NR by LL at 3/26/2021 1336    Acceptance, E,D, VU,NR by AG at 3/25/2021 1517    Acceptance, E,D, VU,NR by AG at 3/24/2021 1537    Acceptance, E,D, VU,NR by AG at 3/23/2021 1506    Acceptance, E,TB, VU by CT at 3/20/2021 1338    Acceptance, E,TB, VU by DG at 3/20/2021 0010    Acceptance, E, NR by CW at 3/19/2021 1330                   Point: Precautions (Done)     Learning Progress Summary           Patient Acceptance, E,TB, VU by DG at 4/3/2021 2231    Acceptance, E,TB, VU by DG at 4/2/2021 2227    Acceptance, E,TB, VU by DG at 4/1/2021 2245    Acceptance, E,D, VU,NR by LL at 3/31/2021 1059    Acceptance, E,D, VU,NR by RG at 3/30/2021 1427    Acceptance, E,D, VU,NR by LL at 3/30/2021 0850    Acceptance, E,D, VU,NR by LL at 3/29/2021 0934    Acceptance, E, VU by EB at 3/29/2021 0755    Acceptance, E,D, VU,NR by LL at 3/26/2021 1336    Acceptance, E,D, VU,NR by AG at 3/25/2021 1517    Acceptance, E,D, VU,NR by AG at 3/24/2021 1537    Acceptance, E,D, VU,NR by AG at 3/23/2021 1506    Acceptance, E,TB, VU by CT at 3/20/2021 1338    Acceptance, E,TB, VU by DG at 3/20/2021 0010    Acceptance, E, NR by CW at 3/19/2021 1330                               User Key     Initials Effective Dates Name Provider Type Discipline    ADDIE 06/16/16 -  Diamond Carranza, RN Registered Nurse Nurse    DG 06/16/16 -  Tonie Mendes, RN Registered Nurse Nurse    AG 04/03/18 -  Ivana Carpenter, PT Physical Therapist PT    CT 04/03/18 -  Jing Escalante, PT Physical Therapist PT    LL 05/02/16 -  Martha Schaeffer, PTA Physical Therapy Assistant PT    RG 10/31/19 -  Jonathan Spangler, JENNIFER Physical Therapy Assistant PT    CW 09/11/20 -  Barry Santamaria, PT Physical Therapist PT                PT Recommendation and Plan    Planned Therapy Interventions (PT):  balance training, bed mobility training, gait training, home exercise program, patient/family education, strengthening, transfer training, wheelchair management/propulsion training  Frequency of Treatment (PT): 5 times per week, 90 minutes per session  Anticipated Equipment Needs at Discharge (PT Eval): front wheeled walker, hospital bed, shower chair, wheelchair, bedside commode  Daily Progress Summary (PT)  Functional Goal Overall Progress (PT): progressing toward functional goals slower than expected  Impairments Still Limiting Function (PT): ROM decreased, strength decreased, impaired balance, impaired functional activity tolerance               Time Calculation:     PT Charges     Row Name 04/05/21 1157             Time Calculation    Start Time  1000  -CW      Stop Time  1130  -CW      Time Calculation (min)  90 min  -CW      PT Received On  04/05/21  -CW      PT Goal Re-Cert Due Date  04/08/21  -CW         Time Calculation- PT    Total Timed Code Minutes- PT  90 minute(s)  -CW        User Key  (r) = Recorded By, (t) = Taken By, (c) = Cosigned By    Initials Name Provider Type    CW Barry Santamaria, PT Physical Therapist          Therapy Charges for Today     Code Description Service Date Service Provider Modifiers Qty    53852484667 HC GAIT TRAINING EA 15 MIN 4/5/2021 Barry Santamaria, PT GP 1    00366850070 HC PT THER PROC EA 15 MIN 4/5/2021 Barry Santamaria, PT GP 2    83495559200 HC PT THERAPEUTIC ACT EA 15 MIN 4/5/2021 Barry Santamaria, PT GP 3                   Barry Santamaria PT  4/5/2021

## 2021-04-05 NOTE — PROGRESS NOTES
[facility] PROGRESS NOTE     Patient Identification:  Name:  Argenis Lay  Age:  76 y.o.  Sex:  female  :  1945  MRN:  [id number]  Visit Number:  [id number]  ROOM: [facility]     Primary Care Provider:  Mahi Villarreal APRN    Length of stay in inpatient status:  18    Subjective     Chief Compliant:  No chief complaint on file.      History of Presenting Illness: Patient 76-year-old female who was recently treated for severe sepsis, COVID-19 pneumonia requiring tracheostomy and PEG tube placement which been removed, diabetes mellitus, hypertension, hypothyroidism.  Patient also recently treated for sepsis with growing Serratia and Enterococcus from blood cultures.  Patient still has some residual shortness of breath and does require 4 L of O2.    Objective     Current Hospital Meds:atorvastatin, 20 mg, Oral, Nightly  Check Fentanyl Patch Placement, 1 each, Does not apply, Q12H  Check Fentanyl Patch Placement, 1 each, Does not apply, Q12H  enoxaparin, 40 mg, Subcutaneous, Q24H  gabapentin, 100 mg, Oral, Q8H  insulin aspart, 0-7 Units, Subcutaneous, TID AC  insulin detemir, 24 Units, Subcutaneous, Nightly  lactobacillus acidophilus, 1 capsule, Oral, Daily  levothyroxine, 50 mcg, Oral, Q AM  lidocaine-EPINEPHrine, 10 mL, Infiltration, Once  metoprolol tartrate, 12.5 mg, Oral, Q12H  mupirocin, , Topical, Q12H  pantoprazole, 40 mg, Oral, Q AM  polyethylene glycol, 17 g, Oral, Daily  QUEtiapine, 50 mg, Oral, Q12H    Pharmacy Consult - Pharmacy to dose,       ----------------------------------------------------------------------------------------------------------------------  Vital Signs:  Temp:  [97.7 °F (36.5 °C)-99 °F (37.2 °C)] 97.7 °F (36.5 °C)  Heart Rate:  [81-90] 81  Resp:  [16-20] 16  BP: (119-135)/(77-81) 119/77  SpO2:  [94 %-98 %] 98 %  on  Flow (L/min):  [4] 4;   Device (Oxygen Therapy): nasal cannula  Body mass index is 25.54 kg/m².    Wt Readings from Last 3 Encounters:   21  76.2 kg (168 lb)     Intake & Output (last 3 days)       04/02 0701 - 04/03 0700 04/03 0701 - 04/04 0700 04/04 0701 - 04/05 0700 04/05 0701 - 04/06 0700    P.O. 840 1800 1500 1160    IV Piggyback 1300 700 600 100    Total Intake(mL/kg) 2140 (28.1) 2500 (32.8) 2100 (27.6) 1260 (16.5)    Net +2140 +2500 +2100 +1260            Urine Unmeasured Occurrence 6 x 18 x 11 x 4 x    Stool Unmeasured Occurrence  1 x 1 x 1 x        Diet Soft Texture; Ground; Thin  ----------------------------------------------------------------------------------------------------------------------  Physical exam:  Constitutional:   Chronically ill-appearing female no distress  HEENT: Normocephalic atraumatic  Neck:    Supple  Cardiovascular: Regular rate and rhythm  Pulmonary/Chest: Fairly clear to auscultation  Abdominal: Positive bowel sounds soft.   Musculoskeletal: No arthropathy  Neurological: No focal deficits  Skin: No rash  Peripheral vascular:  Genitourinary:  ----------------------------------------------------------------------------------------------------------------------    Last echocardiogram:  Results for orders placed during the hospital encounter of 03/18/21    Adult Transthoracic Echo Complete W/ Cont if Necessary Per Protocol    Interpretation Summary  · Normal left ventricular cavity size and wall thickness noted. All left ventricular wall segments contract normally.  · Left ventricular ejection fraction appears to be 66 - 70%.  · Left ventricular diastolic function is consistent with (grade I) impaired relaxation.  · There is calcification of the aortic valve mainly affecting the non-coronary and left coronary cusp(s). The aortic valve appears trileaflet. Mild aortic valve regurgitation is present. Mild aortic valve stenosis is present.  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 Mild mitral valve regurgitation is present. No significant mitral valve  stenosis is present.  · Moderate tricuspid valve regurgitation is present. Estimated right ventricular systolic pressure from tricuspid regurgitation is markedly elevated (78 mmHg).  · Severe pulmonary hypertension is present.  · There is a trivial pericardial effusion.    ----------------------------------------------------------------------------------------------------------------------  Results from last 7 days   Lab Units 04/04/21  0304 04/01/21  0814 04/01/21  0202 03/31/21  0218 03/30/21  0130   CRP mg/dL  --   --  1.88* 1.31* 1.06*   WBC 10*3/mm3 6.19 6.23 7.97  --   --    HEMOGLOBIN g/dL 9.3* 9.8* 9.1*  --   --    HEMATOCRIT % 32.5* 33.2* 31.2*  --   --    MCV fL 95.0 91.5 92.3  --   --    MCHC g/dL 28.6* 29.5* 29.2*  --   --    PLATELETS 10*3/mm3 167 166 198  --   --    INR   --  1.08  --   --   --          Results from last 7 days   Lab Units 04/03/21  1619 04/03/21  0206 04/01/21  0202   SODIUM mmol/L  --  142 142   POTASSIUM mmol/L 4.0 3.5 3.6   CHLORIDE mmol/L  --  108* 103   CO2 mmol/L  --  22.6 28.5   BUN mg/dL  --  9 5*   CREATININE mg/dL  --  0.30* 0.22*   EGFR IF NONAFRICN AM mL/min/1.73  --  >150 >150   CALCIUM mg/dL  --  8.7 9.1   GLUCOSE mg/dL  --  94 81   ALBUMIN g/dL  --   --  2.76*   BILIRUBIN mg/dL  --   --  0.2   ALK PHOS U/L  --   --  85   AST (SGOT) U/L  --   --  14   ALT (SGPT) U/L  --   --  10   Estimated Creatinine Clearance: 73.1 mL/min (A) (by C-G formula based on SCr of 0.3 mg/dL (L)).  No results found for: AMMONIA      Results from last 7 days   Lab Units 04/01/21  0202 03/31/21  1150   PROBNP pg/mL 356.3 581.2         Glucose   Date/Time Value Ref Range Status   04/05/2021 1120 107 70 - 130 mg/dL Final   04/05/2021 0645 84 70 - 130 mg/dL Final   04/04/2021 2035 192 (H) 70 - 130 mg/dL Final   04/04/2021 1623 101 70 - 130 mg/dL Final   04/04/2021 1134 100 70 - 130 mg/dL Final   04/04/2021 0638 92 70 - 130 mg/dL Final   04/04/2021 0613 67 (L) 70 - 130 mg/dL Final   04/03/2021 2003  91 70 - 130 mg/dL Final     No results found for: TSH, FREET4  No results found for: PREGTESTUR, PREGSERUM, HCG, HCGQUANT  Pain Management Panel    There is no flowsheet data to display.       Brief Urine Lab Results  (Last result in the past 365 days)      Color   Clarity   Blood   Leuk Est   Nitrite   Protein   CREAT   Urine HCG        03/21/21 2043 Yellow Clear Negative Negative Negative Negative             No results found for: BLOODCX      No results found for: URINECX  No results found for: WOUNDCX  No results found for: STOOLCX  Results from last 7 days   Lab Units 04/01/21  0202 03/31/21  0218 03/30/21  0130   CRP mg/dL 1.88* 1.31* 1.06*       I have personally looked at the labs and they are summarized above.  ----------------------------------------------------------------------------------------------------------------------  Detailed radiology reports for the last 24 hours:    Imaging Results (Last 24 Hours)     ** No results found for the last 24 hours. **        Final impressions for the last 30 days of radiology reports:    NM Lung Scan Perfusion Particulate    Result Date: 3/31/2021  Intermediate suspicion of PE.  This report was finalized on 3/31/2021 3:21 PM by Dr. Alexx Espinosa MD.      XR Chest 1 View    Result Date: 3/31/2021  Worsening pulmonary status with lower lung volumes and continued bilateral peripheral diffuse infiltrates.  Signer Name: Lindsey Duron MD  Signed: 3/31/2021 7:03 AM  Workstation Name: [facility]  [facility]    XR Chest 1 View    Result Date: 3/28/2021  Patchy bilateral airspace disease, very similar to the prior study  This report was finalized on 3/28/2021 3:56 PM by Dr. Alan Hargrove MD.      XR Chest 1 View    Result Date: 3/23/2021  Slight worsening of bilateral upper lobe infiltrates, otherwise stable. Signer Name: Truman White MD  Signed: 3/23/2021 10:47 PM  Workstation Name: [email or web address]  [facility] [place]    XR Chest  1 View    Result Date: 3/21/2021  Severe bilateral pneumonia is again noted. It is uncertain if there may be interval improvement in aeration of the right lung. Signer Name: Amie Arana MD  Signed: 3/21/2021 7:59 PM  Workstation Name: [id number]  [facility]    XR Chest 1 View    Result Date: 3/9/2021  1.  Support devices as detailed above. 2.  Mild interval improvement in bilateral airspace disease.  This report was finalized on 3/9/2021 9:51 AM by Dr. Alexx Espinosa MD.      CT Chest Pulmonary Embolism    Result Date: 4/2/2021  1.  No PE identified. 2.  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 3.  Stable pattern of pulmonary fibrosis which on imaging is most adjusted of UIP. 4.  Trace effusions. 5.  Probable reactive hilar mediastinal lymph nodes which are stable from the previous exam. Continued follow-up recommended.  This report was finalized on 4/2/2021 10:29 AM by Dr. Alexx Espinosa MD.      US Venous Doppler Lower Extremity Bilateral (duplex)    Result Date: 3/31/2021  No DVT in the lower extremities on today's exam.  This report was finalized on 3/31/2021 1:40 PM by Dr. Alan Hargrove MD.      US Venous Doppler Upper Extremity Bilateral (duplex)    Result Date: 4/2/2021  No DVT is seen  This report was finalized on 4/2/2021 10:47 AM by Dr. Alan Hargrove MD.      US Venous Doppler Upper Extremity Bilateral (duplex)    Result Date: 3/22/2021  No sonographic findings of DVT in the upper extremities   This report was finalized on 3/22/2021 3:32 PM by Dr. Tim Mir II, MD.      FL Video Swallow Single Contrast    Result Date: 3/19/2021      Please see the speech pathologist's report for additional information.  This report was finalized on 3/19/2021 11:14 AM by Dr. Randal Mata MD.      FL Video Swallow Single Contrast    Result Date: 3/12/2021  1.  Deep laryngeal penetration and intermittent aspiration with thin liquids. 2. Please see dysphasia notes for further details.  This  report was finalized on 3/12/2021 11:22 AM by Dr. Alexx Espinosa MD.      I have personally looked at the radiology images and read the final radiology report.    Assessment & Plan    Debility secondary to prolonged COVID-19 infection--patient requiring moderate assistance for bed mobility; moderate assistance for transfers; moderate assistance for help with gait using parallel bars and did walk 4 feet x 2 and then later 6 feet.  Patient requiring maximum assistance for lower body dressing; minimum assistance for grooming;    Chronic hypoxic respiratory failure--patient likely has some postinflammatory changes from her COVID-19 infection.  Also had recent aspiration pneumonia which was treated    Diabetes mellitus--continue current treatment    Hypertension is controlled        VTE Prophylaxis:   Mechanical Order History:     None      Pharmalogical Order History:      Ordered     Dose Route Frequency Stop    04/02/21 1056  enoxaparin (LOVENOX) syringe 40 mg      40 mg SC Every 24 Hours Scheduled --    04/01/21 0755  heparin (porcine) 5000 UNIT/ML injection 4,600 Units      60 Units/kg IV Once 04/01/21 1047    04/01/21 0755  heparin 57944 units/250 mL (100 units/mL) in 0.45 % NaCl infusion  9.14 mL/hr,   Status:  Discontinued      12 Units/kg/hr IV Titrated 04/02/21 1056    04/01/21 0755  heparin (porcine) 5000 UNIT/ML injection 4,600 Units  Status:  Discontinued      60 Units/kg IV As Needed 04/02/21 1056    04/01/21 0755  heparin (porcine) 5000 UNIT/ML injection 2,300 Units  Status:  Discontinued      30 Units/kg IV As Needed 04/02/21 1056    03/18/21 1811  enoxaparin (LOVENOX) syringe 40 mg  Status:  Discontinued      40 mg SC Every 24 Hours 04/01/21 0754    03/18/21 1806  enoxaparin (LOVENOX) syringe 40 mg  Status:  Discontinued      40 mg SC Every 24 Hours 03/18/21 1811                    Marquis He MD  [facility]  04/05/21  14:24 EDT

## 2021-04-05 NOTE — PROGRESS NOTES
Inpatient Rehabilitation Functional Measures Assessment and Plan of Care    Plan of Care  Self Care    [OT] Dressing (Lower)(Active)  Current Status(04/05/2021): Max A  Weekly Goal(04/13/2021): Mod A  Discharge Goal: Mod A    Functional Measures  TINO Eating:  TINO Grooming:  TINO Bathing:  TINO Upper Body Dressing:  TINO Lower Body Dressing:  TINO Toileting:    TINO Bladder Management  Level of Assistance:  Frequency/Number of Accidents this Shift:    TINO Bowel Management  Level of Assistance:  Frequency/Number of Accidents this Shift:    TINO Bed/Chair/Wheelchair Transfer:  TINO Toilet Transfer:  TINO Tub/Shower Transfer:    Previously Documented Mode of Locomotion at Discharge:  TINO Expected Mode of Locomotion at Discharge:  TINO Walk/Wheelchair:  TINO Stairs:    TINO Comprehension:  TINO Expression:  TINO Social Interaction:  TINO Problem Solving:  TINO Memory:    Therapy Mode Minutes  Occupational Therapy: Individual: 90 minutes.  Physical Therapy:  Speech Language Pathology:    Discharge Functional Goals:    Signed by: Vanesa Hernandez OT

## 2021-04-06 LAB
APTT PPP: 27.6 SECONDS (ref 25.6–35.3)
CRP SERPL-MCNC: 1.71 MG/DL (ref 0–0.5)
GLUCOSE BLDC GLUCOMTR-MCNC: 109 MG/DL (ref 70–130)
GLUCOSE BLDC GLUCOMTR-MCNC: 129 MG/DL (ref 70–130)
GLUCOSE BLDC GLUCOMTR-MCNC: 138 MG/DL (ref 70–130)
GLUCOSE BLDC GLUCOMTR-MCNC: 83 MG/DL (ref 70–130)

## 2021-04-06 PROCEDURE — 97110 THERAPEUTIC EXERCISES: CPT | Performed by: OCCUPATIONAL THERAPIST

## 2021-04-06 PROCEDURE — 97110 THERAPEUTIC EXERCISES: CPT

## 2021-04-06 PROCEDURE — 86140 C-REACTIVE PROTEIN: CPT | Performed by: PHYSICIAN ASSISTANT

## 2021-04-06 PROCEDURE — 85730 THROMBOPLASTIN TIME PARTIAL: CPT | Performed by: INTERNAL MEDICINE

## 2021-04-06 PROCEDURE — 97116 GAIT TRAINING THERAPY: CPT

## 2021-04-06 PROCEDURE — 82962 GLUCOSE BLOOD TEST: CPT

## 2021-04-06 PROCEDURE — 25010000002 ENOXAPARIN PER 10 MG: Performed by: INTERNAL MEDICINE

## 2021-04-06 PROCEDURE — 97530 THERAPEUTIC ACTIVITIES: CPT

## 2021-04-06 PROCEDURE — 63710000001 DIPHENHYDRAMINE PER 50 MG: Performed by: INTERNAL MEDICINE

## 2021-04-06 PROCEDURE — 97530 THERAPEUTIC ACTIVITIES: CPT | Performed by: OCCUPATIONAL THERAPIST

## 2021-04-06 PROCEDURE — 99231 SBSQ HOSP IP/OBS SF/LOW 25: CPT | Performed by: FAMILY MEDICINE

## 2021-04-06 PROCEDURE — 97535 SELF CARE MNGMENT TRAINING: CPT | Performed by: OCCUPATIONAL THERAPIST

## 2021-04-06 PROCEDURE — 63710000001 INSULIN DETEMIR PER 5 UNITS: Performed by: INTERNAL MEDICINE

## 2021-04-06 RX ADMIN — INSULIN DETEMIR 24 UNITS: 100 INJECTION, SOLUTION SUBCUTANEOUS at 21:11

## 2021-04-06 RX ADMIN — OXYCODONE HYDROCHLORIDE 5 MG: 5 TABLET ORAL at 13:23

## 2021-04-06 RX ADMIN — QUETIAPINE FUMARATE 50 MG: 25 TABLET, FILM COATED ORAL at 08:35

## 2021-04-06 RX ADMIN — MUPIROCIN 1 APPLICATION: 20 OINTMENT TOPICAL at 08:37

## 2021-04-06 RX ADMIN — MUPIROCIN: 20 OINTMENT TOPICAL at 21:06

## 2021-04-06 RX ADMIN — GABAPENTIN 100 MG: 100 CAPSULE ORAL at 13:23

## 2021-04-06 RX ADMIN — ACETAMINOPHEN 650 MG: 325 TABLET, COATED ORAL at 12:22

## 2021-04-06 RX ADMIN — OXYCODONE HYDROCHLORIDE 5 MG: 5 TABLET ORAL at 21:05

## 2021-04-06 RX ADMIN — POLYETHYLENE GLYCOL (3350) 17 G: 17 POWDER, FOR SOLUTION ORAL at 08:37

## 2021-04-06 RX ADMIN — DIPHENHYDRAMINE HYDROCHLORIDE 25 MG: 25 CAPSULE ORAL at 21:05

## 2021-04-06 RX ADMIN — GABAPENTIN 100 MG: 100 CAPSULE ORAL at 05:16

## 2021-04-06 RX ADMIN — GABAPENTIN 100 MG: 100 CAPSULE ORAL at 21:04

## 2021-04-06 RX ADMIN — OXYCODONE HYDROCHLORIDE 5 MG: 5 TABLET ORAL at 05:17

## 2021-04-06 RX ADMIN — PANTOPRAZOLE SODIUM 40 MG: 40 TABLET, DELAYED RELEASE ORAL at 05:17

## 2021-04-06 RX ADMIN — QUETIAPINE FUMARATE 50 MG: 25 TABLET, FILM COATED ORAL at 21:04

## 2021-04-06 RX ADMIN — ATORVASTATIN CALCIUM 20 MG: 20 TABLET, FILM COATED ORAL at 21:04

## 2021-04-06 RX ADMIN — LEVOTHYROXINE SODIUM 50 MCG: 88 TABLET ORAL at 05:17

## 2021-04-06 RX ADMIN — CLONAZEPAM 0.25 MG: 0.5 TABLET ORAL at 21:01

## 2021-04-06 RX ADMIN — ENOXAPARIN SODIUM 40 MG: 40 INJECTION SUBCUTANEOUS at 08:37

## 2021-04-06 NOTE — THERAPY TREATMENT NOTE
Inpatient Rehabilitation - Occupational Therapy Treatment Note     [facility]     Patient Name: Argenis Lay  : 1945  MRN: [id number]    Today's Date: 2021                 Admit Date: 3/18/2021       No diagnosis found.    Patient Active Problem List   Diagnosis   • Debility       Past Medical History:   Diagnosis Date   • COPD (chronic obstructive pulmonary disease) (CMS/HCC)    • Diabetes mellitus (CMS/[facility])    • Hypertension        History reviewed. No pertinent surgical history.             IRF OT ASSESSMENT FLOWSHEET (last 12 hours)      IRF OT Evaluation and Treatment     Row Name 21 1351          OT Time and Intention    Document Type  daily treatment  -BF     Mode of Treatment  occupational therapy  -BF     Patient Effort  good  -BF     Symptoms Noted During/After Treatment  fatigue  -BF     Row Name 21 1351          General Information    Existing Precautions/Restrictions  fall;oxygen therapy device and L/min  -BF     Row Name 21 1351          Cognition/Psychosocial    Orientation Status (Cognition)  oriented x 3  -BF     Follows Commands (Cognition)  follows one-step commands;verbal cues/prompting required;repetition of directions required  -BF     Row Name 21 1351          Transfers    Chair-Bed [place] (Transfers)  moderate assist (50% patient effort);2 person assist;verbal cues;nonverbal cues (demo/gesture)  -BF     Row Name 21 1351          Chair-Bed Transfer    Assistive Device (Chair-Bed Transfers)  wheelchair  -BF     Row Name 21 1351          Motor Skills    Motor Control/Coordination Interventions  fine motor manipulation/dexterity activities;gross motor coordination activities;therapeutic exercise/ROM BUE GMC/FMC theract; light strengthening; L hand AAROM  -BF     Gross Motor Skill, Impairments Detail  Arm bike 2 mins X5; theraputty; handgripper  -BF     Row Name 21 1351          Positioning and Restraints    In Bed  supine;call light within  reach;encouraged to call for assist In PM  -BF     In Wheelchair  sitting;call light within reach;encouraged to call for assist In AM  -BF       User Key  (r) = Recorded By, (t) = Taken By, (c) = Cosigned By    Initials Name Effective Dates    BF HernandezVanesa bates, BUSHRA 07/05/20 -            Occupational Therapy Education                 Title: PT OT SLP Therapies (Done)     Topic: Occupational Therapy (Done)     Point: ADL training (Done)     Description:   Instruct learner(s) on proper safety adaptation and remediation techniques during self care or transfers.   Instruct in proper use of assistive devices.              Learning Progress Summary           Patient Acceptance, E, VU,NR by BF at 4/6/2021 1351    Acceptance, E, VU,NR by BF at 4/5/2021 1418    Comment: Daughter present for ADL    Acceptance, E,TB, VU by DG at 4/3/2021 2231    Acceptance, E,TB, VU by DG at 4/2/2021 2227    Acceptance, E, VU,NR by BF at 4/2/2021 1419    Acceptance, E,TB, VU by DG at 4/1/2021 2245    Acceptance, E, VU,NR by BF at 4/1/2021 1431    Acceptance, E, VU,NR by BF at 3/30/2021 1233    Acceptance, E, VU,NR by BF at 3/29/2021 1219    Acceptance, E, VU by EB at 3/29/2021 0755    Acceptance, E, VU,NR by BF at 3/26/2021 1357    Acceptance, E, VU,NR by BF at 3/24/2021 1337    Acceptance, E, VU,NR by BF at 3/23/2021 1451    Acceptance, E,D, VU,NR by CJ at 3/23/2021 1444    Acceptance, E,TB, VU by DG at 3/20/2021 0010    Acceptance, E, VU,NR by BF at 3/19/2021 1429   Family Acceptance, E, VU,NR by BF at 4/5/2021 1418    Comment: Daughter present for ADL                   Point: Home exercise program (Done)     Description:   Instruct learner(s) on appropriate technique for monitoring, assisting and/or progressing therapeutic exercises/activities.              Learning Progress Summary           Patient Acceptance, E,TB, VU by DG at 4/3/2021 2231    Acceptance, E,TB, VU by DG at 4/2/2021 2227    Acceptance, E,TB, VU by DG at 4/1/2021  2245    Acceptance, E, VU by EB at 3/29/2021 0755    Acceptance, E,TB, VU by DG at 3/20/2021 0010                   Point: Precautions (Done)     Description:   Instruct learner(s) on prescribed precautions during self-care and functional transfers.              Learning Progress Summary           Patient Acceptance, E, VU,NR by BF at 4/6/2021 1351    Acceptance, E, VU,NR by BF at 4/5/2021 1418    Comment: Daughter present for ADL    Acceptance, E,TB, VU by DG at 4/3/2021 2231    Acceptance, E,TB, VU by DG at 4/2/2021 2227    Acceptance, E, VU,NR by BF at 4/2/2021 1419    Acceptance, E,TB, VU by DG at 4/1/2021 2245    Acceptance, E, VU,NR by BF at 3/30/2021 1233    Acceptance, E, VU by EB at 3/29/2021 0755    Acceptance, E, VU,NR by BF at 3/26/2021 1357    Acceptance, E, VU,NR by BF at 3/24/2021 1337    Acceptance, E,D, VU,NR by CJ at 3/23/2021 1444    Acceptance, E,TB, VU by DG at 3/20/2021 0010    Acceptance, E, VU,NR by BF at 3/19/2021 1429   Family Acceptance, E, VU,NR by BF at 4/5/2021 1418    Comment: Daughter present for ADL                   Point: Body mechanics (Done)     Description:   Instruct learner(s) on proper positioning and spine alignment during self-care, functional mobility activities and/or exercises.              Learning Progress Summary           Patient Acceptance, E,TB, VU by DG at 4/3/2021 2231    Acceptance, E,TB, VU by DG at 4/2/2021 2227    Acceptance, E,TB, VU by DG at 4/1/2021 2245    Acceptance, E, VU by EB at 3/29/2021 0755    Acceptance, E,TB, VU by DG at 3/20/2021 0010                               User Key     Initials Effective Dates Name Provider Type Discipline    EB 06/16/16 -  Diamond Carranza, RN Registered Nurse Nurse    BRADY 06/16/16 -  Mendes, Tonie M, RN Registered Nurse Nurse     07/05/20 -  Vanesa Hernandez, OT Occupational Therapist OT    CJ 04/03/18 -  Sharon Abbott, OT Occupational Therapist OT                    OT Recommendation and  Plan    Planned Therapy Interventions (OT): activity tolerance training, adaptive equipment training, BADL retraining, neuromuscular control/coordination retraining, ROM/therapeutic exercise, strengthening exercise, transfer/mobility retraining                    Time Calculation:     Time Calculation- OT     Row Name 04/06/21 1355 04/06/21 1354          Time Calculation- OT    OT Start Time  1230  -BF  1020  -BF     OT Stop Time  1330  -BF  1050  -BF     OT Time Calculation (min)  60 min  -BF  30 min  -BF     Total Timed Code Minutes- OT  60 minute(s)  -BF  30 minute(s)  -BF     OT Non-Billable Time (min)  --  20 min  -BF       User Key  (r) = Recorded By, (t) = Taken By, (c) = Cosigned By    Initials Name Provider Type    Vanesa Martin OT Occupational Therapist        Therapy Charges for Today     Code Description Service Date Service Provider Modifiers Qty    70134938666 HC OT SELF CARE/MGMT/TRAIN EA 15 MIN 4/5/2021 Vanesa Hernandez, OT GO 3    55137196316 HC OT THERAPEUTIC ACT EA 15 MIN 4/5/2021 Vanesa Hernandez, OT GO 2    72830447427 HC OT THER PROC EA 15 MIN 4/5/2021 Vanesa Hernandez, OT GO 1    95966808202 HC OT SELF CARE/MGMT/TRAIN EA 15 MIN 4/6/2021 Vanesa Hernandez OT GO 1    26284915909 HC OT THERAPEUTIC ACT EA 15 MIN 4/6/2021 Vanesa Hernandez, OT GO 3    55551283071 HC OT THER PROC EA 15 MIN 4/6/2021 Vanesa Hernandez OT GO 2                   Vanesa Hernandez OT  4/6/2021

## 2021-04-06 NOTE — PROGRESS NOTES
[facility]     Patient Identification:  Name:  Argenis Lay  Age:  76 y.o.  Sex:  female  :  1945  MRN:  [id number]  Visit Number:  [id number]  ROOM: [facility]/     Primary Care Provider:  Mahi Villarreal APRN    Length of stay in inpatient status:  19    Subjective     Chief Compliant:  No chief complaint on file.      History of Presenting Illness: 76-year-old female who is recently treated for severe sepsis, COVID-19 pneumonia that did require tracheostomy and PEG tube placement which have been removed, diabetes mellitus, hypertension, hypothyroidism.  Patient was treated for bacteremia last week week as well.  Patient doing well at this time and is regaining her strength and is doing much better with PT and OT this week.  No new complaints    Objective     Current Hospital Meds:atorvastatin, 20 mg, Oral, Nightly  Check Fentanyl Patch Placement, 1 each, Does not apply, Q12H  Check Fentanyl Patch Placement, 1 each, Does not apply, Q12H  enoxaparin, 40 mg, Subcutaneous, Q24H  gabapentin, 100 mg, Oral, Q8H  insulin aspart, 0-7 Units, Subcutaneous, TID AC  insulin detemir, 24 Units, Subcutaneous, Nightly  levothyroxine, 50 mcg, Oral, Q AM  lidocaine-EPINEPHrine, 10 mL, Infiltration, Once  metoprolol tartrate, 12.5 mg, Oral, Q12H  mupirocin, , Topical, Q12H  pantoprazole, 40 mg, Oral, Q AM  polyethylene glycol, 17 g, Oral, Daily  QUEtiapine, 50 mg, Oral, Q12H       ----------------------------------------------------------------------------------------------------------------------  Vital Signs:  Temp:  [98.1 °F (36.7 °C)] 98.1 °F (36.7 °C)  Heart Rate:  [80-84] 84  Resp:  [20-22] 22  BP: (109-110)/(67) 109/67  SpO2:  [97 %-98 %] 98 %  on  Flow (L/min):  [4] 4;   Device (Oxygen Therapy): nasal cannula  Body mass index is 25.54 kg/m².    Wt Readings from Last 3 Encounters:   21 76.2 kg (168 lb)     Intake & Output (last 3 days)        07 -  0700  07 -   0700 04/05 0701 - 04/06 0700 04/06 0701 - 04/07 0700    P.O. 1800 1500 1820 820    IV Piggyback 700 600 100     Total Intake(mL/kg) 2500 (32.8) 2100 (27.6) 1920 (25.2) 820 (10.8)    Net +2500 +2100 +1920 +820            Urine Unmeasured Occurrence 18 x 11 x 12 x 4 x    Stool Unmeasured Occurrence 1 x 1 x 1 x         Diet Soft Texture; Ground; Thin  ----------------------------------------------------------------------------------------------------------------------  Physical exam:  Constitutional:   Frail 76-year-old female no distress  HEENT: Normocephalic atraumatic  Neck: Supple   Cardiovascular: Regular rate and rhythm  Pulmonary/Chest: Clear to auscultation, decreased breath sounds in the bases  Abdominal: Positive bowel sounds soft.   Musculoskeletal: No arthropathy  Neurological: No focal deficits  Skin: No rash  Peripheral vascular:  Genitourinary:  ----------------------------------------------------------------------------------------------------------------------    Last echocardiogram:  Results for orders placed during the hospital encounter of 03/18/21    Adult Transthoracic Echo Complete W/ Cont if Necessary Per Protocol    Interpretation Summary  · Normal left ventricular cavity size and wall thickness noted. All left ventricular wall segments contract normally.  · Left ventricular ejection fraction appears to be 66 - 70%.  · Left ventricular diastolic function is consistent with (grade I) impaired relaxation.  · There is calcification of the aortic valve mainly affecting the non-coronary and left coronary cusp(s). The aortic valve appears trileaflet. Mild aortic valve regurgitation is present. Mild aortic valve stenosis is present.  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 Mild mitral valve regurgitation is present. No significant mitral valve stenosis is present.  · Moderate tricuspid valve regurgitation is present. Estimated  right ventricular systolic pressure from tricuspid regurgitation is markedly elevated (78 mmHg).  · Severe pulmonary hypertension is present.  · There is a trivial pericardial effusion.    ----------------------------------------------------------------------------------------------------------------------  Results from last 7 days   Lab Units 04/06/21  0740 04/04/21  0304 04/01/21  0814 04/01/21  0202 03/31/21  0218   CRP mg/dL 1.71*  --   --  1.88* 1.31*   WBC 10*3/mm3  --  6.19 6.23 7.97  --    HEMOGLOBIN g/dL  --  9.3* 9.8* 9.1*  --    HEMATOCRIT %  --  32.5* 33.2* 31.2*  --    MCV fL  --  95.0 91.5 92.3  --    MCHC g/dL  --  28.6* 29.5* 29.2*  --    PLATELETS 10*3/mm3  --  167 166 198  --    INR   --   --  1.08  --   --          Results from last 7 days   Lab Units 04/03/21  1619 04/03/21  0206 04/01/21  0202   SODIUM mmol/L  --  142 142   POTASSIUM mmol/L 4.0 3.5 3.6   CHLORIDE mmol/L  --  108* 103   CO2 mmol/L  --  22.6 28.5   BUN mg/dL  --  9 5*   CREATININE mg/dL  --  0.30* 0.22*   EGFR IF NONAFRICN AM mL/min/1.73  --  >150 >150   CALCIUM mg/dL  --  8.7 9.1   GLUCOSE mg/dL  --  94 81   ALBUMIN g/dL  --   --  2.76*   BILIRUBIN mg/dL  --   --  0.2   ALK PHOS U/L  --   --  85   AST (SGOT) U/L  --   --  14   ALT (SGPT) U/L  --   --  10   Estimated Creatinine Clearance: 73.1 mL/min (A) (by C-G formula based on SCr of 0.3 mg/dL (L)).  No results found for: AMMONIA      Results from last 7 days   Lab Units 04/01/21  0202 03/31/21  1150   PROBNP pg/mL 356.3 581.2         Glucose   Date/Time Value Ref Range Status   04/06/2021 1130 109 70 - 130 mg/dL Final   04/06/2021 0628 83 70 - 130 mg/dL Final   04/05/2021 2033 118 70 - 130 mg/dL Final   04/05/2021 1610 124 70 - 130 mg/dL Final   04/05/2021 1120 107 70 - 130 mg/dL Final   04/05/2021 0645 84 70 - 130 mg/dL Final   04/04/2021 2035 192 (H) 70 - 130 mg/dL Final   04/04/2021 1623 101 70 - 130 mg/dL Final     No results found for: TSH, FREET4  No results found for:  PREGTESTUR, PREGSERUM, HCG, HCGQUANT  Pain Management Panel    There is no flowsheet data to display.       Brief Urine Lab Results  (Last result in the past 365 days)      Color   Clarity   Blood   Leuk Est   Nitrite   Protein   CREAT   Urine HCG        03/21/21 2043 Yellow Clear Negative Negative Negative Negative             No results found for: BLOODCX      No results found for: URINECX  No results found for: WOUNDCX  No results found for: STOOLCX  Results from last 7 days   Lab Units 04/06/21  0740 04/01/21  0202 03/31/21  0218   CRP mg/dL 1.71* 1.88* 1.31*       I have personally looked at the labs and they are summarized above.  ----------------------------------------------------------------------------------------------------------------------  Detailed radiology reports for the last 24 hours:    Imaging Results (Last 24 Hours)     ** No results found for the last 24 hours. **        Final impressions for the last 30 days of radiology reports:    NM Lung Scan Perfusion Particulate    Result Date: 3/31/2021  Intermediate suspicion of PE.  This report was finalized on 3/31/2021 3:21 PM by Dr. Alexx Espinosa MD.      XR Chest 1 View    Result Date: 3/31/2021  Worsening pulmonary status with lower lung volumes and continued bilateral peripheral diffuse infiltrates.  Signer Name: Lindsey Duron MD  Signed: 3/31/2021 7:03 AM  Workstation Name: [facility]  [facility]    XR Chest 1 View    Result Date: 3/28/2021  Patchy bilateral airspace disease, very similar to the prior study  This report was finalized on 3/28/2021 3:56 PM by Dr. Alan Hargrove MD.      XR Chest 1 View    Result Date: 3/23/2021  Slight worsening of bilateral upper lobe infiltrates, otherwise stable. Signer Name: Truman White MD  Signed: 3/23/2021 10:47 PM  Workstation Name: [id number]  [facility]    XR Chest 1 View    Result Date: 3/21/2021  Severe bilateral pneumonia is again noted. It is  uncertain if there may be interval improvement in aeration of the right lung. Signer Name: Amie Arana MD  Signed: 3/21/2021 7:59 PM  Workstation Name: [id number]  [facility]    XR Chest 1 View    Result Date: 3/9/2021  1.  Support devices as detailed above. 2.  Mild interval improvement in bilateral airspace disease.  This report was finalized on 3/9/2021 9:51 AM by Dr. Alexx Espinosa MD.      CT Chest Pulmonary Embolism    Result Date: 4/2/2021  1.  No PE identified. 2.  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 3.  Stable pattern of pulmonary fibrosis which on imaging is most adjusted of UIP. 4.  Trace effusions. 5.  Probable reactive hilar mediastinal lymph nodes which are stable from the previous exam. Continued follow-up recommended.  This report was finalized on 4/2/2021 10:29 AM by Dr. Alexx Espinosa MD.      US Venous Doppler Lower Extremity Bilateral (duplex)    Result Date: 3/31/2021  No DVT in the lower extremities on today's exam.  This report was finalized on 3/31/2021 1:40 PM by Dr. Alan Hargrove MD.      US Venous Doppler Upper Extremity Bilateral (duplex)    Result Date: 4/2/2021  No DVT is seen  This report was finalized on 4/2/2021 10:47 AM by Dr. Alan Hargrove MD.      US Venous Doppler Upper Extremity Bilateral (duplex)    Result Date: 3/22/2021  No sonographic findings of DVT in the upper extremities   This report was finalized on 3/22/2021 3:32 PM by Dr. Tim Mir II, MD.      FL Video Swallow Single Contrast    Result Date: 3/19/2021      Please see the speech pathologist's report for additional information.  This report was finalized on 3/19/2021 11:14 AM by Dr. Randal Mata MD.      FL Video Swallow Single Contrast    Result Date: 3/12/2021  1.  Deep laryngeal penetration and intermittent aspiration with thin liquids. 2. Please see dysphasia notes for further details.  This report was finalized on 3/12/2021 11:22 AM by Dr. Alexx Espinosa MD.      I have  personally looked at the radiology images and read the final radiology report.    Assessment & Plan    Debility secondary to prolonged COVID-19 infection--requiring moderate assistance for bed mobility; moderate assistance for transfers; moderate assistance with gait using parallel bars in his head try to walker with front wheeled as well patient walk 7 feet x 3 with parallel bars and 6 feet x 2 with rolling walker still in parallel bars.    Chronic hypoxic respiratory failure--continue supportive care.  Patient still requiring 4 L per of O2 per nasal cannula    Diabetes mellitus continue current treatment    Hypertension controlled    VTE Prophylaxis:   Mechanical Order History:     None      Pharmalogical Order History:      Ordered     Dose Route Frequency Stop    04/02/21 1056  enoxaparin (LOVENOX) syringe 40 mg      40 mg SC Every 24 Hours Scheduled --    04/01/21 0755  heparin (porcine) 5000 UNIT/ML injection 4,600 Units      60 Units/kg IV Once 04/01/21 1047    04/01/21 0755  heparin 76305 units/250 mL (100 units/mL) in 0.45 % NaCl infusion  9.14 mL/hr,   Status:  Discontinued      12 Units/kg/hr IV Titrated 04/02/21 1056    04/01/21 0755  heparin (porcine) 5000 UNIT/ML injection 4,600 Units  Status:  Discontinued      60 Units/kg IV As Needed 04/02/21 1056    04/01/21 0755  heparin (porcine) 5000 UNIT/ML injection 2,300 Units  Status:  Discontinued      30 Units/kg IV As Needed 04/02/21 1056    03/18/21 1811  enoxaparin (LOVENOX) syringe 40 mg  Status:  Discontinued      40 mg SC Every 24 Hours 04/01/21 0754    03/18/21 1806  enoxaparin (LOVENOX) syringe 40 mg  Status:  Discontinued      40 mg SC Every 24 Hours 03/18/21 1811                    Marquis He MD  [facility]  04/06/21  14:27 EDT

## 2021-04-06 NOTE — THERAPY TREATMENT NOTE
Inpatient Rehabilitation - Physical Therapy Treatment Note       [facility]     Patient Name: Argenis Lay  : 1945  MRN: [id number]    Today's Date: 2021                    Admit Date: 3/18/2021      Visit Dx: No diagnosis found.    Patient Active Problem List   Diagnosis   • Debility       Past Medical History:   Diagnosis Date   • COPD (chronic obstructive pulmonary disease) (CMS/HCC)    • Diabetes mellitus (CMS/[facility])    • Hypertension        History reviewed. No pertinent surgical history.       PT ASSESSMENT (last 12 hours)      IRF PT Evaluation and Treatment     Row Name 21 1140          PT Time and Intention    Document Type  daily treatment  -CW     Mode of Treatment  physical therapy  -CW     Patient/Family/Caregiver Comments/Observations  Patient agreeable to physical therapy treatment this date. She reports that she feels she will benefit from more time before her discharge.  -CW     Total Minutes, Physical Therapy  90  -CW     Row Name 21 1140          General Information    Patient Profile Reviewed  yes  -CW     General Observations of Patient  Alert/cooperative  -CW     Existing Precautions/Restrictions  fall;oxygen therapy device and L/min  -CW     Row Name 21 1140          Coping Strategies    Trust Relationship/Rapport  care explained;choices provided;questions answered;thoughts/feelings acknowledged  -CW     Row Name 21 1140          Cognition/Psychosocial    Affect/Mental Status (Cognitive)  anxious;WFL  -CW     Behavioral Issues (Cognitive)  overwhelmed easily improving  -CW     Orientation Status (Cognition)  oriented x 3  -CW     Follows Commands (Cognition)  follows one-step commands;repetition of directions required;verbal cues/prompting required;physical/tactile prompts required  -CW     Row Name 21 1140          Pain Scale: Numbers Pre/Post-Treatment    Pre/Posttreatment Pain Comment  Patient reports some back pain initially with treatment session, but  later reports she is feeling okay.  -CW     Row Name 04/06/21 1140          Bed Mobility    All Activities, Rankin (Bed Mobility)  moderate assist (50% patient effort);verbal cues;nonverbal cues (demo/gesture)  -CW     Bed Mobility, Safety Issues  decreased use of arms for pushing/pulling;decreased use of legs for bridging/pushing  -CW     Assistive Device (Bed Mobility)  bed rails;draw sheet  -CW     Row Name 04/06/21 1140          Transfers    Bed-Chair Rankin (Transfers)  verbal cues;nonverbal cues (demo/gesture);moderate assist (50% patient effort)  -CW     Chair-Bed Rankin (Transfers)  verbal cues;nonverbal cues (demo/gesture);moderate assist (50% patient effort)  -CW     Assistive Device (Bed-Chair Transfers)  wheelchair  -CW     Sit-Stand Rankin (Transfers)  verbal cues;nonverbal cues (demo/gesture);moderate assist (50% patient effort);1 person assist x 6  -CW     Stand-Sit Rankin (Transfers)  verbal cues;nonverbal cues (demo/gesture);moderate assist (50% patient effort);1 person assist  -CW     Row Name 04/06/21 1140          Chair-Bed Transfer    Assistive Device (Chair-Bed Transfers)  wheelchair  -CW     Row Name 04/06/21 1140          Sit-Stand Transfer    Assistive Device (Sit-Stand Transfers)  other (see comments) parallel bars  -CW     Row Name 04/06/21 1140          Stand-Sit Transfer    Assistive Device (Stand-Sit Transfers)  other (see comments) parallel bars  -CW     Row Name 04/06/21 1140          Gait/Stairs (Locomotion)    Rankin Level (Gait)  moderate assist (50% patient effort);2 person assist;verbal cues;nonverbal cues (demo/gesture);1 person to manage equipment 2 person with RW, only 1 for equipment with parallel bars  -CW     Assistive Device (Gait)  parallel bars;walker, front-wheeled  -CW     Distance in Feet (Gait)  7 x 3 with parallel bars, 6 x 2 with RW still in parallel bars  -CW     Pattern (Gait)  step-to  -CW     Deviations/Abnormal Patterns  (Gait)  luiz decreased;gait speed decreased;stride length decreased;weight shifting decreased  -CW     Bilateral Gait Deviations  forward flexed posture;heel strike decreased less flexion this date  -CW     Row Name 04/06/21 1140          Safety Issues, Functional Mobility    Safety Issues Affecting Function (Mobility)  insight into deficits/self-awareness;judgment;problem-solving;other (see comments) anxiety/fear  -CW     Impairments Affecting Function (Mobility)  balance;endurance/activity tolerance;shortness of breath;strength  -CW     Row Name 04/06/21 1140          Balance    Static Standing Balance  moderate impairment;supported  -CW     Row Name 04/06/21 1140          Motor Skills    Therapeutic Exercise  other (see comments) Seated LE strengthening interventions  -CW     Row Name 04/06/21 1140          Positioning and Restraints    Pre-Treatment Position  in bed  -CW     Post Treatment Position  wheelchair  -CW     In Wheelchair  with OT  -CW     Row Name 04/06/21 1140          Therapy Assessment/Plan (PT)    Comment, Therapy Assessment/Plan (PT)  Patient demonstrates progression in ability for ambulation this date by use of a RW. Her tolerance of this was decreased from using the parallel bars, but she is still limited in both due to respiratory endurance deficits. Her ability to maintain oxygen saturation has improved as well.  -CW     Row Name 04/06/21 1140          Daily Progress Summary (PT)    Functional Goal Overall Progress (PT)  progressing toward functional goals slower than expected  -CW     Impairments Still Limiting Function (PT)  ROM decreased;strength decreased;impaired balance;impaired functional activity tolerance  -CW       User Key  (r) = Recorded By, (t) = Taken By, (c) = Cosigned By    Initials Name Provider Type    CW Barry Santamaria, PT Physical Therapist        Wound 03/26/21 1637 Right lower abdomen Incision (Active)   Dressing Appearance dry;intact 04/06/21 0804     Physical  Therapy Education                 Title: PT OT SLP Therapies (Done)     Topic: Physical Therapy (Done)     Point: Mobility training (Done)     Learning Progress Summary           Patient Acceptance, E,TB, VU by DG at 4/3/2021 2231    Acceptance, E,TB, VU by DG at 4/2/2021 2227    Acceptance, E,TB, VU by DG at 4/1/2021 2245    Acceptance, E,D, VU,NR by LL at 3/31/2021 1059    Acceptance, E,D, VU,NR by RG at 3/30/2021 1427    Acceptance, E,D, VU,NR by LL at 3/30/2021 0850    Acceptance, E,D, VU,NR by LL at 3/29/2021 0934    Acceptance, E, VU by EB at 3/29/2021 0755    Acceptance, E,D, VU,NR by LL at 3/26/2021 1336    Acceptance, E,D, VU,NR by AG at 3/25/2021 1517    Acceptance, E,D, VU,NR by AG at 3/24/2021 1537    Acceptance, E,D, VU,NR by AG at 3/23/2021 1506    Acceptance, E,TB, VU by CT at 3/20/2021 1338    Acceptance, E,TB, VU by DG at 3/20/2021 0010    Acceptance, E, NR by CW at 3/19/2021 1330                   Point: Home exercise program (Done)     Learning Progress Summary           Patient Acceptance, E,TB, VU by DG at 4/3/2021 2231    Acceptance, E,TB, VU by DG at 4/2/2021 2227    Acceptance, E,TB, VU by DG at 4/1/2021 2245    Acceptance, E,D, VU,NR by LL at 3/31/2021 1059    Acceptance, E,D, VU,NR by RG at 3/30/2021 1427    Acceptance, E,D, VU,NR by LL at 3/30/2021 0850    Acceptance, E,D, VU,NR by LL at 3/29/2021 0934    Acceptance, E, VU by EB at 3/29/2021 0755    Acceptance, E,D, VU,NR by LL at 3/26/2021 1336    Acceptance, E,D, VU,NR by AG at 3/25/2021 1517    Acceptance, E,D, VU,NR by AG at 3/24/2021 1537    Acceptance, E,D, VU,NR by AG at 3/23/2021 1506    Acceptance, E,TB, VU by CT at 3/20/2021 1338    Acceptance, E,TB, VU by DG at 3/20/2021 0010    Acceptance, E, NR by CW at 3/19/2021 1330                   Point: Body mechanics (Done)     Learning Progress Summary           Patient Acceptance, E,TB, VU by DG at 4/3/2021 2231    Acceptance, E,TB, VU by DG at 4/2/2021 2227    Acceptance, E,TB, VU by  DG at 4/1/2021 2245    Acceptance, E,D, VU,NR by LL at 3/31/2021 1059    Acceptance, E,D, VU,NR by RG at 3/30/2021 1427    Acceptance, E,D, VU,NR by LL at 3/30/2021 0850    Acceptance, E,D, VU,NR by LL at 3/29/2021 0934    Acceptance, E, VU by EB at 3/29/2021 0755    Acceptance, E,D, VU,NR by LL at 3/26/2021 1336    Acceptance, E,D, VU,NR by AG at 3/25/2021 1517    Acceptance, E,D, VU,NR by AG at 3/24/2021 1537    Acceptance, E,D, VU,NR by AG at 3/23/2021 1506    Acceptance, E,TB, VU by CT at 3/20/2021 1338    Acceptance, E,TB, VU by DG at 3/20/2021 0010    Acceptance, E, NR by CW at 3/19/2021 1330                   Point: Precautions (Done)     Learning Progress Summary           Patient Acceptance, E,TB, VU by DG at 4/3/2021 2231    Acceptance, E,TB, VU by DG at 4/2/2021 2227    Acceptance, E,TB, VU by DG at 4/1/2021 2245    Acceptance, E,D, VU,NR by LL at 3/31/2021 1059    Acceptance, E,D, VU,NR by RG at 3/30/2021 1427    Acceptance, E,D, VU,NR by LL at 3/30/2021 0850    Acceptance, E,D, VU,NR by LL at 3/29/2021 0934    Acceptance, E, VU by EB at 3/29/2021 0755    Acceptance, E,D, VU,NR by LL at 3/26/2021 1336    Acceptance, E,D, VU,NR by AG at 3/25/2021 1517    Acceptance, E,D, VU,NR by AG at 3/24/2021 1537    Acceptance, E,D, VU,NR by AG at 3/23/2021 1506    Acceptance, E,TB, VU by CT at 3/20/2021 1338    Acceptance, E,TB, VU by DG at 3/20/2021 0010    Acceptance, E, NR by CW at 3/19/2021 1330                               User Key     Initials Effective Dates Name Provider Type Discipline    EB 06/16/16 -  Diamond Carranza, RN Registered Nurse Nurse    DG 06/16/16 -  Tonie Mendes RN Registered Nurse Nurse    AG 04/03/18 -  Ivana Carpenter, PT Physical Therapist PT    CT 04/03/18 -  Jing Escalante, PT Physical Therapist PT    LL 05/02/16 -  Martha Schaeffer, PTA Physical Therapy Assistant PT    RG 10/31/19 -  Jonathan Spangler, PTA Physical Therapy Assistant PT    CW 09/11/20 -  Barry Santamaria, PT  Physical Therapist PT                PT Recommendation and Plan    Planned Therapy Interventions (PT): balance training, bed mobility training, gait training, home exercise program, patient/family education, strengthening, transfer training, wheelchair management/propulsion training  Frequency of Treatment (PT): 5 times per week, 90 minutes per session  Anticipated Equipment Needs at Discharge (PT Eval): front wheeled walker, hospital bed, shower chair, wheelchair, bedside commode  Daily Progress Summary (PT)  Functional Goal Overall Progress (PT): progressing toward functional goals slower than expected  Impairments Still Limiting Function (PT): ROM decreased, strength decreased, impaired balance, impaired functional activity tolerance               Time Calculation:     PT Charges     Row Name 04/06/21 1158             Time Calculation    Start Time  0830  -CW      Stop Time  1000  -CW      Time Calculation (min)  90 min  -CW      PT Received On  04/06/21  -CW      PT Goal Re-Cert Due Date  04/08/21  -CW         Time Calculation- PT    Total Timed Code Minutes- PT  90 minute(s)  -CW        User Key  (r) = Recorded By, (t) = Taken By, (c) = Cosigned By    Initials Name Provider Type    CW Barry Santamaria, PT Physical Therapist          Therapy Charges for Today     Code Description Service Date Service Provider Modifiers Qty    20226670455 HC GAIT TRAINING EA 15 MIN 4/5/2021 Barry Santamaria, PT GP 1    90384176866 HC PT THER PROC EA 15 MIN 4/5/2021 Barry Santamaria, PT GP 2    53660894500 HC PT THERAPEUTIC ACT EA 15 MIN 4/5/2021 Barry Santamaria, PT GP 3    54692123872 HC GAIT TRAINING EA 15 MIN 4/6/2021 Barry Santamaria, PT GP 2    50196255846 HC PT THERAPEUTIC ACT EA 15 MIN 4/6/2021 Barry Santamaria, PT GP 3    12047798555 HC PT THER PROC EA 15 MIN 4/6/2021 Barry Santamaria, PT GP 1                   Barry Santamaria PT  4/6/2021

## 2021-04-06 NOTE — SIGNIFICANT NOTE
04/06/21 1023   Plan   Plan Team conference was held this AM and discharge date has been extended to 4/16.  Spoke to pt and daughter about  new d/c date and they are agreeable.  Will continue to follow for d/c planning needs.

## 2021-04-06 NOTE — PROGRESS NOTES
PROGRESS NOTE         Patient Identification:  Name:  Argenis Lay  Age:  76 y.o.  Sex:  female  :  1945  MRN:  [id number]  Visit Number:  [id number]  Primary Care Provider:  Mahi Villarreal APRN         LOS: 19 days       ----------------------------------------------------------------------------------------------------------------------  Subjective       Chief Complaints:    Aspiration pneumonia  Acute on chronic respiratory failure  Type 2 diabetes mellitus  G-tube removal with intermittent drainage      Interval History:      The patient is participating in physical therapy this morning.  Sitting in a wheelchair in no apparent distress.  Complains of chronic shortness of breath, which is no worse than usual.  Afebrile, no diarrhea.  CRP is improved at 1.71.    Review of Systems:    Constitutional: No fever, no chills and night sweats. No appetite change or unexpected weight change. No fatigue.  Eyes: no eye drainage, itching or redness.  HEENT: no mouth sores, dysphagia or nose bleed.  Respiratory:  Resolved cough and shortness of breath.  Cardiovascular: no chest pain, no palpitations, no orthopnea.  Gastrointestinal: No vomiting. No diarrhea. No abdominal pain, hematemesis or rectal bleeding.  Nausea.  Genitourinary: no dysuria or polyuria.  Hematologic/lymphatic: no lymph node abnormalities, no easy bruising or easy bleeding.  Musculoskeletal: no muscle or joint pain.  Skin: No rash and no itching.  Neurological: no loss of consciousness, no seizure, no headache.  Behavioral/Psych: no depression or suicidal ideation.   Endocrine: no hot flashes.  Immunologic: negative.    ----------------------------------------------------------------------------------------------------------------------      Objective       \[facility] [place] [facility]\"" Meds:  atorvastatin, 20 mg, Oral, Nightly  Check Fentanyl Patch Placement, 1 each, Does not apply, Q12H  Check Fentanyl Patch Placement, 1 each, Does not apply,  Q12H  enoxaparin, 40 mg, Subcutaneous, Q24H  gabapentin, 100 mg, Oral, Q8H  insulin aspart, 0-7 Units, Subcutaneous, TID AC  insulin detemir, 24 Units, Subcutaneous, Nightly  levothyroxine, 50 mcg, Oral, Q AM  lidocaine-EPINEPHrine, 10 mL, Infiltration, Once  metoprolol tartrate, 12.5 mg, Oral, Q12H  mupirocin, , Topical, Q12H  pantoprazole, 40 mg, Oral, Q AM  polyethylene glycol, 17 g, Oral, Daily  QUEtiapine, 50 mg, Oral, Q12H         ----------------------------------------------------------------------------------------------------------------------    Vital Signs:  Temp:  [98.1 °F (36.7 °C)] 98.1 °F (36.7 °C)  Heart Rate:  [80-84] 84  Resp:  [20-22] 22  BP: (109-110)/(67) 109/67  No data found.  SpO2 Percentage    04/05/21 0819 04/05/21 1900 04/06/21 0802   SpO2: 98% 97% 98%     SpO2:  [97 %-98 %] 98 %  on  Flow (L/min):  [4] 4;   Device (Oxygen Therapy): nasal cannula    Body mass index is 25.54 kg/m².  Wt Readings from Last 3 Encounters:   03/19/21 76.2 kg (168 lb)        Intake/Output Summary (Last 24 hours) at 4/6/2021 1029  Last data filed at 4/6/2021 0702  Gross per 24 hour   Intake 1460 ml   Output --   Net 1460 ml     Diet Soft Texture; Ground; Thin  ----------------------------------------------------------------------------------------------------------------------      Physical Exam:    Constitutional: Feels well this morning without any increased shortness of breath or cough.  HENT:  Head: Normocephalic and atraumatic.  Mouth:  Moist mucous membranes.    Eyes:  Conjunctivae and EOM are normal.  No scleral icterus.  Neck:  Neck supple.  No JVD present.    Cardiovascular:  Normal rate, regular rhythm and normal heart sounds with no murmur. No edema.  Pulmonary/Chest: Diminished lung sounds in the bases bilaterally.  Abdominal:  Soft.  Bowel sounds are normal.  No distension.  PEG tube has been removed and skin looks normal with no evidence of drainage.  Musculoskeletal:  No edema, no tenderness, and  no deformity.  No swelling or redness of joints.  Neurological:  Alert and oriented to person, place, and time.  No facial droop.  No slurred speech.   Skin:  Skin is warm and dry.  No rash noted.  No pallor.    Psychiatric:  Normal mood and affect.  Behavior is normal.     ----------------------------------------------------------------------------------------------------------------------      Results from last 7 days   Lab Units 04/01/21  0202 03/31/21  1150   PROBNP pg/mL 356.3 581.2         Results from last 7 days   Lab Units 04/06/21  0740 04/04/21  0304 04/01/21  0814 04/01/21  0202 03/31/21  0218   CRP mg/dL 1.71*  --   --  1.88* 1.31*   WBC 10*3/mm3  --  6.19 6.23 7.97  --    HEMOGLOBIN g/dL  --  9.3* 9.8* 9.1*  --    HEMATOCRIT %  --  32.5* 33.2* 31.2*  --    MCV fL  --  95.0 91.5 92.3  --    MCHC g/dL  --  28.6* 29.5* 29.2*  --    PLATELETS 10*3/mm3  --  167 166 198  --    INR   --   --  1.08  --   --      Results from last 7 days   Lab Units 04/03/21  1619 04/03/21  0206 04/01/21  0202   SODIUM mmol/L  --  142 142   POTASSIUM mmol/L 4.0 3.5 3.6   CHLORIDE mmol/L  --  108* 103   CO2 mmol/L  --  22.6 28.5   BUN mg/dL  --  9 5*   CREATININE mg/dL  --  0.30* 0.22*   EGFR IF NONAFRICN AM mL/min/1.73  --  >150 >150   CALCIUM mg/dL  --  8.7 9.1   GLUCOSE mg/dL  --  94 81   ALBUMIN g/dL  --   --  2.76*   BILIRUBIN mg/dL  --   --  0.2   ALK PHOS U/L  --   --  85   AST (SGOT) U/L  --   --  14   ALT (SGPT) U/L  --   --  10   Estimated Creatinine Clearance: 73.1 mL/min (A) (by C-G formula based on SCr of 0.3 mg/dL (L)).  No results found for: AMMONIA    Glucose   Date/Time Value Ref Range Status   04/06/2021 0628 83 70 - 130 mg/dL Final   04/05/2021 2033 118 70 - 130 mg/dL Final   04/05/2021 1610 124 70 - 130 mg/dL Final   04/05/2021 1120 107 70 - 130 mg/dL Final   04/05/2021 0645 84 70 - 130 mg/dL Final   04/04/2021 2035 192 (H) 70 - 130 mg/dL Final   04/04/2021 1623 101 70 - 130 mg/dL Final   04/04/2021 1134 100  70 - 130 mg/dL Final     Lab Results   Component Value Date    HGBA1C 6.40 (H) 02/22/2021     No results found for: TSH, FREET4    Blood Culture   Date Value Ref Range Status   03/21/2021 Gram Negative Bacilli (C)  Preliminary   03/21/2021 Gram Positive Cocci (C)  Preliminary   03/21/2021 Gram Negative Bacilli (C)  Preliminary   03/21/2021 Gram Positive Cocci (C)  Preliminary     No results found for: URINECX  No results found for: WOUNDCX  No results found for: STOOLCX  No results found for: RESPCX  Pain Management Panel    There is no flowsheet data to display.           ----------------------------------------------------------------------------------------------------------------------  Imaging Results (Last 24 Hours)       ** No results found for the last 24 hours. **            ----------------------------------------------------------------------------------------------------------------------    Assessment/Plan       Assessment/Plan     ASSESSMENT:    Debility  Severe sepsis  Enterococcus and Serratia bacteremia  Aspiration pneumonia  Chest tightness  Acute on chronic respiratory failure  Diabetes mellitus type 2  Hypertension  G-tube removed with resolved drainage      PLAN:    The patient is participating in physical therapy this morning.  Sitting in a wheelchair in no apparent distress.  Complains of chronic shortness of breath and cough, which is no worse than usual.  Afebrile, no diarrhea.  CRP is improved at 1.71.    Wound culture on 3/24/2021 finalized as Pseudomonas.  Repeat blood cultures on 3/22/2021 are so far showing no growth.    Bilateral upper extremity venous duplex reports no evidence of DVT.  2D echo unremarkable for vegetation.      Urinalysis from 3/21/2021 unremarkable.  Blood cultures from 3/21/2021 2 out of 2 sets positive for Enterococcus species and Serratia marcescens. Chest x-ray from 3/21/2021 reports severe bilateral pneumonia with possible interval improvement in aeration of the  right lung.    Patient completed a course of cefepime on 4/4/2021. She is stable from an ID standpoint.  We will sign off at this time.  Please call back for any questions.    Code Status:   Code Status and Medical Interventions:   Ordered at: 03/19/21 0727     Level Of Support Discussed With:    Patient     Code Status:    No CPR     Medical Interventions (Level of Support Prior to Arrest):    Full     ALBERT Pham  04/06/21  10:29 EDT

## 2021-04-07 LAB
ANISOCYTOSIS BLD QL: NORMAL
APTT PPP: 28.3 SECONDS (ref 25.6–35.3)
BASOPHILS # BLD AUTO: 0.03 10*3/MM3 (ref 0–0.2)
BASOPHILS NFR BLD AUTO: 0.5 % (ref 0–1.5)
DEPRECATED RDW RBC AUTO: 54.4 FL (ref 37–54)
EOSINOPHIL # BLD AUTO: 0.33 10*3/MM3 (ref 0–0.4)
EOSINOPHIL NFR BLD AUTO: 6 % (ref 0.3–6.2)
ERYTHROCYTE [DISTWIDTH] IN BLOOD BY AUTOMATED COUNT: 16.2 % (ref 12.3–15.4)
GLUCOSE BLDC GLUCOMTR-MCNC: 114 MG/DL (ref 70–130)
GLUCOSE BLDC GLUCOMTR-MCNC: 162 MG/DL (ref 70–130)
GLUCOSE BLDC GLUCOMTR-MCNC: 84 MG/DL (ref 70–130)
GLUCOSE BLDC GLUCOMTR-MCNC: 92 MG/DL (ref 70–130)
GLUCOSE BLDC GLUCOMTR-MCNC: 98 MG/DL (ref 70–130)
HCT VFR BLD AUTO: 33.4 % (ref 34–46.6)
HGB BLD-MCNC: 10.1 G/DL (ref 12–15.9)
HYPOCHROMIA BLD QL: NORMAL
IMM GRANULOCYTES # BLD AUTO: 0.04 10*3/MM3 (ref 0–0.05)
IMM GRANULOCYTES NFR BLD AUTO: 0.7 % (ref 0–0.5)
LYMPHOCYTES # BLD AUTO: 1.17 10*3/MM3 (ref 0.7–3.1)
LYMPHOCYTES NFR BLD AUTO: 21.4 % (ref 19.6–45.3)
MCH RBC QN AUTO: 27.6 PG (ref 26.6–33)
MCHC RBC AUTO-ENTMCNC: 30.2 G/DL (ref 31.5–35.7)
MCV RBC AUTO: 91.3 FL (ref 79–97)
MONOCYTES # BLD AUTO: 0.41 10*3/MM3 (ref 0.1–0.9)
MONOCYTES NFR BLD AUTO: 7.5 % (ref 5–12)
NEUTROPHILS NFR BLD AUTO: 3.5 10*3/MM3 (ref 1.7–7)
NEUTROPHILS NFR BLD AUTO: 63.9 % (ref 42.7–76)
NRBC BLD AUTO-RTO: 0 /100 WBC (ref 0–0.2)
PLAT MORPH BLD: NORMAL
PLATELET # BLD AUTO: 143 10*3/MM3 (ref 140–450)
PMV BLD AUTO: 10.6 FL (ref 6–12)
RBC # BLD AUTO: 3.66 10*6/MM3 (ref 3.77–5.28)
WBC # BLD AUTO: 5.48 10*3/MM3 (ref 3.4–10.8)

## 2021-04-07 PROCEDURE — 97116 GAIT TRAINING THERAPY: CPT

## 2021-04-07 PROCEDURE — 97112 NEUROMUSCULAR REEDUCATION: CPT

## 2021-04-07 PROCEDURE — 85007 BL SMEAR W/DIFF WBC COUNT: CPT | Performed by: INTERNAL MEDICINE

## 2021-04-07 PROCEDURE — 25010000002 ENOXAPARIN PER 10 MG: Performed by: INTERNAL MEDICINE

## 2021-04-07 PROCEDURE — 97530 THERAPEUTIC ACTIVITIES: CPT

## 2021-04-07 PROCEDURE — 85025 COMPLETE CBC W/AUTO DIFF WBC: CPT | Performed by: INTERNAL MEDICINE

## 2021-04-07 PROCEDURE — 97535 SELF CARE MNGMENT TRAINING: CPT

## 2021-04-07 PROCEDURE — 97110 THERAPEUTIC EXERCISES: CPT

## 2021-04-07 PROCEDURE — 63710000001 INSULIN DETEMIR PER 5 UNITS: Performed by: INTERNAL MEDICINE

## 2021-04-07 PROCEDURE — 85730 THROMBOPLASTIN TIME PARTIAL: CPT | Performed by: INTERNAL MEDICINE

## 2021-04-07 PROCEDURE — 82962 GLUCOSE BLOOD TEST: CPT

## 2021-04-07 PROCEDURE — 99231 SBSQ HOSP IP/OBS SF/LOW 25: CPT | Performed by: FAMILY MEDICINE

## 2021-04-07 RX ADMIN — CLONAZEPAM 0.25 MG: 0.5 TABLET ORAL at 20:59

## 2021-04-07 RX ADMIN — METOPROLOL TARTRATE 12.5 MG: 25 TABLET, FILM COATED ORAL at 21:00

## 2021-04-07 RX ADMIN — PANTOPRAZOLE SODIUM 40 MG: 40 TABLET, DELAYED RELEASE ORAL at 05:01

## 2021-04-07 RX ADMIN — ATORVASTATIN CALCIUM 20 MG: 20 TABLET, FILM COATED ORAL at 21:01

## 2021-04-07 RX ADMIN — GABAPENTIN 100 MG: 100 CAPSULE ORAL at 05:01

## 2021-04-07 RX ADMIN — OXYCODONE HYDROCHLORIDE 5 MG: 5 TABLET ORAL at 04:35

## 2021-04-07 RX ADMIN — GABAPENTIN 100 MG: 100 CAPSULE ORAL at 14:53

## 2021-04-07 RX ADMIN — GABAPENTIN 100 MG: 100 CAPSULE ORAL at 21:00

## 2021-04-07 RX ADMIN — OXYCODONE HYDROCHLORIDE 5 MG: 5 TABLET ORAL at 20:59

## 2021-04-07 RX ADMIN — METOPROLOL TARTRATE 12.5 MG: 25 TABLET, FILM COATED ORAL at 08:44

## 2021-04-07 RX ADMIN — QUETIAPINE FUMARATE 50 MG: 25 TABLET, FILM COATED ORAL at 21:01

## 2021-04-07 RX ADMIN — LEVOTHYROXINE SODIUM 50 MCG: 88 TABLET ORAL at 05:01

## 2021-04-07 RX ADMIN — OXYCODONE HYDROCHLORIDE 5 MG: 5 TABLET ORAL at 12:33

## 2021-04-07 RX ADMIN — QUETIAPINE FUMARATE 50 MG: 25 TABLET, FILM COATED ORAL at 08:44

## 2021-04-07 RX ADMIN — INSULIN DETEMIR 24 UNITS: 100 INJECTION, SOLUTION SUBCUTANEOUS at 21:20

## 2021-04-07 RX ADMIN — ENOXAPARIN SODIUM 40 MG: 40 INJECTION SUBCUTANEOUS at 08:43

## 2021-04-07 RX ADMIN — MUPIROCIN: 20 OINTMENT TOPICAL at 08:43

## 2021-04-07 RX ADMIN — Medication 5 MG: at 20:59

## 2021-04-07 RX ADMIN — POLYETHYLENE GLYCOL (3350) 17 G: 17 POWDER, FOR SOLUTION ORAL at 08:43

## 2021-04-07 RX ADMIN — MUPIROCIN 1 APPLICATION: 20 OINTMENT TOPICAL at 21:01

## 2021-04-07 NOTE — THERAPY TREATMENT NOTE
Inpatient Rehabilitation - Occupational Therapy Treatment Note     [place]     Patient Name: Argenis Lay  : 1945  MRN: [id number]    Today's Date: 2021                 Admit Date: 3/18/2021       No diagnosis found.    Patient Active Problem List   Diagnosis   • Debility       Past Medical History:   Diagnosis Date   • COPD (chronic obstructive pulmonary disease) (CMS/HCC)    • Diabetes mellitus (CMS/[facility])    • Hypertension        History reviewed. No pertinent surgical history.             IRF OT ASSESSMENT FLOWSHEET (last 12 hours)      IRF OT Evaluation and Treatment     Row Name 21 1019          OT Time and Intention    Document Type  daily treatment  -     Mode of Treatment  individual therapy;occupational therapy  -     Patient Effort  good  -     Row Name 21 1019          General Information    General Observations of Patient  pt seen for 90 Min. this am.  She participated in LB/UE dress tr with mod A, bed mob and transfer tr with mod A X 2 and groom and hyg training while in W/c.  She also completed fine and gross motor coord. challenges to improve fine motor aspects of ADLs and mob.  SHe cont to c/o decrease function in L hand.                                                   -     Existing Precautions/Restrictions  oxygen therapy device and L/min;fall  -       User Key  (r) = Recorded By, (t) = Taken By, (c) = Cosigned By    Initials Name Effective Dates    Dylan Pineda, OTR 18 -            Occupational Therapy Education                 Title: PT OT SLP Therapies (Done)     Topic: Occupational Therapy (Done)     Point: ADL training (Done)     Description:   Instruct learner(s) on proper safety adaptation and remediation techniques during self care or transfers.   Instruct in proper use of assistive devices.              Learning Progress Summary           Patient Acceptance, E,TB,D, VU,DU,NR by CHRIS at 2021 1023    Acceptance, E, VU,NR by  at 2021 1351     Acceptance, E, VU,NR by BF at 4/5/2021 1418    Comment: Daughter present for ADL    Acceptance, E,TB, VU by DG at 4/3/2021 2231    Acceptance, E,TB, VU by DG at 4/2/2021 2227    Acceptance, E, VU,NR by BF at 4/2/2021 1419    Acceptance, E,TB, VU by DG at 4/1/2021 2245    Acceptance, E, VU,NR by BF at 4/1/2021 1431    Acceptance, E, VU,NR by BF at 3/30/2021 1233    Acceptance, E, VU,NR by BF at 3/29/2021 1219    Acceptance, E, VU by EB at 3/29/2021 0755    Acceptance, E, VU,NR by BF at 3/26/2021 1357    Acceptance, E, VU,NR by BF at 3/24/2021 1337    Acceptance, E, VU,NR by BF at 3/23/2021 1451    Acceptance, E,D, VU,NR by CJ at 3/23/2021 1444    Acceptance, E,TB, VU by DG at 3/20/2021 0010    Acceptance, E, VU,NR by BF at 3/19/2021 1429   Family Acceptance, E, VU,NR by BF at 4/5/2021 1418    Comment: Daughter present for ADL                   Point: Home exercise program (Done)     Description:   Instruct learner(s) on appropriate technique for monitoring, assisting and/or progressing therapeutic exercises/activities.              Learning Progress Summary           Patient Acceptance, E,TB,D, VU,DU,NR by JW at 4/7/2021 1023    Acceptance, E,TB, VU by DG at 4/3/2021 2231    Acceptance, E,TB, VU by DG at 4/2/2021 2227    Acceptance, E,TB, VU by DG at 4/1/2021 2245    Acceptance, E, VU by EB at 3/29/2021 0755    Acceptance, E,TB, VU by DG at 3/20/2021 0010                   Point: Precautions (Done)     Description:   Instruct learner(s) on prescribed precautions during self-care and functional transfers.              Learning Progress Summary           Patient Acceptance, E,TB,D, VU,DU,NR by JW at 4/7/2021 1023    Acceptance, E, VU,NR by BF at 4/6/2021 1351    Acceptance, E, VU,NR by BF at 4/5/2021 1418    Comment: Daughter present for ADL    Acceptance, E,TB, VU by DG at 4/3/2021 2231    Acceptance, E,TB, VU by DG at 4/2/2021 2227    Acceptance, E, VU,NR by BF at 4/2/2021 1419    Acceptance, E,TB, VU by DG at  4/1/2021 2245    Acceptance, E, VU,NR by BF at 3/30/2021 1233    Acceptance, E, VU by EB at 3/29/2021 0755    Acceptance, E, VU,NR by BF at 3/26/2021 1357    Acceptance, E, VU,NR by BF at 3/24/2021 1337    Acceptance, E,D, VU,NR by  at 3/23/2021 1444    Acceptance, E,TB, VU by DG at 3/20/2021 0010    Acceptance, E, VU,NR by BF at 3/19/2021 1429   Family Acceptance, E, VU,NR by BF at 4/5/2021 1418    Comment: Daughter present for ADL                   Point: Body mechanics (Done)     Description:   Instruct learner(s) on proper positioning and spine alignment during self-care, functional mobility activities and/or exercises.              Learning Progress Summary           Patient Acceptance, E,TB,D, VU,DU,NR by JW at 4/7/2021 1023    Acceptance, E,TB, VU by DG at 4/3/2021 2231    Acceptance, E,TB, VU by DG at 4/2/2021 2227    Acceptance, E,TB, VU by DG at 4/1/2021 2245    Acceptance, E, VU by EB at 3/29/2021 0755    Acceptance, E,TB, VU by DG at 3/20/2021 0010                               User Key     Initials Effective Dates Name Provider Type Discipline    EB 06/16/16 -  Diamond Carranza, RN Registered Nurse Nurse     06/16/16 -  Tonie Mendes, RN Registered Nurse Nurse     07/05/20 -  Vanesa Hernandez, OT Occupational Therapist OT     04/03/18 -  Sharon Abbott OT Occupational Therapist OT     03/07/18 -  Dylan Cabrera OTR Occupational Therapist OT                    OT Recommendation and Plan                         Time Calculation:     Time Calculation- OT     Row Name 04/07/21 1023             Time Calculation- OT    OT Start Time  0900  -      OT Stop Time  1030  -      OT Time Calculation (min)  90 min  -      Total Timed Code Minutes- OT  90 minute(s)  -        User Key  (r) = Recorded By, (t) = Taken By, (c) = Cosigned By    Initials Name Provider Type    JW Way, Dylan R, OTR Occupational Therapist        Therapy Charges for Today     Code Description Service Date  Service Provider Modifiers Qty    60739820273 HC OT SELF CARE/MGMT/TRAIN EA 15 MIN 4/7/2021 WayDylan OTR GO 3    79258649428 HC OT NEUROMUSC RE EDUCATION EA 15 MIN 4/7/2021 Dylan Cabrera OTR GO 2    09949192784 HC OT THERAPEUTIC ACT EA 15 MIN 4/7/2021 Dylan Cabrera OTR GO 1    37052216901 HC OT THER SUPP EA 15 MIN 4/7/2021 Dylan Cabrera OTR GO 1                   JET Damon  4/7/2021

## 2021-04-07 NOTE — PROGRESS NOTES
Rehabilitation Nursing  Inpatient Rehabilitation Plan of Care Note    Plan of Care  Copy from Ryann    Pain Management (Active)  Current Status (3/18/2021 4:16:00 PM): Potential for pain  Weekly Goal: No pain this week  Discharge Goal: No pain    Safety    Potential for Injury (Active)  Current Status (3/18/2021 4:16:00 PM): At risk for injury  Weekly Goal: No injury this week  Discharge Goal: No injury    Signed by: Sharda Lopez, Nurse

## 2021-04-07 NOTE — THERAPY PROGRESS REPORT/RE-CERT
Inpatient Rehabilitation - Physical Therapy Progress Note        [place]     Patient Name: Argenis Lay  : 1945  MRN: [id number]    Today's Date: 2021                    Admit Date: 3/18/2021      Visit Dx: No diagnosis found.    Patient Active Problem List   Diagnosis   • Debility       Past Medical History:   Diagnosis Date   • COPD (chronic obstructive pulmonary disease) (CMS/HCC)    • Diabetes mellitus (CMS/HCC)    • Hypertension        History reviewed. No pertinent surgical history.       PT ASSESSMENT (last 12 hours)      IRF PT Evaluation and Treatment     Row Name 21 1601          PT Time and Intention    Document Type  progress note  -LL     Mode of Treatment  physical therapy;individual therapy  -LL     Patient/Family/Caregiver Comments/Observations  Patient has made significant improvements with therapy and is now walking short distances with a RW with mod A x 2.  She continues to be extremely debilitated and would benefit from ongoing skilled therapy to further improve strength, endurance and safety with functional mobility.    -LL     Row Name 21 1601          General Information    Patient Profile Reviewed  yes  -LL     Existing Precautions/Restrictions  fall;oxygen therapy device and L/min  -LL     Row Name 21 1601          Cognition/Psychosocial    Affect/Mental Status (Cognitive)  anxious;WFL  -LL     Behavioral Issues (Cognitive)  overwhelmed easily improving  -LL     Orientation Status (Cognition)  oriented x 3  -LL     Follows Commands (Cognition)  follows one-step commands;repetition of directions required;verbal cues/prompting required;physical/tactile prompts required  -LL     Personal Safety Interventions  fall prevention program maintained;gait belt;nonskid shoes/slippers when out of bed;supervised activity  -LL     Row Name 21 1601          Bed Mobility    All Activities, [place] (Bed Mobility)  moderate assist (50% patient effort);verbal  cues;nonverbal cues (demo/gesture)  -LL     Bed Mobility, Safety Issues  decreased use of arms for pushing/pulling;decreased use of legs for bridging/pushing  -     Assistive Device (Bed Mobility)  bed rails;draw sheet  -LL     Row Name 04/07/21 1601          Transfer Assessment/Treatment    Transfers  toilet transfer  -LL     Row Name 04/07/21 1601          Transfers    Chair-Bed [place] (Transfers)  verbal cues;nonverbal cues (demo/gesture);moderate assist (50% patient effort);2 person assist  -LL     Sit-Stand [place] (Transfers)  verbal cues;nonverbal cues (demo/gesture);moderate assist (50% patient effort);1 person assist x4  -LL     Stand-Sit [place] (Transfers)  verbal cues;nonverbal cues (demo/gesture);moderate assist (50% patient effort);1 person assist  -LL     [place] Level (Toilet Transfer)  maximum assist (25% patient effort);2 person assist;verbal cues;nonverbal cues (demo/gesture)  -     Assistive Device (Toilet Transfer)  grab bars/safety frame;wheelchair  -LL     Row Name 04/07/21 1601          Chair-Bed Transfer    Assistive Device (Chair-Bed Transfers)  wheelchair;walker, front-wheeled  -LL     Row Name 04/07/21 1601          Sit-Stand Transfer    Assistive Device (Sit-Stand Transfers)  other (see comments) parallel bars  -LL     Row Name 04/07/21 1601          Stand-Sit Transfer    Assistive Device (Stand-Sit Transfers)  other (see comments) parallel bars  -LL     Row Name 04/07/21 1601          Toilet Transfer    Type (Toilet Transfer)  stand pivot/stand step  -LL     Row Name 04/07/21 1601          Gait/Stairs (Locomotion)    [place] Level (Gait)  moderate assist (50% patient effort);2 person assist;verbal cues;nonverbal cues (demo/gesture);1 person to manage equipment 2 person with RW, only 1 for equipment with parallel bars  -LL     Assistive Device (Gait)  parallel bars;walker, front-wheeled  -LL     Distance in Feet (Gait)  6' x 2 in hallway; 8' in hallway; 8'  // bars  -LL     Pattern (Gait)  step-to  -LL     Deviations/Abnormal Patterns (Gait)  luiz decreased;gait speed decreased;stride length decreased;weight shifting decreased  -LL     Bilateral Gait Deviations  forward flexed posture;heel strike decreased less flexion this date  -LL     Row Name 04/07/21 1601          Safety Issues, Functional Mobility    Impairments Affecting Function (Mobility)  balance;endurance/activity tolerance;shortness of breath;strength  -LL     Row Name 04/07/21 1601          Balance    Comment, Balance  Sitting bean bag toss & pickup with reacher w/o back support  -LL     Row Name 04/07/21 1601          Hip (Therapeutic Exercise)    Hip Strengthening (Therapeutic Exercise)  bilateral;flexion;aBduction;aDduction;marching while seated;sitting;resistance band;2 lb free weight;green  -LL     Row Name 04/07/21 1601          Knee (Therapeutic Exercise)    Knee Strengthening (Therapeutic Exercise)  bilateral;flexion;marching while seated;LAQ (long arc quad);hamstring curls;sitting;2 lb free weight;resistance band;green  -     Row Name 04/07/21 1601          Ankle (Therapeutic Exercise)    Ankle AROM (Therapeutic Exercise)  bilateral;dorsiflexion;plantarflexion;sitting  -LL     Row Name 04/07/21 1601          Bed Mobility Goal 1 (PT-IRF)    Progress/Outcomes (Bed Mobility Goal 1, PT-IRF)  continuing progress toward goal  -LL     Row Name 04/07/21 1601          Transfer Goal 1 (PT-IRF)    Progress/Outcomes (Transfer Goal 1, PT-IRF)  continuing progress toward goal  -LL     Row Name 04/07/21 1601          Gait/Walking Locomotion Goal 1 (PT-IRF)    Progress/Outcomes (Gait/Walking Locomotion Goal 1, PT-IRF)  continuing progress toward goal Limited d/t SOA  -LL     Row Name 04/07/21 1601          Positioning and Restraints    Pre-Treatment Position  -- WC in AM & PM  -LL     Post Treatment Position  -- WC in AM; bed in pm  -LL     In Bed  supine;call light within reach;encouraged to call for  assist;side rails up x3;heels elevated in PM  -LL     In Wheelchair  sitting;with other staff activities in AM  -LL     Row Name 04/07/21 1601          Daily Progress Summary (PT)    Impairments Still Limiting Function (PT)  ROM decreased;strength decreased;impaired balance;impaired functional activity tolerance  -LL       User Key  (r) = Recorded By, (t) = Taken By, (c) = Cosigned By    Initials Name Provider Type    LL Martha Schaeffer PTA Physical Therapy Assistant        Wound 03/26/21 1637 Right lower abdomen Incision (Active)   Dressing Appearance dry;intact 04/07/21 0800     Physical Therapy Education                 Title: PT OT SLP Therapies (Done)     Topic: Physical Therapy (Done)     Point: Mobility training (Done)     Learning Progress Summary           Patient Acceptance, E,D, VU by LL at 4/7/2021 1611    Acceptance, E,TB, VU by DG at 4/3/2021 2231    Acceptance, E,TB, VU by DG at 4/2/2021 2227    Acceptance, E,TB, VU by DG at 4/1/2021 2245    Acceptance, E,D, VU,NR by LL at 3/31/2021 1059    Acceptance, E,D, VU,NR by RG at 3/30/2021 1427    Acceptance, E,D, VU,NR by LL at 3/30/2021 0850    Acceptance, E,D, VU,NR by LL at 3/29/2021 0934    Acceptance, E, VU by EB at 3/29/2021 0755    Acceptance, E,D, VU,NR by LL at 3/26/2021 1336    Acceptance, E,D, VU,NR by AG at 3/25/2021 1517    Acceptance, E,D, VU,NR by AG at 3/24/2021 1537    Acceptance, E,D, VU,NR by AG at 3/23/2021 1506    Acceptance, E,TB, VU by CT at 3/20/2021 1338    Acceptance, E,TB, VU by DG at 3/20/2021 0010    Acceptance, E, NR by CW at 3/19/2021 1330                   Point: Home exercise program (Done)     Learning Progress Summary           Patient Acceptance, E,D, VU by LL at 4/7/2021 1611    Acceptance, E,TB, VU by DG at 4/3/2021 2231    Acceptance, E,TB, VU by DG at 4/2/2021 2227    Acceptance, E,TB, VU by DG at 4/1/2021 2245    Acceptance, E,D, VU,NR by LL at 3/31/2021 1059    Acceptance, E,D, VU,NR by RG at 3/30/2021 1427     Acceptance, E,D, VU,NR by LL at 3/30/2021 0850    Acceptance, E,D, VU,NR by LL at 3/29/2021 0934    Acceptance, E, VU by EB at 3/29/2021 0755    Acceptance, E,D, VU,NR by LL at 3/26/2021 1336    Acceptance, E,D, VU,NR by AG at 3/25/2021 1517    Acceptance, E,D, VU,NR by AG at 3/24/2021 1537    Acceptance, E,D, VU,NR by AG at 3/23/2021 1506    Acceptance, E,TB, VU by CT at 3/20/2021 1338    Acceptance, E,TB, VU by DG at 3/20/2021 0010    Acceptance, E, NR by CW at 3/19/2021 1330                   Point: Body mechanics (Done)     Learning Progress Summary           Patient Acceptance, E,D, VU by LL at 4/7/2021 1611    Acceptance, E,TB, VU by DG at 4/3/2021 2231    Acceptance, E,TB, VU by DG at 4/2/2021 2227    Acceptance, E,TB, VU by DG at 4/1/2021 2245    Acceptance, E,D, VU,NR by LL at 3/31/2021 1059    Acceptance, E,D, VU,NR by RG at 3/30/2021 1427    Acceptance, E,D, VU,NR by LL at 3/30/2021 0850    Acceptance, E,D, VU,NR by LL at 3/29/2021 0934    Acceptance, E, VU by EB at 3/29/2021 0755    Acceptance, E,D, VU,NR by LL at 3/26/2021 1336    Acceptance, E,D, VU,NR by AG at 3/25/2021 1517    Acceptance, E,D, VU,NR by AG at 3/24/2021 1537    Acceptance, E,D, VU,NR by AG at 3/23/2021 1506    Acceptance, E,TB, VU by CT at 3/20/2021 1338    Acceptance, E,TB, VU by DG at 3/20/2021 0010    Acceptance, E, NR by CW at 3/19/2021 1330                   Point: Precautions (Done)     Learning Progress Summary           Patient Acceptance, E,D, VU by LL at 4/7/2021 1611    Acceptance, E,TB, VU by DG at 4/3/2021 2231    Acceptance, E,TB, VU by DG at 4/2/2021 2227    Acceptance, E,TB, VU by DG at 4/1/2021 2245    Acceptance, E,D, VU,NR by LL at 3/31/2021 1059    Acceptance, E,D, VU,NR by RG at 3/30/2021 1427    Acceptance, E,D, VU,NR by LL at 3/30/2021 0850    Acceptance, E,D, VU,NR by LL at 3/29/2021 0934    Acceptance, E, VU by EB at 3/29/2021 0755    Acceptance, E,D, VU,NR by LL at 3/26/2021 1336    Acceptance, E,D, VU,NR by  AG at 3/25/2021 1517    Acceptance, E,D, VU,NR by AG at 3/24/2021 1537    Acceptance, E,D, VU,NR by AG at 3/23/2021 1506    Acceptance, E,TB, VU by CT at 3/20/2021 1338    Acceptance, E,TB, VU by DG at 3/20/2021 0010    Acceptance, E, NR by CW at 3/19/2021 1330                               User Key     Initials Effective Dates Name Provider Type Discipline    EB 06/16/16 -  Diamond Carranza, RN Registered Nurse Nurse    DG 06/16/16 -  Tonie Mendes, RN Registered Nurse Nurse    AG 04/03/18 -  Ivana Carpenter, PT Physical Therapist PT    CT 04/03/18 -  Jing Escalante, PT Physical Therapist PT    LL 05/02/16 -  Martha Schaeffer PTA Physical Therapy Assistant PT    RG 10/31/19 -  Jonathan Spangler, JENNIFER Physical Therapy Assistant PT    CW 09/11/20 -  Barry Santamaria, PT Physical Therapist PT                PT Recommendation and Plan          Daily Progress Summary (PT)  Impairments Still Limiting Function (PT): ROM decreased, strength decreased, impaired balance, impaired functional activity tolerance               Time Calculation:     PT Charges     Row Name 04/07/21 1613 04/07/21 1612          Time Calculation    Start Time  1245  -LL  1045  -LL     Stop Time  1330  -LL  1140  -LL     Time Calculation (min)  45 min  -LL  55 min  -LL     PT Received On  --  04/07/21  -LL        Time Calculation- PT    Total Timed Code Minutes- PT  45 minute(s)  -LL  55 minute(s)  -LL       User Key  (r) = Recorded By, (t) = Taken By, (c) = Cosigned By    Initials Name Provider Type    LL Martha Schaeffer PTA Physical Therapy Assistant          Therapy Charges for Today     Code Description Service Date Service Provider Modifiers Qty    90673263115 HC GAIT TRAINING EA 15 MIN 4/7/2021 Martha Schaeffer PTA GP 2    70800187988 HC PT THERAPEUTIC ACT EA 15 MIN 4/7/2021 Martha Schaeffer, JENNIFER GP 2    72826950697 HC PT THER PROC EA 15 MIN 4/7/2021 Martha Schaeffer, JENNIFER GP 3                   Martha. JENNIFER Schaeffer  4/7/2021

## 2021-04-07 NOTE — PROGRESS NOTES
Occupational Therapy: Individual: 90 minutes.    Physical Therapy:    Speech Language Pathology:    Signed by: Dylan Cabrera, Therapy Coordinator

## 2021-04-07 NOTE — PROGRESS NOTES
[facility] REHAB PROGRESS NOTE     Patient Identification:  Name:  Argenis Lay  Age:  76 y.o.  Sex:  female  :  1945  MRN:  [id number]  Visit Number:  [id number]  ROOM: [facility]     Primary Care Provider:  Mahi Villarreal APRN    Length of stay in inpatient status:  20    Subjective     Chief Compliant:  No chief complaint on file.      History of Presenting Illness: 76-year-old female who is been treated in the past for sepsis, Covid pneumonia and required tracheostomy and PEG tube placement, diabetes mellitus, hypertension, hypothyroidism.  Patient is being treated in rehab for debility.  Patient states she is doing very well at this time has no new complaints    Objective     Current Hospital Meds:atorvastatin, 20 mg, Oral, Nightly  Check Fentanyl Patch Placement, 1 each, Does not apply, Q12H  Check Fentanyl Patch Placement, 1 each, Does not apply, Q12H  enoxaparin, 40 mg, Subcutaneous, Q24H  gabapentin, 100 mg, Oral, Q8H  insulin aspart, 0-7 Units, Subcutaneous, TID AC  insulin detemir, 24 Units, Subcutaneous, Nightly  levothyroxine, 50 mcg, Oral, Q AM  lidocaine-EPINEPHrine, 10 mL, Infiltration, Once  metoprolol tartrate, 12.5 mg, Oral, Q12H  mupirocin, , Topical, Q12H  pantoprazole, 40 mg, Oral, Q AM  polyethylene glycol, 17 g, Oral, Daily  QUEtiapine, 50 mg, Oral, Q12H       ----------------------------------------------------------------------------------------------------------------------  Vital Signs:  Temp:  [97.9 °F (36.6 °C)] 97.9 °F (36.6 °C)  Heart Rate:  [86-90] 86  Resp:  [18-20] 20  BP: (126-131)/(66-72) 131/66  SpO2:  [96 %] 96 %  on  Flow (L/min):  [4] 4;   Device (Oxygen Therapy): nasal cannula  Body mass index is 25.54 kg/m².    Wt Readings from Last 3 Encounters:   21 76.2 kg (168 lb)     Intake & Output (last 3 days)       701 -  07 -  07 -  07 -  0700    P.O. 1500 1820 2580 240    IV Piggyback 600  100      Total Intake(mL/kg) 2100 (27.6) 1920 (25.2) 2580 (33.9) 240 (3.1)    Net +2100 +1920 +2580 +240            Urine Unmeasured Occurrence 11 x 12 x 11 x 1 x    Stool Unmeasured Occurrence 1 x 1 x          Diet Soft Texture; Ground; Thin  ----------------------------------------------------------------------------------------------------------------------  Physical exam:  Constitutional:   No acute distress  HEENT: Normocephalic atraumatic  Neck:    Supple  Cardiovascular: Regular rate and rhythm  Pulmonary/Chest: Clear to auscultation  Abdominal:  .  Positive bowel sounds soft  Musculoskeletal: No arthropathy  Neurological: No focal deficits  Skin: No rash  Peripheral vascular:  Genitourinary:  ----------------------------------------------------------------------------------------------------------------------    Last echocardiogram:  Results for orders placed during the hospital encounter of 03/18/21    Adult Transthoracic Echo Complete W/ Cont if Necessary Per Protocol    Interpretation Summary  · Normal left ventricular cavity size and wall thickness noted. All left ventricular wall segments contract normally.  · Left ventricular ejection fraction appears to be 66 - 70%.  · Left ventricular diastolic function is consistent with (grade I) impaired relaxation.  · There is calcification of the aortic valve mainly affecting the non-coronary and left coronary cusp(s). The aortic valve appears trileaflet. Mild aortic valve regurgitation is present. Mild aortic valve stenosis is present.  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 Mild mitral valve regurgitation is present. No significant mitral valve stenosis is present.  · Moderate tricuspid valve regurgitation is present. Estimated right ventricular systolic pressure from tricuspid regurgitation is markedly elevated (78 mmHg).  · Severe pulmonary hypertension is present.  · There is a  trivial pericardial effusion.    ----------------------------------------------------------------------------------------------------------------------  Results from last 7 days   Lab Units 04/07/21  0904 04/06/21  0740 04/04/21  0304 04/01/21  0814 04/01/21  0202   CRP mg/dL  --  1.71*  --   --  1.88*   WBC 10*3/mm3 5.48  --  6.19 6.23 7.97   HEMOGLOBIN g/dL 10.1*  --  9.3* 9.8* 9.1*   HEMATOCRIT % 33.4*  --  32.5* 33.2* 31.2*   MCV fL 91.3  --  95.0 91.5 92.3   MCHC g/dL 30.2*  --  28.6* 29.5* 29.2*   PLATELETS 10*3/mm3 143  --  167 166 198   INR   --   --   --  1.08  --          Results from last 7 days   Lab Units 04/03/21  1619 04/03/21  0206 04/01/21  0202   SODIUM mmol/L  --  142 142   POTASSIUM mmol/L 4.0 3.5 3.6   CHLORIDE mmol/L  --  108* 103   CO2 mmol/L  --  22.6 28.5   BUN mg/dL  --  9 5*   CREATININE mg/dL  --  0.30* 0.22*   EGFR IF NONAFRICN AM mL/min/1.73  --  >150 >150   CALCIUM mg/dL  --  8.7 9.1   GLUCOSE mg/dL  --  94 81   ALBUMIN g/dL  --   --  2.76*   BILIRUBIN mg/dL  --   --  0.2   ALK PHOS U/L  --   --  85   AST (SGOT) U/L  --   --  14   ALT (SGPT) U/L  --   --  10   Estimated Creatinine Clearance: 73.1 mL/min (A) (by C-G formula based on SCr of 0.3 mg/dL (L)).  No results found for: AMMONIA      Results from last 7 days   Lab Units 04/01/21  0202   PROBNP pg/mL 356.3         Glucose   Date/Time Value Ref Range Status   04/07/2021 1256 114 70 - 130 mg/dL Final   04/07/2021 1134 98 70 - 130 mg/dL Final   04/07/2021 0611 84 70 - 130 mg/dL Final   04/06/2021 2004 138 (H) 70 - 130 mg/dL Final   04/06/2021 1618 129 70 - 130 mg/dL Final   04/06/2021 1130 109 70 - 130 mg/dL Final   04/06/2021 0628 83 70 - 130 mg/dL Final   04/05/2021 2033 118 70 - 130 mg/dL Final     No results found for: TSH, FREET4  No results found for: PREGTESTUR, PREGSERUM, HCG, HCGQUANT  Pain Management Panel    There is no flowsheet data to display.       Brief Urine Lab Results  (Last result in the past 365 days)      Color    Clarity   Blood   Leuk Est   Nitrite   Protein   CREAT   Urine HCG        03/21/21 2043 Yellow Clear Negative Negative Negative Negative             No results found for: BLOODCX      No results found for: URINECX  No results found for: WOUNDCX  No results found for: STOOLCX  Results from last 7 days   Lab Units 04/06/21  0740 04/01/21  0202   CRP mg/dL 1.71* 1.88*       I have personally looked at the labs and they are summarized above.  ----------------------------------------------------------------------------------------------------------------------  Detailed radiology reports for the last 24 hours:    Imaging Results (Last 24 Hours)     ** No results found for the last 24 hours. **        Final impressions for the last 30 days of radiology reports:    NM Lung Scan Perfusion Particulate    Result Date: 3/31/2021  Intermediate suspicion of PE.  This report was finalized on 3/31/2021 3:21 PM by Dr. Alexx Espinosa MD.      XR Chest 1 View    Result Date: 3/31/2021  Worsening pulmonary status with lower lung volumes and continued bilateral peripheral diffuse infiltrates.  Signer Name: Lindsey Duron MD  Signed: 3/31/2021 7:03 AM  Workstation Name: [facility]  [facility]    XR Chest 1 View    Result Date: 3/28/2021  Patchy bilateral airspace disease, very similar to the prior study  This report was finalized on 3/28/2021 3:56 PM by Dr. Alan Hargrove MD.      XR Chest 1 View    Result Date: 3/23/2021  Slight worsening of bilateral upper lobe infiltrates, otherwise stable. Signer Name: Truman White MD  Signed: 3/23/2021 10:47 PM  Workstation Name: [facility]  [facility]    XR Chest 1 View    Result Date: 3/21/2021  Severe bilateral pneumonia is again noted. It is uncertain if there may be interval improvement in aeration of the right lung. Signer Name: Amie Arana MD  Signed: 3/21/2021 7:59 PM  Workstation Name: [id number]  [facility]  [place]    XR Chest 1 View    Result Date: 3/9/2021  1.  Support devices as detailed above. 2.  Mild interval improvement in bilateral airspace disease.  This report was finalized on 3/9/2021 9:51 AM by Dr. Alexx Espinosa MD.      CT Chest Pulmonary Embolism    Result Date: 4/2/2021  1.  No PE identified. 2.  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 3.  Stable pattern of pulmonary fibrosis which on imaging is most adjusted of UIP. 4.  Trace effusions. 5.  Probable reactive hilar mediastinal lymph nodes which are stable from the previous exam. Continued follow-up recommended.  This report was finalized on 4/2/2021 10:29 AM by Dr. Alexx Espinosa MD.      US Venous Doppler Lower Extremity Bilateral (duplex)    Result Date: 3/31/2021  No DVT in the lower extremities on today's exam.  This report was finalized on 3/31/2021 1:40 PM by Dr. Alan Hargrove MD.      US Venous Doppler Upper Extremity Bilateral (duplex)    Result Date: 4/2/2021  No DVT is seen  This report was finalized on 4/2/2021 10:47 AM by Dr. Alan Hargrove MD.      US Venous Doppler Upper Extremity Bilateral (duplex)    Result Date: 3/22/2021  No sonographic findings of DVT in the upper extremities   This report was finalized on 3/22/2021 3:32 PM by Dr. Tim Mir II, MD.      FL Video Swallow Single Contrast    Result Date: 3/19/2021      Please see the speech pathologist's report for additional information.  This report was finalized on 3/19/2021 11:14 AM by Dr. Randal Mata MD.      FL Video Swallow Single Contrast    Result Date: 3/12/2021  1.  Deep laryngeal penetration and intermittent aspiration with thin liquids. 2. Please see dysphasia notes for further details.  This report was finalized on 3/12/2021 11:22 AM by Dr. Alexx Espinosa MD.      I have personally looked at the radiology images and read the final radiology report.    Assessment & Plan    Debility secondary to prolonged COVID-19 infection--requiring moderate assistance for bed  mobility; moderate assistance for transfers; moderate assistance for help with gait using the parallel bars and front wheel walker did walk 7 feet x 3 with parallel bars and 6 feet x 2 with a rolling walker.    Chronic hypoxic respiratory failure--continue O2 support.  Patient in no distress    Diabetes mellitus continue current treatment    Hypertension--continue current treatment    Hypothyroidism--continue Synthroid    VTE Prophylaxis:   Mechanical Order History:     None      Pharmalogical Order History:      Ordered     Dose Route Frequency Stop    04/02/21 1056  enoxaparin (LOVENOX) syringe 40 mg      40 mg SC Every 24 Hours Scheduled --    04/01/21 0755  heparin (porcine) 5000 UNIT/ML injection 4,600 Units      60 Units/kg IV Once 04/01/21 1047    04/01/21 0755  heparin 88516 units/250 mL (100 units/mL) in 0.45 % NaCl infusion  9.14 mL/hr,   Status:  Discontinued      12 Units/kg/hr IV Titrated 04/02/21 1056    04/01/21 0755  heparin (porcine) 5000 UNIT/ML injection 4,600 Units  Status:  Discontinued      60 Units/kg IV As Needed 04/02/21 1056    04/01/21 0755  heparin (porcine) 5000 UNIT/ML injection 2,300 Units  Status:  Discontinued      30 Units/kg IV As Needed 04/02/21 1056    03/18/21 1811  enoxaparin (LOVENOX) syringe 40 mg  Status:  Discontinued      40 mg SC Every 24 Hours 04/01/21 0754    03/18/21 1806  enoxaparin (LOVENOX) syringe 40 mg  Status:  Discontinued      40 mg SC Every 24 Hours 03/18/21 1811                    Marquis He MD  [facility]  04/07/21  14:00 EDT

## 2021-04-07 NOTE — PROGRESS NOTES
Occupational Therapy:    Physical Therapy: Individual: 100 minutes.    Speech Language Pathology:    Signed by: Martha Schaeffer PTA

## 2021-04-08 LAB
GLUCOSE BLDC GLUCOMTR-MCNC: 106 MG/DL (ref 70–130)
GLUCOSE BLDC GLUCOMTR-MCNC: 123 MG/DL (ref 70–130)
GLUCOSE BLDC GLUCOMTR-MCNC: 133 MG/DL (ref 70–130)
GLUCOSE BLDC GLUCOMTR-MCNC: 81 MG/DL (ref 70–130)

## 2021-04-08 PROCEDURE — 97116 GAIT TRAINING THERAPY: CPT

## 2021-04-08 PROCEDURE — 99231 SBSQ HOSP IP/OBS SF/LOW 25: CPT | Performed by: FAMILY MEDICINE

## 2021-04-08 PROCEDURE — 63710000001 INSULIN DETEMIR PER 5 UNITS: Performed by: INTERNAL MEDICINE

## 2021-04-08 PROCEDURE — 97110 THERAPEUTIC EXERCISES: CPT

## 2021-04-08 PROCEDURE — 25010000002 ENOXAPARIN PER 10 MG: Performed by: INTERNAL MEDICINE

## 2021-04-08 PROCEDURE — 97535 SELF CARE MNGMENT TRAINING: CPT | Performed by: OCCUPATIONAL THERAPIST

## 2021-04-08 PROCEDURE — 97530 THERAPEUTIC ACTIVITIES: CPT | Performed by: OCCUPATIONAL THERAPIST

## 2021-04-08 PROCEDURE — 97110 THERAPEUTIC EXERCISES: CPT | Performed by: OCCUPATIONAL THERAPIST

## 2021-04-08 PROCEDURE — 82962 GLUCOSE BLOOD TEST: CPT

## 2021-04-08 RX ORDER — BISACODYL 5 MG/1
10 TABLET, DELAYED RELEASE ORAL DAILY PRN
Status: DISCONTINUED | OUTPATIENT
Start: 2021-04-08 | End: 2021-04-12 | Stop reason: HOSPADM

## 2021-04-08 RX ADMIN — PANTOPRAZOLE SODIUM 40 MG: 40 TABLET, DELAYED RELEASE ORAL at 05:05

## 2021-04-08 RX ADMIN — POLYETHYLENE GLYCOL (3350) 17 G: 17 POWDER, FOR SOLUTION ORAL at 08:21

## 2021-04-08 RX ADMIN — MUPIROCIN: 20 OINTMENT TOPICAL at 08:22

## 2021-04-08 RX ADMIN — QUETIAPINE FUMARATE 50 MG: 25 TABLET, FILM COATED ORAL at 08:21

## 2021-04-08 RX ADMIN — OXYCODONE HYDROCHLORIDE 5 MG: 5 TABLET ORAL at 12:44

## 2021-04-08 RX ADMIN — Medication 5 MG: at 20:20

## 2021-04-08 RX ADMIN — METOPROLOL TARTRATE 12.5 MG: 25 TABLET, FILM COATED ORAL at 08:21

## 2021-04-08 RX ADMIN — LEVOTHYROXINE SODIUM 50 MCG: 88 TABLET ORAL at 05:05

## 2021-04-08 RX ADMIN — INSULIN DETEMIR 24 UNITS: 100 INJECTION, SOLUTION SUBCUTANEOUS at 20:46

## 2021-04-08 RX ADMIN — ATORVASTATIN CALCIUM 20 MG: 20 TABLET, FILM COATED ORAL at 20:21

## 2021-04-08 RX ADMIN — GABAPENTIN 100 MG: 100 CAPSULE ORAL at 21:02

## 2021-04-08 RX ADMIN — GABAPENTIN 100 MG: 100 CAPSULE ORAL at 05:05

## 2021-04-08 RX ADMIN — CLONAZEPAM 0.25 MG: 0.5 TABLET ORAL at 20:20

## 2021-04-08 RX ADMIN — ENOXAPARIN SODIUM 40 MG: 40 INJECTION SUBCUTANEOUS at 08:21

## 2021-04-08 RX ADMIN — OXYCODONE HYDROCHLORIDE 5 MG: 5 TABLET ORAL at 05:05

## 2021-04-08 RX ADMIN — BISACODYL 10 MG: 5 TABLET ORAL at 20:20

## 2021-04-08 RX ADMIN — GABAPENTIN 100 MG: 100 CAPSULE ORAL at 14:09

## 2021-04-08 RX ADMIN — QUETIAPINE FUMARATE 50 MG: 25 TABLET, FILM COATED ORAL at 20:22

## 2021-04-08 RX ADMIN — OXYCODONE HYDROCHLORIDE 5 MG: 5 TABLET ORAL at 20:20

## 2021-04-08 RX ADMIN — METOPROLOL TARTRATE 12.5 MG: 25 TABLET, FILM COATED ORAL at 20:21

## 2021-04-08 NOTE — PROGRESS NOTES
Occupational Therapy:    Physical Therapy: Individual: 90 minutes.    Speech Language Pathology:    Signed by: Martha Schaeffer PTA

## 2021-04-08 NOTE — PROGRESS NOTES
[facility] PROGRESS NOTE     Patient Identification:  Name:  Argenis Lay  Age:  76 y.o.  Sex:  female  :  1945  MRN:  [id number]  Visit Number:  [id number]  ROOM: [facility]     Primary Care Provider:  Mahi Villarreal APRN    Length of stay in inpatient status:  21    Subjective     Chief Compliant:  No chief complaint on file.      History of Presenting Illness: 76-year-old female who was recently treated for sepsis, COVID-19 pneumonia, patient had required tracheostomy and PEG tube placement which is since been removed, diabetes mellitus, hypertension, hypothyroidism.  Patient is being treated for debility and is done much better.  Patient was able to walk few steps yesterday which is a marked improvement for her.  Patient excited and ready to resume PT and OT today    Objective     Current Hospital Meds:atorvastatin, 20 mg, Oral, Nightly  Check Fentanyl Patch Placement, 1 each, Does not apply, Q12H  Check Fentanyl Patch Placement, 1 each, Does not apply, Q12H  enoxaparin, 40 mg, Subcutaneous, Q24H  gabapentin, 100 mg, Oral, Q8H  insulin aspart, 0-7 Units, Subcutaneous, TID AC  insulin detemir, 24 Units, Subcutaneous, Nightly  levothyroxine, 50 mcg, Oral, Q AM  lidocaine-EPINEPHrine, 10 mL, Infiltration, Once  metoprolol tartrate, 12.5 mg, Oral, Q12H  mupirocin, , Topical, Q12H  pantoprazole, 40 mg, Oral, Q AM  polyethylene glycol, 17 g, Oral, Daily  QUEtiapine, 50 mg, Oral, Q12H       ----------------------------------------------------------------------------------------------------------------------  Vital Signs:  Temp:  [97.9 °F (36.6 °C)-98.2 °F (36.8 °C)] 97.9 °F (36.6 °C)  Heart Rate:  [84-97] 84  Resp:  [18-20] 20  BP: (108-112)/(60-69) 108/60  SpO2:  [98 %] 98 %  on  Flow (L/min):  [4] 4;   Device (Oxygen Therapy): nasal cannula  Body mass index is 25.54 kg/m².    Wt Readings from Last 3 Encounters:   21 76.2 kg (168 lb)     Intake & Output (last 3 days)        0701 -  04/06 0700 04/06 0701 - 04/07 0700 04/07 0701 - 04/08 0700 04/08 0701 - 04/09 0700    P.O. 1820 2580 1320 240    IV Piggyback 100       Total Intake(mL/kg) 1920 (25.2) 2580 (33.9) 1320 (17.3) 240 (3.1)    Net +1920 +2580 +1320 +240            Urine Unmeasured Occurrence 12 x 11 x 7 x 1 x    Stool Unmeasured Occurrence 1 x           Diet Soft Texture; Ground; Thin  ----------------------------------------------------------------------------------------------------------------------  Physical exam:  Constitutional:   Chronically ill-appearing female no distress  HEENT: Normocephalic atraumatic  Neck: Supple   Cardiovascular: Regular rate and rhythm  Pulmonary/Chest: Clear to auscultation  Abdominal: Positive bowel sounds soft.   Musculoskeletal: No arthropathy  Neurological: No focal deficits  Skin: No rash  Peripheral vascular:  Genitourinary:  ----------------------------------------------------------------------------------------------------------------------    Last echocardiogram:  Results for orders placed during the hospital encounter of 03/18/21    Adult Transthoracic Echo Complete W/ Cont if Necessary Per Protocol    Interpretation Summary  · Normal left ventricular cavity size and wall thickness noted. All left ventricular wall segments contract normally.  · Left ventricular ejection fraction appears to be 66 - 70%.  · Left ventricular diastolic function is consistent with (grade I) impaired relaxation.  · There is calcification of the aortic valve mainly affecting the non-coronary and left coronary cusp(s). The aortic valve appears trileaflet. Mild aortic valve regurgitation is present. Mild aortic valve stenosis is present.  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 Mild mitral valve regurgitation is present. No significant mitral valve stenosis is present.  · Moderate tricuspid valve regurgitation is present. Estimated right  ventricular systolic pressure from tricuspid regurgitation is markedly elevated (78 mmHg).  · Severe pulmonary hypertension is present.  · There is a trivial pericardial effusion.    ----------------------------------------------------------------------------------------------------------------------  Results from last 7 days   Lab Units 04/07/21  0904 04/06/21  0740 04/04/21  0304   CRP mg/dL  --  1.71*  --    WBC 10*3/mm3 5.48  --  6.19   HEMOGLOBIN g/dL 10.1*  --  9.3*   HEMATOCRIT % 33.4*  --  32.5*   MCV fL 91.3  --  95.0   MCHC g/dL 30.2*  --  28.6*   PLATELETS 10*3/mm3 143  --  167         Results from last 7 days   Lab Units 04/03/21  1619 04/03/21  0206   SODIUM mmol/L  --  142   POTASSIUM mmol/L 4.0 3.5   CHLORIDE mmol/L  --  108*   CO2 mmol/L  --  22.6   BUN mg/dL  --  9   CREATININE mg/dL  --  0.30*   EGFR IF NONAFRICN AM mL/min/1.73  --  >150   CALCIUM mg/dL  --  8.7   GLUCOSE mg/dL  --  94   Estimated Creatinine Clearance: 73.1 mL/min (A) (by C-G formula based on SCr of 0.3 mg/dL (L)).  No results found for: AMMONIA              Glucose   Date/Time Value Ref Range Status   04/08/2021 0653 81 70 - 130 mg/dL Final   04/07/2021 1955 162 (H) 70 - 130 mg/dL Final   04/07/2021 1639 92 70 - 130 mg/dL Final   04/07/2021 1256 114 70 - 130 mg/dL Final   04/07/2021 1134 98 70 - 130 mg/dL Final   04/07/2021 0611 84 70 - 130 mg/dL Final   04/06/2021 2004 138 (H) 70 - 130 mg/dL Final   04/06/2021 1618 129 70 - 130 mg/dL Final     No results found for: TSH, FREET4  No results found for: PREGTESTUR, PREGSERUM, HCG, HCGQUANT  Pain Management Panel    There is no flowsheet data to display.       Brief Urine Lab Results  (Last result in the past 365 days)      Color   Clarity   Blood   Leuk Est   Nitrite   Protein   CREAT   Urine HCG        03/21/21 2043 Yellow Clear Negative Negative Negative Negative             No results found for: BLOODCX      No results found for: URINECX  No results found for: WOUNDCX  No results  found for: STOOLCX  Results from last 7 days   Lab Units 04/06/21  0740   CRP mg/dL 1.71*       I have personally looked at the labs and they are summarized above.  ----------------------------------------------------------------------------------------------------------------------  Detailed radiology reports for the last 24 hours:    Imaging Results (Last 24 Hours)     ** No results found for the last 24 hours. **        Final impressions for the last 30 days of radiology reports:    NM Lung Scan Perfusion Particulate    Result Date: 3/31/2021  Intermediate suspicion of PE.  This report was finalized on 3/31/2021 3:21 PM by Dr. Alexx Espinosa MD.      XR Chest 1 View    Result Date: 3/31/2021  Worsening pulmonary status with lower lung volumes and continued bilateral peripheral diffuse infiltrates.  Signer Name: Lindsey Duron MD  Signed: 3/31/2021 7:03 AM  Workstation [facility]: [facility]  [facility]    XR Chest 1 View    Result Date: 3/28/2021  Patchy bilateral airspace disease, very similar to the prior study  This report was finalized on 3/28/2021 3:56 PM by Dr. Alan Hargrove MD.      XR Chest 1 View    Result Date: 3/23/2021  Slight worsening of bilateral upper lobe infiltrates, otherwise stable. Signer Name: Truman White MD  Signed: 3/23/2021 10:47 PM  Workstation Name: [facility]  [facility]    XR Chest 1 View    Result Date: 3/21/2021  Severe bilateral pneumonia is again noted. It is uncertain if there may be interval improvement in aeration of the right lung. Signer Name: Amie Arana MD  Signed: 3/21/2021 7:59 PM  Workstation Name: [id number]  [facility]    XR Chest 1 View    Result Date: 3/9/2021  1.  Support devices as detailed above. 2.  Mild interval improvement in bilateral airspace disease.  This report was finalized on 3/9/2021 9:51 AM by Dr. Alexx Espinosa MD.      CT Chest Pulmonary Embolism    Result Date: 4/2/2021  1.   No PE identified. 2.  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 3.  Stable pattern of pulmonary fibrosis which on imaging is most adjusted of UIP. 4.  Trace effusions. 5.  Probable reactive hilar mediastinal lymph nodes which are stable from the previous exam. Continued follow-up recommended.  This report was finalized on 4/2/2021 10:29 AM by Dr. Alexx Espinosa MD.      US Venous Doppler Lower Extremity Bilateral (duplex)    Result Date: 3/31/2021  No DVT in the lower extremities on today's exam.  This report was finalized on 3/31/2021 1:40 PM by Dr. Alan Hargrove MD.      US Venous Doppler Upper Extremity Bilateral (duplex)    Result Date: 4/2/2021  No DVT is seen  This report was finalized on 4/2/2021 10:47 AM by Dr. Alan Hargrove MD.      US Venous Doppler Upper Extremity Bilateral (duplex)    Result Date: 3/22/2021  No sonographic findings of DVT in the upper extremities   This report was finalized on 3/22/2021 3:32 PM by Dr. Tim Mir II, MD.      FL Video Swallow Single Contrast    Result Date: 3/19/2021      Please see the speech pathologist's report for additional information.  This report was finalized on 3/19/2021 11:14 AM by Dr. Randal Mata MD.      FL Video Swallow Single Contrast    Result Date: 3/12/2021  1.  Deep laryngeal penetration and intermittent aspiration with thin liquids. 2. Please see dysphasia notes for further details.  This report was finalized on 3/12/2021 11:22 AM by Dr. Alexx Espinosa MD.      I have personally looked at the radiology images and read the final radiology report.    Assessment & Plan    Debility secondary to prolonged COVID-19 infection--patient requiring moderate assistance for help with bed mobility; moderate assistance for help with transfers with the exception of bed requiring maximum assistance for help with toilet transfers.  Required moderate assistance for help with gait.  Patient did use parallel bars and front wheel walker yesterday was able to walk 6  feet x 2 in the hallway 8 feet once in the hallway and also 8 feet on parallel bars.    Chronic hypoxic respiratory failure--patient in no distress.  Will try to wean O2 to 3 L today    Diabetes mellitus continue current treatment    Hypertension--stable    Hypothyroidism--continue Synthroid      VTE Prophylaxis:   Mechanical Order History:     None      Pharmalogical Order History:      Ordered     Dose Route Frequency Stop    04/02/21 1056  enoxaparin (LOVENOX) syringe 40 mg      40 mg SC Every 24 Hours Scheduled --    04/01/21 0755  heparin (porcine) 5000 UNIT/ML injection 4,600 Units      60 Units/kg IV Once 04/01/21 1047    04/01/21 0755  heparin 01293 units/250 mL (100 units/mL) in 0.45 % NaCl infusion  9.14 mL/hr,   Status:  Discontinued      12 Units/kg/hr IV Titrated 04/02/21 1056    04/01/21 0755  heparin (porcine) 5000 UNIT/ML injection 4,600 Units  Status:  Discontinued      60 Units/kg IV As Needed 04/02/21 1056    04/01/21 0755  heparin (porcine) 5000 UNIT/ML injection 2,300 Units  Status:  Discontinued      30 Units/kg IV As Needed 04/02/21 1056    03/18/21 1811  enoxaparin (LOVENOX) syringe 40 mg  Status:  Discontinued      40 mg SC Every 24 Hours 04/01/21 0754    03/18/21 1806  enoxaparin (LOVENOX) syringe 40 mg  Status:  Discontinued      40 mg SC Every 24 Hours 03/18/21 1811                    Marquis He MD  [facility]  04/08/21  11:17 EDT

## 2021-04-08 NOTE — THERAPY TREATMENT NOTE
Inpatient Rehabilitation - Occupational Therapy Treatment Note     [place]     Patient Name: Argenis Lay  : 1945  MRN: [id number]    Today's Date: 2021                 Admit Date: 3/18/2021       No diagnosis found.    Patient Active Problem List   Diagnosis   • Debility       Past Medical History:   Diagnosis Date   • COPD (chronic obstructive pulmonary disease) (CMS/HCC)    • Diabetes mellitus (CMS/[facility])    • Hypertension        History reviewed. No pertinent surgical history.             IRF OT ASSESSMENT FLOWSHEET (last 12 hours)      IRF OT Evaluation and Treatment     Row Name 21 1211          OT Time and Intention    Document Type  daily treatment  -BF     Mode of Treatment  occupational therapy  -BF     Patient Effort  good  -BF     Symptoms Noted During/After Treatment  fatigue  -BF     Row Name 21 1211          General Information    Existing Precautions/Restrictions  fall;oxygen therapy device and L/min  -BF     Row Name 21 1211          Cognition/Psychosocial    Orientation Status (Cognition)  oriented x 3  -BF     Follows Commands (Cognition)  follows one-step commands;verbal cues/prompting required  -BF     Row Name 21 1211          Transfers    Bed-Chair [place] (Transfers)  moderate assist (50% patient effort);2 person assist;verbal cues;nonverbal cues (demo/gesture)  -BF     Assistive Device (Bed-Chair Transfers)  wheelchair  -BF     Row Name 21 1211          Motor Skills    Motor Control/Coordination Interventions  fine motor manipulation/dexterity activities;gross motor coordination activities;therapeutic exercise/ROM BUE GMC/FMC theract; light strengthening  -BF     Gross Motor Skill, Impairments Detail  Arm bike 2 mins X4; flexbar therex  -BF     Row Name 21 1211          Positioning and Restraints    In Wheelchair  sitting;with PT  -BF       User Key  (r) = Recorded By, (t) = Taken By, (c) = Cosigned By    Initials Name Effective Dates    BF  Vanesa Hernandez OT 07/05/20 -            Occupational Therapy Education                 Title: PT OT SLP Therapies (Done)     Topic: Occupational Therapy (Done)     Point: ADL training (Done)     Description:   Instruct learner(s) on proper safety adaptation and remediation techniques during self care or transfers.   Instruct in proper use of assistive devices.              Learning Progress Summary           Patient Acceptance, E, VU,NR by BF at 4/8/2021 1211    Acceptance, E,TB,D, VU,DU,NR by JW at 4/7/2021 1023    Acceptance, E, VU,NR by BF at 4/6/2021 1351    Acceptance, E, VU,NR by BF at 4/5/2021 1418    Comment: Daughter present for ADL    Acceptance, E,TB, VU by DG at 4/3/2021 2231    Acceptance, E,TB, VU by DG at 4/2/2021 2227    Acceptance, E, VU,NR by BF at 4/2/2021 1419    Acceptance, E,TB, VU by DG at 4/1/2021 2245    Acceptance, E, VU,NR by BF at 4/1/2021 1431    Acceptance, E, VU,NR by BF at 3/30/2021 1233    Acceptance, E, VU,NR by BF at 3/29/2021 1219    Acceptance, E, VU by EB at 3/29/2021 0755    Acceptance, E, VU,NR by BF at 3/26/2021 1357    Acceptance, E, VU,NR by BF at 3/24/2021 1337    Acceptance, E, VU,NR by BF at 3/23/2021 1451    Acceptance, E,D, VU,NR by CJ at 3/23/2021 1444    Acceptance, E,TB, VU by DG at 3/20/2021 0010    Acceptance, E, VU,NR by BF at 3/19/2021 1429   Family Acceptance, E, VU,NR by BF at 4/5/2021 1418    Comment: Daughter present for ADL                   Point: Home exercise program (Done)     Description:   Instruct learner(s) on appropriate technique for monitoring, assisting and/or progressing therapeutic exercises/activities.              Learning Progress Summary           Patient Acceptance, E,TB,D, VU,DU,NR by JW at 4/7/2021 1023    Acceptance, E,TB, VU by DG at 4/3/2021 2231    Acceptance, E,TB, VU by DG at 4/2/2021 2227    Acceptance, E,TB, VU by DG at 4/1/2021 2245    Acceptance, E, VU by EB at 3/29/2021 0755    Acceptance, E,TB, VU by DG at 3/20/2021  0010                   Point: Precautions (Done)     Description:   Instruct learner(s) on prescribed precautions during self-care and functional transfers.              Learning Progress Summary           Patient Acceptance, E, VU,NR by BF at 4/8/2021 1211    Acceptance, E,TB,D, VU,DU,NR by JW at 4/7/2021 1023    Acceptance, E, VU,NR by BF at 4/6/2021 1351    Acceptance, E, VU,NR by BF at 4/5/2021 1418    Comment: Daughter present for ADL    Acceptance, E,TB, VU by DG at 4/3/2021 2231    Acceptance, E,TB, VU by DG at 4/2/2021 2227    Acceptance, E, VU,NR by BF at 4/2/2021 1419    Acceptance, E,TB, VU by DG at 4/1/2021 2245    Acceptance, E, VU,NR by BF at 3/30/2021 1233    Acceptance, E, VU by EB at 3/29/2021 0755    Acceptance, E, VU,NR by BF at 3/26/2021 1357    Acceptance, E, VU,NR by BF at 3/24/2021 1337    Acceptance, E,D, VU,NR by CJ at 3/23/2021 1444    Acceptance, E,TB, VU by DG at 3/20/2021 0010    Acceptance, E, VU,NR by BF at 3/19/2021 1429   Family Acceptance, E, VU,NR by BF at 4/5/2021 1418    Comment: Daughter present for ADL                   Point: Body mechanics (Done)     Description:   Instruct learner(s) on proper positioning and spine alignment during self-care, functional mobility activities and/or exercises.              Learning Progress Summary           Patient Acceptance, E,TB,D, VU,DU,NR by JW at 4/7/2021 1023    Acceptance, E,TB, VU by DG at 4/3/2021 2231    Acceptance, E,TB, VU by DG at 4/2/2021 2227    Acceptance, E,TB, VU by DG at 4/1/2021 2245    Acceptance, E, VU by EB at 3/29/2021 0755    Acceptance, E,TB, VU by DG at 3/20/2021 0010                               User Key     Initials Effective Dates Name Provider Type Discipline    EB 06/16/16 -  Tere, Diamond Delcid, RN Registered Nurse Nurse    DG 06/16/16 -  Tonie Mendes, RN Registered Nurse Nurse    BF 07/05/20 -  David, Vanesa Nesbitt, OT Occupational Therapist OT    CJ 04/03/18 -  Scar, Sharon TIJERINA OT Occupational  Therapist OT    CHRIS 03/07/18 -  Dylan Cabrera OTR Occupational Therapist OT                    OT Recommendation and Plan    Planned Therapy Interventions (OT): activity tolerance training, adaptive equipment training, BADL retraining, neuromuscular control/coordination retraining, ROM/therapeutic exercise, strengthening exercise, transfer/mobility retraining                    Time Calculation:     Time Calculation- OT     Row Name 04/08/21 1215             Time Calculation- OT    OT Start Time  0915  -BF      OT Stop Time  1045  -BF      OT Time Calculation (min)  90 min  -BF      Total Timed Code Minutes- OT  90 minute(s)  -BF      OT Non-Billable Time (min)  20 min  -BF        User Key  (r) = Recorded By, (t) = Taken By, (c) = Cosigned By    Initials Name Provider Type    Vanesa Martin OT Occupational Therapist        Therapy Charges for Today     Code Description Service Date Service Provider Modifiers Qty    96200701572 HC OT THERAPEUTIC ACT EA 15 MIN 4/8/2021 Vanesa Hernandez OT GO 3    03142490458 HC OT SELF CARE/MGMT/TRAIN EA 15 MIN 4/8/2021 Vanesa Hernandez OT GO 1    11116591259 HC OT THER PROC EA 15 MIN 4/8/2021 Vanesa Hernandez OT GO 2                   Vanesa Hernandez OT  4/8/2021

## 2021-04-08 NOTE — PROGRESS NOTES
Rehabilitation Nursing  Inpatient Rehabilitation Plan of Care Note    Plan of Care  Copy from POC    Signed by: Nena Hauser, Nurse

## 2021-04-08 NOTE — THERAPY TREATMENT NOTE
Inpatient Rehabilitation - Physical Therapy Treatment Note       [facility]     Patient Name: Argenis Lay  : 1945  MRN: [id number]    Today's Date: 2021                    Admit Date: 3/18/2021      Visit Dx: No diagnosis found.    Patient Active Problem List   Diagnosis   • Debility       Past Medical History:   Diagnosis Date   • COPD (chronic obstructive pulmonary disease) (CMS/HCC)    • Diabetes mellitus (CMS/HCC)    • Hypertension        History reviewed. No pertinent surgical history.       PT ASSESSMENT (last 12 hours)      IRF PT Evaluation and Treatment     Row Name 21 1554          PT Time and Intention    Document Type  daily treatment BID treatment session  -     Mode of Treatment  physical therapy;individual therapy  -     Patient/Family/Caregiver Comments/Observations  Patient agreeable to PT session this date.  -     Row Name 21 1558          General Information    Patient Profile Reviewed  yes  -LL     Existing Precautions/Restrictions  fall;oxygen therapy device and L/min  -     Row Name 21 1554          Cognition/Psychosocial    Affect/Mental Status (Cognitive)  WFL  -     Orientation Status (Cognition)  oriented x 3  -LL     Follows Commands (Cognition)  follows one-step commands;repetition of directions required;verbal cues/prompting required;physical/tactile prompts required  -     Personal Safety Interventions  fall prevention program maintained;gait belt;nonskid shoes/slippers when out of bed;supervised activity  -     Row Name 21 1553          Bed Mobility    All Activities, [place] (Bed Mobility)  moderate assist (50% patient effort);verbal cues;nonverbal cues (demo/gesture)  -     Bed Mobility, Safety Issues  decreased use of arms for pushing/pulling;decreased use of legs for bridging/pushing  -     Assistive Device (Bed Mobility)  bed rails;draw sheet  -     Row Name 21 1553          Transfer Assessment/Treatment    Transfers   toilet transfer  -     Row Name 04/08/21 1554          Transfers    Chair-Bed [place] (Transfers)  verbal cues;nonverbal cues (demo/gesture);moderate assist (50% patient effort);2 person assist  -LL     Sit-Stand [place] (Transfers)  verbal cues;nonverbal cues (demo/gesture);moderate assist (50% patient effort);1 person assist  -LL     Stand-Sit [place] (Transfers)  verbal cues;nonverbal cues (demo/gesture);moderate assist (50% patient effort);1 person assist  -     Row Name 04/08/21 1554          Chair-Bed Transfer    Assistive Device (Chair-Bed Transfers)  wheelchair;walker, front-wheeled  -     Row Name 04/08/21 1554          Gait/Stairs (Locomotion)    [place] Level (Gait)  moderate assist (50% patient effort);2 person assist;verbal cues;nonverbal cues (demo/gesture);1 person to manage equipment 2 person with RW, only 1 for equipment with parallel bars  -LL     Assistive Device (Gait)  parallel bars;walker, front-wheeled  -     Distance in Feet (Gait)  10', 5' x 2 in AM; 20', 10' x 2 in PM  -LL     Pattern (Gait)  step-to  -LL     Deviations/Abnormal Patterns (Gait)  luiz decreased;gait speed decreased;stride length decreased;weight shifting decreased  -LL     Bilateral Gait Deviations  forward flexed posture;heel strike decreased less flexion this date  -     Row Name 04/08/21 1554          Safety Issues, Functional Mobility    Impairments Affecting Function (Mobility)  balance;endurance/activity tolerance;shortness of breath;strength  -     Row Name 04/08/21 1554          Motor Skills    Therapeutic Exercise  aerobic  -     Row Name 04/08/21 1554          Hip (Therapeutic Exercise)    Hip Strengthening (Therapeutic Exercise)  bilateral;flexion;aBduction;aDduction;marching while seated;sitting;2 lb free weight;resistance band;green  -     Row Name 04/08/21 1554          Knee (Therapeutic Exercise)    Knee Strengthening (Therapeutic Exercise)   bilateral;flexion;extension;marching while seated;LAQ (long arc quad);hamstring curls;sitting;2 lb free weight;resistance band;green  -     Row Name 04/08/21 1554          Ankle (Therapeutic Exercise)    Ankle AROM (Therapeutic Exercise)  bilateral;dorsiflexion;plantarflexion RTB  -     Row Name 04/08/21 1554          Aerobic Exercise    Type (Aerobic Exercise)  recumbent stationary bike;for 5 minutes LVL 1.0 w/ frequent rest breaks  -LL     Row Name 04/08/21 1554          Bed Mobility Goal 1 (PT-IRF)    Progress/Outcomes (Bed Mobility Goal 1, PT-IRF)  continuing progress toward goal  -LL     Row Name 04/08/21 1554          Transfer Goal 1 (PT-IRF)    Progress/Outcomes (Transfer Goal 1, PT-IRF)  continuing progress toward goal  -LL     Row Name 04/08/21 1554          Gait/Walking Locomotion Goal 1 (PT-IRF)    Progress/Outcomes (Gait/Walking Locomotion Goal 1, PT-IRF)  continuing progress toward goal Limited d/t SOA  -     Row Name 04/08/21 1554          Positioning and Restraints    Pre-Treatment Position  -- WC w/ OT in am; WC in room in PM  -LL     Post Treatment Position  wheelchair in  AM; bed in PM  -LL     In Bed  supine;call light within reach;encouraged to call for assist;side rails up x3;heels elevated  -LL     In Wheelchair  sitting;call light within reach;encouraged to call for assist in room w/ tray table in front of her in AM  -     Row Name 04/08/21 1554          Daily Progress Summary (PT)    Impairments Still Limiting Function (PT)  ROM decreased;strength decreased;impaired balance;impaired functional activity tolerance  -       User Key  (r) = Recorded By, (t) = Taken By, (c) = Cosigned By    Initials Name Provider Type    LL Martha Schaeffer PTA Physical Therapy Assistant        Wound 03/26/21 1637 Right lower abdomen Incision (Active)   Dressing Appearance open to air 04/08/21 0720   Closure None 04/07/21 1915   Base clean;dry;pink 04/07/21 1915   Periwound intact 04/07/21 1915    Drainage Amount none 04/07/21 1915   Care, Wound other (see comments) 04/07/21 1915   Dressing Care open to air 04/08/21 0720     Physical Therapy Education                 Title: PT OT SLP Therapies (Done)     Topic: Physical Therapy (Done)     Point: Mobility training (Done)     Learning Progress Summary           Patient Acceptance, E,D, VU by LL at 4/8/2021 1610    Acceptance, E,D, VU by LL at 4/7/2021 1611    Acceptance, E,TB, VU by DG at 4/3/2021 2231    Acceptance, E,TB, VU by DG at 4/2/2021 2227    Acceptance, E,TB, VU by DG at 4/1/2021 2245    Acceptance, E,D, VU,NR by LL at 3/31/2021 1059    Acceptance, E,D, VU,NR by RG at 3/30/2021 1427    Acceptance, E,D, VU,NR by LL at 3/30/2021 0850    Acceptance, E,D, VU,NR by LL at 3/29/2021 0934    Acceptance, E, VU by EB at 3/29/2021 0755    Acceptance, E,D, VU,NR by LL at 3/26/2021 1336    Acceptance, E,D, VU,NR by AG at 3/25/2021 1517    Acceptance, E,D, VU,NR by AG at 3/24/2021 1537    Acceptance, E,D, VU,NR by AG at 3/23/2021 1506    Acceptance, E,TB, VU by CT at 3/20/2021 1338    Acceptance, E,TB, VU by DG at 3/20/2021 0010    Acceptance, E, NR by CW at 3/19/2021 1330                   Point: Home exercise program (Done)     Learning Progress Summary           Patient Acceptance, E,D, VU by LL at 4/8/2021 1610    Acceptance, E,D, VU by LL at 4/7/2021 1611    Acceptance, E,TB, VU by DG at 4/3/2021 2231    Acceptance, E,TB, VU by DG at 4/2/2021 2227    Acceptance, E,TB, VU by DG at 4/1/2021 2245    Acceptance, E,D, VU,NR by LL at 3/31/2021 1059    Acceptance, E,D, VU,NR by RG at 3/30/2021 1427    Acceptance, E,D, VU,NR by LL at 3/30/2021 0850    Acceptance, E,D, VU,NR by LL at 3/29/2021 0934    Acceptance, E, VU by EB at 3/29/2021 0755    Acceptance, E,D, VU,NR by LL at 3/26/2021 1336    Acceptance, E,D, VU,NR by AG at 3/25/2021 1517    Acceptance, E,D, VU,NR by AG at 3/24/2021 1537    Acceptance, E,D, VU,NR by AG at 3/23/2021 1506    Acceptance, E,TB, VU by  CT at 3/20/2021 1338    Acceptance, E,TB, VU by DG at 3/20/2021 0010    Acceptance, E, NR by CW at 3/19/2021 1330                   Point: Body mechanics (Done)     Learning Progress Summary           Patient Acceptance, E,D, VU by LL at 4/8/2021 1610    Acceptance, E,D, VU by LL at 4/7/2021 1611    Acceptance, E,TB, VU by DG at 4/3/2021 2231    Acceptance, E,TB, VU by DG at 4/2/2021 2227    Acceptance, E,TB, VU by DG at 4/1/2021 2245    Acceptance, E,D, VU,NR by LL at 3/31/2021 1059    Acceptance, E,D, VU,NR by RG at 3/30/2021 1427    Acceptance, E,D, VU,NR by LL at 3/30/2021 0850    Acceptance, E,D, VU,NR by LL at 3/29/2021 0934    Acceptance, E, VU by EB at 3/29/2021 0755    Acceptance, E,D, VU,NR by LL at 3/26/2021 1336    Acceptance, E,D, VU,NR by AG at 3/25/2021 1517    Acceptance, E,D, VU,NR by AG at 3/24/2021 1537    Acceptance, E,D, VU,NR by AG at 3/23/2021 1506    Acceptance, E,TB, VU by CT at 3/20/2021 1338    Acceptance, E,TB, VU by DG at 3/20/2021 0010    Acceptance, E, NR by CW at 3/19/2021 1330                   Point: Precautions (Done)     Learning Progress Summary           Patient Acceptance, E,D, VU by LL at 4/8/2021 1610    Acceptance, E,D, VU by LL at 4/7/2021 1611    Acceptance, E,TB, VU by DG at 4/3/2021 2231    Acceptance, E,TB, VU by DG at 4/2/2021 2227    Acceptance, E,TB, VU by DG at 4/1/2021 2245    Acceptance, E,D, VU,NR by LL at 3/31/2021 1059    Acceptance, E,D, VU,NR by RG at 3/30/2021 1427    Acceptance, E,D, VU,NR by LL at 3/30/2021 0850    Acceptance, E,D, VU,NR by LL at 3/29/2021 0934    Acceptance, E, VU by EB at 3/29/2021 0755    Acceptance, E,D, VU,NR by LL at 3/26/2021 1336    Acceptance, E,D, VU,NR by AG at 3/25/2021 1517    Acceptance, E,D, VU,NR by AG at 3/24/2021 1537    Acceptance, E,D, VU,NR by AG at 3/23/2021 1506    Acceptance, E,TB, VU by CT at 3/20/2021 1338    Acceptance, E,TB, VU by DG at 3/20/2021 0010    Acceptance, E, NR by CW at 3/19/2021 1330                                User Key     Initials Effective Dates Name Provider Type Discipline    EB 06/16/16 -  Diamond Carranza, RN Registered Nurse Nurse    DG 06/16/16 -  Tonie Mendes RN Registered Nurse Nurse    AG 04/03/18 -  Ivana Carpenter, PT Physical Therapist PT    CT 04/03/18 -  Jing Escalante, PT Physical Therapist PT    LL 05/02/16 -  Martha Schaeffer, JENNIFER Physical Therapy Assistant PT    RG 10/31/19 -  Jonathan Spangler, JENNIFER Physical Therapy Assistant PT    CW 09/11/20 -  Barry Santamaria, PT Physical Therapist PT                PT Recommendation and Plan          Daily Progress Summary (PT)  Impairments Still Limiting Function (PT): ROM decreased, strength decreased, impaired balance, impaired functional activity tolerance               Time Calculation:     PT Charges     Row Name 04/08/21 1611 04/08/21 1610          Time Calculation    Start Time  1245  -LL  1045  -LL     Stop Time  1330  -LL  1130  -LL     Time Calculation (min)  45 min  -LL  45 min  -LL     PT Received On  --  04/08/21  -LL        Time Calculation- PT    Total Timed Code Minutes- PT  45 minute(s)  -LL  45 minute(s)  -LL       User Key  (r) = Recorded By, (t) = Taken By, (c) = Cosigned By    Initials Name Provider Type    LL Martha Schaeffer, JENNIFER Physical Therapy Assistant          Therapy Charges for Today     Code Description Service Date Service Provider Modifiers Qty    25230129773 HC GAIT TRAINING EA 15 MIN 4/7/2021 Martha Schaeffer, PTA GP 2    74402131216 HC PT THERAPEUTIC ACT EA 15 MIN 4/7/2021 Martha Schaeffer, PTA GP 2    36109075210 HC PT THER PROC EA 15 MIN 4/7/2021 Martha Schaeffer, PTA GP 3    71658284193 HC GAIT TRAINING EA 15 MIN 4/8/2021 Martha Schaeffer, PTA GP 3    64762953354 HC PT THER PROC EA 15 MIN 4/8/2021 Martha Schaeffer, PTA GP 3                   Martha. JENNIFER Schaeffer  4/8/2021

## 2021-04-08 NOTE — PROGRESS NOTES
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  Medical Necessity Expected Level Rationale: Fair  Intensity and Duration: an average of 3 hours/5 days per week  Medical Supervision and 24 Hour Rehab Nursing: x  Physical Therapy: x  PT Intensity/Duration: 1-1.5 hrs/day, 5-6 days/week  Occupational Therapy: x  OT Intensity/Duration: 1-1.5 hrs/day, 5-6 days/week  Speech and Language Therapy: x  SLP Intensity/Duration: 0.5-1 hr/day, 3-5 days/week  Social Work: x  Updated (if changes indicated)    Anticipated Discharge Date/Estimated Length of Stay:   04/16/2021      Discharge Plan of Care:    Based on the patient's medical and functional status, their prognosis and  expected level of functional improvement is: good      Interdisciplinary Problem/Goals/Status  Mobility    [PT] Bed/Chair/Wheelchair(Active)  Current Status(03/19/2021): Max  Weekly Goal(03/26/2021): Mod  Discharge Goal: Min        Pain    [RN] Pain Management(Active)  Current Status(03/18/2021): Potential for pain  Weekly Goal(04/10/2021): No pain this week  Discharge Goal: No  pain        Safety    [RN] Potential for Injury(Active)  Current Status(03/18/2021): At risk for injury  Weekly Goal(04/10/2021): No injury this week  Discharge Goal: No injury        Self Care    [OT] Dressing (Lower)(Active)  Current Status(04/05/2021): Max A  Weekly Goal(04/13/2021): Mod A  Discharge Goal: Mod A    Comments: Daughter recommended to come to rehab for education and observation of  therapy sessions.  Pt plans to go home with daughter at discharge.    Signed by: Pam Hogan, Nurse    Physician CoSigned By: Marquis He 04/08/2021 09:28:54

## 2021-04-09 LAB
GLUCOSE BLDC GLUCOMTR-MCNC: 106 MG/DL (ref 70–130)
GLUCOSE BLDC GLUCOMTR-MCNC: 128 MG/DL (ref 70–130)
GLUCOSE BLDC GLUCOMTR-MCNC: 175 MG/DL (ref 70–130)
GLUCOSE BLDC GLUCOMTR-MCNC: 92 MG/DL (ref 70–130)

## 2021-04-09 PROCEDURE — 82962 GLUCOSE BLOOD TEST: CPT

## 2021-04-09 PROCEDURE — 97535 SELF CARE MNGMENT TRAINING: CPT | Performed by: OCCUPATIONAL THERAPIST

## 2021-04-09 PROCEDURE — 97110 THERAPEUTIC EXERCISES: CPT

## 2021-04-09 PROCEDURE — 97530 THERAPEUTIC ACTIVITIES: CPT | Performed by: OCCUPATIONAL THERAPIST

## 2021-04-09 PROCEDURE — 63710000001 INSULIN DETEMIR PER 5 UNITS: Performed by: INTERNAL MEDICINE

## 2021-04-09 PROCEDURE — 97116 GAIT TRAINING THERAPY: CPT

## 2021-04-09 PROCEDURE — 99231 SBSQ HOSP IP/OBS SF/LOW 25: CPT | Performed by: FAMILY MEDICINE

## 2021-04-09 PROCEDURE — 97110 THERAPEUTIC EXERCISES: CPT | Performed by: OCCUPATIONAL THERAPIST

## 2021-04-09 PROCEDURE — 63710000001 DIPHENHYDRAMINE PER 50 MG: Performed by: INTERNAL MEDICINE

## 2021-04-09 PROCEDURE — 25010000002 ENOXAPARIN PER 10 MG: Performed by: INTERNAL MEDICINE

## 2021-04-09 RX ADMIN — GABAPENTIN 100 MG: 100 CAPSULE ORAL at 15:22

## 2021-04-09 RX ADMIN — INSULIN DETEMIR 24 UNITS: 100 INJECTION, SOLUTION SUBCUTANEOUS at 20:22

## 2021-04-09 RX ADMIN — OXYCODONE HYDROCHLORIDE 5 MG: 5 TABLET ORAL at 11:30

## 2021-04-09 RX ADMIN — QUETIAPINE FUMARATE 50 MG: 25 TABLET, FILM COATED ORAL at 20:06

## 2021-04-09 RX ADMIN — POLYETHYLENE GLYCOL (3350) 17 G: 17 POWDER, FOR SOLUTION ORAL at 08:21

## 2021-04-09 RX ADMIN — QUETIAPINE FUMARATE 50 MG: 25 TABLET, FILM COATED ORAL at 08:21

## 2021-04-09 RX ADMIN — METOPROLOL TARTRATE 12.5 MG: 25 TABLET, FILM COATED ORAL at 20:05

## 2021-04-09 RX ADMIN — BENZONATATE 200 MG: 100 CAPSULE ORAL at 00:21

## 2021-04-09 RX ADMIN — DIPHENHYDRAMINE HYDROCHLORIDE 25 MG: 25 CAPSULE ORAL at 00:21

## 2021-04-09 RX ADMIN — METOPROLOL TARTRATE 12.5 MG: 25 TABLET, FILM COATED ORAL at 08:21

## 2021-04-09 RX ADMIN — ACETAMINOPHEN 650 MG: 325 TABLET, COATED ORAL at 00:21

## 2021-04-09 RX ADMIN — Medication 5 MG: at 20:04

## 2021-04-09 RX ADMIN — GABAPENTIN 100 MG: 100 CAPSULE ORAL at 21:07

## 2021-04-09 RX ADMIN — PANTOPRAZOLE SODIUM 40 MG: 40 TABLET, DELAYED RELEASE ORAL at 05:03

## 2021-04-09 RX ADMIN — ATORVASTATIN CALCIUM 20 MG: 20 TABLET, FILM COATED ORAL at 20:06

## 2021-04-09 RX ADMIN — CLONAZEPAM 0.25 MG: 0.5 TABLET ORAL at 20:04

## 2021-04-09 RX ADMIN — ENOXAPARIN SODIUM 40 MG: 40 INJECTION SUBCUTANEOUS at 08:21

## 2021-04-09 RX ADMIN — OXYCODONE HYDROCHLORIDE 5 MG: 5 TABLET ORAL at 20:04

## 2021-04-09 RX ADMIN — MUPIROCIN: 20 OINTMENT TOPICAL at 08:22

## 2021-04-09 RX ADMIN — LEVOTHYROXINE SODIUM 50 MCG: 88 TABLET ORAL at 05:03

## 2021-04-09 RX ADMIN — SIMETHICONE 80 MG: 80 TABLET, CHEWABLE ORAL at 20:37

## 2021-04-09 RX ADMIN — CALCIUM CARBONATE 1 TABLET: 500 TABLET, CHEWABLE ORAL at 20:36

## 2021-04-09 RX ADMIN — GABAPENTIN 100 MG: 100 CAPSULE ORAL at 05:03

## 2021-04-09 NOTE — PROGRESS NOTES
Rehabilitation Nursing  Inpatient Rehabilitation Plan of Care Note    Plan of Care  Copy from Ryann    Pain Management (Active)  Current Status (3/18/2021 4:16:00 PM): Potential for pain  Weekly Goal: No pain this week  Discharge Goal: No pain    Safety    Potential for Injury (Active)  Current Status (3/18/2021 4:16:00 PM): At risk for injury  Weekly Goal: No injury this week  Discharge Goal: No injury    Signed by: Poppy Ayers Nurse

## 2021-04-09 NOTE — THERAPY TREATMENT NOTE
Inpatient Rehabilitation - Physical Therapy Treatment Note        [place]     Patient Name: Argenis Lay  : 1945  MRN: [id number]    Today's Date: 2021                    Admit Date: 3/18/2021      Visit Dx: No diagnosis found.    Patient Active Problem List   Diagnosis   • Debility       Past Medical History:   Diagnosis Date   • COPD (chronic obstructive pulmonary disease) (CMS/HCC)    • Diabetes mellitus (CMS/HCC)    • Hypertension        History reviewed. No pertinent surgical history.       PT ASSESSMENT (last 12 hours)      IRF PT Evaluation and Treatment     Row Name 21 144          PT Time and Intention    Document Type  daily treatment BID treatment session  -     Mode of Treatment  physical therapy;individual therapy  -     Patient/Family/Caregiver Comments/Observations  Patient tolerating increased therapy this date with frequent rest breaks.    -     Row Name 21 144          General Information    Patient Profile Reviewed  yes  -LL     Existing Precautions/Restrictions  fall;oxygen therapy device and L/min  -     Row Name 21 144          Cognition/Psychosocial    Affect/Mental Status (Cognitive)  WFL  -     Orientation Status (Cognition)  oriented x 3  -     Follows Commands (Cognition)  follows one-step commands;repetition of directions required;verbal cues/prompting required;physical/tactile prompts required  -     Personal Safety Interventions  fall prevention program maintained;gait belt;nonskid shoes/slippers when out of bed;supervised activity  -     Row Name 21 144          Bed Mobility    All Activities, [place] (Bed Mobility)  moderate assist (50% patient effort);verbal cues;nonverbal cues (demo/gesture)  -     Bed Mobility, Safety Issues  decreased use of arms for pushing/pulling;decreased use of legs for bridging/pushing  -     Assistive Device (Bed Mobility)  bed rails;draw sheet  -     Row Name 21 144          Transfer  Assessment/Treatment    Transfers  toilet transfer  -     Row Name 04/09/21 1441          Transfers    Chair-Bed [place] (Transfers)  verbal cues;nonverbal cues (demo/gesture);moderate assist (50% patient effort);2 person assist  -LL     Sit-Stand [place] (Transfers)  verbal cues;nonverbal cues (demo/gesture);moderate assist (50% patient effort);1 person assist  -LL     Stand-Sit [place] (Transfers)  verbal cues;nonverbal cues (demo/gesture);moderate assist (50% patient effort);1 person assist  -LL     Row Name 04/09/21 1441          Chair-Bed Transfer    Assistive Device (Chair-Bed Transfers)  wheelchair;walker, front-wheeled  -LL     Row Name 04/09/21 1441          Sit-Stand Transfer    Assistive Device (Sit-Stand Transfers)  walker, front-wheeled;wheelchair  -LL     Row Name 04/09/21 1441          Stand-Sit Transfer    Assistive Device (Stand-Sit Transfers)  walker, front-wheeled;wheelchair  -LL     Row Name 04/09/21 1441          Stand Pivot/Stand Step Transfer    Stand Pivot/Stand Step [place] (Transfers)  moderate assist (50% patient effort);verbal cues;nonverbal cues (demo/gesture)  -LL     Assistive Device (Stand Pivot Stand Step Transfer)  wheelchair;walker, front-wheeled  -LL     Row Name 04/09/21 1441          Gait/Stairs (Locomotion)    [place] Level (Gait)  moderate assist (50% patient effort);2 person assist;verbal cues;nonverbal cues (demo/gesture);1 person to manage equipment 2 person with RW, only 1 for equipment with parallel bars  -LL     Assistive Device (Gait)  walker, front-wheeled  -LL     Distance in Feet (Gait)  20', 5', 10' in AM; 20', 10', 8' in PM  -LL     Pattern (Gait)  step-to  -LL     Deviations/Abnormal Patterns (Gait)  luiz decreased;gait speed decreased;stride length decreased;weight shifting decreased  -LL     Bilateral Gait Deviations  forward flexed posture;heel strike decreased less flexion this date  -     Row Name 04/09/21 1441          Safety  Issues, Functional Mobility    Impairments Affecting Function (Mobility)  balance;endurance/activity tolerance;shortness of breath;strength  -     Row Name 04/09/21 1441          Hip (Therapeutic Exercise)    Hip Strengthening (Therapeutic Exercise)  bilateral;flexion;aBduction;aDduction;marching while seated;sitting;2 lb free weight;resistance band;green  [facility]     Row Name 04/09/21 1441          Knee (Therapeutic Exercise)    Knee Strengthening (Therapeutic Exercise)  bilateral;flexion;extension;marching while seated;LAQ (long arc quad);hamstring curls;sitting;2 lb free weight;resistance band;green  -LL     Row Name 04/09/21 1441          Ankle (Therapeutic Exercise)    Ankle AROM (Therapeutic Exercise)  bilateral;dorsiflexion;plantarflexion;sitting RTB  -[facility] 04/09/21 1441          Aerobic Exercise    Type (Aerobic Exercise)  recumbent stationary bike;for 10 minutes LVL 1.0 w/ frequent rest breaks  -[facility] Name 04/09/21 1441          Bed Mobility Goal 1 (PT-IRF)    Progress/Outcomes (Bed Mobility Goal 1, PT-IRF)  continuing progress toward goal  -LL     Row Name 04/09/21 1441          Transfer Goal 1 (PT-IRF)    Progress/Outcomes (Transfer Goal 1, PT-IRF)  continuing progress toward goal  -LL     Row Name 04/09/21 1441          Gait/Walking Locomotion Goal 1 (PT-IRF)    Progress/Outcomes (Gait/Walking Locomotion Goal 1, PT-IRF)  continuing progress toward goal Limited d/t SOA  -[facility] Name 04/09/21 1441          Positioning and Restraints    Pre-Treatment Position  -- WC w/ OT in AM; WC in room in PM  -LL     Post Treatment Position  wheelchair in AM; bed in PM  -LL     In Bed  supine;call light within reach;encouraged to call for assist;side rails up x2 in PM  -LL     In Wheelchair  sitting;call light within reach;encouraged to call for assist in room w/ tray table in front of her in AM  -     Row Name 04/09/21 1441          Daily Progress Summary (PT)    Impairments Still Limiting Function  (PT)  ROM decreased;strength decreased;impaired balance;impaired functional activity tolerance  -LL       User Key  (r) = Recorded By, (t) = Taken By, (c) = Cosigned By    Initials Name Provider Type    LL Martha Schaeffer PTA Physical Therapy Assistant        Wound 03/26/21 1637 Right lower abdomen Incision (Active)   Dressing Appearance open to air 04/09/21 0720   Closure None 04/08/21 1915   Base clean;dry;pink 04/08/21 1915   Periwound intact 04/08/21 1915   Drainage Amount none 04/08/21 1915   Care, Wound other (see comments) 04/08/21 1915   Dressing Care open to air 04/09/21 0720     Physical Therapy Education                 Title: PT OT SLP Therapies (Done)     Topic: Physical Therapy (Done)     Point: Mobility training (Done)     Learning Progress Summary           Patient Acceptance, E,D, VU by LL at 4/9/2021 1505    Acceptance, E,D, VU by LL at 4/8/2021 1610    Acceptance, E,D, VU by LL at 4/7/2021 1611    Acceptance, E,TB, VU by DG at 4/3/2021 2231    Acceptance, E,TB, VU by DG at 4/2/2021 2227    Acceptance, E,TB, VU by DG at 4/1/2021 2245    Acceptance, E,D, VU,NR by LL at 3/31/2021 1059    Acceptance, E,D, VU,NR by RG at 3/30/2021 1427    Acceptance, E,D, VU,NR by LL at 3/30/2021 0850    Acceptance, E,D, VU,NR by LL at 3/29/2021 0934    Acceptance, E, VU by EB at 3/29/2021 0755    Acceptance, E,D, VU,NR by LL at 3/26/2021 1336    Acceptance, E,D, VU,NR by AG at 3/25/2021 1517    Acceptance, E,D, VU,NR by AG at 3/24/2021 1537    Acceptance, E,D, VU,NR by AG at 3/23/2021 1506    Acceptance, E,TB, VU by CT at 3/20/2021 1338    Acceptance, E,TB, VU by DG at 3/20/2021 0010    Acceptance, E, NR by CW at 3/19/2021 1330                   Point: Home exercise program (Done)     Learning Progress Summary           Patient Acceptance, E,D, VU by LL at 4/9/2021 1505    Acceptance, E,D, VU by LL at 4/8/2021 1610    Acceptance, E,D, VU by LL at 4/7/2021 1611    Acceptance, E,TB, VU by DG at 4/3/2021 3775     Acceptance, E,TB, VU by DG at 4/2/2021 2227    Acceptance, E,TB, VU by DG at 4/1/2021 2245    Acceptance, E,D, VU,NR by LL at 3/31/2021 1059    Acceptance, E,D, VU,NR by RG at 3/30/2021 1427    Acceptance, E,D, VU,NR by LL at 3/30/2021 0850    Acceptance, E,D, VU,NR by LL at 3/29/2021 0934    Acceptance, E, VU by EB at 3/29/2021 0755    Acceptance, E,D, VU,NR by LL at 3/26/2021 1336    Acceptance, E,D, VU,NR by AG at 3/25/2021 1517    Acceptance, E,D, VU,NR by AG at 3/24/2021 1537    Acceptance, E,D, VU,NR by AG at 3/23/2021 1506    Acceptance, E,TB, VU by CT at 3/20/2021 1338    Acceptance, E,TB, VU by DG at 3/20/2021 0010    Acceptance, E, NR by CW at 3/19/2021 1330                   Point: Body mechanics (Done)     Learning Progress Summary           Patient Acceptance, E,D, VU by LL at 4/9/2021 1505    Acceptance, E,D, VU by LL at 4/8/2021 1610    Acceptance, E,D, VU by LL at 4/7/2021 1611    Acceptance, E,TB, VU by DG at 4/3/2021 2231    Acceptance, E,TB, VU by DG at 4/2/2021 2227    Acceptance, E,TB, VU by DG at 4/1/2021 2245    Acceptance, E,D, VU,NR by LL at 3/31/2021 1059    Acceptance, E,D, VU,NR by RG at 3/30/2021 1427    Acceptance, E,D, VU,NR by LL at 3/30/2021 0850    Acceptance, E,D, VU,NR by LL at 3/29/2021 0934    Acceptance, E, VU by EB at 3/29/2021 0755    Acceptance, E,D, VU,NR by LL at 3/26/2021 1336    Acceptance, E,D, VU,NR by AG at 3/25/2021 1517    Acceptance, E,D, VU,NR by AG at 3/24/2021 1537    Acceptance, E,D, VU,NR by AG at 3/23/2021 1506    Acceptance, E,TB, VU by CT at 3/20/2021 1338    Acceptance, E,TB, VU by DG at 3/20/2021 0010    Acceptance, E, NR by CW at 3/19/2021 1330                   Point: Precautions (Done)     Learning Progress Summary           Patient Acceptance, E,D, VU by LL at 4/9/2021 1505    Acceptance, E,D, VU by LL at 4/8/2021 1610    Acceptance, E,D, VU by LL at 4/7/2021 1611    Acceptance, E,TB, VU by DG at 4/3/2021 2231    Acceptance, E,TB, VU by DG at 4/2/2021  2227    Acceptance, E,TB, VU by DG at 4/1/2021 2245    Acceptance, E,D, VU,NR by LL at 3/31/2021 1059    Acceptance, E,D, VU,NR by RG at 3/30/2021 1427    Acceptance, E,D, VU,NR by LL at 3/30/2021 0850    Acceptance, E,D, VU,NR by LL at 3/29/2021 0934    Acceptance, E, VU by EB at 3/29/2021 0755    Acceptance, E,D, VU,NR by LL at 3/26/2021 1336    Acceptance, E,D, VU,NR by AG at 3/25/2021 1517    Acceptance, E,D, VU,NR by AG at 3/24/2021 1537    Acceptance, E,D, VU,NR by AG at 3/23/2021 1506    Acceptance, E,TB, VU by CT at 3/20/2021 1338    Acceptance, E,TB, VU by DG at 3/20/2021 0010    Acceptance, E, NR by CW at 3/19/2021 1330                               User Key     Initials Effective Dates Name Provider Type Discipline    EB 06/16/16 -  Diamond Carranza, RN Registered Nurse Nurse    DG 06/16/16 -  Tonie Mendes RN Registered Nurse Nurse    AG 04/03/18 -  Ivana Carpenter, PT Physical Therapist PT    CT 04/03/18 -  Jing Escalante, PT Physical Therapist PT    LL 05/02/16 -  Martha Schaeffer, PTA Physical Therapy Assistant PT    RG 10/31/19 -  Jonathan Spangler, JENNIFER Physical Therapy Assistant PT    CW 09/11/20 -  Barry Santamaria, PT Physical Therapist PT                PT Recommendation and Plan          Daily Progress Summary (PT)  Impairments Still Limiting Function (PT): ROM decreased, strength decreased, impaired balance, impaired functional activity tolerance               Time Calculation:     PT Charges     Row Name 04/09/21 1506 04/09/21 1505          Time Calculation    Start Time  1245  -LL  1045  -LL     Stop Time  1330  -LL  1130  -LL     Time Calculation (min)  45 min  -LL  45 min  -LL     PT Received On  --  04/09/21  -LL        Time Calculation- PT    Total Timed Code Minutes- PT  45 minute(s)  -LL  45 minute(s)  -LL       User Key  (r) = Recorded By, (t) = Taken By, (c) = Cosigned By    Initials Name Provider Type    Martha Kenyon PTA Physical Therapy Assistant          Therapy  Charges for Today     Code Description Service Date Service Provider Modifiers Qty    18711148743 HC GAIT TRAINING EA 15 MIN 4/8/2021 Martha Schaeffer, PTA GP 3    54594937763 HC PT THER PROC EA 15 MIN 4/8/2021 Martha Schaeffer, PTA GP 3    07018520886 HC GAIT TRAINING EA 15 MIN 4/9/2021 Martha Schaeffer, PTA GP 4    18730667462 HC PT THER PROC EA 15 MIN 4/9/2021 Martha Schaeffer, PTA GP 2                   Mratha. JENNIFER Schaeffer  4/9/2021

## 2021-04-09 NOTE — PROGRESS NOTES
[facility] PROGRESS NOTE     Patient Identification:  Name:  Argenis Lay  Age:  76 y.o.  Sex:  female  :  1945  MRN:  [id number]  Visit Number:  [id number]  ROOM: [facility]     Primary Care Provider:  Mahi Villarreal APRN    Length of stay in inpatient status:  22    Subjective     Chief Compliant:  No chief complaint on file.      History of Presenting Illness: 76-year-old female who was recently treated for sepsis, COVID-19 pneumonia, had history of tracheostomy and PEG tube placement which were at home which were removed.  Patient also with a history of diabetes mellitus, hypertension, hypothyroidism.  Patient being treated for debility because of prolonged illness and is doing reasonably well at this time.    Objective     Current Hospital Meds:atorvastatin, 20 mg, Oral, Nightly  Check Fentanyl Patch Placement, 1 each, Does not apply, Q12H  Check Fentanyl Patch Placement, 1 each, Does not apply, Q12H  enoxaparin, 40 mg, Subcutaneous, Q24H  gabapentin, 100 mg, Oral, Q8H  insulin aspart, 0-7 Units, Subcutaneous, TID AC  insulin detemir, 24 Units, Subcutaneous, Nightly  levothyroxine, 50 mcg, Oral, Q AM  lidocaine-EPINEPHrine, 10 mL, Infiltration, Once  metoprolol tartrate, 12.5 mg, Oral, Q12H  mupirocin, , Topical, Q12H  pantoprazole, 40 mg, Oral, Q AM  polyethylene glycol, 17 g, Oral, Daily  QUEtiapine, 50 mg, Oral, Q12H       ----------------------------------------------------------------------------------------------------------------------  Vital Signs:  Temp:  [98.4 °F (36.9 °C)] 98.4 °F (36.9 °C)  Heart Rate:  [90] 90  Resp:  [18] 18  BP: (114)/(54) 114/54  SpO2:  [93 %-94 %] 94 %  on  Flow (L/min):  [3] 3;   Device (Oxygen Therapy): nasal cannula  Body mass index is 25.54 kg/m².    Wt Readings from Last 3 Encounters:   21 76.2 kg (168 lb)     Intake & Output (last 3 days)       701 -  0700  07 -  0700 701 -  0700  07 - 04/10 0700     P.O. 2580 1320 1320 480    IV Piggyback        Total Intake(mL/kg) 2580 (33.9) 1320 (17.3) 1320 (17.3) 480 (6.3)    Net +2580 +1320 +1320 +480            Urine Unmeasured Occurrence 11 x 7 x 8 x 2 x    Stool Unmeasured Occurrence   1 x         Diet Soft Texture; Ground; Thin  ----------------------------------------------------------------------------------------------------------------------  Physical exam:  Constitutional:   Chronically ill-appearing female no distress  HEENT: Normocephalic atraumatic  Neck:    Supple  Cardiovascular: Regular rate and rhythm  Pulmonary/Chest: Decreased breath sounds but clear  Abdominal: Positive bowel sounds soft.   Musculoskeletal: No arthropathy  Neurological: No focal deficits  Skin: No rash  Peripheral vascular:  Genitourinary:  ----------------------------------------------------------------------------------------------------------------------    Last echocardiogram:  Results for orders placed during the hospital encounter of 03/18/21    Adult Transthoracic Echo Complete W/ Cont if Necessary Per Protocol    Interpretation Summary  · Normal left ventricular cavity size and wall thickness noted. All left ventricular wall segments contract normally.  · Left ventricular ejection fraction appears to be 66 - 70%.  · Left ventricular diastolic function is consistent with (grade I) impaired relaxation.  · There is calcification of the aortic valve mainly affecting the non-coronary and left coronary cusp(s). The aortic valve appears trileaflet. Mild aortic valve regurgitation is present. Mild aortic valve stenosis is present.  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 Mild mitral valve regurgitation is present. No significant mitral valve stenosis is present.  · Moderate tricuspid valve regurgitation is present. Estimated right ventricular systolic pressure from tricuspid regurgitation is markedly elevated (78  mmHg).  · Severe pulmonary hypertension is present.  · There is a trivial pericardial effusion.    ----------------------------------------------------------------------------------------------------------------------  Results from last 7 days   Lab Units 04/07/21  0904 04/06/21  0740 04/04/21  0304   CRP mg/dL  --  1.71*  --    WBC 10*3/mm3 5.48  --  6.19   HEMOGLOBIN g/dL 10.1*  --  9.3*   HEMATOCRIT % 33.4*  --  32.5*   MCV fL 91.3  --  95.0   MCHC g/dL 30.2*  --  28.6*   PLATELETS 10*3/mm3 143  --  167         Results from last 7 days   Lab Units 04/03/21  1619 04/03/21  0206   SODIUM mmol/L  --  142   POTASSIUM mmol/L 4.0 3.5   CHLORIDE mmol/L  --  108*   CO2 mmol/L  --  22.6   BUN mg/dL  --  9   CREATININE mg/dL  --  0.30*   EGFR IF NONAFRICN AM mL/min/1.73  --  >150   CALCIUM mg/dL  --  8.7   GLUCOSE mg/dL  --  94   Estimated Creatinine Clearance: 73.1 mL/min (A) (by C-G formula based on SCr of 0.3 mg/dL (L)).  No results found for: AMMONIA              Glucose   Date/Time Value Ref Range Status   04/09/2021 1111 128 70 - 130 mg/dL Final   04/09/2021 0621 92 70 - 130 mg/dL Final   04/08/2021 1936 133 (H) 70 - 130 mg/dL Final   04/08/2021 1612 106 70 - 130 mg/dL Final   04/08/2021 1116 123 70 - 130 mg/dL Final   04/08/2021 0653 81 70 - 130 mg/dL Final   04/07/2021 1955 162 (H) 70 - 130 mg/dL Final   04/07/2021 1639 92 70 - 130 mg/dL Final     No results found for: TSH, FREET4  No results found for: PREGTESTUR, PREGSERUM, HCG, HCGQUANT  Pain Management Panel    There is no flowsheet data to display.       Brief Urine Lab Results  (Last result in the past 365 days)      Color   Clarity   Blood   Leuk Est   Nitrite   Protein   CREAT   Urine HCG        03/21/21 2043 Yellow Clear Negative Negative Negative Negative             No results found for: BLOODCX      No results found for: URINECX  No results found for: WOUNDCX  No results found for: STOOLCX  Results from last 7 days   Lab Units 04/06/21  0740   CRP mg/dL  1.71*       I have personally looked at the labs and they are summarized above.  ----------------------------------------------------------------------------------------------------------------------  Detailed radiology reports for the last 24 hours:    Imaging Results (Last 24 Hours)     ** No results found for the last 24 hours. **        Final impressions for the last 30 days of radiology reports:    NM Lung Scan Perfusion Particulate    Result Date: 3/31/2021  Intermediate suspicion of PE.  This report was finalized on 3/31/2021 3:21 PM by Dr. Alexx Espinosa MD.      XR Chest 1 View    Result Date: 3/31/2021  Worsening pulmonary status with lower lung volumes and continued bilateral peripheral diffuse infiltrates.  Signer Name: Lindsey Duron MD  Signed: 3/31/2021 7:03 AM  [facility]  [facility]    XR Chest 1 View    Result Date: 3/28/2021  Patchy bilateral airspace disease, very similar to the prior study  This report was finalized on 3/28/2021 3:56 PM by Dr. Alan Hargrove MD.      XR Chest 1 View    Result Date: 3/23/2021  Slight worsening of bilateral upper lobe infiltrates, otherwise stable. Signer Name: Truman White MD  Signed: 3/23/2021 10:47 PM  Workstation Name: [facility]  [facility]    XR Chest 1 View    Result Date: 3/21/2021  Severe bilateral pneumonia is again noted. It is uncertain if there may be interval improvement in aeration of the right lung. Signer Name: Amie Arana MD  Signed: 3/21/2021 7:59 PM  Workstation Name: [id number]  [facility]    CT Chest Pulmonary Embolism    Result Date: 4/2/2021  1.  No PE identified. 2.  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 3.  Stable pattern of pulmonary fibrosis which on imaging is most adjusted of UIP. 4.  Trace effusions. 5.  Probable reactive hilar mediastinal lymph nodes which are stable from the previous exam. Continued follow-up recommended.  This  report was finalized on 4/2/2021 10:29 AM by Dr. Alexx Espinosa MD.      US Venous Doppler Lower Extremity Bilateral (duplex)    Result Date: 3/31/2021  No DVT in the lower extremities on today's exam.  This report was finalized on 3/31/2021 1:40 PM by Dr. Alan Hargrove MD.      US Venous Doppler Upper Extremity Bilateral (duplex)    Result Date: 4/2/2021  No DVT is seen  This report was finalized on 4/2/2021 10:47 AM by Dr. Alan Hargrove MD.      US Venous Doppler Upper Extremity Bilateral (duplex)    Result Date: 3/22/2021  No sonographic findings of DVT in the upper extremities   This report was finalized on 3/22/2021 3:32 PM by Dr. Tim Mir II, MD.      FL Video Swallow Single Contrast    Result Date: 3/19/2021      Please see the speech pathologist's report for additional information.  This report was finalized on 3/19/2021 11:14 AM by Dr. Randal Mata MD.      FL Video Swallow Single Contrast    Result Date: 3/12/2021  1.  Deep laryngeal penetration and intermittent aspiration with thin liquids. 2. Please see dysphasia notes for further details.  This report was finalized on 3/12/2021 11:22 AM by Dr. Alexx Espinosa MD.      I have personally looked at the radiology images and read the final radiology report.    Assessment & Plan    Debility secondary to prolonged COVID-19 infection illness--patient requires moderate assistance for help with bed mobility; moderate assistance for help with transfers; moderate assistance for up with gait and was able to walk yesterday or actually today at 20 feet the morning 5 feet and 10 feet in the morning did walk 20 feet 10 feet and 8 feet later on in the afternoon.    Chronic hypoxic respiratory failure--patient is tolerated being on 3 L per nasal cannula very well.  Does have some increased shortness of breath with ambulation and have had to titrate her O2 to 4 L during this time.    Diabetes mellitus continue current treatment    Hypertension well  controlled    Hypothyroidism continue Synthroid    VTE Prophylaxis:   Mechanical Order History:     None      Pharmalogical Order History:      Ordered     Dose Route Frequency Stop    04/02/21 1056  enoxaparin (LOVENOX) syringe 40 mg      40 mg SC Every 24 Hours Scheduled --    04/01/21 0755  heparin (porcine) 5000 UNIT/ML injection 4,600 Units      60 Units/kg IV Once 04/01/21 1047    04/01/21 0755  heparin 82896 units/250 mL (100 units/mL) in 0.45 % NaCl infusion  9.14 mL/hr,   Status:  Discontinued      12 Units/kg/hr IV Titrated 04/02/21 1056    04/01/21 0755  heparin (porcine) 5000 UNIT/ML injection 4,600 Units  Status:  Discontinued      60 Units/kg IV As Needed 04/02/21 1056    04/01/21 0755  heparin (porcine) 5000 UNIT/ML injection 2,300 Units  Status:  Discontinued      30 Units/kg IV As Needed 04/02/21 1056    03/18/21 1811  enoxaparin (LOVENOX) syringe 40 mg  Status:  Discontinued      40 mg SC Every 24 Hours 04/01/21 0754    03/18/21 1806  enoxaparin (LOVENOX) syringe 40 mg  Status:  Discontinued      40 mg SC Every 24 Hours 03/18/21 1811                    Marquis He MD  [facility]  04/09/21  15:14 EDT

## 2021-04-09 NOTE — THERAPY TREATMENT NOTE
Inpatient Rehabilitation - Occupational Therapy Treatment Note     [facility]     Patient Name: Argenis Lay  : 1945  MRN: [id number]    Today's Date: 2021                 Admit Date: 3/18/2021       No diagnosis found.    Patient Active Problem List   Diagnosis   • Debility       Past Medical History:   Diagnosis Date   • COPD (chronic obstructive pulmonary disease) (CMS/HCC)    • Diabetes mellitus (CMS/[facility])    • Hypertension        History reviewed. No pertinent surgical history.             IRF OT ASSESSMENT FLOWSHEET (last 12 hours)      IRF OT Evaluation and Treatment     Row Name 21 1219          OT Time and Intention    Document Type  daily treatment  -BF     Mode of Treatment  occupational therapy  -BF     Patient Effort  good  -BF     Row Name 21 1219          General Information    Existing Precautions/Restrictions  fall;oxygen therapy device and L/min  -BF     Row Name 21 1219          Cognition/Psychosocial    Orientation Status (Cognition)  oriented x 3  -BF     Follows Commands (Cognition)  follows one-step commands;verbal cues/prompting required  -BF     Row Name 21 1219          Transfers    Bed-Chair Merrick (Transfers)  moderate assist (50% patient effort);maximum assist (25% patient effort);verbal cues;nonverbal cues (demo/gesture)  -BF     Assistive Device (Bed-Chair Transfers)  wheelchair  -BF     Row Name 21 1219          Motor Skills    Motor Control/Coordination Interventions  fine motor manipulation/dexterity activities;gross motor coordination activities;therapeutic exercise/ROM BUE GMC/FMC theract; light strengthening  -BF     Gross Motor Skill, Impairments Detail  Arm bike 3 mins X4; handgripper; theraputty   -BF     Row Name 21 1219          Positioning and Restraints    In Wheelchair  sitting;with PT  -BF       User Key  (r) = Recorded By, (t) = Taken By, (c) = Cosigned By    Initials Name Effective Dates    Vanesa Martin, OT  07/05/20 -            Occupational Therapy Education                 Title: PT OT SLP Therapies (Done)     Topic: Occupational Therapy (Done)     Point: ADL training (Done)     Description:   Instruct learner(s) on proper safety adaptation and remediation techniques during self care or transfers.   Instruct in proper use of assistive devices.              Learning Progress Summary           Patient Acceptance, E, VU,NR by BF at 4/9/2021 1218    Acceptance, E, VU,NR by BF at 4/8/2021 1211    Acceptance, E,TB,D, VU,DU,NR by JW at 4/7/2021 1023    Acceptance, E, VU,NR by BF at 4/6/2021 1351    Acceptance, E, VU,NR by BF at 4/5/2021 1418    Comment: Daughter present for ADL    Acceptance, E,TB, VU by DG at 4/3/2021 2231    Acceptance, E,TB, VU by DG at 4/2/2021 2227    Acceptance, E, VU,NR by BF at 4/2/2021 1419    Acceptance, E,TB, VU by DG at 4/1/2021 2245    Acceptance, E, VU,NR by BF at 4/1/2021 1431    Acceptance, E, VU,NR by BF at 3/30/2021 1233    Acceptance, E, VU,NR by BF at 3/29/2021 1219    Acceptance, E, VU by EB at 3/29/2021 0755    Acceptance, E, VU,NR by BF at 3/26/2021 1357    Acceptance, E, VU,NR by BF at 3/24/2021 1337    Acceptance, E, VU,NR by BF at 3/23/2021 1451    Acceptance, E,D, VU,NR by CJ at 3/23/2021 1444    Acceptance, E,TB, VU by DG at 3/20/2021 0010    Acceptance, E, VU,NR by BF at 3/19/2021 1429   Family Acceptance, E, VU,NR by BF at 4/5/2021 1418    Comment: Daughter present for ADL                   Point: Home exercise program (Done)     Description:   Instruct learner(s) on appropriate technique for monitoring, assisting and/or progressing therapeutic exercises/activities.              Learning Progress Summary           Patient Acceptance, E,TB,D, VU,DU,NR by JW at 4/7/2021 1023    Acceptance, E,TB, VU by DG at 4/3/2021 2231    Acceptance, E,TB, VU by DG at 4/2/2021 2227    Acceptance, E,TB, VU by DG at 4/1/2021 2245    Acceptance, E, VU by EB at 3/29/2021 0755    Acceptance, E,TB,  VU by DG at 3/20/2021 0010                   Point: Precautions (Done)     Description:   Instruct learner(s) on prescribed precautions during self-care and functional transfers.              Learning Progress Summary           Patient Acceptance, E, VU,NR by BF at 4/9/2021 1218    Acceptance, E, VU,NR by BF at 4/8/2021 1211    Acceptance, E,TB,D, VU,DU,NR by JW at 4/7/2021 1023    Acceptance, E, VU,NR by BF at 4/6/2021 1351    Acceptance, E, VU,NR by BF at 4/5/2021 1418    Comment: Daughter present for ADL    Acceptance, E,TB, VU by DG at 4/3/2021 2231    Acceptance, E,TB, VU by DG at 4/2/2021 2227    Acceptance, E, VU,NR by BF at 4/2/2021 1419    Acceptance, E,TB, VU by DG at 4/1/2021 2245    Acceptance, E, VU,NR by BF at 3/30/2021 1233    Acceptance, E, VU by EB at 3/29/2021 0755    Acceptance, E, VU,NR by BF at 3/26/2021 1357    Acceptance, E, VU,NR by BF at 3/24/2021 1337    Acceptance, E,D, VU,NR by CJ at 3/23/2021 1444    Acceptance, E,TB, VU by DG at 3/20/2021 0010    Acceptance, E, VU,NR by BF at 3/19/2021 1429   Family Acceptance, E, VU,NR by BF at 4/5/2021 1418    Comment: Daughter present for ADL                   Point: Body mechanics (Done)     Description:   Instruct learner(s) on proper positioning and spine alignment during self-care, functional mobility activities and/or exercises.              Learning Progress Summary           Patient Acceptance, E,TB,D, VU,DU,NR by JW at 4/7/2021 1023    Acceptance, E,TB, VU by DG at 4/3/2021 2231    Acceptance, E,TB, VU by DG at 4/2/2021 2227    Acceptance, E,TB, VU by DG at 4/1/2021 2245    Acceptance, E, VU by EB at 3/29/2021 0755    Acceptance, E,TB, VU by DG at 3/20/2021 0010                               User Key     Initials Effective Dates Name Provider Type Discipline    EB 06/16/16 -  Diamond Carranza, RN Registered Nurse Nurse    BRADY 06/16/16 -  Tonie Mendes, RN Registered Nurse Nurse     07/05/20 -  Vanesa Hernandez OT  Occupational Therapist OT    CJ 04/03/18 -  Sharon Abbott OT Occupational Therapist OT    JW 03/07/18 -  Dylan Cabrera OTR Occupational Therapist OT                    OT Recommendation and Plan    Planned Therapy Interventions (OT): activity tolerance training, adaptive equipment training, BADL retraining, neuromuscular control/coordination retraining, ROM/therapeutic exercise, strengthening exercise, transfer/mobility retraining                    Time Calculation:     Time Calculation- OT     Row Name 04/09/21 1224             Time Calculation- OT    OT Start Time  0915  -BF      OT Stop Time  1045  -BF      OT Time Calculation (min)  90 min  -BF      Total Timed Code Minutes- OT  90 minute(s)  -BF      OT Non-Billable Time (min)  20 min  -BF        User Key  (r) = Recorded By, (t) = Taken By, (c) = Cosigned By    Initials Name Provider Type    Vanesa Martin OT Occupational Therapist        Therapy Charges for Today     Code Description Service Date Service Provider Modifiers Qty    30787747731 HC OT THERAPEUTIC ACT EA 15 MIN 4/8/2021 Vanesa Hernandez OT GO 3    92657536346 HC OT SELF CARE/MGMT/TRAIN EA 15 MIN 4/8/2021 Vanesa Hernandez, OT GO 1    18928020194 HC OT THER PROC EA 15 MIN 4/8/2021 Vanesa Hernandez, OT GO 2    80810121161 HC OT THER PROC EA 15 MIN 4/9/2021 Vanesa Hernandez, OT GO 2    91972254534 HC OT THERAPEUTIC ACT EA 15 MIN 4/9/2021 Vanesa Hernandez OT GO 3    87636259349 HC OT SELF CARE/MGMT/TRAIN EA 15 MIN 4/9/2021 Vanesa Hernandez, OT GO 1                   Vanesa Hernandez OT  4/9/2021

## 2021-04-10 LAB
BASOPHILS # BLD AUTO: 0.03 10*3/MM3 (ref 0–0.2)
BASOPHILS NFR BLD AUTO: 0.6 % (ref 0–1.5)
DEPRECATED RDW RBC AUTO: 52.7 FL (ref 37–54)
EOSINOPHIL # BLD AUTO: 0.39 10*3/MM3 (ref 0–0.4)
EOSINOPHIL NFR BLD AUTO: 7.2 % (ref 0.3–6.2)
ERYTHROCYTE [DISTWIDTH] IN BLOOD BY AUTOMATED COUNT: 15.9 % (ref 12.3–15.4)
GLUCOSE BLDC GLUCOMTR-MCNC: 112 MG/DL (ref 70–130)
GLUCOSE BLDC GLUCOMTR-MCNC: 124 MG/DL (ref 70–130)
GLUCOSE BLDC GLUCOMTR-MCNC: 160 MG/DL (ref 70–130)
GLUCOSE BLDC GLUCOMTR-MCNC: 90 MG/DL (ref 70–130)
HCT VFR BLD AUTO: 32.3 % (ref 34–46.6)
HGB BLD-MCNC: 9.7 G/DL (ref 12–15.9)
IMM GRANULOCYTES # BLD AUTO: 0.04 10*3/MM3 (ref 0–0.05)
IMM GRANULOCYTES NFR BLD AUTO: 0.7 % (ref 0–0.5)
LYMPHOCYTES # BLD AUTO: 1.11 10*3/MM3 (ref 0.7–3.1)
LYMPHOCYTES NFR BLD AUTO: 20.4 % (ref 19.6–45.3)
MCH RBC QN AUTO: 27.1 PG (ref 26.6–33)
MCHC RBC AUTO-ENTMCNC: 30 G/DL (ref 31.5–35.7)
MCV RBC AUTO: 90.2 FL (ref 79–97)
MONOCYTES # BLD AUTO: 0.5 10*3/MM3 (ref 0.1–0.9)
MONOCYTES NFR BLD AUTO: 9.2 % (ref 5–12)
NEUTROPHILS NFR BLD AUTO: 3.36 10*3/MM3 (ref 1.7–7)
NEUTROPHILS NFR BLD AUTO: 61.9 % (ref 42.7–76)
NRBC BLD AUTO-RTO: 0 /100 WBC (ref 0–0.2)
PLATELET # BLD AUTO: 128 10*3/MM3 (ref 140–450)
PMV BLD AUTO: 10.5 FL (ref 6–12)
RBC # BLD AUTO: 3.58 10*6/MM3 (ref 3.77–5.28)
WBC # BLD AUTO: 5.43 10*3/MM3 (ref 3.4–10.8)

## 2021-04-10 PROCEDURE — 63710000001 DIPHENHYDRAMINE PER 50 MG: Performed by: INTERNAL MEDICINE

## 2021-04-10 PROCEDURE — 82962 GLUCOSE BLOOD TEST: CPT

## 2021-04-10 PROCEDURE — 94799 UNLISTED PULMONARY SVC/PX: CPT

## 2021-04-10 PROCEDURE — 85025 COMPLETE CBC W/AUTO DIFF WBC: CPT | Performed by: INTERNAL MEDICINE

## 2021-04-10 PROCEDURE — 25010000002 ENOXAPARIN PER 10 MG: Performed by: INTERNAL MEDICINE

## 2021-04-10 PROCEDURE — 99231 SBSQ HOSP IP/OBS SF/LOW 25: CPT | Performed by: FAMILY MEDICINE

## 2021-04-10 PROCEDURE — 63710000001 INSULIN DETEMIR PER 5 UNITS: Performed by: INTERNAL MEDICINE

## 2021-04-10 RX ADMIN — GABAPENTIN 100 MG: 100 CAPSULE ORAL at 05:00

## 2021-04-10 RX ADMIN — GABAPENTIN 100 MG: 100 CAPSULE ORAL at 21:04

## 2021-04-10 RX ADMIN — OXYCODONE HYDROCHLORIDE 5 MG: 5 TABLET ORAL at 08:22

## 2021-04-10 RX ADMIN — DIPHENHYDRAMINE HYDROCHLORIDE 25 MG: 25 CAPSULE ORAL at 21:04

## 2021-04-10 RX ADMIN — QUETIAPINE FUMARATE 50 MG: 25 TABLET, FILM COATED ORAL at 08:22

## 2021-04-10 RX ADMIN — METOPROLOL TARTRATE 12.5 MG: 25 TABLET, FILM COATED ORAL at 08:22

## 2021-04-10 RX ADMIN — OXYCODONE HYDROCHLORIDE 5 MG: 5 TABLET ORAL at 21:03

## 2021-04-10 RX ADMIN — CLONAZEPAM 0.25 MG: 0.5 TABLET ORAL at 21:04

## 2021-04-10 RX ADMIN — ENOXAPARIN SODIUM 40 MG: 40 INJECTION SUBCUTANEOUS at 08:22

## 2021-04-10 RX ADMIN — ATORVASTATIN CALCIUM 20 MG: 20 TABLET, FILM COATED ORAL at 21:04

## 2021-04-10 RX ADMIN — GABAPENTIN 100 MG: 100 CAPSULE ORAL at 13:14

## 2021-04-10 RX ADMIN — INSULIN DETEMIR 24 UNITS: 100 INJECTION, SOLUTION SUBCUTANEOUS at 20:47

## 2021-04-10 RX ADMIN — LEVOTHYROXINE SODIUM 50 MCG: 88 TABLET ORAL at 05:00

## 2021-04-10 RX ADMIN — QUETIAPINE FUMARATE 50 MG: 25 TABLET, FILM COATED ORAL at 21:04

## 2021-04-10 RX ADMIN — MUPIROCIN: 20 OINTMENT TOPICAL at 21:05

## 2021-04-10 RX ADMIN — METOPROLOL TARTRATE 12.5 MG: 25 TABLET, FILM COATED ORAL at 21:03

## 2021-04-10 RX ADMIN — MUPIROCIN: 20 OINTMENT TOPICAL at 08:22

## 2021-04-10 RX ADMIN — PANTOPRAZOLE SODIUM 40 MG: 40 TABLET, DELAYED RELEASE ORAL at 05:00

## 2021-04-10 RX ADMIN — Medication 5 MG: at 21:04

## 2021-04-10 NOTE — PROGRESS NOTES
[facility] PROGRESS NOTE     Patient Identification:  Name:  Argenis Lay  Age:  76 y.o.  Sex:  female  :  1945  MRN:  [id number]  Visit Number:  [id number]  ROOM: [facility]     Primary Care Provider:  Mahi Villarreal APRN    Length of stay in inpatient status:  23    Subjective     Chief Compliant:  No chief complaint on file.      History of Presenting Illness: 76-year-old female who had been treated for sepsis, prolonged COVID-19 pneumonia, history of tracheostomy and PEG tube placement which have been since removed.  Patient continues to have good hypoxic respiratory failure.  Patient does have past history of diabetes mellitus, hypertension, and hypothyroidism.  Patient states that she was a little short of breath last evening.  States that she did have one episode of coughing where she coughed up some sputum.  She states that she is not been typically coughing and is not this morning.  She states she does have just intermittent episodes where she does have a cough.  Patient states she is breathing easy this morning without difficulty    Objective     Current Hospital Meds:atorvastatin, 20 mg, Oral, Nightly  Check Fentanyl Patch Placement, 1 each, Does not apply, Q12H  Check Fentanyl Patch Placement, 1 each, Does not apply, Q12H  enoxaparin, 40 mg, Subcutaneous, Q24H  gabapentin, 100 mg, Oral, Q8H  insulin aspart, 0-7 Units, Subcutaneous, TID AC  insulin detemir, 24 Units, Subcutaneous, Nightly  levothyroxine, 50 mcg, Oral, Q AM  lidocaine-EPINEPHrine, 10 mL, Infiltration, Once  metoprolol tartrate, 12.5 mg, Oral, Q12H  mupirocin, , Topical, Q12H  pantoprazole, 40 mg, Oral, Q AM  polyethylene glycol, 17 g, Oral, Daily  QUEtiapine, 50 mg, Oral, Q12H       ----------------------------------------------------------------------------------------------------------------------  Vital Signs:  Temp:  [98.1 °F (36.7 °C)-99.1 °F (37.3 °C)] 99.1 °F (37.3 °C)  Heart Rate:  [90-95] 90  Resp:   [16-18] 18  BP: (115-125)/(63-68) 115/63  SpO2:  [94 %-98 %] 98 %  on  Flow (L/min):  [3] 3;   Device (Oxygen Therapy): humidified;nasal cannula  Body mass index is 25.54 kg/m².    Wt Readings from Last 3 Encounters:   03/19/21 76.2 kg (168 lb)     Intake & Output (last 3 days)       04/07 0701 - 04/08 0700 04/08 0701 - 04/09 0700 04/09 0701 - 04/10 0700 04/10 0701 - 04/11 0700    P.O. 1320 1320 840     Total Intake(mL/kg) 1320 (17.3) 1320 (17.3) 840 (11)     Net +1320 +1320 +840             Urine Unmeasured Occurrence 7 x 8 x 4 x 1 x    Stool Unmeasured Occurrence  1 x 1 x         Diet Soft Texture; Ground; Thin  ----------------------------------------------------------------------------------------------------------------------  Physical exam:  Constitutional:   No acute distress  HEENT: Normocephalic atraumatic  Neck: Supple   Cardiovascular: Regular rate and rhythm  Pulmonary/Chest: Fairly clear to auscultation; slightly decreased breath sounds in the bases  Abdominal: Positive bowel sounds soft.   Musculoskeletal: No arthropathy  Neurological: No focal deficits  Skin: No rash  Peripheral vascular:  Genitourinary:  ----------------------------------------------------------------------------------------------------------------------    Last echocardiogram:  Results for orders placed during the hospital encounter of 03/18/21    Adult Transthoracic Echo Complete W/ Cont if Necessary Per Protocol    Interpretation Summary  · Normal left ventricular cavity size and wall thickness noted. All left ventricular wall segments contract normally.  · Left ventricular ejection fraction appears to be 66 - 70%.  · Left ventricular diastolic function is consistent with (grade I) impaired relaxation.  · There is calcification of the aortic valve mainly affecting the non-coronary and left coronary cusp(s). The aortic valve appears trileaflet. Mild aortic valve regurgitation is present. Mild aortic valve stenosis is present.  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 Mild mitral valve regurgitation is present. No significant mitral valve stenosis is present.  · Moderate tricuspid valve regurgitation is present. Estimated right ventricular systolic pressure from tricuspid regurgitation is markedly elevated (78 mmHg).  · Severe pulmonary hypertension is present.  · There is a trivial pericardial effusion.    ----------------------------------------------------------------------------------------------------------------------  Results from last 7 days   Lab Units 04/07/21  0904 04/06/21  0740 04/04/21  0304   CRP mg/dL  --  1.71*  --    WBC 10*3/mm3 5.48  --  6.19   HEMOGLOBIN g/dL 10.1*  --  9.3*   HEMATOCRIT % 33.4*  --  32.5*   MCV fL 91.3  --  95.0   MCHC g/dL 30.2*  --  28.6*   PLATELETS 10*3/mm3 143  --  167         Results from last 7 days   Lab Units 04/03/21  1619   POTASSIUM mmol/L 4.0   Estimated Creatinine Clearance: 73.1 mL/min (A) (by C-G formula based on SCr of 0.3 mg/dL (L)).  No results found for: AMMONIA              Glucose   Date/Time Value Ref Range Status   04/10/2021 0616 90 70 - 130 mg/dL Final   04/09/2021 2001 175 (H) 70 - 130 mg/dL Final   04/09/2021 1619 106 70 - 130 mg/dL Final   04/09/2021 1111 128 70 - 130 mg/dL Final   04/09/2021 0621 92 70 - 130 mg/dL Final   04/08/2021 1936 133 (H) 70 - 130 mg/dL Final   04/08/2021 1612 106 70 - 130 mg/dL Final   04/08/2021 1116 123 70 - 130 mg/dL Final     No results found for: TSH, FREET4  No results found for: PREGTESTUR, PREGSERUM, HCG, HCGQUANT  Pain Management Panel    There is no flowsheet data to display.       Brief Urine Lab Results  (Last result in the past 365 days)      Color   Clarity   Blood   Leuk Est   Nitrite   Protein   CREAT   Urine HCG        03/21/21 2043 Yellow Clear Negative Negative Negative Negative             No results found for: BLOODCX      No results found for: URINECX  No  results found for: WOUNDCX  No results found for: STOOLCX  Results from last 7 days   Lab Units 04/06/21  0740   CRP mg/dL 1.71*       I have personally looked at the labs and they are summarized above.  ----------------------------------------------------------------------------------------------------------------------  Detailed radiology reports for the last 24 hours:    Imaging Results (Last 24 Hours)     ** No results found for the last 24 hours. **        Final impressions for the last 30 days of radiology reports:    NM Lung Scan Perfusion Particulate    Result Date: 3/31/2021  Intermediate suspicion of PE.  This report was finalized on 3/31/2021 3:21 PM by Dr. Alexx Espinosa MD.      XR Chest 1 View    Result Date: 3/31/2021  Worsening pulmonary status with lower lung volumes and continued bilateral peripheral diffuse infiltrates.  Signer Name: Lindsey Duron MD  Signed: 3/31/2021 7:03 AM  [facility]  Radiology [facility]    XR Chest 1 View    Result Date: 3/28/2021  Patchy bilateral airspace disease, very similar to the prior study  This report was finalized on 3/28/2021 3:56 PM by Dr. Alan Hargrove MD.      XR Chest 1 View    Result Date: 3/23/2021  Slight worsening of bilateral upper lobe infiltrates, otherwise stable. Signer Name: Truman White MD  Signed: 3/23/2021 10:47 PM  Workstation Name: [facility]  Radiology [facility]    XR Chest 1 View    Result Date: 3/21/2021  Severe bilateral pneumonia is again noted. It is uncertain if there may be interval improvement in aeration of the right lung. Signer Name: Amie Arana MD  Signed: 3/21/2021 7:59 PM  Workstation Name: [id number]  Radiology [facility]    CT Chest Pulmonary Embolism    Result Date: 4/2/2021  1.  No PE identified. 2.  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 3.  Stable pattern of pulmonary fibrosis which on imaging is most adjusted of UIP. 4.  Trace effusions. 5.  Probable  reactive hilar mediastinal lymph nodes which are stable from the previous exam. Continued follow-up recommended.  This report was finalized on 4/2/2021 10:29 AM by Dr. Alexx Espinosa MD.      US Venous Doppler Lower Extremity Bilateral (duplex)    Result Date: 3/31/2021  No DVT in the lower extremities on today's exam.  This report was finalized on 3/31/2021 1:40 PM by Dr. Alan Hargrove MD.      US Venous Doppler Upper Extremity Bilateral (duplex)    Result Date: 4/2/2021  No DVT is seen  This report was finalized on 4/2/2021 10:47 AM by Dr. Alan Hargrove MD.      US Venous Doppler Upper Extremity Bilateral (duplex)    Result Date: 3/22/2021  No sonographic findings of DVT in the upper extremities   This report was finalized on 3/22/2021 3:32 PM by Dr. Tim Mir II, MD.      FL Video Swallow Single Contrast    Result Date: 3/19/2021      Please see the speech pathologist's report for additional information.  This report was finalized on 3/19/2021 11:14 AM by Dr. Randal Mata MD.      FL Video Swallow Single Contrast    Result Date: 3/12/2021  1.  Deep laryngeal penetration and intermittent aspiration with thin liquids. 2. Please see dysphasia notes for further details.  This report was finalized on 3/12/2021 11:22 AM by Dr. Alexx Espinosa MD.      I have personally looked at the radiology images and read the final radiology report.    Assessment & Plan    Debility secondary to prolonged COVID-19 infection--patient requiring moderate assistance for help with bed mobility; moderate assistance for transfers; moderate assistance for help with ambulation and was able to walk with a front wheel walker 20 feet, 5 feet, 10 feet in the morning and 20 feet, 10 feet, 8 feet in the evening    Chronic hypoxic respiratory failure secondary to likely postinflammatory changes from COVID-19--patient currently tolerating 3 L per nasal cannula well.  Has had some intermittent shortness of breath she states more so with the  eating.  Will monitor closely    Diabetes mellitus continue current treatment    Hypertension--reasonably well-controlled    Hypothyroidism continue Synthroid    VTE Prophylaxis:   Mechanical Order History:     None      Pharmalogical Order History:      Ordered     Dose Route Frequency Stop    04/02/21 1056  enoxaparin (LOVENOX) syringe 40 mg      40 mg SC Every 24 Hours Scheduled --    04/01/21 0755  heparin (porcine) 5000 UNIT/ML injection 4,600 Units      60 Units/kg IV Once 04/01/21 1047    04/01/21 0755  heparin 13971 units/250 mL (100 units/mL) in 0.45 % NaCl infusion  9.14 mL/hr,   Status:  Discontinued      12 Units/kg/hr IV Titrated 04/02/21 1056    04/01/21 0755  heparin (porcine) 5000 UNIT/ML injection 4,600 Units  Status:  Discontinued      60 Units/kg IV As Needed 04/02/21 1056    04/01/21 0755  heparin (porcine) 5000 UNIT/ML injection 2,300 Units  Status:  Discontinued      30 Units/kg IV As Needed 04/02/21 1056    03/18/21 1811  enoxaparin (LOVENOX) syringe 40 mg  Status:  Discontinued      40 mg SC Every 24 Hours 04/01/21 0754    03/18/21 1806  enoxaparin (LOVENOX) syringe 40 mg  Status:  Discontinued      40 mg SC Every 24 Hours 03/18/21 1811                Marquis He MD  [facility]  04/10/21  09:45 EDT

## 2021-04-10 NOTE — PLAN OF CARE
Goal Outcome Evaluation:  Plan of Care Reviewed With: patient  Progress: improving  Outcome Summary: Patient progressing with current therapies. Continue POC

## 2021-04-10 NOTE — PROGRESS NOTES
Rehabilitation Nursing  Inpatient Rehabilitation Plan of Care Note    Plan of Care  Pain    Pain Management (Active)  Current Status (3/18/2021 4:16:00 PM): Potential for pain  Weekly Goal: No pain this week  Discharge Goal: No pain    Safety    Potential for Injury (Active)  Current Status (3/18/2021 4:16:00 PM): At risk for injury  Weekly Goal: No injury this week  Discharge Goal: No injury    Signed by: Genoveva Pike RN

## 2021-04-11 ENCOUNTER — APPOINTMENT (OUTPATIENT)
Dept: GENERAL RADIOLOGY | Facility: HOSPITAL | Age: 76
End: 2021-04-11

## 2021-04-11 LAB
A-A DO2: 254.9 MMHG (ref 0–300)
A-A DO2: 345.4 MMHG (ref 0–300)
ANION GAP SERPL CALCULATED.3IONS-SCNC: 10.1 MMOL/L (ref 5–15)
ARTERIAL PATENCY WRIST A: POSITIVE
ARTERIAL PATENCY WRIST A: POSITIVE
ATMOSPHERIC PRESS: 717 MMHG
ATMOSPHERIC PRESS: 717 MMHG
BASE EXCESS BLDA CALC-SCNC: 3.1 MMOL/L (ref 0–2)
BASE EXCESS BLDA CALC-SCNC: 4 MMOL/L (ref 0–2)
BASOPHILS # BLD AUTO: 0.04 10*3/MM3 (ref 0–0.2)
BASOPHILS NFR BLD AUTO: 0.9 % (ref 0–1.5)
BDY SITE: ABNORMAL
BDY SITE: ABNORMAL
BODY TEMPERATURE: 0 C
BODY TEMPERATURE: 0 C
BUN SERPL-MCNC: 7 MG/DL (ref 8–23)
BUN/CREAT SERPL: ABNORMAL
CALCIUM SPEC-SCNC: 8.8 MG/DL (ref 8.6–10.5)
CHLORIDE SERPL-SCNC: 103 MMOL/L (ref 98–107)
CO2 BLDA-SCNC: 28.7 MMOL/L (ref 22–33)
CO2 BLDA-SCNC: 29 MMOL/L (ref 22–33)
CO2 SERPL-SCNC: 28.9 MMOL/L (ref 22–29)
COHGB MFR BLD: 1 % (ref 0–5)
COHGB MFR BLD: 1.2 % (ref 0–5)
CREAT SERPL-MCNC: <0.47 MG/DL (ref 0.57–1)
DEPRECATED RDW RBC AUTO: 53.7 FL (ref 37–54)
EOSINOPHIL # BLD AUTO: 0.37 10*3/MM3 (ref 0–0.4)
EOSINOPHIL NFR BLD AUTO: 8.1 % (ref 0.3–6.2)
ERYTHROCYTE [DISTWIDTH] IN BLOOD BY AUTOMATED COUNT: 15.9 % (ref 12.3–15.4)
GAS FLOW AIRWAY: 12 LPM
GLUCOSE BLDC GLUCOMTR-MCNC: 100 MG/DL (ref 70–130)
GLUCOSE BLDC GLUCOMTR-MCNC: 134 MG/DL (ref 70–130)
GLUCOSE BLDC GLUCOMTR-MCNC: 85 MG/DL (ref 70–130)
GLUCOSE BLDC GLUCOMTR-MCNC: 94 MG/DL (ref 70–130)
GLUCOSE SERPL-MCNC: 111 MG/DL (ref 65–99)
HCO3 BLDA-SCNC: 27.4 MMOL/L (ref 20–26)
HCO3 BLDA-SCNC: 27.9 MMOL/L (ref 20–26)
HCT VFR BLD AUTO: 33 % (ref 34–46.6)
HCT VFR BLD CALC: 36.1 % (ref 38–51)
HCT VFR BLD CALC: 37.5 % (ref 38–51)
HGB BLD-MCNC: 9.8 G/DL (ref 12–15.9)
HGB BLDA-MCNC: 11.8 G/DL (ref 13.5–17.5)
HGB BLDA-MCNC: 12.2 G/DL (ref 13.5–17.5)
IMM GRANULOCYTES # BLD AUTO: 0.02 10*3/MM3 (ref 0–0.05)
IMM GRANULOCYTES NFR BLD AUTO: 0.4 % (ref 0–0.5)
INHALED O2 CONCENTRATION: 50 %
INHALED O2 CONCENTRATION: 70 %
LYMPHOCYTES # BLD AUTO: 1.36 10*3/MM3 (ref 0.7–3.1)
LYMPHOCYTES NFR BLD AUTO: 29.8 % (ref 19.6–45.3)
Lab: ABNORMAL
MCH RBC QN AUTO: 27.2 PG (ref 26.6–33)
MCHC RBC AUTO-ENTMCNC: 29.7 G/DL (ref 31.5–35.7)
MCV RBC AUTO: 91.7 FL (ref 79–97)
METHGB BLD QL: 0.1 % (ref 0–3)
METHGB BLD QL: 0.1 % (ref 0–3)
MODALITY: ABNORMAL
MODALITY: ABNORMAL
MONOCYTES # BLD AUTO: 0.5 10*3/MM3 (ref 0.1–0.9)
MONOCYTES NFR BLD AUTO: 10.9 % (ref 5–12)
NEUTROPHILS NFR BLD AUTO: 2.28 10*3/MM3 (ref 1.7–7)
NEUTROPHILS NFR BLD AUTO: 49.9 % (ref 42.7–76)
NOTE: ABNORMAL
NOTE: ABNORMAL
NOTIFIED BY: ABNORMAL
NOTIFIED WHO: ABNORMAL
NRBC BLD AUTO-RTO: 0 /100 WBC (ref 0–0.2)
OXYHGB MFR BLDV: 68.6 % (ref 94–99)
OXYHGB MFR BLDV: 96 % (ref 94–99)
PCO2 BLDA: 38.3 MM HG (ref 35–45)
PCO2 BLDA: 40.2 MM HG (ref 35–45)
PCO2 TEMP ADJ BLD: ABNORMAL MM[HG]
PCO2 TEMP ADJ BLD: ABNORMAL MM[HG]
PH BLDA: 7.44 PH UNITS (ref 7.35–7.45)
PH BLDA: 7.47 PH UNITS (ref 7.35–7.45)
PH, TEMP CORRECTED: ABNORMAL
PH, TEMP CORRECTED: ABNORMAL
PLATELET # BLD AUTO: 126 10*3/MM3 (ref 140–450)
PMV BLD AUTO: 10.9 FL (ref 6–12)
PO2 BLDA: 36.7 MM HG (ref 83–108)
PO2 BLDA: 83.5 MM HG (ref 83–108)
PO2 TEMP ADJ BLD: ABNORMAL MM[HG]
PO2 TEMP ADJ BLD: ABNORMAL MM[HG]
POTASSIUM SERPL-SCNC: 3.7 MMOL/L (ref 3.5–5.2)
RBC # BLD AUTO: 3.6 10*6/MM3 (ref 3.77–5.28)
SAO2 % BLDCOA: 69.5 % (ref 94–99)
SAO2 % BLDCOA: 97.1 % (ref 94–99)
SODIUM SERPL-SCNC: 142 MMOL/L (ref 136–145)
TROPONIN T SERPL-MCNC: 0.02 NG/ML (ref 0–0.03)
VENTILATOR MODE: ABNORMAL
VENTILATOR MODE: ABNORMAL
WBC # BLD AUTO: 4.57 10*3/MM3 (ref 3.4–10.8)

## 2021-04-11 PROCEDURE — 80048 BASIC METABOLIC PNL TOTAL CA: CPT | Performed by: FAMILY MEDICINE

## 2021-04-11 PROCEDURE — 25010000002 ENOXAPARIN PER 10 MG: Performed by: INTERNAL MEDICINE

## 2021-04-11 PROCEDURE — 63710000001 INSULIN DETEMIR PER 5 UNITS: Performed by: INTERNAL MEDICINE

## 2021-04-11 PROCEDURE — 71045 X-RAY EXAM CHEST 1 VIEW: CPT

## 2021-04-11 PROCEDURE — 83880 ASSAY OF NATRIURETIC PEPTIDE: CPT | Performed by: INTERNAL MEDICINE

## 2021-04-11 PROCEDURE — 94799 UNLISTED PULMONARY SVC/PX: CPT

## 2021-04-11 PROCEDURE — 36600 WITHDRAWAL OF ARTERIAL BLOOD: CPT

## 2021-04-11 PROCEDURE — 82375 ASSAY CARBOXYHB QUANT: CPT

## 2021-04-11 PROCEDURE — 85025 COMPLETE CBC W/AUTO DIFF WBC: CPT | Performed by: FAMILY MEDICINE

## 2021-04-11 PROCEDURE — 99239 HOSP IP/OBS DSCHRG MGMT >30: CPT | Performed by: FAMILY MEDICINE

## 2021-04-11 PROCEDURE — 63710000001 DIPHENHYDRAMINE PER 50 MG: Performed by: INTERNAL MEDICINE

## 2021-04-11 PROCEDURE — 83050 HGB METHEMOGLOBIN QUAN: CPT

## 2021-04-11 PROCEDURE — 82805 BLOOD GASES W/O2 SATURATION: CPT

## 2021-04-11 PROCEDURE — 82962 GLUCOSE BLOOD TEST: CPT

## 2021-04-11 PROCEDURE — 93010 ELECTROCARDIOGRAM REPORT: CPT | Performed by: INTERNAL MEDICINE

## 2021-04-11 PROCEDURE — 93005 ELECTROCARDIOGRAM TRACING: CPT | Performed by: FAMILY MEDICINE

## 2021-04-11 PROCEDURE — 84484 ASSAY OF TROPONIN QUANT: CPT | Performed by: FAMILY MEDICINE

## 2021-04-11 RX ORDER — ONDANSETRON 4 MG/1
4 TABLET, FILM COATED ORAL EVERY 6 HOURS PRN
Status: ON HOLD
Start: 2021-04-11 | End: 2021-04-12

## 2021-04-11 RX ORDER — AMOXICILLIN 250 MG
2 CAPSULE ORAL 2 TIMES DAILY PRN
Status: ON HOLD
Start: 2021-04-11 | End: 2021-04-12

## 2021-04-11 RX ORDER — SIMETHICONE 80 MG
80 TABLET,CHEWABLE ORAL 4 TIMES DAILY PRN
Status: ON HOLD
Start: 2021-04-11 | End: 2021-04-12

## 2021-04-11 RX ORDER — LEVOTHYROXINE SODIUM 0.05 MG/1
50 TABLET ORAL DAILY
Start: 2021-04-11

## 2021-04-11 RX ORDER — POLYETHYLENE GLYCOL 3350 17 G/17G
17 POWDER, FOR SOLUTION ORAL DAILY
Status: ON HOLD
Start: 2021-04-12 | End: 2021-04-12

## 2021-04-11 RX ORDER — QUETIAPINE FUMARATE 50 MG/1
50 TABLET, FILM COATED ORAL EVERY 12 HOURS SCHEDULED
Status: ON HOLD
Start: 2021-04-12 | End: 2021-04-12

## 2021-04-11 RX ORDER — OXYCODONE HYDROCHLORIDE 5 MG/1
5 TABLET ORAL EVERY 8 HOURS PRN
Status: ON HOLD
Start: 2021-04-11 | End: 2021-04-12

## 2021-04-11 RX ORDER — DIPHENHYDRAMINE HCL 25 MG
25 CAPSULE ORAL EVERY 6 HOURS PRN
Status: ON HOLD
Start: 2021-04-11 | End: 2021-04-12

## 2021-04-11 RX ORDER — CALCIUM CARBONATE 200(500)MG
1 TABLET,CHEWABLE ORAL 2 TIMES DAILY PRN
Status: ON HOLD
Start: 2021-04-11 | End: 2021-04-12

## 2021-04-11 RX ORDER — ATORVASTATIN CALCIUM 20 MG/1
20 TABLET, FILM COATED ORAL NIGHTLY
Status: ON HOLD
Start: 2021-04-12 | End: 2021-04-12

## 2021-04-11 RX ORDER — BISACODYL 5 MG/1
10 TABLET, DELAYED RELEASE ORAL DAILY PRN
Status: ON HOLD
Start: 2021-04-11 | End: 2021-04-12

## 2021-04-11 RX ORDER — ACETAMINOPHEN 325 MG/1
650 TABLET ORAL EVERY 4 HOURS PRN
Status: ON HOLD
Start: 2021-04-11 | End: 2021-04-12

## 2021-04-11 RX ORDER — GABAPENTIN 100 MG/1
100 CAPSULE ORAL EVERY 8 HOURS SCHEDULED
Status: ON HOLD
Start: 2021-04-12 | End: 2021-04-12

## 2021-04-11 RX ORDER — IPRATROPIUM BROMIDE AND ALBUTEROL SULFATE 2.5; .5 MG/3ML; MG/3ML
3 SOLUTION RESPIRATORY (INHALATION)
Status: DISCONTINUED | OUTPATIENT
Start: 2021-04-12 | End: 2021-04-12 | Stop reason: HOSPADM

## 2021-04-11 RX ORDER — PANTOPRAZOLE SODIUM 40 MG/1
40 TABLET, DELAYED RELEASE ORAL DAILY
Status: ON HOLD
Start: 2021-04-11 | End: 2021-04-12

## 2021-04-11 RX ORDER — FUROSEMIDE 10 MG/ML
20 INJECTION INTRAMUSCULAR; INTRAVENOUS ONCE
Status: DISCONTINUED | OUTPATIENT
Start: 2021-04-12 | End: 2021-04-11

## 2021-04-11 RX ORDER — CLONAZEPAM 0.5 MG/1
0.25 TABLET ORAL NIGHTLY PRN
Status: ON HOLD
Start: 2021-04-11 | End: 2021-04-12

## 2021-04-11 RX ORDER — METHYLPREDNISOLONE SODIUM SUCCINATE 40 MG/ML
40 INJECTION, POWDER, LYOPHILIZED, FOR SOLUTION INTRAMUSCULAR; INTRAVENOUS EVERY 12 HOURS
Status: ON HOLD
Start: 2021-04-12 | End: 2021-04-12

## 2021-04-11 RX ORDER — METHYLPREDNISOLONE SODIUM SUCCINATE 40 MG/ML
40 INJECTION, POWDER, LYOPHILIZED, FOR SOLUTION INTRAMUSCULAR; INTRAVENOUS EVERY 12 HOURS
Status: DISCONTINUED | OUTPATIENT
Start: 2021-04-12 | End: 2021-04-12 | Stop reason: HOSPADM

## 2021-04-11 RX ORDER — IPRATROPIUM BROMIDE AND ALBUTEROL SULFATE 2.5; .5 MG/3ML; MG/3ML
3 SOLUTION RESPIRATORY (INHALATION)
Qty: 360 ML | Status: ON HOLD
Start: 2021-04-12 | End: 2021-04-12

## 2021-04-11 RX ORDER — CHOLECALCIFEROL (VITAMIN D3) 125 MCG
5 CAPSULE ORAL NIGHTLY PRN
Status: ON HOLD
Start: 2021-04-11 | End: 2021-04-12

## 2021-04-11 RX ADMIN — QUETIAPINE FUMARATE 50 MG: 25 TABLET, FILM COATED ORAL at 09:11

## 2021-04-11 RX ADMIN — GABAPENTIN 100 MG: 100 CAPSULE ORAL at 05:54

## 2021-04-11 RX ADMIN — DIPHENHYDRAMINE HYDROCHLORIDE 25 MG: 25 CAPSULE ORAL at 21:05

## 2021-04-11 RX ADMIN — LEVOTHYROXINE SODIUM 50 MCG: 88 TABLET ORAL at 05:54

## 2021-04-11 RX ADMIN — GABAPENTIN 100 MG: 100 CAPSULE ORAL at 21:04

## 2021-04-11 RX ADMIN — GABAPENTIN 100 MG: 100 CAPSULE ORAL at 13:51

## 2021-04-11 RX ADMIN — ENOXAPARIN SODIUM 40 MG: 40 INJECTION SUBCUTANEOUS at 09:11

## 2021-04-11 RX ADMIN — OXYCODONE HYDROCHLORIDE 5 MG: 5 TABLET ORAL at 21:04

## 2021-04-11 RX ADMIN — METOPROLOL TARTRATE 12.5 MG: 25 TABLET, FILM COATED ORAL at 09:11

## 2021-04-11 RX ADMIN — MUPIROCIN: 20 OINTMENT TOPICAL at 09:11

## 2021-04-11 RX ADMIN — CLONAZEPAM 0.25 MG: 0.5 TABLET ORAL at 21:05

## 2021-04-11 RX ADMIN — PANTOPRAZOLE SODIUM 40 MG: 40 TABLET, DELAYED RELEASE ORAL at 05:54

## 2021-04-11 RX ADMIN — ATORVASTATIN CALCIUM 20 MG: 20 TABLET, FILM COATED ORAL at 21:05

## 2021-04-11 RX ADMIN — QUETIAPINE FUMARATE 50 MG: 25 TABLET, FILM COATED ORAL at 21:05

## 2021-04-11 RX ADMIN — Medication 5 MG: at 21:05

## 2021-04-11 RX ADMIN — MUPIROCIN: 20 OINTMENT TOPICAL at 21:05

## 2021-04-11 RX ADMIN — OXYCODONE HYDROCHLORIDE 5 MG: 5 TABLET ORAL at 09:12

## 2021-04-11 RX ADMIN — INSULIN DETEMIR 24 UNITS: 100 INJECTION, SOLUTION SUBCUTANEOUS at 21:02

## 2021-04-11 NOTE — PLAN OF CARE
Goal Outcome Evaluation:  Plan of Care Reviewed With: patient  Progress: improving  Outcome Summary: Patient progressing with current therapes. Continue POC

## 2021-04-11 NOTE — PROGRESS NOTES
[facility] PROGRESS NOTE     Patient Identification:  Name:  Argenis Lay  Age:  76 y.o.  Sex:  female  :  1945  MRN:  [id number]  Visit Number:  [id number]  ROOM: [facility], [place]     Primary Care Provider:  Mahi Villarreal APRN    Length of stay in inpatient status:  24    Subjective     Chief Compliant:  No chief complaint on file.      History of Presenting Illness: 76-year-old female who was treated for sepsis, prolonged COVID-19 pneumonia, history of tracheostomy and PEG tube placement which have been removed.  Patient continues have some difficulty with hypoxic respiratory failure, diabetes mellitus, hypertension, hypothyroidism.  Patient states that when she eats she has a little bit of shortness of breath because she states her stomach feels like it gets too full.  Patient denies getting choked on her food.  Patient states that otherwise rash that her breathing is good    Objective     Current Hospital Meds:atorvastatin, 20 mg, Oral, Nightly  Check Fentanyl Patch Placement, 1 each, Does not apply, Q12H  Check Fentanyl Patch Placement, 1 each, Does not apply, Q12H  enoxaparin, 40 mg, Subcutaneous, Q24H  gabapentin, 100 mg, Oral, Q8H  insulin aspart, 0-7 Units, Subcutaneous, TID AC  insulin detemir, 24 Units, Subcutaneous, Nightly  levothyroxine, 50 mcg, Oral, Q AM  lidocaine-EPINEPHrine, 10 mL, Infiltration, Once  metoprolol tartrate, 12.5 mg, Oral, Q12H  mupirocin, , Topical, Q12H  pantoprazole, 40 mg, Oral, Q AM  polyethylene glycol, 17 g, Oral, Daily  QUEtiapine, 50 mg, Oral, Q12H       ----------------------------------------------------------------------------------------------------------------------  Vital Signs:  Temp:  [98.2 °F (36.8 °C)] 98.2 °F (36.8 °C)  Heart Rate:  [83] 83  Resp:  [20] 20  BP: (121)/(82) 121/82  SpO2:  [95 %-97 %] 95 %  on  Flow (L/min):  [3] 3;   Device (Oxygen Therapy): humidified;nasal cannula  Body mass index is 25.54 kg/m².    Wt Readings from Last 3  Encounters:   03/19/21 76.2 kg (168 lb)     Intake & Output (last 3 days)       04/08 0701 - 04/09 0700 04/09 0701 - 04/10 0700 04/10 0701 - 04/11 0700 04/11 0701 - 04/12 0700    P.O. 5794 993 0620     Total Intake(mL/kg) 1320 (17.3) 840 (11) 1680 (22)     Net +1320 +840 +1680             Urine Unmeasured Occurrence 8 x 4 x 5 x     Stool Unmeasured Occurrence 1 x 1 x          Diet Soft Texture; Ground; Thin  ----------------------------------------------------------------------------------------------------------------------  Physical exam:  Constitutional:   No acute distress  HEENT: Normocephalic atraumatic  Neck: Supple   Cardiovascular: Regular rate and rhythm  Pulmonary/Chest: Clear to auscultation  Abdominal: Positive bowel sounds soft.   Musculoskeletal: No arthropathy  Neurological: No focal deficits  Skin: No rash  Peripheral vascular:  Genitourinary:  ----------------------------------------------------------------------------------------------------------------------    Last echocardiogram:  Results for orders placed during the hospital encounter of 03/18/21    Adult Transthoracic Echo Complete W/ Cont if Necessary Per Protocol    Interpretation Summary  · Normal left ventricular cavity size and wall thickness noted. All left ventricular wall segments contract normally.  · Left ventricular ejection fraction appears to be 66 - 70%.  · Left ventricular diastolic function is consistent with (grade I) impaired relaxation.  · There is calcification of the aortic valve mainly affecting the non-coronary and left coronary cusp(s). The aortic valve appears trileaflet. Mild aortic valve regurgitation is present. Mild aortic valve stenosis is present.  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 Mild mitral valve regurgitation is present. No significant mitral valve stenosis is present.  · Moderate tricuspid valve regurgitation is present.  Estimated right ventricular systolic pressure from tricuspid regurgitation is markedly elevated (78 mmHg).  · Severe pulmonary hypertension is present.  · There is a trivial pericardial effusion.    ----------------------------------------------------------------------------------------------------------------------  Results from last 7 days   Lab Units 04/11/21  0059 04/10/21  1019 04/07/21  0904 04/06/21  0740   CRP mg/dL  --   --   --  1.71*   WBC 10*3/mm3 4.57 5.43 5.48  --    HEMOGLOBIN g/dL 9.8* 9.7* 10.1*  --    HEMATOCRIT % 33.0* 32.3* 33.4*  --    MCV fL 91.7 90.2 91.3  --    MCHC g/dL 29.7* 30.0* 30.2*  --    PLATELETS 10*3/mm3 126* 128* 143  --          Results from last 7 days   Lab Units 04/11/21 0059   SODIUM mmol/L 142   POTASSIUM mmol/L 3.7   CHLORIDE mmol/L 103   CO2 mmol/L 28.9   BUN mg/dL 7*   CREATININE mg/dL <0.47*   CALCIUM mg/dL 8.8   GLUCOSE mg/dL 111*   CrCl cannot be calculated (This lab value cannot be used to calculate CrCl because it is not a number: <0.47).  No results found for: AMMONIA              Glucose   Date/Time Value Ref Range Status   04/11/2021 0617 85 70 - 130 mg/dL Final   04/10/2021 1958 160 (H) 70 - 130 mg/dL Final   04/10/2021 1603 112 70 - 130 mg/dL Final   04/10/2021 1049 124 70 - 130 mg/dL Final   04/10/2021 0616 90 70 - 130 mg/dL Final   04/09/2021 2001 175 (H) 70 - 130 mg/dL Final   04/09/2021 1619 106 70 - 130 mg/dL Final   04/09/2021 1111 128 70 - 130 mg/dL Final     No results found for: TSH, FREET4  No results found for: PREGTESTUR, PREGSERUM, HCG, HCGQUANT  Pain Management Panel    There is no flowsheet data to display.       Brief Urine Lab Results  (Last result in the past 365 days)      Color   Clarity   Blood   Leuk Est   Nitrite   Protein   CREAT   Urine HCG        03/21/21 2043 Yellow Clear Negative Negative Negative Negative             No results found for: BLOODCX      No results found for: URINECX  No results found for: WOUNDCX  No results found for:  STOOLCX  Results from last 7 days   Lab Units 04/06/21  0740   CRP mg/dL 1.71*       I have personally looked at the labs and they are summarized above.  ----------------------------------------------------------------------------------------------------------------------  Detailed radiology reports for the last 24 hours:    Imaging Results (Last 24 Hours)     ** No results found for the last 24 hours. **        Final impressions for the last 30 days of radiology reports:    NM Lung Scan Perfusion Particulate    Result Date: 3/31/2021  Intermediate suspicion of PE.  This report was finalized on 3/31/2021 3:21 PM by Dr. Alexx Espinosa MD.      XR Chest 1 View    Result Date: 3/31/2021  Worsening pulmonary status with lower lung volumes and continued bilateral peripheral diffuse infiltrates.  Signer Name: Lindsey Duron MD  Signed: 3/31/2021 7:03 AM  Workstation Name: [facility]  [facility]    XR Chest 1 View    Result Date: 3/28/2021  Patchy bilateral airspace disease, very similar to the prior study  This report was finalized on 3/28/2021 3:56 PM by Dr. Alan Hargrove MD.      XR Chest 1 View    Result Date: 3/23/2021  Slight worsening of bilateral upper lobe infiltrates, otherwise stable. Signer Name: Truman White MD  Signed: 3/23/2021 10:47 PM  Workstation Name: [facility]  Radiology [facility]    XR Chest 1 View    Result Date: 3/21/2021  Severe bilateral pneumonia is again noted. It is uncertain if there may be interval improvement in aeration of the right lung. Signer Name: Amie Arana MD  Signed: 3/21/2021 7:59 PM  Workstation Name: [id number]  [facility]    CT Chest Pulmonary Embolism    Result Date: 4/2/2021  1.  No PE identified. 2.  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 3.  Stable pattern of pulmonary fibrosis which on imaging is most adjusted of UIP. 4.  Trace effusions. 5.  Probable reactive hilar mediastinal lymph nodes which are  stable from the previous exam. Continued follow-up recommended.  This report was finalized on 4/2/2021 10:29 AM by Dr. Alexx Espinosa MD.      US Venous Doppler Lower Extremity Bilateral (duplex)    Result Date: 3/31/2021  No DVT in the lower extremities on today's exam.  This report was finalized on 3/31/2021 1:40 PM by Dr. Alan Hargrove MD.      US Venous Doppler Upper Extremity Bilateral (duplex)    Result Date: 4/2/2021  No DVT is seen  This report was finalized on 4/2/2021 10:47 AM by Dr. Alan Hargrove MD.      US Venous Doppler Upper Extremity Bilateral (duplex)    Result Date: 3/22/2021  No sonographic findings of DVT in the upper extremities   This report was finalized on 3/22/2021 3:32 PM by Dr. Tim Mir II, MD.      FL Video Swallow Single Contrast    Result Date: 3/19/2021      Please see the speech pathologist's report for additional information.  This report was finalized on 3/19/2021 11:14 AM by Dr. Randal Mata MD.      FL Video Swallow Single Contrast    Result Date: 3/12/2021  1.  Deep laryngeal penetration and intermittent aspiration with thin liquids. 2. Please see dysphasia notes for further details.  This report was finalized on 3/12/2021 11:22 AM by Dr. Alexx Espinosa MD.      I have personally looked at the radiology images and read the final radiology report.    Assessment & Plan    Debility secondary to prolonged COVID-19 infection--patient is making good progress with her PT and OT at this time.  Continue current treatment.    Chronic hypoxic respiratory failure likely secondary to postinflammatory changes from COVID-19--patient has now been weaned down to 3 L per nasal cannula.  Has minimal shortness of breath she states because her stomach gets full when she eats.  Otherwise no new complaints.    Diabetes mellitus continue current treatment    Hypertension--well-controlled    Hypothyroidism--continue Synthroid    Reviewed lab work today and patient's hemoglobin, white count, renal  function are all stable.    VTE Prophylaxis:   Mechanical Order History:     None      Pharmalogical Order History:      Ordered     Dose Route Frequency Stop    04/02/21 1056  enoxaparin (LOVENOX) syringe 40 mg      40 mg SC Every 24 Hours Scheduled --    04/01/21 0755  heparin (porcine) 5000 UNIT/ML injection 4,600 Units      60 Units/kg IV Once 04/01/21 1047    04/01/21 0755  heparin 46113 units/250 mL (100 units/mL) in 0.45 % NaCl infusion  9.14 mL/hr,   Status:  Discontinued      12 Units/kg/hr IV Titrated 04/02/21 1056    04/01/21 0755  heparin (porcine) 5000 UNIT/ML injection 4,600 Units  Status:  Discontinued      60 Units/kg IV As Needed 04/02/21 1056    04/01/21 0755  heparin (porcine) 5000 UNIT/ML injection 2,300 Units  Status:  Discontinued      30 Units/kg IV As Needed 04/02/21 1056    03/18/21 1811  enoxaparin (LOVENOX) syringe 40 mg  Status:  Discontinued      40 mg SC Every 24 Hours 04/01/21 0754    03/18/21 1806  enoxaparin (LOVENOX) syringe 40 mg  Status:  Discontinued      40 mg SC Every 24 Hours 03/18/21 1811                    Marquis He MD  [facility]  04/11/21  08:56 EDT

## 2021-04-12 ENCOUNTER — HOSPITAL ENCOUNTER (INPATIENT)
Facility: HOSPITAL | Age: 76
LOS: 7 days | End: 2021-04-19
Attending: INTERNAL MEDICINE | Admitting: INTERNAL MEDICINE

## 2021-04-12 ENCOUNTER — APPOINTMENT (OUTPATIENT)
Dept: CT IMAGING | Facility: HOSPITAL | Age: 76
End: 2021-04-12

## 2021-04-12 ENCOUNTER — APPOINTMENT (OUTPATIENT)
Dept: ULTRASOUND IMAGING | Facility: HOSPITAL | Age: 76
End: 2021-04-12

## 2021-04-12 ENCOUNTER — APPOINTMENT (OUTPATIENT)
Dept: GENERAL RADIOLOGY | Facility: HOSPITAL | Age: 76
End: 2021-04-12

## 2021-04-12 VITALS
WEIGHT: 168 LBS | HEART RATE: 134 BPM | HEIGHT: 68 IN | TEMPERATURE: 97.1 F | OXYGEN SATURATION: 97 % | BODY MASS INDEX: 25.46 KG/M2 | RESPIRATION RATE: 20 BRPM | DIASTOLIC BLOOD PRESSURE: 70 MMHG | SYSTOLIC BLOOD PRESSURE: 130 MMHG

## 2021-04-12 PROBLEM — J96.01 ACUTE RESPIRATORY FAILURE WITH HYPOXIA (HCC): Status: ACTIVE | Noted: 2021-04-12

## 2021-04-12 LAB
A-A DO2: 92.2 MMHG (ref 0–300)
ALBUMIN SERPL-MCNC: 3.18 G/DL (ref 3.5–5.2)
ALBUMIN/GLOB SERPL: 1.3 G/DL
ALP SERPL-CCNC: 74 U/L (ref 39–117)
ALT SERPL W P-5'-P-CCNC: 11 U/L (ref 1–33)
ANION GAP SERPL CALCULATED.3IONS-SCNC: 9.3 MMOL/L (ref 5–15)
ARTERIAL PATENCY WRIST A: ABNORMAL
AST SERPL-CCNC: 14 U/L (ref 1–32)
ATMOSPHERIC PRESS: 723 MMHG
BASE EXCESS BLDA CALC-SCNC: 6.5 MMOL/L (ref 0–2)
BASOPHILS # BLD AUTO: 0.05 10*3/MM3 (ref 0–0.2)
BASOPHILS NFR BLD AUTO: 0.4 % (ref 0–1.5)
BDY SITE: ABNORMAL
BILIRUB SERPL-MCNC: 0.3 MG/DL (ref 0–1.2)
BODY TEMPERATURE: 0 C
BUN SERPL-MCNC: 10 MG/DL (ref 8–23)
BUN/CREAT SERPL: ABNORMAL
CALCIUM SPEC-SCNC: 9 MG/DL (ref 8.6–10.5)
CHLORIDE SERPL-SCNC: 100 MMOL/L (ref 98–107)
CO2 BLDA-SCNC: 33.1 MMOL/L (ref 22–33)
CO2 SERPL-SCNC: 28.7 MMOL/L (ref 22–29)
COHGB MFR BLD: 1.5 % (ref 0–5)
CREAT SERPL-MCNC: <0.47 MG/DL (ref 0.57–1)
CRP SERPL-MCNC: 3.68 MG/DL (ref 0–0.5)
D DIMER PPP FEU-MCNC: 2.76 MCGFEU/ML (ref 0–0.5)
DEPRECATED RDW RBC AUTO: 53 FL (ref 37–54)
EOSINOPHIL # BLD AUTO: 0.06 10*3/MM3 (ref 0–0.4)
EOSINOPHIL NFR BLD AUTO: 0.5 % (ref 0.3–6.2)
ERYTHROCYTE [DISTWIDTH] IN BLOOD BY AUTOMATED COUNT: 15.9 % (ref 12.3–15.4)
FLUAV RNA RESP QL NAA+PROBE: NOT DETECTED
FLUBV RNA RESP QL NAA+PROBE: NOT DETECTED
GAS FLOW AIRWAY: 3 LPM
GLOBULIN UR ELPH-MCNC: 2.5 GM/DL
GLUCOSE BLDC GLUCOMTR-MCNC: 109 MG/DL (ref 70–130)
GLUCOSE BLDC GLUCOMTR-MCNC: 141 MG/DL (ref 70–130)
GLUCOSE BLDC GLUCOMTR-MCNC: 99 MG/DL (ref 70–130)
GLUCOSE BLDC GLUCOMTR-MCNC: 99 MG/DL (ref 70–130)
GLUCOSE SERPL-MCNC: 152 MG/DL (ref 65–99)
HCO3 BLDA-SCNC: 31.6 MMOL/L (ref 20–26)
HCT VFR BLD AUTO: 33.6 % (ref 34–46.6)
HCT VFR BLD CALC: 33.5 % (ref 38–51)
HGB BLD-MCNC: 10.1 G/DL (ref 12–15.9)
HGB BLDA-MCNC: 10.9 G/DL (ref 13.5–17.5)
IMM GRANULOCYTES # BLD AUTO: 0.05 10*3/MM3 (ref 0–0.05)
IMM GRANULOCYTES NFR BLD AUTO: 0.4 % (ref 0–0.5)
INHALED O2 CONCENTRATION: 32 %
LYMPHOCYTES # BLD AUTO: 0.67 10*3/MM3 (ref 0.7–3.1)
LYMPHOCYTES NFR BLD AUTO: 5.5 % (ref 19.6–45.3)
Lab: ABNORMAL
MCH RBC QN AUTO: 27.4 PG (ref 26.6–33)
MCHC RBC AUTO-ENTMCNC: 30.1 G/DL (ref 31.5–35.7)
MCV RBC AUTO: 91.1 FL (ref 79–97)
METHGB BLD QL: <1 % (ref 0–3)
MODALITY: ABNORMAL
MONOCYTES # BLD AUTO: 0.67 10*3/MM3 (ref 0.1–0.9)
MONOCYTES NFR BLD AUTO: 5.5 % (ref 5–12)
MRSA DNA SPEC QL NAA+PROBE: NEGATIVE
NEUTROPHILS NFR BLD AUTO: 10.76 10*3/MM3 (ref 1.7–7)
NEUTROPHILS NFR BLD AUTO: 87.7 % (ref 42.7–76)
NOTE: ABNORMAL
NRBC BLD AUTO-RTO: 0 /100 WBC (ref 0–0.2)
NT-PROBNP SERPL-MCNC: 533.9 PG/ML (ref 0–1800)
OXYHGB MFR BLDV: 93.6 % (ref 94–99)
PCO2 BLDA: 47.1 MM HG (ref 35–45)
PCO2 TEMP ADJ BLD: ABNORMAL MM[HG]
PH BLDA: 7.43 PH UNITS (ref 7.35–7.45)
PH, TEMP CORRECTED: ABNORMAL
PLATELET # BLD AUTO: 111 10*3/MM3 (ref 140–450)
PMV BLD AUTO: 11 FL (ref 6–12)
PO2 BLDA: 71.8 MM HG (ref 83–108)
PO2 TEMP ADJ BLD: ABNORMAL MM[HG]
POTASSIUM SERPL-SCNC: 3.8 MMOL/L (ref 3.5–5.2)
PROT SERPL-MCNC: 5.7 G/DL (ref 6–8.5)
RBC # BLD AUTO: 3.69 10*6/MM3 (ref 3.77–5.28)
S AUREUS DNA SPEC QL NAA+PROBE: NEGATIVE
SAO2 % BLDCOA: 95.1 % (ref 94–99)
SARS-COV-2 RNA RESP QL NAA+PROBE: NOT DETECTED
SODIUM SERPL-SCNC: 138 MMOL/L (ref 136–145)
TROPONIN T SERPL-MCNC: 0.02 NG/ML (ref 0–0.03)
VENTILATOR MODE: ABNORMAL
WBC # BLD AUTO: 12.26 10*3/MM3 (ref 3.4–10.8)

## 2021-04-12 PROCEDURE — 92611 MOTION FLUOROSCOPY/SWALLOW: CPT

## 2021-04-12 PROCEDURE — 87636 SARSCOV2 & INF A&B AMP PRB: CPT | Performed by: INTERNAL MEDICINE

## 2021-04-12 PROCEDURE — 82962 GLUCOSE BLOOD TEST: CPT

## 2021-04-12 PROCEDURE — 80053 COMPREHEN METABOLIC PANEL: CPT | Performed by: INTERNAL MEDICINE

## 2021-04-12 PROCEDURE — 71250 CT THORAX DX C-: CPT

## 2021-04-12 PROCEDURE — 74230 X-RAY XM SWLNG FUNCJ C+: CPT

## 2021-04-12 PROCEDURE — 82375 ASSAY CARBOXYHB QUANT: CPT

## 2021-04-12 PROCEDURE — 97162 PT EVAL MOD COMPLEX 30 MIN: CPT

## 2021-04-12 PROCEDURE — 87640 STAPH A DNA AMP PROBE: CPT | Performed by: INTERNAL MEDICINE

## 2021-04-12 PROCEDURE — 82805 BLOOD GASES W/O2 SATURATION: CPT

## 2021-04-12 PROCEDURE — 99221 1ST HOSP IP/OBS SF/LOW 40: CPT | Performed by: INTERNAL MEDICINE

## 2021-04-12 PROCEDURE — 85025 COMPLETE CBC W/AUTO DIFF WBC: CPT | Performed by: INTERNAL MEDICINE

## 2021-04-12 PROCEDURE — 74230 X-RAY XM SWLNG FUNCJ C+: CPT | Performed by: RADIOLOGY

## 2021-04-12 PROCEDURE — 94799 UNLISTED PULMONARY SVC/PX: CPT

## 2021-04-12 PROCEDURE — 85379 FIBRIN DEGRADATION QUANT: CPT | Performed by: INTERNAL MEDICINE

## 2021-04-12 PROCEDURE — 87641 MR-STAPH DNA AMP PROBE: CPT | Performed by: INTERNAL MEDICINE

## 2021-04-12 PROCEDURE — 87070 CULTURE OTHR SPECIMN AEROBIC: CPT | Performed by: INTERNAL MEDICINE

## 2021-04-12 PROCEDURE — 83050 HGB METHEMOGLOBIN QUAN: CPT

## 2021-04-12 PROCEDURE — 25010000002 PIPERACILLIN SOD-TAZOBACTAM PER 1 G: Performed by: INTERNAL MEDICINE

## 2021-04-12 PROCEDURE — 71250 CT THORAX DX C-: CPT | Performed by: RADIOLOGY

## 2021-04-12 PROCEDURE — 86140 C-REACTIVE PROTEIN: CPT | Performed by: PHYSICIAN ASSISTANT

## 2021-04-12 PROCEDURE — 36600 WITHDRAWAL OF ARTERIAL BLOOD: CPT

## 2021-04-12 PROCEDURE — 87205 SMEAR GRAM STAIN: CPT | Performed by: INTERNAL MEDICINE

## 2021-04-12 PROCEDURE — 84484 ASSAY OF TROPONIN QUANT: CPT | Performed by: INTERNAL MEDICINE

## 2021-04-12 PROCEDURE — 25010000002 ENOXAPARIN PER 10 MG: Performed by: INTERNAL MEDICINE

## 2021-04-12 PROCEDURE — 99223 1ST HOSP IP/OBS HIGH 75: CPT | Performed by: INTERNAL MEDICINE

## 2021-04-12 RX ORDER — SODIUM CHLORIDE 0.9 % (FLUSH) 0.9 %
10 SYRINGE (ML) INJECTION AS NEEDED
Status: DISCONTINUED | OUTPATIENT
Start: 2021-04-12 | End: 2021-04-19 | Stop reason: HOSPADM

## 2021-04-12 RX ORDER — MONTELUKAST SODIUM 10 MG/1
10 TABLET ORAL NIGHTLY
COMMUNITY

## 2021-04-12 RX ORDER — HYDROCODONE BITARTRATE AND ACETAMINOPHEN 7.5; 325 MG/1; MG/1
1 TABLET ORAL 2 TIMES DAILY PRN
COMMUNITY

## 2021-04-12 RX ORDER — GABAPENTIN 400 MG/1
400 CAPSULE ORAL 3 TIMES DAILY PRN
Status: DISCONTINUED | OUTPATIENT
Start: 2021-04-12 | End: 2021-04-19 | Stop reason: HOSPADM

## 2021-04-12 RX ORDER — DEXTROSE MONOHYDRATE 25 G/50ML
25 INJECTION, SOLUTION INTRAVENOUS
Status: DISCONTINUED | OUTPATIENT
Start: 2021-04-12 | End: 2021-04-19 | Stop reason: HOSPADM

## 2021-04-12 RX ORDER — MONTELUKAST SODIUM 10 MG/1
10 TABLET ORAL NIGHTLY
Status: DISCONTINUED | OUTPATIENT
Start: 2021-04-12 | End: 2021-04-19 | Stop reason: HOSPADM

## 2021-04-12 RX ORDER — LISINOPRIL 30 MG/1
30 TABLET ORAL DAILY
COMMUNITY
End: 2021-05-07 | Stop reason: HOSPADM

## 2021-04-12 RX ORDER — ASPIRIN 81 MG/1
81 TABLET ORAL DAILY
COMMUNITY
End: 2021-05-07 | Stop reason: HOSPADM

## 2021-04-12 RX ORDER — ERGOCALCIFEROL 1.25 MG/1
50000 CAPSULE ORAL WEEKLY
COMMUNITY

## 2021-04-12 RX ORDER — ATORVASTATIN CALCIUM 20 MG/1
20 TABLET, FILM COATED ORAL DAILY
Status: DISCONTINUED | OUTPATIENT
Start: 2021-04-12 | End: 2021-04-19 | Stop reason: HOSPADM

## 2021-04-12 RX ORDER — GABAPENTIN 400 MG/1
400 CAPSULE ORAL 3 TIMES DAILY PRN
COMMUNITY

## 2021-04-12 RX ORDER — NITROGLYCERIN 0.4 MG/1
0.4 TABLET SUBLINGUAL
Status: DISCONTINUED | OUTPATIENT
Start: 2021-04-12 | End: 2021-04-19 | Stop reason: HOSPADM

## 2021-04-12 RX ORDER — ASPIRIN 81 MG/1
81 TABLET ORAL DAILY
Status: DISCONTINUED | OUTPATIENT
Start: 2021-04-12 | End: 2021-04-19 | Stop reason: HOSPADM

## 2021-04-12 RX ORDER — FLUTICASONE PROPIONATE 50 MCG
2 SPRAY, SUSPENSION (ML) NASAL DAILY
COMMUNITY

## 2021-04-12 RX ORDER — HYDROCODONE BITARTRATE AND ACETAMINOPHEN 7.5; 325 MG/1; MG/1
1 TABLET ORAL 2 TIMES DAILY PRN
Status: DISCONTINUED | OUTPATIENT
Start: 2021-04-12 | End: 2021-04-19 | Stop reason: HOSPADM

## 2021-04-12 RX ORDER — L.ACID,PARA/B.BIFIDUM/S.THERM 8B CELL
1 CAPSULE ORAL DAILY
Status: DISCONTINUED | OUTPATIENT
Start: 2021-04-12 | End: 2021-04-19 | Stop reason: HOSPADM

## 2021-04-12 RX ORDER — SIMVASTATIN 40 MG
40 TABLET ORAL NIGHTLY
COMMUNITY
End: 2021-06-15

## 2021-04-12 RX ORDER — NICOTINE POLACRILEX 4 MG
15 LOZENGE BUCCAL
Status: DISCONTINUED | OUTPATIENT
Start: 2021-04-12 | End: 2021-04-19 | Stop reason: HOSPADM

## 2021-04-12 RX ORDER — FLUTICASONE PROPIONATE 50 MCG
2 SPRAY, SUSPENSION (ML) NASAL DAILY
Status: DISCONTINUED | OUTPATIENT
Start: 2021-04-12 | End: 2021-04-19 | Stop reason: HOSPADM

## 2021-04-12 RX ORDER — SODIUM CHLORIDE 0.9 % (FLUSH) 0.9 %
10 SYRINGE (ML) INJECTION EVERY 12 HOURS SCHEDULED
Status: DISCONTINUED | OUTPATIENT
Start: 2021-04-12 | End: 2021-04-19 | Stop reason: HOSPADM

## 2021-04-12 RX ADMIN — ENOXAPARIN SODIUM 90 MG: 100 INJECTION SUBCUTANEOUS at 09:00

## 2021-04-12 RX ADMIN — GABAPENTIN 400 MG: 400 CAPSULE ORAL at 20:41

## 2021-04-12 RX ADMIN — FLUTICASONE PROPIONATE 2 SPRAY: 50 SPRAY, METERED NASAL at 09:00

## 2021-04-12 RX ADMIN — ENOXAPARIN SODIUM 90 MG: 100 INJECTION SUBCUTANEOUS at 20:36

## 2021-04-12 RX ADMIN — SODIUM CHLORIDE, PRESERVATIVE FREE 10 ML: 5 INJECTION INTRAVENOUS at 09:24

## 2021-04-12 RX ADMIN — SODIUM CHLORIDE, PRESERVATIVE FREE 10 ML: 5 INJECTION INTRAVENOUS at 20:36

## 2021-04-12 RX ADMIN — Medication 1 CAPSULE: at 14:39

## 2021-04-12 RX ADMIN — PIPERACILLIN SODIUM AND TAZOBACTAM SODIUM 3.38 G: 3; .375 INJECTION, POWDER, LYOPHILIZED, FOR SOLUTION INTRAVENOUS at 09:00

## 2021-04-12 RX ADMIN — PIPERACILLIN SODIUM AND TAZOBACTAM SODIUM 3.38 G: 3; .375 INJECTION, POWDER, LYOPHILIZED, FOR SOLUTION INTRAVENOUS at 04:10

## 2021-04-12 RX ADMIN — PIPERACILLIN SODIUM AND TAZOBACTAM SODIUM 3.38 G: 3; .375 INJECTION, POWDER, LYOPHILIZED, FOR SOLUTION INTRAVENOUS at 18:33

## 2021-04-12 RX ADMIN — MONTELUKAST SODIUM 10 MG: 10 TABLET, COATED ORAL at 20:36

## 2021-04-12 RX ADMIN — HYDROCODONE BITARTRATE AND ACETAMINOPHEN 1 TABLET: 7.5; 325 TABLET ORAL at 20:41

## 2021-04-12 RX ADMIN — SODIUM CHLORIDE, PRESERVATIVE FREE 10 ML: 5 INJECTION INTRAVENOUS at 01:09

## 2021-04-12 NOTE — NURSING NOTE
Patient transferred via bed, to PCU ( all rails up, cardiac monitoring of patient and oxygen on 12L high flow) room 216, report called to Zoey.    Patients belongings transferred with patient.   
Follow-up as soon as possible with your primary care doctor.

## 2021-04-12 NOTE — PROGRESS NOTES
Occupational Therapy:    Physical Therapy: Individual: 90 minutes.    Speech Language Pathology:    Signed by: Citlalli Clark, Supervisor

## 2021-04-12 NOTE — DISCHARGE SUMMARY
[facility] REHAB DISCHARGE SUMMARY    Patient Identification:  Name:  Argenis Lay  Age:  76 y.o.  Sex:  female  :  1945  MRN:  [id number]  Visit Number:  [id number]    Date of Admission: 3/18/2021  Date of Discharge:  2021     PCP: Mahi Villarreal, BORIS    DISCHARGE DIAGNOSIS     debility    Acute hypoxic respiratory failure on chronic respiratory failure  History of prolonged COVID-19 pneumonia with postinfectious laboratory changes  History of ARDS  Patient had previously required tracheostomy and PEG tube placements which have been removed    Severe sepsis with history of aspiration pneumonia  History of recurrent UTI  Neuropathic pain  Diabetes mellitus  Hypomagnesemia    CONSULTS       PROCEDURES PERFORMED      HOSPITAL COURSE  Patient is a 76 y.o. female presented to [facility] acute inpatient rehab on 2021 in transfer from [facility].  Patient had initially been seen at [facility] and later transferred to [facility] for treatment of COVID-19 pneumonia, acute respiratory failure with status post tracheostomy and PEG tube placement.  Patient been treated with dexamethasone/remdesivir as well as broad-spectrum antibiotics including vancomycin, Rocephin, Zyvox, Flagyl and had been deescalated to cefepime.  Patient was transferred to [facility] on .  ID and pulmonary did follow patient and her trach was finally decannulated and she is able to breathe on her own between 2 and 3 L per nasal cannula.  Patient had been on very high dose Lovenox with 50 mg twice a day due to recent Covid pneumonia.  It was decreased to Lovenox 40 mg subcu daily at the time of transfer to acute inpatient.  Patient apparently had changed her CODE STATUS to DNR/DNI.      When patient arrived she was initially evaluated by PT and OT and speech therapy.  Patient had bilateral lower extremity strength that was decreased at 3 out of 5.   Patient required moderate to maximum assistance for help with bed mobility; maximum assistance for transfers; unable to walk.  Required minimum to moderate assistance for help with upper body dressing; dependent for lower body dressing; minimum assistance for grooming with minimum assistance for help with supported sitting;Total assistance for toileting.  Initial speech recommendations recommended mechanical soft solids, ground meats, thin liquids with meds whole in purée thins.  At the time of discharge physical therapy--patient able to required bed mobility assistance of moderate; moderate assistance for help with transfers; moderate assistance for help with stand pivot stand step transfer; moderate assistance for help with gait--able to walk 20 feet, 5 feet, 10 feet in the morning and 20 feet, 10 feet in the evening with parallel bars and rolling walker with again moderate assistance.    During the admission patient did develop a sepsis and had positive blood culture for Enterococcus and Serratia which was treated.  Patient did have fibrotic changes noted on CT scan likely post Covid issues.  Patient was seen by Dr. Ferris during this time and recommended to continue supportive care.  She was ruled out for PE, on 4-21.    Patient had been doing better this week and we were able to titrate her O2 down from 5 L to 3 L.  Patient been participating more aggressively with PT and OT this week and seem seem to be improving.  This evening patient states that she became acutely short of breath while she was eating.  She does not think that she aspirated.  In any event she had been coughing this evening and producing some thick tenacious sputum that is creamy/greenish colored.  There is been no fever no chills.  Patient had severe desaturation initial PO2 was 36 on 50% Ventimask.  But patient was placed on high flow at 12 L and her gas has improved with PO2 of 83.  EKG did show a sinus tachycardia in the 130s initially  heart rate was 170 when she initially desatted.  Troponin is pending at this time.  Chest x-ray shows fibrotic changes difficult to ascertain whether there is a new pneumonia.  In any event patient would likely benefit from closer monitoring will transfer to PCU begins patient empirically on IV Zosyn Zosyn and vancomycin.  Will give Solu-Medrol as well.  I have discussed case with Dr. Gilliam and she is agreed except the patient    VITAL SIGNS:  Temp:  [97.1 °F (36.2 °C)-99.9 °F (37.7 °C)] 97.1 °F (36.2 °C)  Heart Rate:  [] 134  Resp:  [18-30] 20  BP: (117-169)/(70-94) 130/70  SpO2:  [68 %-97 %] 96 %  on  Flow (L/min):  [3-12] 12;   Device (Oxygen Therapy): humidified;high-flow nasal cannula    Body mass index is 25.54 kg/m².  Wt Readings from Last 3 Encounters:   03/19/21 76.2 kg (168 lb)       PHYSICAL EXAM:  Constitutional: Chronically ill-appearing female no distress at this time  HEENT: Normocephalic atraumatic  Neck: Supple  Cardiovascular: Sinus tachycardia  Pulmonary/Chest: Patient with some coarse breath sounds at this time right greater than left  Abdominal:  .  Positive bowel sounds soft  Musculoskeletal: No arthropathy  Neurological: No focal deficits  Skin: No rash  Peripheral vascular:  Genitourinary::    DISCHARGE DISPOSITION   Stable    DISCHARGE MEDICATIONS:     Discharge Medications      New Medications      Instructions Start Date   acetaminophen 325 MG tablet  Commonly known as: TYLENOL   650 mg, Oral, Every 4 Hours PRN      atorvastatin 20 MG tablet  Commonly known as: LIPITOR   20 mg, Oral, Nightly   Start Date: April 12, 2021     bisacodyl 5 MG EC tablet  Commonly known as: DULCOLAX   10 mg, Oral, Daily PRN      calcium carbonate 500 MG chewable tablet  Commonly known as: TUMS   1 tablet, Oral, 2 Times Daily PRN      clonazePAM 0.5 MG tablet  Commonly known as: KlonoPIN   0.25 mg, Oral, Nightly PRN      diphenhydrAMINE 25 mg capsule  Commonly known as: BENADRYL   25 mg, Oral, Every 6 Hours  PRN      enoxaparin 40 MG/0.4ML solution syringe  Commonly known as: LOVENOX   40 mg, Subcutaneous, Every 24 Hours Scheduled   Start Date: April 12, 2021     glucagon (human recombinant) 1 MG injection  Commonly known as: GLUCAGEN DIAGNOSTIC   1 mg, Subcutaneous, Every 15 Minutes PRN      insulin aspart 100 UNIT/ML injection  Commonly known as: novoLOG   0-7 Units, Subcutaneous, 3 Times Daily Before Meals   Start Date: April 12, 2021     insulin detemir 100 UNIT/ML injection  Commonly known as: LEVEMIR   24 Units, Subcutaneous, Nightly   Start Date: April 12, 2021     ipratropium-albuterol 0.5-2.5 mg/3 ml nebulizer  Commonly known as: DUO-NEB   3 mL, Nebulization, 4 Times Daily - RT   Start Date: April 12, 2021     magic barrier cream   1 application, Topical, As Needed      magnesium hydroxide 2400 MG/10ML suspension suspension  Commonly known as: MILK OF MAGNESIA   5 mL, Oral, Daily PRN      melatonin 5 MG tablet tablet   5 mg, Oral, Nightly PRN      methylPREDNISolone sodium succinate 40 MG injection  Commonly known as: SOLU-Medrol   40 mg, Intravenous, Every 12 Hours   Start Date: April 12, 2021     metoprolol tartrate 25 MG tablet  Commonly known as: LOPRESSOR   12.5 mg, Oral, Every 12 Hours Scheduled   Start Date: April 12, 2021     mupirocin 2 % ointment  Commonly known as: BACTROBAN   Topical, Every 12 Hours Scheduled   Start Date: April 12, 2021     ondansetron 4 MG tablet  Commonly known as: ZOFRAN   4 mg, Oral, Every 6 Hours PRN      oxyCODONE 5 MG immediate release tablet  Commonly known as: ROXICODONE   5 mg, Oral, Every 8 Hours PRN      pantoprazole 40 MG EC tablet  Commonly known as: PROTONIX   40 mg, Oral, Daily      PHARMACY TO DOSE VANCOMYCIN   Does not apply, Continuous      piperacillin-tazobactam  Commonly known as: ZOSYN   3.375 g, Intravenous, Once   Start Date: April 12, 2021     piperacillin-tazobactam  Commonly known as: ZOSYN   3.375 g, Intravenous, Every 8 Hours   Start Date: April 12,  2021     polyethylene glycol 17 g packet  Commonly known as: MIRALAX   17 g, Oral, Daily   Start Date: April 12, 2021     Polyvinyl Alcohol-Povidone PF 1.4-0.6 % ophthalmic solution  Commonly known as: HYPOTEARS   2 drops, Both Eyes, Every 1 Hour PRN      QUEtiapine 50 MG tablet  Commonly known as: SEROquel   50 mg, Oral, Every 12 Hours Scheduled   Start Date: April 12, 2021     sennosides-docusate 8.6-50 MG per tablet  Commonly known as: PERICOLACE   2 tablets, Oral, 2 Times Daily PRN      simethicone 80 MG chewable tablet  Commonly known as: MYLICON   80 mg, Oral, 4 Times Daily PRN      vancomycin   1,500 mg, Intravenous, Once   Start Date: April 12, 2021        Changes to Medications      Instructions Start Date   gabapentin 100 MG capsule  Commonly known as: NEURONTIN  What changed:   · medication strength  · how much to take  · when to take this  · reasons to take this   100 mg, Oral, Every 8 Hours Scheduled   Start Date: April 12, 2021        Continue These Medications      Instructions Start Date   levothyroxine 50 MCG tablet  Commonly known as: SYNTHROID, LEVOTHROID   50 mcg, Oral, Daily         Stop These Medications    aspirin 81 MG EC tablet     fluticasone 50 MCG/ACT nasal spray  Commonly known as: FLONASE     HYDROcodone-acetaminophen 7.5-325 MG per tablet  Commonly known as: NORCO     lisinopril 30 MG tablet  Commonly known as: PRINIVIL,ZESTRIL     metFORMIN 500 MG tablet  Commonly known as: GLUCOPHAGE     montelukast 10 MG tablet  Commonly known as: SINGULAIR     simvastatin 40 MG tablet  Commonly known as: ZOCOR     vitamin D 1.25 MG (47616 UT) capsule capsule  Commonly known as: ERGOCALCIFEROL             Your medication list      START taking these medications      Instructions Last Dose Given Next Dose Due   acetaminophen 325 MG tablet  Commonly known as: TYLENOL      Take 2 tablets by mouth Every 4 (Four) Hours As Needed for Mild Pain .       atorvastatin 20 MG tablet  Commonly known as:  LIPITOR  Start taking on: April 12, 2021      Take 1 tablet by mouth Every Night.       bisacodyl 5 MG EC tablet  Commonly known as: DULCOLAX      Take 2 tablets by mouth Daily As Needed for Constipation.       calcium carbonate 500 MG chewable tablet  Commonly known as: TUMS      Chew 500 mg 2 (Two) Times a Day As Needed for Indigestion or Heartburn.       clonazePAM 0.5 MG tablet  Commonly known as: KlonoPIN      Take 0.5 tablets by mouth At Night As Needed for Anxiety for up to 7 days.       diphenhydrAMINE 25 mg capsule  Commonly known as: BENADRYL      Take 1 capsule by mouth Every 6 (Six) Hours As Needed for Itching.       enoxaparin 40 MG/0.4ML solution syringe  Commonly known as: LOVENOX  Start taking on: April 12, 2021      Inject 0.4 mL under the skin into the appropriate area as directed Daily. Indications: Prevention of Unwanted Clot in Veins       glucagon (human recombinant) 1 MG injection  Commonly known as: GLUCAGEN DIAGNOSTIC      Inject 1 mg under the skin into the appropriate area as directed Every 15 (Fifteen) Minutes As Needed (Blood Glucose Less Than 70) for up to 360 days.       insulin aspart 100 UNIT/ML injection  Commonly known as: novoLOG  Start taking on: April 12, 2021      Inject 0-7 Units under the skin into the appropriate area as directed 3 (Three) Times a Day Before Meals.       insulin detemir 100 UNIT/ML injection  Commonly known as: LEVEMIR  Start taking on: April 12, 2021      Inject 24 Units under the skin into the appropriate area as directed Every Night.       ipratropium-albuterol 0.5-2.5 mg/3 ml nebulizer  Commonly known as: DUO-NEB  Start taking on: April 12, 2021      Take 3 mL by nebulization 4 (Four) Times a Day.       magic barrier cream      Apply 1 application topically to the appropriate area as directed As Needed (wound).       magnesium hydroxide 2400 MG/10ML suspension suspension  Commonly known as: MILK OF MAGNESIA      Take 5 mL by mouth Daily As Needed  (constipation).       melatonin 5 MG tablet tablet      Take 1 tablet by mouth At Night As Needed (insomnia).       methylPREDNISolone sodium succinate 40 MG injection  Commonly known as: SOLU-Medrol  Start taking on: April 12, 2021      Infuse 1 mL into a venous catheter Every 12 (Twelve) Hours.       metoprolol tartrate 25 MG tablet  Commonly known as: LOPRESSOR  Start taking on: April 12, 2021      Take 0.5 tablets by mouth Every 12 (Twelve) Hours.       mupirocin 2 % ointment  Commonly known as: BACTROBAN  Start taking on: April 12, 2021      Apply  topically to the appropriate area as directed Every 12 (Twelve) Hours.       ondansetron 4 MG tablet  Commonly known as: ZOFRAN      Take 1 tablet by mouth Every 6 (Six) Hours As Needed for Nausea or Vomiting.       oxyCODONE 5 MG immediate release tablet  Commonly known as: ROXICODONE      Take 1 tablet by mouth Every 8 (Eight) Hours As Needed for Moderate Pain  for up to 7 days.       pantoprazole 40 MG EC tablet  Commonly known as: PROTONIX      Take 1 tablet by mouth Daily.       PHARMACY TO DOSE VANCOMYCIN      Continuous for 5 days. Indications: Pneumonia       piperacillin-tazobactam  Commonly known as: ZOSYN  Start taking on: April 12, 2021      Infuse 100 mL into a venous catheter 1 (One) Time for 1 dose.       piperacillin-tazobactam  Commonly known as: ZOSYN  Start taking on: April 12, 2021      Infuse 100 mL into a venous catheter Every 8 (Eight) Hours for 15 doses. Indications: Pneumonia       polyethylene glycol 17 g packet  Commonly known as: MIRALAX  Start taking on: April 12, 2021      Take 17 g by mouth Daily.       Polyvinyl Alcohol-Povidone PF 1.4-0.6 % ophthalmic solution  Commonly known as: HYPOTEARS      Administer 2 drops to both eyes Every 1 (One) Hour As Needed (dry eyes).       QUEtiapine 50 MG tablet  Commonly known as: SEROquel  Start taking on: April 12, 2021      Take 1 tablet by mouth Every 12 (Twelve) Hours.       sennosides-docusate  8.6-50 MG per tablet  Commonly known as: PERICOLACE      Take 2 tablets by mouth 2 (Two) Times a Day As Needed for Constipation.       simethicone 80 MG chewable tablet  Commonly known as: MYLICON      Chew 1 tablet 4 (Four) Times a Day As Needed for Flatulence.       vancomycin  Start taking on: April 12, 2021      Infuse 500 mL into a venous catheter 1 (One) Time for 1 dose. Indications: Pneumonia          CHANGE how you take these medications      Instructions Last Dose Given Next Dose Due   gabapentin 100 MG capsule  Commonly known as: NEURONTIN  Start taking on: April 12, 2021  What changed:   · medication strength  · how much to take  · when to take this  · reasons to take this      Take 1 capsule by mouth Every 8 (Eight) Hours.          CONTINUE taking these medications      Instructions Last Dose Given Next Dose Due   levothyroxine 50 MCG tablet  Commonly known as: SYNTHROID, LEVOTHROID      Take 1 tablet by mouth Daily.          STOP taking these medications    aspirin 81 MG EC tablet        fluticasone 50 MCG/ACT nasal spray  Commonly known as: FLONASE        HYDROcodone-acetaminophen 7.5-325 MG per tablet  Commonly known as: NORCO        lisinopril 30 MG tablet  Commonly known as: PRINIVIL,ZESTRIL        metFORMIN 500 MG tablet  Commonly known as: GLUCOPHAGE        montelukast 10 MG tablet  Commonly known as: SINGULAIR        simvastatin 40 MG tablet  Commonly known as: ZOCOR        vitamin D 1.25 MG (83185 UT) capsule capsule  Commonly known as: ERGOCALCIFEROL              Where to Get Your Medications      Information about where to get these medications is not yet available    Ask your nurse or doctor about these medications  · acetaminophen 325 MG tablet  · atorvastatin 20 MG tablet  · bisacodyl 5 MG EC tablet  · calcium carbonate 500 MG chewable tablet  · clonazePAM 0.5 MG tablet  · diphenhydrAMINE 25 mg capsule  · enoxaparin 40 MG/0.4ML solution syringe  · gabapentin 100 MG capsule  · glucagon  (human recombinant) 1 MG injection  · insulin aspart 100 UNIT/ML injection  · insulin detemir 100 UNIT/ML injection  · ipratropium-albuterol 0.5-2.5 mg/3 ml nebulizer  · levothyroxine 50 MCG tablet  · magic barrier cream   · magnesium hydroxide 2400 MG/10ML suspension suspension  · melatonin 5 MG tablet tablet  · methylPREDNISolone sodium succinate 40 MG injection  · metoprolol tartrate 25 MG tablet  · mupirocin 2 % ointment  · ondansetron 4 MG tablet  · oxyCODONE 5 MG immediate release tablet  · pantoprazole 40 MG EC tablet  · PHARMACY TO DOSE VANCOMYCIN  · piperacillin-tazobactam   · piperacillin-tazobactam   · polyethylene glycol 17 g packet  · Polyvinyl Alcohol-Povidone PF 1.4-0.6 % ophthalmic solution  · QUEtiapine 50 MG tablet  · sennosides-docusate 8.6-50 MG per tablet  · simethicone 80 MG chewable tablet  · vancomycin            No future appointments.  Follow-up Information     Mahi Villarreal, APRN. Go on 4/23/2021.    Specialty: Family Medicine  Why: at 2:15 PM  Contact information:  [address]  [facility] [id number] [phone]                    TEST  RESULTS PENDING AT DISCHARGE       CODE STATUS  Code Status and Medical Interventions:   Ordered at: 03/19/21 0736     Level Of Support Discussed With:    Patient     Code Status:    No CPR     Medical Interventions (Level of Support Prior to Arrest):    Full       Marquis He MD  [facility]  04/11/21  23:27 EDT    Please note that this discharge summary required more than 30 minutes to complete.

## 2021-04-12 NOTE — PROGRESS NOTES
Patient Assessment Instrument  Quality Indicators - Discharge    Section GG. Self-Care Performance      Section GG. Mobility Performance      Section J. Health Conditions Discharge  Fall(s) Since Admission:  No    Section M. Skin Conditions Discharge  Unhealed Pressure Ulcer(s)/Injurie(s) at Stage 1 or Higher:  No    . Current Number of Unhealed Pressure Ulcers      Section N. Medication    Medication Intervention: N/A - There were no potential clinically significant  medication issues identified since admission or patient is not taking any  medications.    Signed by: Citlalli Clark, Supervisor

## 2021-04-13 ENCOUNTER — APPOINTMENT (OUTPATIENT)
Dept: ULTRASOUND IMAGING | Facility: HOSPITAL | Age: 76
End: 2021-04-13

## 2021-04-13 LAB
ALBUMIN SERPL-MCNC: 2.81 G/DL (ref 3.5–5.2)
ALBUMIN/GLOB SERPL: 1.1 G/DL
ALP SERPL-CCNC: 68 U/L (ref 39–117)
ALT SERPL W P-5'-P-CCNC: 10 U/L (ref 1–33)
ANION GAP SERPL CALCULATED.3IONS-SCNC: 8.2 MMOL/L (ref 5–15)
APTT PPP: 40.2 SECONDS (ref 25.6–35.3)
AST SERPL-CCNC: 15 U/L (ref 1–32)
BASOPHILS # BLD AUTO: 0.03 10*3/MM3 (ref 0–0.2)
BASOPHILS NFR BLD AUTO: 0.6 % (ref 0–1.5)
BILIRUB SERPL-MCNC: 0.2 MG/DL (ref 0–1.2)
BUN SERPL-MCNC: 7 MG/DL (ref 8–23)
BUN/CREAT SERPL: 19.4 (ref 7–25)
CALCIUM SPEC-SCNC: 8.7 MG/DL (ref 8.6–10.5)
CHLORIDE SERPL-SCNC: 105 MMOL/L (ref 98–107)
CO2 SERPL-SCNC: 27.8 MMOL/L (ref 22–29)
CREAT SERPL-MCNC: 0.36 MG/DL (ref 0.57–1)
CRP SERPL-MCNC: 7.11 MG/DL (ref 0–0.5)
DEPRECATED RDW RBC AUTO: 54.8 FL (ref 37–54)
EOSINOPHIL # BLD AUTO: 0.31 10*3/MM3 (ref 0–0.4)
EOSINOPHIL NFR BLD AUTO: 5.8 % (ref 0.3–6.2)
ERYTHROCYTE [DISTWIDTH] IN BLOOD BY AUTOMATED COUNT: 15.9 % (ref 12.3–15.4)
FIBRINOGEN PPP-MCNC: 484 MG/DL (ref 173–524)
GFR SERPL CREATININE-BSD FRML MDRD: >150 ML/MIN/1.73
GLOBULIN UR ELPH-MCNC: 2.5 GM/DL
GLUCOSE BLDC GLUCOMTR-MCNC: 101 MG/DL (ref 70–130)
GLUCOSE BLDC GLUCOMTR-MCNC: 104 MG/DL (ref 70–130)
GLUCOSE BLDC GLUCOMTR-MCNC: 122 MG/DL (ref 70–130)
GLUCOSE BLDC GLUCOMTR-MCNC: 88 MG/DL (ref 70–130)
GLUCOSE SERPL-MCNC: 91 MG/DL (ref 65–99)
HCT VFR BLD AUTO: 32.5 % (ref 34–46.6)
HGB BLD-MCNC: 9.6 G/DL (ref 12–15.9)
IMM GRANULOCYTES # BLD AUTO: 0.03 10*3/MM3 (ref 0–0.05)
IMM GRANULOCYTES NFR BLD AUTO: 0.6 % (ref 0–0.5)
INR PPP: 1.15 (ref 0.9–1.1)
LYMPHOCYTES # BLD AUTO: 1.06 10*3/MM3 (ref 0.7–3.1)
LYMPHOCYTES NFR BLD AUTO: 20 % (ref 19.6–45.3)
MAGNESIUM SERPL-MCNC: 2 MG/DL (ref 1.6–2.4)
MCH RBC QN AUTO: 27.4 PG (ref 26.6–33)
MCHC RBC AUTO-ENTMCNC: 29.5 G/DL (ref 31.5–35.7)
MCV RBC AUTO: 92.6 FL (ref 79–97)
MONOCYTES # BLD AUTO: 0.42 10*3/MM3 (ref 0.1–0.9)
MONOCYTES NFR BLD AUTO: 7.9 % (ref 5–12)
NEUTROPHILS NFR BLD AUTO: 3.45 10*3/MM3 (ref 1.7–7)
NEUTROPHILS NFR BLD AUTO: 65.1 % (ref 42.7–76)
NRBC BLD AUTO-RTO: 0 /100 WBC (ref 0–0.2)
PLATELET # BLD AUTO: 59 10*3/MM3 (ref 140–450)
PMV BLD AUTO: 12.3 FL (ref 6–12)
POTASSIUM SERPL-SCNC: 3.7 MMOL/L (ref 3.5–5.2)
PROT SERPL-MCNC: 5.3 G/DL (ref 6–8.5)
PROTHROMBIN TIME: 14.5 SECONDS (ref 11.9–14.1)
RBC # BLD AUTO: 3.51 10*6/MM3 (ref 3.77–5.28)
RBC MORPH BLD: NORMAL
SMALL PLATELETS BLD QL SMEAR: NORMAL
SODIUM SERPL-SCNC: 141 MMOL/L (ref 136–145)
WBC # BLD AUTO: 5.3 10*3/MM3 (ref 3.4–10.8)

## 2021-04-13 PROCEDURE — 85007 BL SMEAR W/DIFF WBC COUNT: CPT | Performed by: INTERNAL MEDICINE

## 2021-04-13 PROCEDURE — 86022 PLATELET ANTIBODIES: CPT | Performed by: INTERNAL MEDICINE

## 2021-04-13 PROCEDURE — 85384 FIBRINOGEN ACTIVITY: CPT | Performed by: INTERNAL MEDICINE

## 2021-04-13 PROCEDURE — 25010000002 ENOXAPARIN PER 10 MG: Performed by: INTERNAL MEDICINE

## 2021-04-13 PROCEDURE — 94799 UNLISTED PULMONARY SVC/PX: CPT

## 2021-04-13 PROCEDURE — 83735 ASSAY OF MAGNESIUM: CPT | Performed by: INTERNAL MEDICINE

## 2021-04-13 PROCEDURE — 25010000002 PIPERACILLIN SOD-TAZOBACTAM PER 1 G: Performed by: INTERNAL MEDICINE

## 2021-04-13 PROCEDURE — 82542 COL CHROMOTOGRAPHY QUAL/QUAN: CPT | Performed by: INTERNAL MEDICINE

## 2021-04-13 PROCEDURE — 80053 COMPREHEN METABOLIC PANEL: CPT | Performed by: INTERNAL MEDICINE

## 2021-04-13 PROCEDURE — 85025 COMPLETE CBC W/AUTO DIFF WBC: CPT | Performed by: INTERNAL MEDICINE

## 2021-04-13 PROCEDURE — 82962 GLUCOSE BLOOD TEST: CPT

## 2021-04-13 PROCEDURE — 93970 EXTREMITY STUDY: CPT | Performed by: RADIOLOGY

## 2021-04-13 PROCEDURE — 93970 EXTREMITY STUDY: CPT

## 2021-04-13 PROCEDURE — 85730 THROMBOPLASTIN TIME PARTIAL: CPT | Performed by: INTERNAL MEDICINE

## 2021-04-13 PROCEDURE — 92610 EVALUATE SWALLOWING FUNCTION: CPT

## 2021-04-13 PROCEDURE — 97116 GAIT TRAINING THERAPY: CPT

## 2021-04-13 PROCEDURE — 99232 SBSQ HOSP IP/OBS MODERATE 35: CPT | Performed by: INTERNAL MEDICINE

## 2021-04-13 PROCEDURE — 99221 1ST HOSP IP/OBS SF/LOW 40: CPT | Performed by: NURSE PRACTITIONER

## 2021-04-13 PROCEDURE — 85610 PROTHROMBIN TIME: CPT | Performed by: INTERNAL MEDICINE

## 2021-04-13 PROCEDURE — 97167 OT EVAL HIGH COMPLEX 60 MIN: CPT | Performed by: OCCUPATIONAL THERAPIST

## 2021-04-13 PROCEDURE — 86140 C-REACTIVE PROTEIN: CPT | Performed by: PHYSICIAN ASSISTANT

## 2021-04-13 PROCEDURE — 97530 THERAPEUTIC ACTIVITIES: CPT

## 2021-04-13 RX ADMIN — ENOXAPARIN SODIUM 90 MG: 100 INJECTION SUBCUTANEOUS at 08:02

## 2021-04-13 RX ADMIN — GABAPENTIN 400 MG: 400 CAPSULE ORAL at 21:21

## 2021-04-13 RX ADMIN — HYDROCODONE BITARTRATE AND ACETAMINOPHEN 1 TABLET: 7.5; 325 TABLET ORAL at 21:21

## 2021-04-13 RX ADMIN — SODIUM CHLORIDE, PRESERVATIVE FREE 10 ML: 5 INJECTION INTRAVENOUS at 08:03

## 2021-04-13 RX ADMIN — PIPERACILLIN SODIUM AND TAZOBACTAM SODIUM 3.38 G: 3; .375 INJECTION, POWDER, LYOPHILIZED, FOR SOLUTION INTRAVENOUS at 01:10

## 2021-04-13 RX ADMIN — MONTELUKAST SODIUM 10 MG: 10 TABLET, COATED ORAL at 21:01

## 2021-04-13 RX ADMIN — ATORVASTATIN CALCIUM 20 MG: 20 TABLET, FILM COATED ORAL at 08:02

## 2021-04-13 RX ADMIN — ASPIRIN 81 MG: 81 TABLET, COATED ORAL at 08:02

## 2021-04-13 RX ADMIN — PIPERACILLIN SODIUM AND TAZOBACTAM SODIUM 3.38 G: 3; .375 INJECTION, POWDER, LYOPHILIZED, FOR SOLUTION INTRAVENOUS at 18:13

## 2021-04-13 RX ADMIN — Medication 1 CAPSULE: at 08:02

## 2021-04-13 RX ADMIN — PIPERACILLIN SODIUM AND TAZOBACTAM SODIUM 3.38 G: 3; .375 INJECTION, POWDER, LYOPHILIZED, FOR SOLUTION INTRAVENOUS at 08:56

## 2021-04-13 RX ADMIN — SODIUM CHLORIDE, PRESERVATIVE FREE 10 ML: 5 INJECTION INTRAVENOUS at 21:01

## 2021-04-13 RX ADMIN — FLUTICASONE PROPIONATE 2 SPRAY: 50 SPRAY, METERED NASAL at 08:02

## 2021-04-14 LAB
ALBUMIN SERPL-MCNC: 2.95 G/DL (ref 3.5–5.2)
ALBUMIN/GLOB SERPL: 1.1 G/DL
ALP SERPL-CCNC: 75 U/L (ref 39–117)
ALT SERPL W P-5'-P-CCNC: 10 U/L (ref 1–33)
ANION GAP SERPL CALCULATED.3IONS-SCNC: 9.2 MMOL/L (ref 5–15)
AST SERPL-CCNC: 14 U/L (ref 1–32)
BACTERIA SPEC RESP CULT: NORMAL
BASOPHILS # BLD AUTO: 0.04 10*3/MM3 (ref 0–0.2)
BASOPHILS NFR BLD AUTO: 0.7 % (ref 0–1.5)
BILIRUB SERPL-MCNC: 0.3 MG/DL (ref 0–1.2)
BUN SERPL-MCNC: 6 MG/DL (ref 8–23)
BUN/CREAT SERPL: 20 (ref 7–25)
CALCIUM SPEC-SCNC: 8.7 MG/DL (ref 8.6–10.5)
CHLORIDE SERPL-SCNC: 104 MMOL/L (ref 98–107)
CO2 SERPL-SCNC: 24.8 MMOL/L (ref 22–29)
CREAT SERPL-MCNC: 0.3 MG/DL (ref 0.57–1)
CRP SERPL-MCNC: 6.29 MG/DL (ref 0–0.5)
DEPRECATED RDW RBC AUTO: 52.1 FL (ref 37–54)
EOSINOPHIL # BLD AUTO: 0.34 10*3/MM3 (ref 0–0.4)
EOSINOPHIL NFR BLD AUTO: 5.6 % (ref 0.3–6.2)
ERYTHROCYTE [DISTWIDTH] IN BLOOD BY AUTOMATED COUNT: 15.7 % (ref 12.3–15.4)
GFR SERPL CREATININE-BSD FRML MDRD: >150 ML/MIN/1.73
GLOBULIN UR ELPH-MCNC: 2.7 GM/DL
GLUCOSE BLDC GLUCOMTR-MCNC: 100 MG/DL (ref 70–130)
GLUCOSE BLDC GLUCOMTR-MCNC: 106 MG/DL (ref 70–130)
GLUCOSE BLDC GLUCOMTR-MCNC: 140 MG/DL (ref 70–130)
GLUCOSE BLDC GLUCOMTR-MCNC: 141 MG/DL (ref 70–130)
GLUCOSE SERPL-MCNC: 103 MG/DL (ref 65–99)
GRAM STN SPEC: NORMAL
HCT VFR BLD AUTO: 33.2 % (ref 34–46.6)
HGB BLD-MCNC: 10 G/DL (ref 12–15.9)
IMM GRANULOCYTES # BLD AUTO: 0.03 10*3/MM3 (ref 0–0.05)
IMM GRANULOCYTES NFR BLD AUTO: 0.5 % (ref 0–0.5)
LYMPHOCYTES # BLD AUTO: 1.19 10*3/MM3 (ref 0.7–3.1)
LYMPHOCYTES NFR BLD AUTO: 19.5 % (ref 19.6–45.3)
MCH RBC QN AUTO: 27.2 PG (ref 26.6–33)
MCHC RBC AUTO-ENTMCNC: 30.1 G/DL (ref 31.5–35.7)
MCV RBC AUTO: 90.2 FL (ref 79–97)
MONOCYTES # BLD AUTO: 0.55 10*3/MM3 (ref 0.1–0.9)
MONOCYTES NFR BLD AUTO: 9 % (ref 5–12)
NEUTROPHILS NFR BLD AUTO: 3.96 10*3/MM3 (ref 1.7–7)
NEUTROPHILS NFR BLD AUTO: 64.7 % (ref 42.7–76)
NRBC BLD AUTO-RTO: 0 /100 WBC (ref 0–0.2)
PLATELET # BLD AUTO: 135 10*3/MM3 (ref 140–450)
PMV BLD AUTO: 10.9 FL (ref 6–12)
POTASSIUM SERPL-SCNC: 3.6 MMOL/L (ref 3.5–5.2)
PROT SERPL-MCNC: 5.6 G/DL (ref 6–8.5)
RBC # BLD AUTO: 3.68 10*6/MM3 (ref 3.77–5.28)
SODIUM SERPL-SCNC: 138 MMOL/L (ref 136–145)
WBC # BLD AUTO: 6.11 10*3/MM3 (ref 3.4–10.8)

## 2021-04-14 PROCEDURE — 97116 GAIT TRAINING THERAPY: CPT

## 2021-04-14 PROCEDURE — 99232 SBSQ HOSP IP/OBS MODERATE 35: CPT | Performed by: INTERNAL MEDICINE

## 2021-04-14 PROCEDURE — 25010000002 PIPERACILLIN SOD-TAZOBACTAM PER 1 G: Performed by: INTERNAL MEDICINE

## 2021-04-14 PROCEDURE — 85025 COMPLETE CBC W/AUTO DIFF WBC: CPT | Performed by: INTERNAL MEDICINE

## 2021-04-14 PROCEDURE — 97110 THERAPEUTIC EXERCISES: CPT

## 2021-04-14 PROCEDURE — 97530 THERAPEUTIC ACTIVITIES: CPT

## 2021-04-14 PROCEDURE — 82962 GLUCOSE BLOOD TEST: CPT

## 2021-04-14 PROCEDURE — 86140 C-REACTIVE PROTEIN: CPT | Performed by: PHYSICIAN ASSISTANT

## 2021-04-14 PROCEDURE — 80053 COMPREHEN METABOLIC PANEL: CPT | Performed by: INTERNAL MEDICINE

## 2021-04-14 RX ORDER — AMOXICILLIN AND CLAVULANATE POTASSIUM 875; 125 MG/1; MG/1
1 TABLET, FILM COATED ORAL EVERY 12 HOURS SCHEDULED
Status: DISCONTINUED | OUTPATIENT
Start: 2021-04-14 | End: 2021-04-14

## 2021-04-14 RX ADMIN — Medication 1 CAPSULE: at 08:32

## 2021-04-14 RX ADMIN — FLUTICASONE PROPIONATE 2 SPRAY: 50 SPRAY, METERED NASAL at 08:32

## 2021-04-14 RX ADMIN — ATORVASTATIN CALCIUM 20 MG: 20 TABLET, FILM COATED ORAL at 08:32

## 2021-04-14 RX ADMIN — SODIUM CHLORIDE, PRESERVATIVE FREE 10 ML: 5 INJECTION INTRAVENOUS at 08:33

## 2021-04-14 RX ADMIN — PIPERACILLIN SODIUM AND TAZOBACTAM SODIUM 3.38 G: 3; .375 INJECTION, POWDER, LYOPHILIZED, FOR SOLUTION INTRAVENOUS at 08:32

## 2021-04-14 RX ADMIN — PIPERACILLIN SODIUM AND TAZOBACTAM SODIUM 3.38 G: 3; .375 INJECTION, POWDER, LYOPHILIZED, FOR SOLUTION INTRAVENOUS at 00:32

## 2021-04-14 RX ADMIN — GABAPENTIN 400 MG: 400 CAPSULE ORAL at 21:15

## 2021-04-14 RX ADMIN — ASPIRIN 81 MG: 81 TABLET, COATED ORAL at 08:32

## 2021-04-14 RX ADMIN — HYDROCODONE BITARTRATE AND ACETAMINOPHEN 1 TABLET: 7.5; 325 TABLET ORAL at 11:26

## 2021-04-14 RX ADMIN — MONTELUKAST SODIUM 10 MG: 10 TABLET, COATED ORAL at 21:15

## 2021-04-14 RX ADMIN — PIPERACILLIN SODIUM AND TAZOBACTAM SODIUM 3.38 G: 3; .375 INJECTION, POWDER, LYOPHILIZED, FOR SOLUTION INTRAVENOUS at 16:58

## 2021-04-15 LAB
ALBUMIN SERPL-MCNC: 3.17 G/DL (ref 3.5–5.2)
ALBUMIN/GLOB SERPL: 1.2 G/DL
ALP SERPL-CCNC: 74 U/L (ref 39–117)
ALT SERPL W P-5'-P-CCNC: 9 U/L (ref 1–33)
ANION GAP SERPL CALCULATED.3IONS-SCNC: 8.2 MMOL/L (ref 5–15)
AST SERPL-CCNC: 13 U/L (ref 1–32)
BASOPHILS # BLD AUTO: 0.04 10*3/MM3 (ref 0–0.2)
BASOPHILS NFR BLD AUTO: 0.6 % (ref 0–1.5)
BILIRUB SERPL-MCNC: 0.4 MG/DL (ref 0–1.2)
BUN SERPL-MCNC: 4 MG/DL (ref 8–23)
BUN/CREAT SERPL: 12.5 (ref 7–25)
CALCIUM SPEC-SCNC: 9 MG/DL (ref 8.6–10.5)
CHLORIDE SERPL-SCNC: 103 MMOL/L (ref 98–107)
CO2 SERPL-SCNC: 25.8 MMOL/L (ref 22–29)
CREAT SERPL-MCNC: 0.32 MG/DL (ref 0.57–1)
CRP SERPL-MCNC: 5.33 MG/DL (ref 0–0.5)
DEPRECATED RDW RBC AUTO: 52.9 FL (ref 37–54)
EOSINOPHIL # BLD AUTO: 0.28 10*3/MM3 (ref 0–0.4)
EOSINOPHIL NFR BLD AUTO: 3.9 % (ref 0.3–6.2)
ERYTHROCYTE [DISTWIDTH] IN BLOOD BY AUTOMATED COUNT: 15.7 % (ref 12.3–15.4)
GFR SERPL CREATININE-BSD FRML MDRD: >150 ML/MIN/1.73
GLOBULIN UR ELPH-MCNC: 2.7 GM/DL
GLUCOSE BLDC GLUCOMTR-MCNC: 118 MG/DL (ref 70–130)
GLUCOSE BLDC GLUCOMTR-MCNC: 94 MG/DL (ref 70–130)
GLUCOSE BLDC GLUCOMTR-MCNC: 95 MG/DL (ref 70–130)
GLUCOSE BLDC GLUCOMTR-MCNC: 96 MG/DL (ref 70–130)
GLUCOSE SERPL-MCNC: 101 MG/DL (ref 65–99)
HCT VFR BLD AUTO: 34.4 % (ref 34–46.6)
HGB BLD-MCNC: 10.2 G/DL (ref 12–15.9)
IMM GRANULOCYTES # BLD AUTO: 0.04 10*3/MM3 (ref 0–0.05)
IMM GRANULOCYTES NFR BLD AUTO: 0.6 % (ref 0–0.5)
LYMPHOCYTES # BLD AUTO: 0.99 10*3/MM3 (ref 0.7–3.1)
LYMPHOCYTES NFR BLD AUTO: 13.9 % (ref 19.6–45.3)
MCH RBC QN AUTO: 27.2 PG (ref 26.6–33)
MCHC RBC AUTO-ENTMCNC: 29.7 G/DL (ref 31.5–35.7)
MCV RBC AUTO: 91.7 FL (ref 79–97)
MONOCYTES # BLD AUTO: 0.47 10*3/MM3 (ref 0.1–0.9)
MONOCYTES NFR BLD AUTO: 6.6 % (ref 5–12)
NEUTROPHILS NFR BLD AUTO: 5.29 10*3/MM3 (ref 1.7–7)
NEUTROPHILS NFR BLD AUTO: 74.4 % (ref 42.7–76)
NRBC BLD AUTO-RTO: 0 /100 WBC (ref 0–0.2)
PLATELET # BLD AUTO: 145 10*3/MM3 (ref 140–450)
PMV BLD AUTO: 10 FL (ref 6–12)
POTASSIUM SERPL-SCNC: 3.8 MMOL/L (ref 3.5–5.2)
PROT SERPL-MCNC: 5.9 G/DL (ref 6–8.5)
QT INTERVAL: 284 MS
QTC INTERVAL: 424 MS
RBC # BLD AUTO: 3.75 10*6/MM3 (ref 3.77–5.28)
SODIUM SERPL-SCNC: 137 MMOL/L (ref 136–145)
WBC # BLD AUTO: 7.11 10*3/MM3 (ref 3.4–10.8)

## 2021-04-15 PROCEDURE — 80053 COMPREHEN METABOLIC PANEL: CPT | Performed by: INTERNAL MEDICINE

## 2021-04-15 PROCEDURE — 97530 THERAPEUTIC ACTIVITIES: CPT

## 2021-04-15 PROCEDURE — 94799 UNLISTED PULMONARY SVC/PX: CPT

## 2021-04-15 PROCEDURE — 25010000002 PIPERACILLIN SOD-TAZOBACTAM PER 1 G: Performed by: INTERNAL MEDICINE

## 2021-04-15 PROCEDURE — 97116 GAIT TRAINING THERAPY: CPT

## 2021-04-15 PROCEDURE — 82962 GLUCOSE BLOOD TEST: CPT

## 2021-04-15 PROCEDURE — 85025 COMPLETE CBC W/AUTO DIFF WBC: CPT | Performed by: INTERNAL MEDICINE

## 2021-04-15 PROCEDURE — 97110 THERAPEUTIC EXERCISES: CPT

## 2021-04-15 PROCEDURE — 86140 C-REACTIVE PROTEIN: CPT | Performed by: INTERNAL MEDICINE

## 2021-04-15 PROCEDURE — 99233 SBSQ HOSP IP/OBS HIGH 50: CPT | Performed by: PHYSICIAN ASSISTANT

## 2021-04-15 RX ORDER — FAMOTIDINE 20 MG/1
20 TABLET, FILM COATED ORAL DAILY
Status: DISCONTINUED | OUTPATIENT
Start: 2021-04-15 | End: 2021-04-19 | Stop reason: HOSPADM

## 2021-04-15 RX ADMIN — Medication 1 CAPSULE: at 08:49

## 2021-04-15 RX ADMIN — PIPERACILLIN SODIUM AND TAZOBACTAM SODIUM 3.38 G: 3; .375 INJECTION, POWDER, LYOPHILIZED, FOR SOLUTION INTRAVENOUS at 08:49

## 2021-04-15 RX ADMIN — SODIUM CHLORIDE, PRESERVATIVE FREE 10 ML: 5 INJECTION INTRAVENOUS at 21:01

## 2021-04-15 RX ADMIN — FLUTICASONE PROPIONATE 2 SPRAY: 50 SPRAY, METERED NASAL at 08:51

## 2021-04-15 RX ADMIN — ATORVASTATIN CALCIUM 20 MG: 20 TABLET, FILM COATED ORAL at 08:49

## 2021-04-15 RX ADMIN — HYDROCODONE BITARTRATE AND ACETAMINOPHEN 1 TABLET: 7.5; 325 TABLET ORAL at 08:59

## 2021-04-15 RX ADMIN — SODIUM CHLORIDE, PRESERVATIVE FREE 10 ML: 5 INJECTION INTRAVENOUS at 08:49

## 2021-04-15 RX ADMIN — GUAIFENESIN 400 MG: 200 SOLUTION ORAL at 11:16

## 2021-04-15 RX ADMIN — SODIUM CHLORIDE, PRESERVATIVE FREE 10 ML: 5 INJECTION INTRAVENOUS at 01:10

## 2021-04-15 RX ADMIN — MONTELUKAST SODIUM 10 MG: 10 TABLET, COATED ORAL at 21:01

## 2021-04-15 RX ADMIN — GABAPENTIN 400 MG: 400 CAPSULE ORAL at 21:04

## 2021-04-15 RX ADMIN — PIPERACILLIN SODIUM AND TAZOBACTAM SODIUM 3.38 G: 3; .375 INJECTION, POWDER, LYOPHILIZED, FOR SOLUTION INTRAVENOUS at 01:08

## 2021-04-15 RX ADMIN — FAMOTIDINE 20 MG: 20 TABLET, FILM COATED ORAL at 11:16

## 2021-04-15 RX ADMIN — GUAIFENESIN 400 MG: 200 SOLUTION ORAL at 21:01

## 2021-04-15 RX ADMIN — PIPERACILLIN SODIUM AND TAZOBACTAM SODIUM 3.38 G: 3; .375 INJECTION, POWDER, LYOPHILIZED, FOR SOLUTION INTRAVENOUS at 17:30

## 2021-04-15 RX ADMIN — ASPIRIN 81 MG: 81 TABLET, COATED ORAL at 08:49

## 2021-04-15 NOTE — PROGRESS NOTES
PPS CMG Coordinator  Inpatient Rehabilitation Discharge    Mode of Locomotion: Both walking and wheelchair.    Discharge Against Medical Advice:  No.  Discharge Information  Patient Discharged Alive:  Yes  Discharge Destination/Living Setting: [facility]  Diagnosis for Interruption/Death:    Impairment Group: 03.8 Neuromuscular Disorders    Comorbidities: Rank Code      Description      1    U07.1     COVID-19  2    R13.10    Dysphagia, unspecified  3    E11.42    Type 2 diabetes mellitus with diabetic                 polyneuropathy  4    E83.42    Hypomagnesemia  5    Z66       Do not resuscitate  6    R53.81    Other malaise  7    J44.9     Chronic obstructive pulmonary disease,                 unspecified  8    Z87.440   Personal history of urinary (tract) infections    Complications:      TINO Bladder Accidents: 0  - Accidents.  Bladder Score = 7. Patient has not had an accident.  TINO Bowel Accident: 0  - Accidents.  Bowel Score = 6. Patient has no accidents, but uses a device/medications.  medications    Signed by: Pam Hogan Nurse

## 2021-04-16 LAB
ANION GAP SERPL CALCULATED.3IONS-SCNC: 9.8 MMOL/L (ref 5–15)
BASOPHILS # BLD AUTO: 0.03 10*3/MM3 (ref 0–0.2)
BASOPHILS NFR BLD AUTO: 0.5 % (ref 0–1.5)
BUN SERPL-MCNC: 4 MG/DL (ref 8–23)
BUN/CREAT SERPL: 12.5 (ref 7–25)
CALCIUM SPEC-SCNC: 8.5 MG/DL (ref 8.6–10.5)
CHLORIDE SERPL-SCNC: 103 MMOL/L (ref 98–107)
CO2 SERPL-SCNC: 25.2 MMOL/L (ref 22–29)
CREAT SERPL-MCNC: 0.32 MG/DL (ref 0.57–1)
CRP SERPL-MCNC: 4.07 MG/DL (ref 0–0.5)
DEPRECATED RDW RBC AUTO: 50.8 FL (ref 37–54)
EOSINOPHIL # BLD AUTO: 0.31 10*3/MM3 (ref 0–0.4)
EOSINOPHIL NFR BLD AUTO: 5.5 % (ref 0.3–6.2)
ERYTHROCYTE [DISTWIDTH] IN BLOOD BY AUTOMATED COUNT: 15.6 % (ref 12.3–15.4)
GFR SERPL CREATININE-BSD FRML MDRD: >150 ML/MIN/1.73
GLUCOSE BLDC GLUCOMTR-MCNC: 112 MG/DL (ref 70–130)
GLUCOSE BLDC GLUCOMTR-MCNC: 147 MG/DL (ref 70–130)
GLUCOSE BLDC GLUCOMTR-MCNC: 87 MG/DL (ref 70–130)
GLUCOSE BLDC GLUCOMTR-MCNC: 98 MG/DL (ref 70–130)
GLUCOSE SERPL-MCNC: 93 MG/DL (ref 65–99)
HCT VFR BLD AUTO: 31.2 % (ref 34–46.6)
HGB BLD-MCNC: 9.6 G/DL (ref 12–15.9)
IMM GRANULOCYTES # BLD AUTO: 0.07 10*3/MM3 (ref 0–0.05)
IMM GRANULOCYTES NFR BLD AUTO: 1.2 % (ref 0–0.5)
LYMPHOCYTES # BLD AUTO: 0.94 10*3/MM3 (ref 0.7–3.1)
LYMPHOCYTES NFR BLD AUTO: 16.7 % (ref 19.6–45.3)
MCH RBC QN AUTO: 27.4 PG (ref 26.6–33)
MCHC RBC AUTO-ENTMCNC: 30.8 G/DL (ref 31.5–35.7)
MCV RBC AUTO: 88.9 FL (ref 79–97)
MONOCYTES # BLD AUTO: 0.44 10*3/MM3 (ref 0.1–0.9)
MONOCYTES NFR BLD AUTO: 7.8 % (ref 5–12)
NEUTROPHILS NFR BLD AUTO: 3.85 10*3/MM3 (ref 1.7–7)
NEUTROPHILS NFR BLD AUTO: 68.3 % (ref 42.7–76)
NRBC BLD AUTO-RTO: 0 /100 WBC (ref 0–0.2)
PF4 HEPARIN CMPLX IGG SERPL IA: 0.25 OD (ref 0–0.4)
PLATELET # BLD AUTO: 120 10*3/MM3 (ref 140–450)
PMV BLD AUTO: 10.4 FL (ref 6–12)
POTASSIUM SERPL-SCNC: 3.3 MMOL/L (ref 3.5–5.2)
POTASSIUM SERPL-SCNC: 5 MMOL/L (ref 3.5–5.2)
RBC # BLD AUTO: 3.51 10*6/MM3 (ref 3.77–5.28)
SODIUM SERPL-SCNC: 138 MMOL/L (ref 136–145)
WBC # BLD AUTO: 5.64 10*3/MM3 (ref 3.4–10.8)

## 2021-04-16 PROCEDURE — 84132 ASSAY OF SERUM POTASSIUM: CPT | Performed by: INTERNAL MEDICINE

## 2021-04-16 PROCEDURE — 85025 COMPLETE CBC W/AUTO DIFF WBC: CPT | Performed by: PHYSICIAN ASSISTANT

## 2021-04-16 PROCEDURE — 80048 BASIC METABOLIC PNL TOTAL CA: CPT | Performed by: PHYSICIAN ASSISTANT

## 2021-04-16 PROCEDURE — 97530 THERAPEUTIC ACTIVITIES: CPT

## 2021-04-16 PROCEDURE — 97110 THERAPEUTIC EXERCISES: CPT

## 2021-04-16 PROCEDURE — 82962 GLUCOSE BLOOD TEST: CPT

## 2021-04-16 PROCEDURE — 25010000002 PIPERACILLIN SOD-TAZOBACTAM PER 1 G: Performed by: INTERNAL MEDICINE

## 2021-04-16 PROCEDURE — 99233 SBSQ HOSP IP/OBS HIGH 50: CPT | Performed by: PHYSICIAN ASSISTANT

## 2021-04-16 PROCEDURE — 86140 C-REACTIVE PROTEIN: CPT | Performed by: INTERNAL MEDICINE

## 2021-04-16 PROCEDURE — 97116 GAIT TRAINING THERAPY: CPT

## 2021-04-16 PROCEDURE — 99231 SBSQ HOSP IP/OBS SF/LOW 25: CPT | Performed by: INTERNAL MEDICINE

## 2021-04-16 RX ORDER — POTASSIUM CHLORIDE 7.45 MG/ML
10 INJECTION INTRAVENOUS
Status: DISCONTINUED | OUTPATIENT
Start: 2021-04-16 | End: 2021-04-19 | Stop reason: HOSPADM

## 2021-04-16 RX ORDER — AMOXICILLIN AND CLAVULANATE POTASSIUM 875; 125 MG/1; MG/1
1 TABLET, FILM COATED ORAL EVERY 12 HOURS SCHEDULED
Status: DISCONTINUED | OUTPATIENT
Start: 2021-04-16 | End: 2021-04-19 | Stop reason: HOSPADM

## 2021-04-16 RX ORDER — POTASSIUM CHLORIDE 1.5 G/1.77G
40 POWDER, FOR SOLUTION ORAL AS NEEDED
Status: DISCONTINUED | OUTPATIENT
Start: 2021-04-16 | End: 2021-04-19 | Stop reason: HOSPADM

## 2021-04-16 RX ORDER — MAGNESIUM SULFATE HEPTAHYDRATE 40 MG/ML
4 INJECTION, SOLUTION INTRAVENOUS AS NEEDED
Status: DISCONTINUED | OUTPATIENT
Start: 2021-04-16 | End: 2021-04-19 | Stop reason: HOSPADM

## 2021-04-16 RX ORDER — POTASSIUM CHLORIDE 20 MEQ/1
40 TABLET, EXTENDED RELEASE ORAL AS NEEDED
Status: DISCONTINUED | OUTPATIENT
Start: 2021-04-16 | End: 2021-04-19 | Stop reason: HOSPADM

## 2021-04-16 RX ORDER — MAGNESIUM SULFATE HEPTAHYDRATE 40 MG/ML
2 INJECTION, SOLUTION INTRAVENOUS AS NEEDED
Status: DISCONTINUED | OUTPATIENT
Start: 2021-04-16 | End: 2021-04-19 | Stop reason: HOSPADM

## 2021-04-16 RX ORDER — POTASSIUM CHLORIDE 20 MEQ/1
40 TABLET, EXTENDED RELEASE ORAL EVERY 4 HOURS
Status: COMPLETED | OUTPATIENT
Start: 2021-04-16 | End: 2021-04-16

## 2021-04-16 RX ORDER — SUCRALFATE 1 G/1
1 TABLET ORAL
Status: DISCONTINUED | OUTPATIENT
Start: 2021-04-16 | End: 2021-04-17

## 2021-04-16 RX ADMIN — SUCRALFATE 1 G: 1 TABLET ORAL at 17:01

## 2021-04-16 RX ADMIN — PIPERACILLIN SODIUM AND TAZOBACTAM SODIUM 3.38 G: 3; .375 INJECTION, POWDER, LYOPHILIZED, FOR SOLUTION INTRAVENOUS at 00:20

## 2021-04-16 RX ADMIN — POTASSIUM CHLORIDE 40 MEQ: 20 TABLET, EXTENDED RELEASE ORAL at 15:04

## 2021-04-16 RX ADMIN — ATORVASTATIN CALCIUM 20 MG: 20 TABLET, FILM COATED ORAL at 08:26

## 2021-04-16 RX ADMIN — Medication 1 CAPSULE: at 08:26

## 2021-04-16 RX ADMIN — POTASSIUM CHLORIDE 40 MEQ: 20 TABLET, EXTENDED RELEASE ORAL at 11:57

## 2021-04-16 RX ADMIN — AMOXICILLIN AND CLAVULANATE POTASSIUM 1 TABLET: 875; 125 TABLET, FILM COATED ORAL at 11:57

## 2021-04-16 RX ADMIN — HYDROCODONE BITARTRATE AND ACETAMINOPHEN 1 TABLET: 7.5; 325 TABLET ORAL at 11:58

## 2021-04-16 RX ADMIN — FLUTICASONE PROPIONATE 2 SPRAY: 50 SPRAY, METERED NASAL at 08:26

## 2021-04-16 RX ADMIN — FAMOTIDINE 20 MG: 20 TABLET, FILM COATED ORAL at 08:26

## 2021-04-16 RX ADMIN — HYDROCODONE BITARTRATE AND ACETAMINOPHEN 1 TABLET: 7.5; 325 TABLET ORAL at 00:20

## 2021-04-16 RX ADMIN — SODIUM CHLORIDE, PRESERVATIVE FREE 10 ML: 5 INJECTION INTRAVENOUS at 08:26

## 2021-04-16 RX ADMIN — GUAIFENESIN 400 MG: 200 SOLUTION ORAL at 21:17

## 2021-04-16 RX ADMIN — ASPIRIN 81 MG: 81 TABLET, COATED ORAL at 08:26

## 2021-04-16 RX ADMIN — GUAIFENESIN 400 MG: 200 SOLUTION ORAL at 06:09

## 2021-04-16 RX ADMIN — MONTELUKAST SODIUM 10 MG: 10 TABLET, COATED ORAL at 21:17

## 2021-04-16 RX ADMIN — GABAPENTIN 400 MG: 400 CAPSULE ORAL at 21:17

## 2021-04-16 RX ADMIN — SODIUM CHLORIDE, PRESERVATIVE FREE 10 ML: 5 INJECTION INTRAVENOUS at 21:17

## 2021-04-16 RX ADMIN — PIPERACILLIN SODIUM AND TAZOBACTAM SODIUM 3.38 G: 3; .375 INJECTION, POWDER, LYOPHILIZED, FOR SOLUTION INTRAVENOUS at 08:26

## 2021-04-16 RX ADMIN — AMOXICILLIN AND CLAVULANATE POTASSIUM 1 TABLET: 875; 125 TABLET, FILM COATED ORAL at 21:17

## 2021-04-16 RX ADMIN — GUAIFENESIN 400 MG: 200 SOLUTION ORAL at 15:04

## 2021-04-17 LAB
ANION GAP SERPL CALCULATED.3IONS-SCNC: 8.2 MMOL/L (ref 5–15)
BASOPHILS # BLD AUTO: 0.03 10*3/MM3 (ref 0–0.2)
BASOPHILS NFR BLD AUTO: 0.6 % (ref 0–1.5)
BUN SERPL-MCNC: 5 MG/DL (ref 8–23)
BUN/CREAT SERPL: 17.2 (ref 7–25)
CALCIUM SPEC-SCNC: 9.2 MG/DL (ref 8.6–10.5)
CHLORIDE SERPL-SCNC: 104 MMOL/L (ref 98–107)
CO2 SERPL-SCNC: 23.8 MMOL/L (ref 22–29)
CREAT SERPL-MCNC: 0.29 MG/DL (ref 0.57–1)
CRP SERPL-MCNC: 4.63 MG/DL (ref 0–0.5)
DEPRECATED RDW RBC AUTO: 51.5 FL (ref 37–54)
EOSINOPHIL # BLD AUTO: 0.36 10*3/MM3 (ref 0–0.4)
EOSINOPHIL NFR BLD AUTO: 6.7 % (ref 0.3–6.2)
ERYTHROCYTE [DISTWIDTH] IN BLOOD BY AUTOMATED COUNT: 15.6 % (ref 12.3–15.4)
GFR SERPL CREATININE-BSD FRML MDRD: >150 ML/MIN/1.73
GLUCOSE BLDC GLUCOMTR-MCNC: 107 MG/DL (ref 70–130)
GLUCOSE BLDC GLUCOMTR-MCNC: 97 MG/DL (ref 70–130)
GLUCOSE SERPL-MCNC: 104 MG/DL (ref 65–99)
HCT VFR BLD AUTO: 34.3 % (ref 34–46.6)
HGB BLD-MCNC: 10.5 G/DL (ref 12–15.9)
IMM GRANULOCYTES # BLD AUTO: 0.06 10*3/MM3 (ref 0–0.05)
IMM GRANULOCYTES NFR BLD AUTO: 1.1 % (ref 0–0.5)
LYMPHOCYTES # BLD AUTO: 1.17 10*3/MM3 (ref 0.7–3.1)
LYMPHOCYTES NFR BLD AUTO: 21.8 % (ref 19.6–45.3)
MCH RBC QN AUTO: 27.4 PG (ref 26.6–33)
MCHC RBC AUTO-ENTMCNC: 30.6 G/DL (ref 31.5–35.7)
MCV RBC AUTO: 89.6 FL (ref 79–97)
MONOCYTES # BLD AUTO: 0.49 10*3/MM3 (ref 0.1–0.9)
MONOCYTES NFR BLD AUTO: 9.1 % (ref 5–12)
NEUTROPHILS NFR BLD AUTO: 3.26 10*3/MM3 (ref 1.7–7)
NEUTROPHILS NFR BLD AUTO: 60.7 % (ref 42.7–76)
NRBC BLD AUTO-RTO: 0 /100 WBC (ref 0–0.2)
PLATELET # BLD AUTO: 148 10*3/MM3 (ref 140–450)
PMV BLD AUTO: 10.2 FL (ref 6–12)
POTASSIUM SERPL-SCNC: 4 MMOL/L (ref 3.5–5.2)
RBC # BLD AUTO: 3.83 10*6/MM3 (ref 3.77–5.28)
SODIUM SERPL-SCNC: 136 MMOL/L (ref 136–145)
WBC # BLD AUTO: 5.37 10*3/MM3 (ref 3.4–10.8)

## 2021-04-17 PROCEDURE — 94799 UNLISTED PULMONARY SVC/PX: CPT

## 2021-04-17 PROCEDURE — 99232 SBSQ HOSP IP/OBS MODERATE 35: CPT | Performed by: PHYSICIAN ASSISTANT

## 2021-04-17 PROCEDURE — 82962 GLUCOSE BLOOD TEST: CPT

## 2021-04-17 PROCEDURE — 86140 C-REACTIVE PROTEIN: CPT | Performed by: INTERNAL MEDICINE

## 2021-04-17 PROCEDURE — 85025 COMPLETE CBC W/AUTO DIFF WBC: CPT | Performed by: PHYSICIAN ASSISTANT

## 2021-04-17 PROCEDURE — 80048 BASIC METABOLIC PNL TOTAL CA: CPT | Performed by: PHYSICIAN ASSISTANT

## 2021-04-17 RX ORDER — MEGESTROL ACETATE 40 MG/ML
200 SUSPENSION ORAL DAILY
Status: DISCONTINUED | OUTPATIENT
Start: 2021-04-17 | End: 2021-04-17

## 2021-04-17 RX ORDER — BENZONATATE 100 MG/1
100 CAPSULE ORAL 3 TIMES DAILY PRN
Status: DISCONTINUED | OUTPATIENT
Start: 2021-04-17 | End: 2021-04-19 | Stop reason: HOSPADM

## 2021-04-17 RX ORDER — MEGESTROL ACETATE 40 MG/ML
200 SUSPENSION ORAL DAILY
Status: DISCONTINUED | OUTPATIENT
Start: 2021-04-18 | End: 2021-04-19 | Stop reason: HOSPADM

## 2021-04-17 RX ADMIN — FAMOTIDINE 20 MG: 20 TABLET, FILM COATED ORAL at 07:44

## 2021-04-17 RX ADMIN — AMOXICILLIN AND CLAVULANATE POTASSIUM 1 TABLET: 875; 125 TABLET, FILM COATED ORAL at 07:43

## 2021-04-17 RX ADMIN — SUCRALFATE 1 G: 1 TABLET ORAL at 05:47

## 2021-04-17 RX ADMIN — HYDROCODONE BITARTRATE AND ACETAMINOPHEN 1 TABLET: 7.5; 325 TABLET ORAL at 00:36

## 2021-04-17 RX ADMIN — Medication 1 CAPSULE: at 07:44

## 2021-04-17 RX ADMIN — ASPIRIN 81 MG: 81 TABLET, COATED ORAL at 07:44

## 2021-04-17 RX ADMIN — GUAIFENESIN 400 MG: 200 SOLUTION ORAL at 05:47

## 2021-04-17 RX ADMIN — GABAPENTIN 400 MG: 400 CAPSULE ORAL at 21:00

## 2021-04-17 RX ADMIN — MONTELUKAST SODIUM 10 MG: 10 TABLET, COATED ORAL at 21:00

## 2021-04-17 RX ADMIN — ATORVASTATIN CALCIUM 20 MG: 20 TABLET, FILM COATED ORAL at 07:44

## 2021-04-17 RX ADMIN — HYDROCODONE BITARTRATE AND ACETAMINOPHEN 1 TABLET: 7.5; 325 TABLET ORAL at 12:25

## 2021-04-17 RX ADMIN — SODIUM CHLORIDE, PRESERVATIVE FREE 10 ML: 5 INJECTION INTRAVENOUS at 07:46

## 2021-04-17 RX ADMIN — AMOXICILLIN AND CLAVULANATE POTASSIUM 1 TABLET: 875; 125 TABLET, FILM COATED ORAL at 21:00

## 2021-04-17 RX ADMIN — FLUTICASONE PROPIONATE 2 SPRAY: 50 SPRAY, METERED NASAL at 07:44

## 2021-04-18 ENCOUNTER — APPOINTMENT (OUTPATIENT)
Dept: GENERAL RADIOLOGY | Facility: HOSPITAL | Age: 76
End: 2021-04-18

## 2021-04-18 LAB
ANION GAP SERPL CALCULATED.3IONS-SCNC: 9.6 MMOL/L (ref 5–15)
B PARAPERT DNA SPEC QL NAA+PROBE: NOT DETECTED
B PERT DNA SPEC QL NAA+PROBE: NOT DETECTED
BASOPHILS # BLD AUTO: 0.05 10*3/MM3 (ref 0–0.2)
BASOPHILS NFR BLD AUTO: 0.7 % (ref 0–1.5)
BUN SERPL-MCNC: 5 MG/DL (ref 8–23)
BUN/CREAT SERPL: 16.1 (ref 7–25)
C PNEUM DNA NPH QL NAA+NON-PROBE: NOT DETECTED
CALCIUM SPEC-SCNC: 9.3 MG/DL (ref 8.6–10.5)
CHLORIDE SERPL-SCNC: 103 MMOL/L (ref 98–107)
CO2 SERPL-SCNC: 24.4 MMOL/L (ref 22–29)
CREAT SERPL-MCNC: 0.31 MG/DL (ref 0.57–1)
CRP SERPL-MCNC: 2.01 MG/DL (ref 0–0.5)
DEPRECATED RDW RBC AUTO: 50 FL (ref 37–54)
EOSINOPHIL # BLD AUTO: 0.56 10*3/MM3 (ref 0–0.4)
EOSINOPHIL NFR BLD AUTO: 8.3 % (ref 0.3–6.2)
ERYTHROCYTE [DISTWIDTH] IN BLOOD BY AUTOMATED COUNT: 15.4 % (ref 12.3–15.4)
FLUAV SUBTYP SPEC NAA+PROBE: NOT DETECTED
FLUBV RNA ISLT QL NAA+PROBE: NOT DETECTED
GFR SERPL CREATININE-BSD FRML MDRD: >150 ML/MIN/1.73
GLUCOSE BLDC GLUCOMTR-MCNC: 118 MG/DL (ref 70–130)
GLUCOSE BLDC GLUCOMTR-MCNC: 120 MG/DL (ref 70–130)
GLUCOSE BLDC GLUCOMTR-MCNC: 154 MG/DL (ref 70–130)
GLUCOSE BLDC GLUCOMTR-MCNC: 88 MG/DL (ref 70–130)
GLUCOSE SERPL-MCNC: 99 MG/DL (ref 65–99)
HADV DNA SPEC NAA+PROBE: NOT DETECTED
HCOV 229E RNA SPEC QL NAA+PROBE: NOT DETECTED
HCOV HKU1 RNA SPEC QL NAA+PROBE: NOT DETECTED
HCOV NL63 RNA SPEC QL NAA+PROBE: NOT DETECTED
HCOV OC43 RNA SPEC QL NAA+PROBE: NOT DETECTED
HCT VFR BLD AUTO: 35.6 % (ref 34–46.6)
HGB BLD-MCNC: 10.7 G/DL (ref 12–15.9)
HMPV RNA NPH QL NAA+NON-PROBE: NOT DETECTED
HPIV1 RNA SPEC QL NAA+PROBE: NOT DETECTED
HPIV2 RNA SPEC QL NAA+PROBE: NOT DETECTED
HPIV3 RNA NPH QL NAA+PROBE: NOT DETECTED
HPIV4 P GENE NPH QL NAA+PROBE: NOT DETECTED
IMM GRANULOCYTES # BLD AUTO: 0.08 10*3/MM3 (ref 0–0.05)
IMM GRANULOCYTES NFR BLD AUTO: 1.2 % (ref 0–0.5)
LYMPHOCYTES # BLD AUTO: 1.33 10*3/MM3 (ref 0.7–3.1)
LYMPHOCYTES NFR BLD AUTO: 19.7 % (ref 19.6–45.3)
M PNEUMO IGG SER IA-ACNC: NOT DETECTED
MCH RBC QN AUTO: 26.8 PG (ref 26.6–33)
MCHC RBC AUTO-ENTMCNC: 30.1 G/DL (ref 31.5–35.7)
MCV RBC AUTO: 89 FL (ref 79–97)
MONOCYTES # BLD AUTO: 0.65 10*3/MM3 (ref 0.1–0.9)
MONOCYTES NFR BLD AUTO: 9.6 % (ref 5–12)
NEUTROPHILS NFR BLD AUTO: 4.08 10*3/MM3 (ref 1.7–7)
NEUTROPHILS NFR BLD AUTO: 60.5 % (ref 42.7–76)
NRBC BLD AUTO-RTO: 0 /100 WBC (ref 0–0.2)
PLATELET # BLD AUTO: 181 10*3/MM3 (ref 140–450)
PMV BLD AUTO: 10.5 FL (ref 6–12)
POTASSIUM SERPL-SCNC: 3.9 MMOL/L (ref 3.5–5.2)
RBC # BLD AUTO: 4 10*6/MM3 (ref 3.77–5.28)
RHINOVIRUS RNA SPEC NAA+PROBE: DETECTED
RSV RNA NPH QL NAA+NON-PROBE: NOT DETECTED
SODIUM SERPL-SCNC: 137 MMOL/L (ref 136–145)
WBC # BLD AUTO: 6.75 10*3/MM3 (ref 3.4–10.8)

## 2021-04-18 PROCEDURE — 99233 SBSQ HOSP IP/OBS HIGH 50: CPT | Performed by: PHYSICIAN ASSISTANT

## 2021-04-18 PROCEDURE — 0100U HC BIOFIRE FILMARRAY RESP PANEL 2: CPT | Performed by: PHYSICIAN ASSISTANT

## 2021-04-18 PROCEDURE — 94799 UNLISTED PULMONARY SVC/PX: CPT

## 2021-04-18 PROCEDURE — 71045 X-RAY EXAM CHEST 1 VIEW: CPT

## 2021-04-18 PROCEDURE — 86140 C-REACTIVE PROTEIN: CPT | Performed by: INTERNAL MEDICINE

## 2021-04-18 PROCEDURE — 87633 RESP VIRUS 12-25 TARGETS: CPT | Performed by: PHYSICIAN ASSISTANT

## 2021-04-18 PROCEDURE — 85025 COMPLETE CBC W/AUTO DIFF WBC: CPT | Performed by: INTERNAL MEDICINE

## 2021-04-18 PROCEDURE — 82962 GLUCOSE BLOOD TEST: CPT

## 2021-04-18 PROCEDURE — 80048 BASIC METABOLIC PNL TOTAL CA: CPT | Performed by: INTERNAL MEDICINE

## 2021-04-18 PROCEDURE — 71045 X-RAY EXAM CHEST 1 VIEW: CPT | Performed by: RADIOLOGY

## 2021-04-18 RX ADMIN — HYDROCODONE BITARTRATE AND ACETAMINOPHEN 1 TABLET: 7.5; 325 TABLET ORAL at 12:31

## 2021-04-18 RX ADMIN — AMOXICILLIN AND CLAVULANATE POTASSIUM 1 TABLET: 875; 125 TABLET, FILM COATED ORAL at 08:54

## 2021-04-18 RX ADMIN — Medication 1 CAPSULE: at 08:54

## 2021-04-18 RX ADMIN — MONTELUKAST SODIUM 10 MG: 10 TABLET, COATED ORAL at 21:09

## 2021-04-18 RX ADMIN — BENZONATATE 100 MG: 100 CAPSULE ORAL at 21:11

## 2021-04-18 RX ADMIN — FAMOTIDINE 20 MG: 20 TABLET, FILM COATED ORAL at 08:54

## 2021-04-18 RX ADMIN — GABAPENTIN 400 MG: 400 CAPSULE ORAL at 21:23

## 2021-04-18 RX ADMIN — AMOXICILLIN AND CLAVULANATE POTASSIUM 1 TABLET: 875; 125 TABLET, FILM COATED ORAL at 21:09

## 2021-04-18 RX ADMIN — ASPIRIN 81 MG: 81 TABLET, COATED ORAL at 08:54

## 2021-04-18 RX ADMIN — ATORVASTATIN CALCIUM 20 MG: 20 TABLET, FILM COATED ORAL at 08:54

## 2021-04-18 RX ADMIN — SODIUM CHLORIDE, PRESERVATIVE FREE 10 ML: 5 INJECTION INTRAVENOUS at 08:55

## 2021-04-18 RX ADMIN — MEGESTROL ACETATE 200 MG: 40 SUSPENSION ORAL at 08:51

## 2021-04-18 RX ADMIN — SODIUM CHLORIDE, PRESERVATIVE FREE 10 ML: 5 INJECTION INTRAVENOUS at 21:09

## 2021-04-18 RX ADMIN — FLUTICASONE PROPIONATE 2 SPRAY: 50 SPRAY, METERED NASAL at 08:54

## 2021-04-18 RX ADMIN — HYDROCODONE BITARTRATE AND ACETAMINOPHEN 1 TABLET: 7.5; 325 TABLET ORAL at 00:56

## 2021-04-19 ENCOUNTER — HOSPITAL ENCOUNTER (INPATIENT)
Facility: HOSPITAL | Age: 76
LOS: 18 days | Discharge: HOME HEALTH | End: 2021-05-07
Attending: FAMILY MEDICINE | Admitting: FAMILY MEDICINE

## 2021-04-19 VITALS
RESPIRATION RATE: 22 BRPM | TEMPERATURE: 97.3 F | HEIGHT: 66 IN | WEIGHT: 190.3 LBS | HEART RATE: 104 BPM | BODY MASS INDEX: 30.58 KG/M2 | SYSTOLIC BLOOD PRESSURE: 152 MMHG | OXYGEN SATURATION: 96 % | DIASTOLIC BLOOD PRESSURE: 82 MMHG

## 2021-04-19 DIAGNOSIS — B34.8 RHINOVIRUS INFECTION: Primary | ICD-10-CM

## 2021-04-19 DIAGNOSIS — M19.012 ARTHRITIS OF LEFT SHOULDER REGION: ICD-10-CM

## 2021-04-19 DIAGNOSIS — R53.81 DEBILITY: ICD-10-CM

## 2021-04-19 DIAGNOSIS — J96.01 ACUTE RESPIRATORY FAILURE WITH HYPOXIA (HCC): ICD-10-CM

## 2021-04-19 LAB
ANION GAP SERPL CALCULATED.3IONS-SCNC: 10.5 MMOL/L (ref 5–15)
BASOPHILS # BLD AUTO: 0.04 10*3/MM3 (ref 0–0.2)
BASOPHILS NFR BLD AUTO: 0.6 % (ref 0–1.5)
BUN SERPL-MCNC: 6 MG/DL (ref 8–23)
BUN/CREAT SERPL: 20.7 (ref 7–25)
CALCIUM SPEC-SCNC: 9.3 MG/DL (ref 8.6–10.5)
CHLORIDE SERPL-SCNC: 106 MMOL/L (ref 98–107)
CO2 SERPL-SCNC: 23.5 MMOL/L (ref 22–29)
CREAT SERPL-MCNC: 0.29 MG/DL (ref 0.57–1)
CRP SERPL-MCNC: 2.06 MG/DL (ref 0–0.5)
DEPRECATED RDW RBC AUTO: 52 FL (ref 37–54)
EOSINOPHIL # BLD AUTO: 0.44 10*3/MM3 (ref 0–0.4)
EOSINOPHIL NFR BLD AUTO: 6.5 % (ref 0.3–6.2)
ERYTHROCYTE [DISTWIDTH] IN BLOOD BY AUTOMATED COUNT: 15.8 % (ref 12.3–15.4)
GFR SERPL CREATININE-BSD FRML MDRD: >150 ML/MIN/1.73
GLUCOSE BLDC GLUCOMTR-MCNC: 111 MG/DL (ref 70–130)
GLUCOSE BLDC GLUCOMTR-MCNC: 129 MG/DL (ref 70–130)
GLUCOSE BLDC GLUCOMTR-MCNC: 98 MG/DL (ref 70–130)
GLUCOSE SERPL-MCNC: 110 MG/DL (ref 65–99)
HCT VFR BLD AUTO: 37.5 % (ref 34–46.6)
HGB BLD-MCNC: 11.4 G/DL (ref 12–15.9)
IMM GRANULOCYTES # BLD AUTO: 0.07 10*3/MM3 (ref 0–0.05)
IMM GRANULOCYTES NFR BLD AUTO: 1 % (ref 0–0.5)
LYMPHOCYTES # BLD AUTO: 1.64 10*3/MM3 (ref 0.7–3.1)
LYMPHOCYTES NFR BLD AUTO: 24.2 % (ref 19.6–45.3)
MCH RBC QN AUTO: 27.3 PG (ref 26.6–33)
MCHC RBC AUTO-ENTMCNC: 30.4 G/DL (ref 31.5–35.7)
MCV RBC AUTO: 89.9 FL (ref 79–97)
MONOCYTES # BLD AUTO: 0.52 10*3/MM3 (ref 0.1–0.9)
MONOCYTES NFR BLD AUTO: 7.7 % (ref 5–12)
NEUTROPHILS NFR BLD AUTO: 4.07 10*3/MM3 (ref 1.7–7)
NEUTROPHILS NFR BLD AUTO: 60 % (ref 42.7–76)
NRBC BLD AUTO-RTO: 0 /100 WBC (ref 0–0.2)
PLATELET # BLD AUTO: 189 10*3/MM3 (ref 140–450)
PMV BLD AUTO: 9.7 FL (ref 6–12)
POTASSIUM SERPL-SCNC: 3.7 MMOL/L (ref 3.5–5.2)
RBC # BLD AUTO: 4.17 10*6/MM3 (ref 3.77–5.28)
SODIUM SERPL-SCNC: 140 MMOL/L (ref 136–145)
WBC # BLD AUTO: 6.78 10*3/MM3 (ref 3.4–10.8)

## 2021-04-19 PROCEDURE — 99239 HOSP IP/OBS DSCHRG MGMT >30: CPT | Performed by: PHYSICIAN ASSISTANT

## 2021-04-19 PROCEDURE — 86140 C-REACTIVE PROTEIN: CPT | Performed by: INTERNAL MEDICINE

## 2021-04-19 PROCEDURE — 82962 GLUCOSE BLOOD TEST: CPT

## 2021-04-19 PROCEDURE — 97110 THERAPEUTIC EXERCISES: CPT

## 2021-04-19 PROCEDURE — 99223 1ST HOSP IP/OBS HIGH 75: CPT | Performed by: FAMILY MEDICINE

## 2021-04-19 PROCEDURE — 85025 COMPLETE CBC W/AUTO DIFF WBC: CPT | Performed by: INTERNAL MEDICINE

## 2021-04-19 PROCEDURE — 97530 THERAPEUTIC ACTIVITIES: CPT

## 2021-04-19 PROCEDURE — 97116 GAIT TRAINING THERAPY: CPT

## 2021-04-19 PROCEDURE — 94799 UNLISTED PULMONARY SVC/PX: CPT

## 2021-04-19 PROCEDURE — 80048 BASIC METABOLIC PNL TOTAL CA: CPT | Performed by: INTERNAL MEDICINE

## 2021-04-19 RX ORDER — MEGESTROL ACETATE 40 MG/ML
200 SUSPENSION ORAL DAILY
Status: DISCONTINUED | OUTPATIENT
Start: 2021-04-20 | End: 2021-05-07 | Stop reason: HOSPADM

## 2021-04-19 RX ORDER — SODIUM CHLORIDE 0.9 % (FLUSH) 0.9 %
10 SYRINGE (ML) INJECTION EVERY 12 HOURS SCHEDULED
Status: CANCELLED | OUTPATIENT
Start: 2021-04-19

## 2021-04-19 RX ORDER — AMOXICILLIN AND CLAVULANATE POTASSIUM 875; 125 MG/1; MG/1
1 TABLET, FILM COATED ORAL EVERY 12 HOURS SCHEDULED
Status: COMPLETED | OUTPATIENT
Start: 2021-04-19 | End: 2021-04-20

## 2021-04-19 RX ORDER — MONTELUKAST SODIUM 10 MG/1
10 TABLET ORAL NIGHTLY
Status: DISCONTINUED | OUTPATIENT
Start: 2021-04-19 | End: 2021-05-07 | Stop reason: HOSPADM

## 2021-04-19 RX ORDER — HYDROCODONE BITARTRATE AND ACETAMINOPHEN 7.5; 325 MG/1; MG/1
1 TABLET ORAL 2 TIMES DAILY PRN
Status: DISCONTINUED | OUTPATIENT
Start: 2021-04-19 | End: 2021-05-07 | Stop reason: HOSPADM

## 2021-04-19 RX ORDER — ASPIRIN 81 MG/1
81 TABLET ORAL DAILY
Status: CANCELLED | OUTPATIENT
Start: 2021-04-20

## 2021-04-19 RX ORDER — AMOXICILLIN AND CLAVULANATE POTASSIUM 875; 125 MG/1; MG/1
1 TABLET, FILM COATED ORAL EVERY 12 HOURS SCHEDULED
Status: CANCELLED | OUTPATIENT
Start: 2021-04-19 | End: 2021-04-21

## 2021-04-19 RX ORDER — FLUTICASONE PROPIONATE 50 MCG
2 SPRAY, SUSPENSION (ML) NASAL DAILY
Status: CANCELLED | OUTPATIENT
Start: 2021-04-20

## 2021-04-19 RX ORDER — MEGESTROL ACETATE 40 MG/ML
200 SUSPENSION ORAL DAILY
Status: CANCELLED | OUTPATIENT
Start: 2021-04-20

## 2021-04-19 RX ORDER — MONTELUKAST SODIUM 10 MG/1
10 TABLET ORAL NIGHTLY
Status: CANCELLED | OUTPATIENT
Start: 2021-04-19

## 2021-04-19 RX ORDER — SODIUM CHLORIDE 0.9 % (FLUSH) 0.9 %
10 SYRINGE (ML) INJECTION EVERY 12 HOURS SCHEDULED
Status: DISCONTINUED | OUTPATIENT
Start: 2021-04-19 | End: 2021-05-07 | Stop reason: HOSPADM

## 2021-04-19 RX ORDER — BENZONATATE 100 MG/1
100 CAPSULE ORAL 3 TIMES DAILY PRN
Status: DISCONTINUED | OUTPATIENT
Start: 2021-04-19 | End: 2021-04-22

## 2021-04-19 RX ORDER — FAMOTIDINE 20 MG/1
20 TABLET, FILM COATED ORAL DAILY
Status: CANCELLED | OUTPATIENT
Start: 2021-04-20

## 2021-04-19 RX ORDER — BENZONATATE 100 MG/1
100 CAPSULE ORAL 3 TIMES DAILY PRN
Status: CANCELLED | OUTPATIENT
Start: 2021-04-19

## 2021-04-19 RX ORDER — L.ACID,PARA/B.BIFIDUM/S.THERM 8B CELL
1 CAPSULE ORAL DAILY
Status: CANCELLED | OUTPATIENT
Start: 2021-04-20

## 2021-04-19 RX ORDER — HYDROCODONE BITARTRATE AND ACETAMINOPHEN 7.5; 325 MG/1; MG/1
1 TABLET ORAL 2 TIMES DAILY PRN
Status: CANCELLED | OUTPATIENT
Start: 2021-04-19

## 2021-04-19 RX ORDER — ATORVASTATIN CALCIUM 20 MG/1
20 TABLET, FILM COATED ORAL DAILY
Status: DISCONTINUED | OUTPATIENT
Start: 2021-04-20 | End: 2021-05-07 | Stop reason: HOSPADM

## 2021-04-19 RX ORDER — GABAPENTIN 400 MG/1
400 CAPSULE ORAL 3 TIMES DAILY PRN
Status: DISCONTINUED | OUTPATIENT
Start: 2021-04-19 | End: 2021-05-07 | Stop reason: HOSPADM

## 2021-04-19 RX ORDER — L.ACID,PARA/B.BIFIDUM/S.THERM 8B CELL
1 CAPSULE ORAL DAILY
Status: DISCONTINUED | OUTPATIENT
Start: 2021-04-20 | End: 2021-04-27

## 2021-04-19 RX ORDER — FLUTICASONE PROPIONATE 50 MCG
2 SPRAY, SUSPENSION (ML) NASAL DAILY
Status: DISCONTINUED | OUTPATIENT
Start: 2021-04-20 | End: 2021-05-07 | Stop reason: HOSPADM

## 2021-04-19 RX ORDER — ATORVASTATIN CALCIUM 20 MG/1
20 TABLET, FILM COATED ORAL DAILY
Status: CANCELLED | OUTPATIENT
Start: 2021-04-20

## 2021-04-19 RX ORDER — LEVOTHYROXINE SODIUM 0.05 MG/1
50 TABLET ORAL DAILY
Status: DISCONTINUED | OUTPATIENT
Start: 2021-04-19 | End: 2021-05-07 | Stop reason: HOSPADM

## 2021-04-19 RX ORDER — ASPIRIN 81 MG/1
81 TABLET ORAL DAILY
Status: DISCONTINUED | OUTPATIENT
Start: 2021-04-20 | End: 2021-05-07 | Stop reason: HOSPADM

## 2021-04-19 RX ORDER — GABAPENTIN 400 MG/1
400 CAPSULE ORAL 3 TIMES DAILY PRN
Status: CANCELLED | OUTPATIENT
Start: 2021-04-19

## 2021-04-19 RX ORDER — FAMOTIDINE 20 MG/1
20 TABLET, FILM COATED ORAL DAILY
Status: DISCONTINUED | OUTPATIENT
Start: 2021-04-20 | End: 2021-05-07 | Stop reason: HOSPADM

## 2021-04-19 RX ADMIN — Medication 1 CAPSULE: at 08:49

## 2021-04-19 RX ADMIN — FAMOTIDINE 20 MG: 20 TABLET, FILM COATED ORAL at 08:48

## 2021-04-19 RX ADMIN — MONTELUKAST SODIUM 10 MG: 10 TABLET, COATED ORAL at 20:56

## 2021-04-19 RX ADMIN — HYDROCODONE BITARTRATE AND ACETAMINOPHEN 1 TABLET: 7.5; 325 TABLET ORAL at 03:03

## 2021-04-19 RX ADMIN — LEVOTHYROXINE SODIUM 50 MCG: 50 TABLET ORAL at 16:44

## 2021-04-19 RX ADMIN — SODIUM CHLORIDE, PRESERVATIVE FREE 10 ML: 5 INJECTION INTRAVENOUS at 20:56

## 2021-04-19 RX ADMIN — OFLOXACIN 50000 UNITS: 300 TABLET, COATED ORAL at 16:44

## 2021-04-19 RX ADMIN — SODIUM CHLORIDE, PRESERVATIVE FREE 10 ML: 5 INJECTION INTRAVENOUS at 08:49

## 2021-04-19 RX ADMIN — AMOXICILLIN AND CLAVULANATE POTASSIUM 1 TABLET: 875; 125 TABLET, FILM COATED ORAL at 20:56

## 2021-04-19 RX ADMIN — AMOXICILLIN AND CLAVULANATE POTASSIUM 1 TABLET: 875; 125 TABLET, FILM COATED ORAL at 08:49

## 2021-04-19 RX ADMIN — ATORVASTATIN CALCIUM 20 MG: 20 TABLET, FILM COATED ORAL at 08:49

## 2021-04-19 RX ADMIN — GABAPENTIN 400 MG: 400 CAPSULE ORAL at 20:56

## 2021-04-19 RX ADMIN — ASPIRIN 81 MG: 81 TABLET, COATED ORAL at 08:49

## 2021-04-19 RX ADMIN — HYDROCODONE BITARTRATE AND ACETAMINOPHEN 1 TABLET: 7.5; 325 TABLET ORAL at 16:05

## 2021-04-19 RX ADMIN — FLUTICASONE PROPIONATE 2 SPRAY: 50 SPRAY, METERED NASAL at 08:49

## 2021-04-20 ENCOUNTER — APPOINTMENT (OUTPATIENT)
Dept: GENERAL RADIOLOGY | Facility: HOSPITAL | Age: 76
End: 2021-04-20

## 2021-04-20 LAB
ANION GAP SERPL CALCULATED.3IONS-SCNC: 11.6 MMOL/L (ref 5–15)
BASOPHILS # BLD AUTO: 0.04 10*3/MM3 (ref 0–0.2)
BASOPHILS NFR BLD AUTO: 0.5 % (ref 0–1.5)
BUN SERPL-MCNC: 8 MG/DL (ref 8–23)
BUN/CREAT SERPL: 23.5 (ref 7–25)
CALCIUM SPEC-SCNC: 9.4 MG/DL (ref 8.6–10.5)
CHLORIDE SERPL-SCNC: 101 MMOL/L (ref 98–107)
CO2 SERPL-SCNC: 20.4 MMOL/L (ref 22–29)
CREAT SERPL-MCNC: 0.34 MG/DL (ref 0.57–1)
DEPRECATED RDW RBC AUTO: 51.1 FL (ref 37–54)
EOSINOPHIL # BLD AUTO: 0.21 10*3/MM3 (ref 0–0.4)
EOSINOPHIL NFR BLD AUTO: 2.5 % (ref 0.3–6.2)
ERYTHROCYTE [DISTWIDTH] IN BLOOD BY AUTOMATED COUNT: 15.8 % (ref 12.3–15.4)
GFR SERPL CREATININE-BSD FRML MDRD: >150 ML/MIN/1.73
GLUCOSE BLDC GLUCOMTR-MCNC: 103 MG/DL (ref 70–130)
GLUCOSE BLDC GLUCOMTR-MCNC: 108 MG/DL (ref 70–130)
GLUCOSE BLDC GLUCOMTR-MCNC: 108 MG/DL (ref 70–130)
GLUCOSE BLDC GLUCOMTR-MCNC: 114 MG/DL (ref 70–130)
GLUCOSE SERPL-MCNC: 118 MG/DL (ref 65–99)
HCT VFR BLD AUTO: 39.1 % (ref 34–46.6)
HGB BLD-MCNC: 11.8 G/DL (ref 12–15.9)
IMM GRANULOCYTES # BLD AUTO: 0.06 10*3/MM3 (ref 0–0.05)
IMM GRANULOCYTES NFR BLD AUTO: 0.7 % (ref 0–0.5)
LYMPHOCYTES # BLD AUTO: 0.54 10*3/MM3 (ref 0.7–3.1)
LYMPHOCYTES NFR BLD AUTO: 6.5 % (ref 19.6–45.3)
MAGNESIUM SERPL-MCNC: 1.6 MG/DL (ref 1.6–2.4)
MCH RBC QN AUTO: 26.6 PG (ref 26.6–33)
MCHC RBC AUTO-ENTMCNC: 30.2 G/DL (ref 31.5–35.7)
MCV RBC AUTO: 88.1 FL (ref 79–97)
MONOCYTES # BLD AUTO: 0.11 10*3/MM3 (ref 0.1–0.9)
MONOCYTES NFR BLD AUTO: 1.3 % (ref 5–12)
NEUTROPHILS NFR BLD AUTO: 7.31 10*3/MM3 (ref 1.7–7)
NEUTROPHILS NFR BLD AUTO: 88.5 % (ref 42.7–76)
NRBC BLD AUTO-RTO: 0 /100 WBC (ref 0–0.2)
PLATELET # BLD AUTO: 162 10*3/MM3 (ref 140–450)
PMV BLD AUTO: 9.6 FL (ref 6–12)
POTASSIUM SERPL-SCNC: 3.5 MMOL/L (ref 3.5–5.2)
RBC # BLD AUTO: 4.44 10*6/MM3 (ref 3.77–5.28)
SODIUM SERPL-SCNC: 133 MMOL/L (ref 136–145)
SRA .2 IU/ML UFH SER-ACNC: 1 % (ref 0–20)
SRA 100IU/ML UFH SER-ACNC: <1 % (ref 0–20)
SRA UFH SER-IMP: NORMAL
WBC # BLD AUTO: 8.27 10*3/MM3 (ref 3.4–10.8)

## 2021-04-20 PROCEDURE — 71045 X-RAY EXAM CHEST 1 VIEW: CPT

## 2021-04-20 PROCEDURE — 87205 SMEAR GRAM STAIN: CPT | Performed by: FAMILY MEDICINE

## 2021-04-20 PROCEDURE — 97530 THERAPEUTIC ACTIVITIES: CPT | Performed by: OCCUPATIONAL THERAPIST

## 2021-04-20 PROCEDURE — 97535 SELF CARE MNGMENT TRAINING: CPT | Performed by: OCCUPATIONAL THERAPIST

## 2021-04-20 PROCEDURE — 97110 THERAPEUTIC EXERCISES: CPT

## 2021-04-20 PROCEDURE — 97161 PT EVAL LOW COMPLEX 20 MIN: CPT

## 2021-04-20 PROCEDURE — 97167 OT EVAL HIGH COMPLEX 60 MIN: CPT | Performed by: OCCUPATIONAL THERAPIST

## 2021-04-20 PROCEDURE — 92610 EVALUATE SWALLOWING FUNCTION: CPT

## 2021-04-20 PROCEDURE — 83735 ASSAY OF MAGNESIUM: CPT | Performed by: FAMILY MEDICINE

## 2021-04-20 PROCEDURE — 80048 BASIC METABOLIC PNL TOTAL CA: CPT | Performed by: FAMILY MEDICINE

## 2021-04-20 PROCEDURE — 94799 UNLISTED PULMONARY SVC/PX: CPT

## 2021-04-20 PROCEDURE — 87070 CULTURE OTHR SPECIMN AEROBIC: CPT | Performed by: FAMILY MEDICINE

## 2021-04-20 PROCEDURE — 99232 SBSQ HOSP IP/OBS MODERATE 35: CPT | Performed by: FAMILY MEDICINE

## 2021-04-20 PROCEDURE — 97116 GAIT TRAINING THERAPY: CPT

## 2021-04-20 PROCEDURE — 97110 THERAPEUTIC EXERCISES: CPT | Performed by: OCCUPATIONAL THERAPIST

## 2021-04-20 PROCEDURE — 71045 X-RAY EXAM CHEST 1 VIEW: CPT | Performed by: RADIOLOGY

## 2021-04-20 PROCEDURE — 87040 BLOOD CULTURE FOR BACTERIA: CPT | Performed by: FAMILY MEDICINE

## 2021-04-20 PROCEDURE — 85025 COMPLETE CBC W/AUTO DIFF WBC: CPT | Performed by: FAMILY MEDICINE

## 2021-04-20 PROCEDURE — 82962 GLUCOSE BLOOD TEST: CPT

## 2021-04-20 RX ADMIN — HYDROCODONE BITARTRATE AND ACETAMINOPHEN 1 TABLET: 7.5; 325 TABLET ORAL at 20:39

## 2021-04-20 RX ADMIN — BENZONATATE 100 MG: 100 CAPSULE ORAL at 00:00

## 2021-04-20 RX ADMIN — FAMOTIDINE 20 MG: 20 TABLET ORAL at 08:29

## 2021-04-20 RX ADMIN — HYDROCODONE BITARTRATE AND ACETAMINOPHEN 1 TABLET: 7.5; 325 TABLET ORAL at 08:35

## 2021-04-20 RX ADMIN — BENZONATATE 100 MG: 100 CAPSULE ORAL at 18:06

## 2021-04-20 RX ADMIN — AMOXICILLIN AND CLAVULANATE POTASSIUM 1 TABLET: 875; 125 TABLET, FILM COATED ORAL at 08:28

## 2021-04-20 RX ADMIN — FLUTICASONE PROPIONATE 2 SPRAY: 50 SPRAY, METERED NASAL at 08:28

## 2021-04-20 RX ADMIN — AMOXICILLIN AND CLAVULANATE POTASSIUM 1 TABLET: 875; 125 TABLET, FILM COATED ORAL at 20:40

## 2021-04-20 RX ADMIN — LEVOTHYROXINE SODIUM 50 MCG: 50 TABLET ORAL at 08:27

## 2021-04-20 RX ADMIN — ASPIRIN 81 MG: 81 TABLET, COATED ORAL at 08:27

## 2021-04-20 RX ADMIN — SODIUM CHLORIDE, PRESERVATIVE FREE 10 ML: 5 INJECTION INTRAVENOUS at 20:39

## 2021-04-20 RX ADMIN — GABAPENTIN 400 MG: 400 CAPSULE ORAL at 20:40

## 2021-04-20 RX ADMIN — ATORVASTATIN CALCIUM 20 MG: 20 TABLET, FILM COATED ORAL at 08:27

## 2021-04-20 RX ADMIN — Medication 1 CAPSULE: at 08:27

## 2021-04-20 RX ADMIN — MONTELUKAST SODIUM 10 MG: 10 TABLET, COATED ORAL at 20:40

## 2021-04-20 RX ADMIN — SODIUM CHLORIDE, PRESERVATIVE FREE 10 ML: 5 INJECTION INTRAVENOUS at 08:28

## 2021-04-20 RX ADMIN — MEGESTROL ACETATE 200 MG: 40 SUSPENSION ORAL at 08:28

## 2021-04-21 LAB
ANION GAP SERPL CALCULATED.3IONS-SCNC: 10.2 MMOL/L (ref 5–15)
BASOPHILS # BLD AUTO: 0.04 10*3/MM3 (ref 0–0.2)
BASOPHILS NFR BLD AUTO: 0.4 % (ref 0–1.5)
BUN SERPL-MCNC: 8 MG/DL (ref 8–23)
BUN/CREAT SERPL: 22.2 (ref 7–25)
CALCIUM SPEC-SCNC: 9 MG/DL (ref 8.6–10.5)
CHLORIDE SERPL-SCNC: 104 MMOL/L (ref 98–107)
CO2 SERPL-SCNC: 23.8 MMOL/L (ref 22–29)
CREAT SERPL-MCNC: 0.36 MG/DL (ref 0.57–1)
CRP SERPL-MCNC: 7.48 MG/DL (ref 0–0.5)
DEPRECATED RDW RBC AUTO: 50.4 FL (ref 37–54)
EOSINOPHIL # BLD AUTO: 0.53 10*3/MM3 (ref 0–0.4)
EOSINOPHIL NFR BLD AUTO: 5.9 % (ref 0.3–6.2)
ERYTHROCYTE [DISTWIDTH] IN BLOOD BY AUTOMATED COUNT: 15.7 % (ref 12.3–15.4)
GFR SERPL CREATININE-BSD FRML MDRD: >150 ML/MIN/1.73
GLUCOSE BLDC GLUCOMTR-MCNC: 105 MG/DL (ref 70–130)
GLUCOSE BLDC GLUCOMTR-MCNC: 129 MG/DL (ref 70–130)
GLUCOSE BLDC GLUCOMTR-MCNC: 144 MG/DL (ref 70–130)
GLUCOSE BLDC GLUCOMTR-MCNC: 85 MG/DL (ref 70–130)
GLUCOSE SERPL-MCNC: 116 MG/DL (ref 65–99)
HCT VFR BLD AUTO: 33.7 % (ref 34–46.6)
HGB BLD-MCNC: 10.3 G/DL (ref 12–15.9)
IMM GRANULOCYTES # BLD AUTO: 0.07 10*3/MM3 (ref 0–0.05)
IMM GRANULOCYTES NFR BLD AUTO: 0.8 % (ref 0–0.5)
LYMPHOCYTES # BLD AUTO: 1.56 10*3/MM3 (ref 0.7–3.1)
LYMPHOCYTES NFR BLD AUTO: 17.2 % (ref 19.6–45.3)
MCH RBC QN AUTO: 26.8 PG (ref 26.6–33)
MCHC RBC AUTO-ENTMCNC: 30.6 G/DL (ref 31.5–35.7)
MCV RBC AUTO: 87.5 FL (ref 79–97)
MONOCYTES # BLD AUTO: 0.54 10*3/MM3 (ref 0.1–0.9)
MONOCYTES NFR BLD AUTO: 6 % (ref 5–12)
NEUTROPHILS NFR BLD AUTO: 6.31 10*3/MM3 (ref 1.7–7)
NEUTROPHILS NFR BLD AUTO: 69.7 % (ref 42.7–76)
NRBC BLD AUTO-RTO: 0 /100 WBC (ref 0–0.2)
PLATELET # BLD AUTO: 169 10*3/MM3 (ref 140–450)
PMV BLD AUTO: 10.2 FL (ref 6–12)
POTASSIUM SERPL-SCNC: 3.7 MMOL/L (ref 3.5–5.2)
QT INTERVAL: 376 MS
QTC INTERVAL: 475 MS
RBC # BLD AUTO: 3.85 10*6/MM3 (ref 3.77–5.28)
SODIUM SERPL-SCNC: 138 MMOL/L (ref 136–145)
WBC # BLD AUTO: 9.05 10*3/MM3 (ref 3.4–10.8)

## 2021-04-21 PROCEDURE — 97116 GAIT TRAINING THERAPY: CPT

## 2021-04-21 PROCEDURE — 97530 THERAPEUTIC ACTIVITIES: CPT

## 2021-04-21 PROCEDURE — 85025 COMPLETE CBC W/AUTO DIFF WBC: CPT | Performed by: FAMILY MEDICINE

## 2021-04-21 PROCEDURE — 97112 NEUROMUSCULAR REEDUCATION: CPT

## 2021-04-21 PROCEDURE — 86140 C-REACTIVE PROTEIN: CPT | Performed by: FAMILY MEDICINE

## 2021-04-21 PROCEDURE — 97535 SELF CARE MNGMENT TRAINING: CPT

## 2021-04-21 PROCEDURE — 97110 THERAPEUTIC EXERCISES: CPT

## 2021-04-21 PROCEDURE — 99232 SBSQ HOSP IP/OBS MODERATE 35: CPT | Performed by: FAMILY MEDICINE

## 2021-04-21 PROCEDURE — 82962 GLUCOSE BLOOD TEST: CPT

## 2021-04-21 PROCEDURE — 80048 BASIC METABOLIC PNL TOTAL CA: CPT | Performed by: FAMILY MEDICINE

## 2021-04-21 PROCEDURE — 93005 ELECTROCARDIOGRAM TRACING: CPT | Performed by: FAMILY MEDICINE

## 2021-04-21 PROCEDURE — 94799 UNLISTED PULMONARY SVC/PX: CPT

## 2021-04-21 RX ORDER — AMOXICILLIN AND CLAVULANATE POTASSIUM 875; 125 MG/1; MG/1
1 TABLET, FILM COATED ORAL EVERY 12 HOURS SCHEDULED
Status: COMPLETED | OUTPATIENT
Start: 2021-04-21 | End: 2021-04-22

## 2021-04-21 RX ADMIN — Medication 1 CAPSULE: at 08:48

## 2021-04-21 RX ADMIN — ASPIRIN 81 MG: 81 TABLET, COATED ORAL at 08:48

## 2021-04-21 RX ADMIN — HYDROCODONE BITARTRATE AND ACETAMINOPHEN 1 TABLET: 7.5; 325 TABLET ORAL at 20:37

## 2021-04-21 RX ADMIN — SODIUM CHLORIDE, PRESERVATIVE FREE 10 ML: 5 INJECTION INTRAVENOUS at 08:50

## 2021-04-21 RX ADMIN — GUAIFENESIN 400 MG: 100 SOLUTION ORAL at 08:48

## 2021-04-21 RX ADMIN — ATORVASTATIN CALCIUM 20 MG: 20 TABLET, FILM COATED ORAL at 08:48

## 2021-04-21 RX ADMIN — MONTELUKAST SODIUM 10 MG: 10 TABLET, COATED ORAL at 20:37

## 2021-04-21 RX ADMIN — FLUTICASONE PROPIONATE 2 SPRAY: 50 SPRAY, METERED NASAL at 08:48

## 2021-04-21 RX ADMIN — AMOXICILLIN AND CLAVULANATE POTASSIUM 1 TABLET: 875; 125 TABLET, FILM COATED ORAL at 10:02

## 2021-04-21 RX ADMIN — FAMOTIDINE 20 MG: 20 TABLET ORAL at 08:48

## 2021-04-21 RX ADMIN — BENZONATATE 100 MG: 100 CAPSULE ORAL at 08:48

## 2021-04-21 RX ADMIN — AMOXICILLIN AND CLAVULANATE POTASSIUM 1 TABLET: 875; 125 TABLET, FILM COATED ORAL at 20:37

## 2021-04-21 RX ADMIN — HYDROCODONE BITARTRATE AND ACETAMINOPHEN 1 TABLET: 7.5; 325 TABLET ORAL at 08:50

## 2021-04-21 RX ADMIN — BENZONATATE 100 MG: 100 CAPSULE ORAL at 20:37

## 2021-04-21 RX ADMIN — GABAPENTIN 400 MG: 400 CAPSULE ORAL at 14:33

## 2021-04-21 RX ADMIN — SODIUM CHLORIDE, PRESERVATIVE FREE 10 ML: 5 INJECTION INTRAVENOUS at 20:37

## 2021-04-21 RX ADMIN — MEGESTROL ACETATE 200 MG: 40 SUSPENSION ORAL at 08:48

## 2021-04-21 RX ADMIN — LEVOTHYROXINE SODIUM 50 MCG: 50 TABLET ORAL at 08:50

## 2021-04-22 ENCOUNTER — APPOINTMENT (OUTPATIENT)
Dept: CT IMAGING | Facility: HOSPITAL | Age: 76
End: 2021-04-22

## 2021-04-22 LAB
ANION GAP SERPL CALCULATED.3IONS-SCNC: 10.1 MMOL/L (ref 5–15)
ANION GAP SERPL CALCULATED.3IONS-SCNC: 10.7 MMOL/L (ref 5–15)
BACTERIA SPEC RESP CULT: NORMAL
BASOPHILS # BLD AUTO: 0.03 10*3/MM3 (ref 0–0.2)
BASOPHILS NFR BLD AUTO: 0.4 % (ref 0–1.5)
BUN SERPL-MCNC: 7 MG/DL (ref 8–23)
BUN SERPL-MCNC: 9 MG/DL (ref 8–23)
BUN/CREAT SERPL: 22.6 (ref 7–25)
BUN/CREAT SERPL: 26.5 (ref 7–25)
CALCIUM SPEC-SCNC: 9 MG/DL (ref 8.6–10.5)
CALCIUM SPEC-SCNC: 9.3 MG/DL (ref 8.6–10.5)
CHLORIDE SERPL-SCNC: 102 MMOL/L (ref 98–107)
CHLORIDE SERPL-SCNC: 104 MMOL/L (ref 98–107)
CO2 SERPL-SCNC: 20.3 MMOL/L (ref 22–29)
CO2 SERPL-SCNC: 22.9 MMOL/L (ref 22–29)
CREAT SERPL-MCNC: 0.31 MG/DL (ref 0.57–1)
CREAT SERPL-MCNC: 0.34 MG/DL (ref 0.57–1)
CRP SERPL-MCNC: 3 MG/DL (ref 0–0.5)
CRP SERPL-MCNC: 6.22 MG/DL (ref 0–0.5)
DEPRECATED RDW RBC AUTO: 51.9 FL (ref 37–54)
EOSINOPHIL # BLD AUTO: 0.34 10*3/MM3 (ref 0–0.4)
EOSINOPHIL NFR BLD AUTO: 4.7 % (ref 0.3–6.2)
ERYTHROCYTE [DISTWIDTH] IN BLOOD BY AUTOMATED COUNT: 15.8 % (ref 12.3–15.4)
GFR SERPL CREATININE-BSD FRML MDRD: >150 ML/MIN/1.73
GFR SERPL CREATININE-BSD FRML MDRD: >150 ML/MIN/1.73
GLUCOSE BLDC GLUCOMTR-MCNC: 103 MG/DL (ref 70–130)
GLUCOSE BLDC GLUCOMTR-MCNC: 104 MG/DL (ref 70–130)
GLUCOSE BLDC GLUCOMTR-MCNC: 106 MG/DL (ref 70–130)
GLUCOSE BLDC GLUCOMTR-MCNC: 93 MG/DL (ref 70–130)
GLUCOSE SERPL-MCNC: 104 MG/DL (ref 65–99)
GLUCOSE SERPL-MCNC: 108 MG/DL (ref 65–99)
GRAM STN SPEC: NORMAL
HCT VFR BLD AUTO: 33.7 % (ref 34–46.6)
HGB BLD-MCNC: 10.2 G/DL (ref 12–15.9)
IMM GRANULOCYTES # BLD AUTO: 0.03 10*3/MM3 (ref 0–0.05)
IMM GRANULOCYTES NFR BLD AUTO: 0.4 % (ref 0–0.5)
LYMPHOCYTES # BLD AUTO: 1.75 10*3/MM3 (ref 0.7–3.1)
LYMPHOCYTES NFR BLD AUTO: 24 % (ref 19.6–45.3)
MCH RBC QN AUTO: 27.1 PG (ref 26.6–33)
MCHC RBC AUTO-ENTMCNC: 30.3 G/DL (ref 31.5–35.7)
MCV RBC AUTO: 89.6 FL (ref 79–97)
MONOCYTES # BLD AUTO: 0.55 10*3/MM3 (ref 0.1–0.9)
MONOCYTES NFR BLD AUTO: 7.6 % (ref 5–12)
NEUTROPHILS NFR BLD AUTO: 4.58 10*3/MM3 (ref 1.7–7)
NEUTROPHILS NFR BLD AUTO: 62.9 % (ref 42.7–76)
NRBC BLD AUTO-RTO: 0 /100 WBC (ref 0–0.2)
PLATELET # BLD AUTO: 168 10*3/MM3 (ref 140–450)
PMV BLD AUTO: 9.6 FL (ref 6–12)
POTASSIUM SERPL-SCNC: 3.4 MMOL/L (ref 3.5–5.2)
POTASSIUM SERPL-SCNC: 4 MMOL/L (ref 3.5–5.2)
RBC # BLD AUTO: 3.76 10*6/MM3 (ref 3.77–5.28)
SODIUM SERPL-SCNC: 133 MMOL/L (ref 136–145)
SODIUM SERPL-SCNC: 137 MMOL/L (ref 136–145)
TROPONIN T SERPL-MCNC: 0.01 NG/ML (ref 0–0.03)
WBC # BLD AUTO: 7.28 10*3/MM3 (ref 3.4–10.8)

## 2021-04-22 PROCEDURE — 97110 THERAPEUTIC EXERCISES: CPT | Performed by: OCCUPATIONAL THERAPIST

## 2021-04-22 PROCEDURE — 97535 SELF CARE MNGMENT TRAINING: CPT | Performed by: OCCUPATIONAL THERAPIST

## 2021-04-22 PROCEDURE — 97530 THERAPEUTIC ACTIVITIES: CPT

## 2021-04-22 PROCEDURE — 97110 THERAPEUTIC EXERCISES: CPT

## 2021-04-22 PROCEDURE — 97530 THERAPEUTIC ACTIVITIES: CPT | Performed by: OCCUPATIONAL THERAPIST

## 2021-04-22 PROCEDURE — 97116 GAIT TRAINING THERAPY: CPT

## 2021-04-22 PROCEDURE — 71250 CT THORAX DX C-: CPT | Performed by: RADIOLOGY

## 2021-04-22 PROCEDURE — 82962 GLUCOSE BLOOD TEST: CPT

## 2021-04-22 PROCEDURE — 71250 CT THORAX DX C-: CPT

## 2021-04-22 PROCEDURE — 99232 SBSQ HOSP IP/OBS MODERATE 35: CPT | Performed by: FAMILY MEDICINE

## 2021-04-22 PROCEDURE — 93005 ELECTROCARDIOGRAM TRACING: CPT | Performed by: FAMILY MEDICINE

## 2021-04-22 PROCEDURE — 25010000002 ONDANSETRON PER 1 MG

## 2021-04-22 PROCEDURE — 85025 COMPLETE CBC W/AUTO DIFF WBC: CPT | Performed by: FAMILY MEDICINE

## 2021-04-22 PROCEDURE — 84484 ASSAY OF TROPONIN QUANT: CPT | Performed by: FAMILY MEDICINE

## 2021-04-22 PROCEDURE — 85007 BL SMEAR W/DIFF WBC COUNT: CPT | Performed by: FAMILY MEDICINE

## 2021-04-22 PROCEDURE — 80048 BASIC METABOLIC PNL TOTAL CA: CPT | Performed by: FAMILY MEDICINE

## 2021-04-22 PROCEDURE — 86140 C-REACTIVE PROTEIN: CPT | Performed by: FAMILY MEDICINE

## 2021-04-22 PROCEDURE — 92610 EVALUATE SWALLOWING FUNCTION: CPT

## 2021-04-22 RX ORDER — ONDANSETRON 2 MG/ML
4 INJECTION INTRAMUSCULAR; INTRAVENOUS EVERY 6 HOURS PRN
Status: DISCONTINUED | OUTPATIENT
Start: 2021-04-22 | End: 2021-05-07 | Stop reason: HOSPADM

## 2021-04-22 RX ORDER — POTASSIUM CHLORIDE 7.45 MG/ML
10 INJECTION INTRAVENOUS
Status: DISCONTINUED | OUTPATIENT
Start: 2021-04-22 | End: 2021-05-07 | Stop reason: HOSPADM

## 2021-04-22 RX ORDER — POTASSIUM CHLORIDE 20 MEQ/1
40 TABLET, EXTENDED RELEASE ORAL AS NEEDED
Status: DISCONTINUED | OUTPATIENT
Start: 2021-04-22 | End: 2021-05-07 | Stop reason: HOSPADM

## 2021-04-22 RX ORDER — POTASSIUM CHLORIDE 1.5 G/1.77G
40 POWDER, FOR SOLUTION ORAL AS NEEDED
Status: DISCONTINUED | OUTPATIENT
Start: 2021-04-22 | End: 2021-05-07 | Stop reason: HOSPADM

## 2021-04-22 RX ORDER — CALCIUM CARBONATE 200(500)MG
1 TABLET,CHEWABLE ORAL 3 TIMES DAILY PRN
Status: DISCONTINUED | OUTPATIENT
Start: 2021-04-22 | End: 2021-05-07 | Stop reason: HOSPADM

## 2021-04-22 RX ORDER — BENZONATATE 100 MG/1
200 CAPSULE ORAL 3 TIMES DAILY PRN
Status: DISCONTINUED | OUTPATIENT
Start: 2021-04-22 | End: 2021-05-07 | Stop reason: HOSPADM

## 2021-04-22 RX ORDER — ONDANSETRON 2 MG/ML
INJECTION INTRAMUSCULAR; INTRAVENOUS
Status: COMPLETED
Start: 2021-04-22 | End: 2021-04-22

## 2021-04-22 RX ADMIN — ATORVASTATIN CALCIUM 20 MG: 20 TABLET, FILM COATED ORAL at 08:46

## 2021-04-22 RX ADMIN — AMOXICILLIN AND CLAVULANATE POTASSIUM 1 TABLET: 875; 125 TABLET, FILM COATED ORAL at 08:46

## 2021-04-22 RX ADMIN — HYDROCODONE BITARTRATE AND ACETAMINOPHEN 1 TABLET: 7.5; 325 TABLET ORAL at 21:28

## 2021-04-22 RX ADMIN — ASPIRIN 81 MG: 81 TABLET, COATED ORAL at 08:46

## 2021-04-22 RX ADMIN — ONDANSETRON 4 MG: 2 INJECTION INTRAMUSCULAR; INTRAVENOUS at 22:50

## 2021-04-22 RX ADMIN — FLUTICASONE PROPIONATE 2 SPRAY: 50 SPRAY, METERED NASAL at 08:46

## 2021-04-22 RX ADMIN — HYDROCODONE BITARTRATE AND ACETAMINOPHEN 1 TABLET: 7.5; 325 TABLET ORAL at 08:46

## 2021-04-22 RX ADMIN — Medication 1 CAPSULE: at 08:46

## 2021-04-22 RX ADMIN — MONTELUKAST SODIUM 10 MG: 10 TABLET, COATED ORAL at 21:28

## 2021-04-22 RX ADMIN — SODIUM CHLORIDE, PRESERVATIVE FREE 10 ML: 5 INJECTION INTRAVENOUS at 08:46

## 2021-04-22 RX ADMIN — SODIUM CHLORIDE, PRESERVATIVE FREE 10 ML: 5 INJECTION INTRAVENOUS at 21:28

## 2021-04-22 RX ADMIN — MEGESTROL ACETATE 200 MG: 40 SUSPENSION ORAL at 10:33

## 2021-04-22 RX ADMIN — CALCIUM CARBONATE 1 TABLET: 500 TABLET, CHEWABLE ORAL at 23:14

## 2021-04-22 RX ADMIN — FAMOTIDINE 20 MG: 20 TABLET ORAL at 08:46

## 2021-04-22 RX ADMIN — LEVOTHYROXINE SODIUM 50 MCG: 50 TABLET ORAL at 08:46

## 2021-04-22 RX ADMIN — METOPROLOL TARTRATE 12.5 MG: 25 TABLET, FILM COATED ORAL at 23:59

## 2021-04-22 NOTE — PROGRESS NOTES
"Patient Assessment Instrument  Quality Indicators - Admission    Section B. Hearing, Speech Vision      Section C. Cognitive Patterns  Brief Interview for Mental Status (BIMS) was conducted.  Repetition of Three Words: Three words  Able to report correct year: Correct  Able to report correct month: Accurate within 5 days  Able to report correct day of the week: Correct  Able to recall \"sock\": Yes, no cue required  Able to recall \"blue\": Yes, no cue required  Able to recall \"bed\": Yes, no cue required    BIMS SUMMARY SCORE: 15 Cognitively intact Patient was able to complete the Brief  Interview for Mental Status    Section SN4528. Prior Functioning    Self Care: Patient completed the activities by him/herself, with or without an  assistive device, with no assistance from a helper.  Indoor Mobility: Patient completed the activities by him/herself, with or  without an assistive device, with no assistance from a helper.  Stairs: Patient completed the activities by him/herself, with or without an  assistive device, with no assistance from a helper.  Functional Cognition: Patient needed partial assistance from another person to  complete activities.    Section EU1806. Prior Device Use      Section XB4028. Self Care Performance      Section PN1143. Self Care Discharge Goals      Section [id number]. Mobility Performance      Section [id number]. Mobility Discharge Goals      Section H. Bladder and Bowel  Bladder Continence: Always continent (no documented incontinence).  Bowel Continence: Always continent (no documented incontinence).    Section I. Active Diagnosis  Comorbidities and Co-existing Conditions:   Diabetes Mellitus (DM) - e.g.,  diabetic retinopathy, nephropathy, and neuropathy).    Section J. Health Conditions  Patient has had two or more falls, or a fall with injury, in the past year.  Patient has not had major surgery during the 100 days prior to admission.    Section K. Swallowing/Nutritional Status  Modiified food " consistency/supervision (patient requires modified food or liquid  consistency and/or needs supervision during eating for safety).    Section M. Skin Conditions  Unhealed Pressure Ulcer/Injuries at Stage 1 or Higher on Admission:  No.    Section N. Medication    Potential Clinically Significant Medication Issues: No issues found during  review    Section O. Special Treatments, Procedures, and Programs  Patient did not receive total parenteral nutrition treatment at the time of  admission.    OPTIONAL BRANCH FOR TRACKING FALLS  Fall(s) During Shift: No falls.    Signed by: Citlalli Clark, Supervisor

## 2021-04-23 LAB
BASOPHILS # BLD AUTO: 0.03 10*3/MM3 (ref 0–0.2)
BASOPHILS NFR BLD AUTO: 0.4 % (ref 0–1.5)
CRP SERPL-MCNC: 4.34 MG/DL (ref 0–0.5)
DEPRECATED RDW RBC AUTO: 50.7 FL (ref 37–54)
EOSINOPHIL # BLD AUTO: 0.07 10*3/MM3 (ref 0–0.4)
EOSINOPHIL NFR BLD AUTO: 1 % (ref 0.3–6.2)
ERYTHROCYTE [DISTWIDTH] IN BLOOD BY AUTOMATED COUNT: 15.5 % (ref 12.3–15.4)
GLUCOSE BLDC GLUCOMTR-MCNC: 104 MG/DL (ref 70–130)
GLUCOSE BLDC GLUCOMTR-MCNC: 107 MG/DL (ref 70–130)
GLUCOSE BLDC GLUCOMTR-MCNC: 114 MG/DL (ref 70–130)
GLUCOSE BLDC GLUCOMTR-MCNC: 83 MG/DL (ref 70–130)
HCT VFR BLD AUTO: 37.6 % (ref 34–46.6)
HGB BLD-MCNC: 11.6 G/DL (ref 12–15.9)
HYPOCHROMIA BLD QL: NORMAL
IMM GRANULOCYTES # BLD AUTO: 0.04 10*3/MM3 (ref 0–0.05)
IMM GRANULOCYTES NFR BLD AUTO: 0.6 % (ref 0–0.5)
LYMPHOCYTES # BLD AUTO: 0.24 10*3/MM3 (ref 0.7–3.1)
LYMPHOCYTES NFR BLD AUTO: 3.3 % (ref 19.6–45.3)
MCH RBC QN AUTO: 27.5 PG (ref 26.6–33)
MCHC RBC AUTO-ENTMCNC: 30.9 G/DL (ref 31.5–35.7)
MCV RBC AUTO: 89.1 FL (ref 79–97)
MONOCYTES # BLD AUTO: 0.04 10*3/MM3 (ref 0.1–0.9)
MONOCYTES NFR BLD AUTO: 0.6 % (ref 5–12)
NEUTROPHILS NFR BLD AUTO: 6.78 10*3/MM3 (ref 1.7–7)
NEUTROPHILS NFR BLD AUTO: 94.1 % (ref 42.7–76)
NRBC BLD AUTO-RTO: 0 /100 WBC (ref 0–0.2)
PLAT MORPH BLD: NORMAL
PLATELET # BLD AUTO: 137 10*3/MM3 (ref 140–450)
PMV BLD AUTO: 9.5 FL (ref 6–12)
POTASSIUM SERPL-SCNC: 5.2 MMOL/L (ref 3.5–5.2)
QT INTERVAL: 290 MS
QTC INTERVAL: 431 MS
RBC # BLD AUTO: 4.22 10*6/MM3 (ref 3.77–5.28)
TROPONIN T SERPL-MCNC: <0.01 NG/ML (ref 0–0.03)
WBC # BLD AUTO: 7.2 10*3/MM3 (ref 3.4–10.8)

## 2021-04-23 PROCEDURE — 97110 THERAPEUTIC EXERCISES: CPT

## 2021-04-23 PROCEDURE — 97112 NEUROMUSCULAR REEDUCATION: CPT

## 2021-04-23 PROCEDURE — 86140 C-REACTIVE PROTEIN: CPT | Performed by: PHYSICIAN ASSISTANT

## 2021-04-23 PROCEDURE — 82962 GLUCOSE BLOOD TEST: CPT

## 2021-04-23 PROCEDURE — 97530 THERAPEUTIC ACTIVITIES: CPT | Performed by: OCCUPATIONAL THERAPIST

## 2021-04-23 PROCEDURE — 99232 SBSQ HOSP IP/OBS MODERATE 35: CPT | Performed by: FAMILY MEDICINE

## 2021-04-23 PROCEDURE — 84132 ASSAY OF SERUM POTASSIUM: CPT | Performed by: FAMILY MEDICINE

## 2021-04-23 PROCEDURE — 84484 ASSAY OF TROPONIN QUANT: CPT | Performed by: FAMILY MEDICINE

## 2021-04-23 PROCEDURE — 97110 THERAPEUTIC EXERCISES: CPT | Performed by: OCCUPATIONAL THERAPIST

## 2021-04-23 PROCEDURE — 97116 GAIT TRAINING THERAPY: CPT

## 2021-04-23 PROCEDURE — 97535 SELF CARE MNGMENT TRAINING: CPT | Performed by: OCCUPATIONAL THERAPIST

## 2021-04-23 PROCEDURE — 97530 THERAPEUTIC ACTIVITIES: CPT

## 2021-04-23 RX ORDER — SUCRALFATE 1 G/1
1 TABLET ORAL
Status: DISCONTINUED | OUTPATIENT
Start: 2021-04-23 | End: 2021-05-07 | Stop reason: HOSPADM

## 2021-04-23 RX ORDER — POTASSIUM CHLORIDE 1.5 G/1.77G
40 POWDER, FOR SOLUTION ORAL EVERY 4 HOURS
Status: COMPLETED | OUTPATIENT
Start: 2021-04-23 | End: 2021-04-23

## 2021-04-23 RX ORDER — ECHINACEA PURPUREA EXTRACT 125 MG
2 TABLET ORAL AS NEEDED
Status: DISCONTINUED | OUTPATIENT
Start: 2021-04-23 | End: 2021-05-07 | Stop reason: HOSPADM

## 2021-04-23 RX ADMIN — POTASSIUM CHLORIDE 40 MEQ: 1.5 POWDER, FOR SOLUTION ORAL at 05:01

## 2021-04-23 RX ADMIN — SUCRALFATE 1 G: 1 TABLET ORAL at 16:32

## 2021-04-23 RX ADMIN — SUCRALFATE 1 G: 1 TABLET ORAL at 10:43

## 2021-04-23 RX ADMIN — HYDROCODONE BITARTRATE AND ACETAMINOPHEN 1 TABLET: 7.5; 325 TABLET ORAL at 09:25

## 2021-04-23 RX ADMIN — Medication 1 CAPSULE: at 09:23

## 2021-04-23 RX ADMIN — SODIUM CHLORIDE, PRESERVATIVE FREE 10 ML: 5 INJECTION INTRAVENOUS at 09:32

## 2021-04-23 RX ADMIN — POTASSIUM CHLORIDE 40 MEQ: 1.5 POWDER, FOR SOLUTION ORAL at 02:08

## 2021-04-23 RX ADMIN — ASPIRIN 81 MG: 81 TABLET, COATED ORAL at 09:23

## 2021-04-23 RX ADMIN — SODIUM CHLORIDE, PRESERVATIVE FREE 10 ML: 5 INJECTION INTRAVENOUS at 20:21

## 2021-04-23 RX ADMIN — BENZONATATE 200 MG: 100 CAPSULE, LIQUID FILLED ORAL at 09:23

## 2021-04-23 RX ADMIN — HYDROCODONE BITARTRATE AND ACETAMINOPHEN 1 TABLET: 7.5; 325 TABLET ORAL at 21:12

## 2021-04-23 RX ADMIN — BENZONATATE 200 MG: 100 CAPSULE, LIQUID FILLED ORAL at 20:20

## 2021-04-23 RX ADMIN — LEVOTHYROXINE SODIUM 50 MCG: 50 TABLET ORAL at 09:23

## 2021-04-23 RX ADMIN — MEGESTROL ACETATE 200 MG: 40 SUSPENSION ORAL at 09:23

## 2021-04-23 RX ADMIN — FLUTICASONE PROPIONATE 2 SPRAY: 50 SPRAY, METERED NASAL at 09:23

## 2021-04-23 RX ADMIN — SUCRALFATE 1 G: 1 TABLET ORAL at 20:21

## 2021-04-23 RX ADMIN — MONTELUKAST SODIUM 10 MG: 10 TABLET, COATED ORAL at 20:21

## 2021-04-23 RX ADMIN — GABAPENTIN 400 MG: 400 CAPSULE ORAL at 23:52

## 2021-04-23 RX ADMIN — ATORVASTATIN CALCIUM 20 MG: 20 TABLET, FILM COATED ORAL at 09:23

## 2021-04-23 RX ADMIN — FAMOTIDINE 20 MG: 20 TABLET ORAL at 09:23

## 2021-04-24 LAB
CRP SERPL-MCNC: 10.14 MG/DL (ref 0–0.5)
GLUCOSE BLDC GLUCOMTR-MCNC: 111 MG/DL (ref 70–130)
GLUCOSE BLDC GLUCOMTR-MCNC: 112 MG/DL (ref 70–130)
GLUCOSE BLDC GLUCOMTR-MCNC: 119 MG/DL (ref 70–130)
GLUCOSE BLDC GLUCOMTR-MCNC: 127 MG/DL (ref 70–130)

## 2021-04-24 PROCEDURE — 97530 THERAPEUTIC ACTIVITIES: CPT

## 2021-04-24 PROCEDURE — 94799 UNLISTED PULMONARY SVC/PX: CPT

## 2021-04-24 PROCEDURE — 99232 SBSQ HOSP IP/OBS MODERATE 35: CPT | Performed by: FAMILY MEDICINE

## 2021-04-24 PROCEDURE — 86140 C-REACTIVE PROTEIN: CPT | Performed by: PHYSICIAN ASSISTANT

## 2021-04-24 PROCEDURE — 97116 GAIT TRAINING THERAPY: CPT

## 2021-04-24 PROCEDURE — 82962 GLUCOSE BLOOD TEST: CPT

## 2021-04-24 PROCEDURE — 97110 THERAPEUTIC EXERCISES: CPT

## 2021-04-24 PROCEDURE — 97535 SELF CARE MNGMENT TRAINING: CPT

## 2021-04-24 PROCEDURE — 97150 GROUP THERAPEUTIC PROCEDURES: CPT

## 2021-04-24 RX ADMIN — SUCRALFATE 1 G: 1 TABLET ORAL at 10:56

## 2021-04-24 RX ADMIN — SUCRALFATE 1 G: 1 TABLET ORAL at 20:22

## 2021-04-24 RX ADMIN — MEGESTROL ACETATE 200 MG: 40 SUSPENSION ORAL at 09:02

## 2021-04-24 RX ADMIN — LEVOTHYROXINE SODIUM 50 MCG: 50 TABLET ORAL at 09:02

## 2021-04-24 RX ADMIN — BENZONATATE 200 MG: 100 CAPSULE, LIQUID FILLED ORAL at 20:22

## 2021-04-24 RX ADMIN — Medication 1 CAPSULE: at 09:02

## 2021-04-24 RX ADMIN — GABAPENTIN 400 MG: 400 CAPSULE ORAL at 20:22

## 2021-04-24 RX ADMIN — SUCRALFATE 1 G: 1 TABLET ORAL at 16:30

## 2021-04-24 RX ADMIN — MONTELUKAST SODIUM 10 MG: 10 TABLET, COATED ORAL at 20:22

## 2021-04-24 RX ADMIN — SUCRALFATE 1 G: 1 TABLET ORAL at 09:02

## 2021-04-24 RX ADMIN — FLUTICASONE PROPIONATE 2 SPRAY: 50 SPRAY, METERED NASAL at 09:03

## 2021-04-24 RX ADMIN — ASPIRIN 81 MG: 81 TABLET, COATED ORAL at 09:02

## 2021-04-24 RX ADMIN — FAMOTIDINE 20 MG: 20 TABLET ORAL at 09:02

## 2021-04-24 RX ADMIN — ATORVASTATIN CALCIUM 20 MG: 20 TABLET, FILM COATED ORAL at 09:02

## 2021-04-24 RX ADMIN — SODIUM CHLORIDE, PRESERVATIVE FREE 10 ML: 5 INJECTION INTRAVENOUS at 20:23

## 2021-04-24 RX ADMIN — SODIUM CHLORIDE, PRESERVATIVE FREE 10 ML: 5 INJECTION INTRAVENOUS at 09:02

## 2021-04-24 RX ADMIN — HYDROCODONE BITARTRATE AND ACETAMINOPHEN 1 TABLET: 7.5; 325 TABLET ORAL at 09:06

## 2021-04-25 LAB
ANION GAP SERPL CALCULATED.3IONS-SCNC: 10.3 MMOL/L (ref 5–15)
BACTERIA SPEC AEROBE CULT: NORMAL
BACTERIA SPEC AEROBE CULT: NORMAL
BASOPHILS # BLD AUTO: 0.02 10*3/MM3 (ref 0–0.2)
BASOPHILS NFR BLD AUTO: 0.2 % (ref 0–1.5)
BUN SERPL-MCNC: 10 MG/DL (ref 8–23)
BUN/CREAT SERPL: 30.3 (ref 7–25)
CALCIUM SPEC-SCNC: 9.2 MG/DL (ref 8.6–10.5)
CHLORIDE SERPL-SCNC: 104 MMOL/L (ref 98–107)
CO2 SERPL-SCNC: 21.7 MMOL/L (ref 22–29)
CREAT SERPL-MCNC: 0.33 MG/DL (ref 0.57–1)
CRP SERPL-MCNC: 8.18 MG/DL (ref 0–0.5)
DEPRECATED RDW RBC AUTO: 50.4 FL (ref 37–54)
EOSINOPHIL # BLD AUTO: 0.3 10*3/MM3 (ref 0–0.4)
EOSINOPHIL NFR BLD AUTO: 3.6 % (ref 0.3–6.2)
ERYTHROCYTE [DISTWIDTH] IN BLOOD BY AUTOMATED COUNT: 15.8 % (ref 12.3–15.4)
GFR SERPL CREATININE-BSD FRML MDRD: >150 ML/MIN/1.73
GLUCOSE BLDC GLUCOMTR-MCNC: 114 MG/DL (ref 70–130)
GLUCOSE BLDC GLUCOMTR-MCNC: 118 MG/DL (ref 70–130)
GLUCOSE BLDC GLUCOMTR-MCNC: 124 MG/DL (ref 70–130)
GLUCOSE BLDC GLUCOMTR-MCNC: 99 MG/DL (ref 70–130)
GLUCOSE SERPL-MCNC: 110 MG/DL (ref 65–99)
HCT VFR BLD AUTO: 34.2 % (ref 34–46.6)
HGB BLD-MCNC: 10.5 G/DL (ref 12–15.9)
IMM GRANULOCYTES # BLD AUTO: 0.06 10*3/MM3 (ref 0–0.05)
IMM GRANULOCYTES NFR BLD AUTO: 0.7 % (ref 0–0.5)
LYMPHOCYTES # BLD AUTO: 0.68 10*3/MM3 (ref 0.7–3.1)
LYMPHOCYTES NFR BLD AUTO: 8.1 % (ref 19.6–45.3)
MCH RBC QN AUTO: 26.6 PG (ref 26.6–33)
MCHC RBC AUTO-ENTMCNC: 30.7 G/DL (ref 31.5–35.7)
MCV RBC AUTO: 86.8 FL (ref 79–97)
MONOCYTES # BLD AUTO: 0.36 10*3/MM3 (ref 0.1–0.9)
MONOCYTES NFR BLD AUTO: 4.3 % (ref 5–12)
NEUTROPHILS NFR BLD AUTO: 6.99 10*3/MM3 (ref 1.7–7)
NEUTROPHILS NFR BLD AUTO: 83.1 % (ref 42.7–76)
NRBC BLD AUTO-RTO: 0 /100 WBC (ref 0–0.2)
PLATELET # BLD AUTO: 140 10*3/MM3 (ref 140–450)
PMV BLD AUTO: 10.4 FL (ref 6–12)
POTASSIUM SERPL-SCNC: 3.9 MMOL/L (ref 3.5–5.2)
RBC # BLD AUTO: 3.94 10*6/MM3 (ref 3.77–5.28)
SODIUM SERPL-SCNC: 136 MMOL/L (ref 136–145)
WBC # BLD AUTO: 8.41 10*3/MM3 (ref 3.4–10.8)

## 2021-04-25 PROCEDURE — 99231 SBSQ HOSP IP/OBS SF/LOW 25: CPT | Performed by: FAMILY MEDICINE

## 2021-04-25 PROCEDURE — 82962 GLUCOSE BLOOD TEST: CPT

## 2021-04-25 PROCEDURE — 86140 C-REACTIVE PROTEIN: CPT | Performed by: PHYSICIAN ASSISTANT

## 2021-04-25 PROCEDURE — 85025 COMPLETE CBC W/AUTO DIFF WBC: CPT | Performed by: FAMILY MEDICINE

## 2021-04-25 PROCEDURE — 80048 BASIC METABOLIC PNL TOTAL CA: CPT | Performed by: FAMILY MEDICINE

## 2021-04-25 RX ADMIN — GABAPENTIN 400 MG: 400 CAPSULE ORAL at 20:45

## 2021-04-25 RX ADMIN — SUCRALFATE 1 G: 1 TABLET ORAL at 16:33

## 2021-04-25 RX ADMIN — SODIUM CHLORIDE, PRESERVATIVE FREE 10 ML: 5 INJECTION INTRAVENOUS at 20:45

## 2021-04-25 RX ADMIN — LEVOTHYROXINE SODIUM 50 MCG: 50 TABLET ORAL at 08:15

## 2021-04-25 RX ADMIN — SUCRALFATE 1 G: 1 TABLET ORAL at 20:45

## 2021-04-25 RX ADMIN — ATORVASTATIN CALCIUM 20 MG: 20 TABLET, FILM COATED ORAL at 08:15

## 2021-04-25 RX ADMIN — ASPIRIN 81 MG: 81 TABLET, COATED ORAL at 08:15

## 2021-04-25 RX ADMIN — MEGESTROL ACETATE 200 MG: 40 SUSPENSION ORAL at 08:15

## 2021-04-25 RX ADMIN — HYDROCODONE BITARTRATE AND ACETAMINOPHEN 1 TABLET: 7.5; 325 TABLET ORAL at 20:45

## 2021-04-25 RX ADMIN — FAMOTIDINE 20 MG: 20 TABLET ORAL at 08:15

## 2021-04-25 RX ADMIN — CALCIUM CARBONATE 1 TABLET: 500 TABLET, CHEWABLE ORAL at 01:17

## 2021-04-25 RX ADMIN — SUCRALFATE 1 G: 1 TABLET ORAL at 08:15

## 2021-04-25 RX ADMIN — FLUTICASONE PROPIONATE 2 SPRAY: 50 SPRAY, METERED NASAL at 08:15

## 2021-04-25 RX ADMIN — MONTELUKAST SODIUM 10 MG: 10 TABLET, COATED ORAL at 20:45

## 2021-04-25 RX ADMIN — SODIUM CHLORIDE, PRESERVATIVE FREE 10 ML: 5 INJECTION INTRAVENOUS at 08:16

## 2021-04-25 RX ADMIN — Medication 1 CAPSULE: at 08:15

## 2021-04-25 RX ADMIN — HYDROCODONE BITARTRATE AND ACETAMINOPHEN 1 TABLET: 7.5; 325 TABLET ORAL at 01:17

## 2021-04-25 RX ADMIN — SUCRALFATE 1 G: 1 TABLET ORAL at 10:57

## 2021-04-26 ENCOUNTER — APPOINTMENT (OUTPATIENT)
Dept: GENERAL RADIOLOGY | Facility: HOSPITAL | Age: 76
End: 2021-04-26

## 2021-04-26 LAB
CRP SERPL-MCNC: 7.73 MG/DL (ref 0–0.5)
GLUCOSE BLDC GLUCOMTR-MCNC: 102 MG/DL (ref 70–130)
GLUCOSE BLDC GLUCOMTR-MCNC: 107 MG/DL (ref 70–130)
GLUCOSE BLDC GLUCOMTR-MCNC: 115 MG/DL (ref 70–130)
GLUCOSE BLDC GLUCOMTR-MCNC: 200 MG/DL (ref 70–130)

## 2021-04-26 PROCEDURE — 97535 SELF CARE MNGMENT TRAINING: CPT | Performed by: OCCUPATIONAL THERAPIST

## 2021-04-26 PROCEDURE — 97110 THERAPEUTIC EXERCISES: CPT | Performed by: OCCUPATIONAL THERAPIST

## 2021-04-26 PROCEDURE — 73030 X-RAY EXAM OF SHOULDER: CPT

## 2021-04-26 PROCEDURE — 97110 THERAPEUTIC EXERCISES: CPT

## 2021-04-26 PROCEDURE — 72050 X-RAY EXAM NECK SPINE 4/5VWS: CPT | Performed by: RADIOLOGY

## 2021-04-26 PROCEDURE — 82962 GLUCOSE BLOOD TEST: CPT

## 2021-04-26 PROCEDURE — 72050 X-RAY EXAM NECK SPINE 4/5VWS: CPT

## 2021-04-26 PROCEDURE — 97530 THERAPEUTIC ACTIVITIES: CPT

## 2021-04-26 PROCEDURE — 86140 C-REACTIVE PROTEIN: CPT | Performed by: PHYSICIAN ASSISTANT

## 2021-04-26 PROCEDURE — 73030 X-RAY EXAM OF SHOULDER: CPT | Performed by: RADIOLOGY

## 2021-04-26 PROCEDURE — 97530 THERAPEUTIC ACTIVITIES: CPT | Performed by: OCCUPATIONAL THERAPIST

## 2021-04-26 RX ORDER — ACETAMINOPHEN 325 MG/1
650 TABLET ORAL EVERY 6 HOURS PRN
Status: DISCONTINUED | OUTPATIENT
Start: 2021-04-26 | End: 2021-05-07 | Stop reason: HOSPADM

## 2021-04-26 RX ORDER — AMOXICILLIN 250 MG
2 CAPSULE ORAL NIGHTLY
Status: DISCONTINUED | OUTPATIENT
Start: 2021-04-26 | End: 2021-05-07 | Stop reason: HOSPADM

## 2021-04-26 RX ADMIN — GABAPENTIN 400 MG: 400 CAPSULE ORAL at 21:07

## 2021-04-26 RX ADMIN — FAMOTIDINE 20 MG: 20 TABLET ORAL at 08:02

## 2021-04-26 RX ADMIN — MONTELUKAST SODIUM 10 MG: 10 TABLET, COATED ORAL at 21:06

## 2021-04-26 RX ADMIN — Medication 1 CAPSULE: at 08:02

## 2021-04-26 RX ADMIN — MAGNESIUM HYDROXIDE 15 ML: 2400 SUSPENSION ORAL at 15:02

## 2021-04-26 RX ADMIN — LEVOTHYROXINE SODIUM 50 MCG: 50 TABLET ORAL at 08:02

## 2021-04-26 RX ADMIN — ACETAMINOPHEN 650 MG: 325 TABLET, FILM COATED ORAL at 15:02

## 2021-04-26 RX ADMIN — HYDROCODONE BITARTRATE AND ACETAMINOPHEN 1 TABLET: 7.5; 325 TABLET ORAL at 21:07

## 2021-04-26 RX ADMIN — ATORVASTATIN CALCIUM 20 MG: 20 TABLET, FILM COATED ORAL at 08:02

## 2021-04-26 RX ADMIN — SODIUM CHLORIDE, PRESERVATIVE FREE 10 ML: 5 INJECTION INTRAVENOUS at 08:05

## 2021-04-26 RX ADMIN — SUCRALFATE 1 G: 1 TABLET ORAL at 08:02

## 2021-04-26 RX ADMIN — MEGESTROL ACETATE 200 MG: 40 SUSPENSION ORAL at 08:02

## 2021-04-26 RX ADMIN — FLUTICASONE PROPIONATE 2 SPRAY: 50 SPRAY, METERED NASAL at 08:03

## 2021-04-26 RX ADMIN — ASPIRIN 81 MG: 81 TABLET, COATED ORAL at 08:03

## 2021-04-26 RX ADMIN — OFLOXACIN 50000 UNITS: 300 TABLET, COATED ORAL at 08:02

## 2021-04-26 RX ADMIN — DOCUSATE SODIUM 50 MG AND SENNOSIDES 8.6 MG 2 TABLET: 8.6; 5 TABLET, FILM COATED ORAL at 21:07

## 2021-04-26 RX ADMIN — SUCRALFATE 1 G: 1 TABLET ORAL at 11:50

## 2021-04-26 RX ADMIN — SUCRALFATE 1 G: 1 TABLET ORAL at 17:57

## 2021-04-26 RX ADMIN — HYDROCODONE BITARTRATE AND ACETAMINOPHEN 1 TABLET: 7.5; 325 TABLET ORAL at 08:05

## 2021-04-26 RX ADMIN — GABAPENTIN 400 MG: 400 CAPSULE ORAL at 11:50

## 2021-04-26 RX ADMIN — SODIUM CHLORIDE, PRESERVATIVE FREE 10 ML: 5 INJECTION INTRAVENOUS at 21:07

## 2021-04-26 RX ADMIN — SUCRALFATE 1 G: 1 TABLET ORAL at 21:06

## 2021-04-27 LAB
CRP SERPL-MCNC: 7.03 MG/DL (ref 0–0.5)
GLUCOSE BLDC GLUCOMTR-MCNC: 117 MG/DL (ref 70–130)
GLUCOSE BLDC GLUCOMTR-MCNC: 152 MG/DL (ref 70–130)
GLUCOSE BLDC GLUCOMTR-MCNC: 202 MG/DL (ref 70–130)
GLUCOSE BLDC GLUCOMTR-MCNC: 280 MG/DL (ref 70–130)

## 2021-04-27 PROCEDURE — 97535 SELF CARE MNGMENT TRAINING: CPT | Performed by: OCCUPATIONAL THERAPIST

## 2021-04-27 PROCEDURE — 86140 C-REACTIVE PROTEIN: CPT | Performed by: PHYSICIAN ASSISTANT

## 2021-04-27 PROCEDURE — 97116 GAIT TRAINING THERAPY: CPT

## 2021-04-27 PROCEDURE — 97110 THERAPEUTIC EXERCISES: CPT

## 2021-04-27 PROCEDURE — 82962 GLUCOSE BLOOD TEST: CPT

## 2021-04-27 PROCEDURE — 25010000002 METHYLPREDNISOLONE PER 125 MG: Performed by: INTERNAL MEDICINE

## 2021-04-27 PROCEDURE — 97530 THERAPEUTIC ACTIVITIES: CPT | Performed by: OCCUPATIONAL THERAPIST

## 2021-04-27 RX ORDER — METHYLPREDNISOLONE SODIUM SUCCINATE 125 MG/2ML
60 INJECTION, POWDER, LYOPHILIZED, FOR SOLUTION INTRAMUSCULAR; INTRAVENOUS ONCE
Status: COMPLETED | OUTPATIENT
Start: 2021-04-27 | End: 2021-04-27

## 2021-04-27 RX ADMIN — FLUTICASONE PROPIONATE 2 SPRAY: 50 SPRAY, METERED NASAL at 08:13

## 2021-04-27 RX ADMIN — SODIUM CHLORIDE, PRESERVATIVE FREE 10 ML: 5 INJECTION INTRAVENOUS at 08:13

## 2021-04-27 RX ADMIN — METHYLPREDNISOLONE SODIUM SUCCINATE 60 MG: 125 INJECTION, POWDER, FOR SOLUTION INTRAMUSCULAR; INTRAVENOUS at 11:44

## 2021-04-27 RX ADMIN — SUCRALFATE 1 G: 1 TABLET ORAL at 11:46

## 2021-04-27 RX ADMIN — ASPIRIN 81 MG: 81 TABLET, COATED ORAL at 08:13

## 2021-04-27 RX ADMIN — SODIUM CHLORIDE, PRESERVATIVE FREE 10 ML: 5 INJECTION INTRAVENOUS at 20:30

## 2021-04-27 RX ADMIN — FAMOTIDINE 20 MG: 20 TABLET ORAL at 08:13

## 2021-04-27 RX ADMIN — ACETAMINOPHEN 650 MG: 325 TABLET, FILM COATED ORAL at 01:17

## 2021-04-27 RX ADMIN — MAGNESIUM HYDROXIDE 15 ML: 2400 SUSPENSION ORAL at 15:21

## 2021-04-27 RX ADMIN — ATORVASTATIN CALCIUM 20 MG: 20 TABLET, FILM COATED ORAL at 08:13

## 2021-04-27 RX ADMIN — SUCRALFATE 1 G: 1 TABLET ORAL at 20:29

## 2021-04-27 RX ADMIN — LEVOTHYROXINE SODIUM 50 MCG: 50 TABLET ORAL at 08:13

## 2021-04-27 RX ADMIN — GABAPENTIN 400 MG: 400 CAPSULE ORAL at 20:30

## 2021-04-27 RX ADMIN — SUCRALFATE 1 G: 1 TABLET ORAL at 18:19

## 2021-04-27 RX ADMIN — SUCRALFATE 1 G: 1 TABLET ORAL at 08:12

## 2021-04-27 RX ADMIN — MONTELUKAST SODIUM 10 MG: 10 TABLET, COATED ORAL at 20:29

## 2021-04-27 RX ADMIN — GABAPENTIN 400 MG: 400 CAPSULE ORAL at 08:12

## 2021-04-27 RX ADMIN — ACETAMINOPHEN 650 MG: 325 TABLET, FILM COATED ORAL at 10:10

## 2021-04-27 RX ADMIN — HYDROCODONE BITARTRATE AND ACETAMINOPHEN 1 TABLET: 7.5; 325 TABLET ORAL at 20:30

## 2021-04-27 RX ADMIN — HYDROCODONE BITARTRATE AND ACETAMINOPHEN 1 TABLET: 7.5; 325 TABLET ORAL at 08:11

## 2021-04-27 RX ADMIN — MEGESTROL ACETATE 200 MG: 40 SUSPENSION ORAL at 08:12

## 2021-04-27 RX ADMIN — DOCUSATE SODIUM 50 MG AND SENNOSIDES 8.6 MG 2 TABLET: 8.6; 5 TABLET, FILM COATED ORAL at 20:29

## 2021-04-28 LAB
CRP SERPL-MCNC: 10.2 MG/DL (ref 0–0.5)
GLUCOSE BLDC GLUCOMTR-MCNC: 145 MG/DL (ref 70–130)
GLUCOSE BLDC GLUCOMTR-MCNC: 166 MG/DL (ref 70–130)
GLUCOSE BLDC GLUCOMTR-MCNC: 192 MG/DL (ref 70–130)

## 2021-04-28 PROCEDURE — 97112 NEUROMUSCULAR REEDUCATION: CPT

## 2021-04-28 PROCEDURE — 97110 THERAPEUTIC EXERCISES: CPT

## 2021-04-28 PROCEDURE — 97116 GAIT TRAINING THERAPY: CPT

## 2021-04-28 PROCEDURE — 94799 UNLISTED PULMONARY SVC/PX: CPT

## 2021-04-28 PROCEDURE — 82962 GLUCOSE BLOOD TEST: CPT

## 2021-04-28 PROCEDURE — 97535 SELF CARE MNGMENT TRAINING: CPT

## 2021-04-28 PROCEDURE — 97530 THERAPEUTIC ACTIVITIES: CPT

## 2021-04-28 PROCEDURE — 86140 C-REACTIVE PROTEIN: CPT | Performed by: PHYSICIAN ASSISTANT

## 2021-04-28 PROCEDURE — 25010000002 METHYLPREDNISOLONE PER 125 MG: Performed by: INTERNAL MEDICINE

## 2021-04-28 RX ORDER — BISACODYL 10 MG
10 SUPPOSITORY, RECTAL RECTAL DAILY PRN
Status: DISCONTINUED | OUTPATIENT
Start: 2021-04-28 | End: 2021-05-07 | Stop reason: HOSPADM

## 2021-04-28 RX ORDER — METHYLPREDNISOLONE SODIUM SUCCINATE 125 MG/2ML
60 INJECTION, POWDER, LYOPHILIZED, FOR SOLUTION INTRAMUSCULAR; INTRAVENOUS ONCE
Status: COMPLETED | OUTPATIENT
Start: 2021-04-28 | End: 2021-04-28

## 2021-04-28 RX ADMIN — FAMOTIDINE 20 MG: 20 TABLET ORAL at 08:43

## 2021-04-28 RX ADMIN — SUCRALFATE 1 G: 1 TABLET ORAL at 08:43

## 2021-04-28 RX ADMIN — HYDROCODONE BITARTRATE AND ACETAMINOPHEN 1 TABLET: 7.5; 325 TABLET ORAL at 20:13

## 2021-04-28 RX ADMIN — BISACODYL 10 MG: 10 SUPPOSITORY RECTAL at 20:13

## 2021-04-28 RX ADMIN — SUCRALFATE 1 G: 1 TABLET ORAL at 17:12

## 2021-04-28 RX ADMIN — MONTELUKAST SODIUM 10 MG: 10 TABLET, COATED ORAL at 20:14

## 2021-04-28 RX ADMIN — SUCRALFATE 1 G: 1 TABLET ORAL at 11:53

## 2021-04-28 RX ADMIN — SUCRALFATE 1 G: 1 TABLET ORAL at 20:14

## 2021-04-28 RX ADMIN — MEGESTROL ACETATE 200 MG: 40 SUSPENSION ORAL at 08:42

## 2021-04-28 RX ADMIN — MAGNESIUM HYDROXIDE 15 ML: 2400 SUSPENSION ORAL at 15:29

## 2021-04-28 RX ADMIN — ATORVASTATIN CALCIUM 20 MG: 20 TABLET, FILM COATED ORAL at 08:43

## 2021-04-28 RX ADMIN — GABAPENTIN 400 MG: 400 CAPSULE ORAL at 20:13

## 2021-04-28 RX ADMIN — FLUTICASONE PROPIONATE 2 SPRAY: 50 SPRAY, METERED NASAL at 08:43

## 2021-04-28 RX ADMIN — METHYLPREDNISOLONE SODIUM SUCCINATE 60 MG: 125 INJECTION, POWDER, FOR SOLUTION INTRAMUSCULAR; INTRAVENOUS at 08:41

## 2021-04-28 RX ADMIN — DOCUSATE SODIUM 50 MG AND SENNOSIDES 8.6 MG 2 TABLET: 8.6; 5 TABLET, FILM COATED ORAL at 20:14

## 2021-04-28 RX ADMIN — ASPIRIN 81 MG: 81 TABLET, COATED ORAL at 08:43

## 2021-04-28 RX ADMIN — SODIUM CHLORIDE, PRESERVATIVE FREE 10 ML: 5 INJECTION INTRAVENOUS at 20:14

## 2021-04-28 RX ADMIN — LEVOTHYROXINE SODIUM 50 MCG: 50 TABLET ORAL at 08:43

## 2021-04-29 LAB
ALBUMIN SERPL-MCNC: 3.21 G/DL (ref 3.5–5.2)
ALBUMIN/GLOB SERPL: 0.9 G/DL
ALP SERPL-CCNC: 73 U/L (ref 39–117)
ALT SERPL W P-5'-P-CCNC: 12 U/L (ref 1–33)
ANION GAP SERPL CALCULATED.3IONS-SCNC: 13.5 MMOL/L (ref 5–15)
AST SERPL-CCNC: 15 U/L (ref 1–32)
BASOPHILS # BLD AUTO: 0.03 10*3/MM3 (ref 0–0.2)
BASOPHILS NFR BLD AUTO: 0.2 % (ref 0–1.5)
BILIRUB SERPL-MCNC: 0.3 MG/DL (ref 0–1.2)
BUN SERPL-MCNC: 18 MG/DL (ref 8–23)
BUN/CREAT SERPL: 43.9 (ref 7–25)
CALCIUM SPEC-SCNC: 9.3 MG/DL (ref 8.6–10.5)
CHLORIDE SERPL-SCNC: 100 MMOL/L (ref 98–107)
CO2 SERPL-SCNC: 21.5 MMOL/L (ref 22–29)
CREAT SERPL-MCNC: 0.41 MG/DL (ref 0.57–1)
CRP SERPL-MCNC: 4.82 MG/DL (ref 0–0.5)
DEPRECATED RDW RBC AUTO: 50.3 FL (ref 37–54)
EOSINOPHIL # BLD AUTO: 0 10*3/MM3 (ref 0–0.4)
EOSINOPHIL NFR BLD AUTO: 0 % (ref 0.3–6.2)
ERYTHROCYTE [DISTWIDTH] IN BLOOD BY AUTOMATED COUNT: 15.5 % (ref 12.3–15.4)
GFR SERPL CREATININE-BSD FRML MDRD: >150 ML/MIN/1.73
GLOBULIN UR ELPH-MCNC: 3.6 GM/DL
GLUCOSE BLDC GLUCOMTR-MCNC: 108 MG/DL (ref 70–130)
GLUCOSE BLDC GLUCOMTR-MCNC: 109 MG/DL (ref 70–130)
GLUCOSE BLDC GLUCOMTR-MCNC: 142 MG/DL (ref 70–130)
GLUCOSE BLDC GLUCOMTR-MCNC: 89 MG/DL (ref 70–130)
GLUCOSE SERPL-MCNC: 130 MG/DL (ref 65–99)
HCT VFR BLD AUTO: 34.6 % (ref 34–46.6)
HGB BLD-MCNC: 10.4 G/DL (ref 12–15.9)
IMM GRANULOCYTES # BLD AUTO: 0.2 10*3/MM3 (ref 0–0.05)
IMM GRANULOCYTES NFR BLD AUTO: 1.3 % (ref 0–0.5)
LYMPHOCYTES # BLD AUTO: 0.88 10*3/MM3 (ref 0.7–3.1)
LYMPHOCYTES NFR BLD AUTO: 5.9 % (ref 19.6–45.3)
MCH RBC QN AUTO: 26.5 PG (ref 26.6–33)
MCHC RBC AUTO-ENTMCNC: 30.1 G/DL (ref 31.5–35.7)
MCV RBC AUTO: 88.3 FL (ref 79–97)
MONOCYTES # BLD AUTO: 0.86 10*3/MM3 (ref 0.1–0.9)
MONOCYTES NFR BLD AUTO: 5.8 % (ref 5–12)
NEUTROPHILS NFR BLD AUTO: 12.95 10*3/MM3 (ref 1.7–7)
NEUTROPHILS NFR BLD AUTO: 86.8 % (ref 42.7–76)
NRBC BLD AUTO-RTO: 0 /100 WBC (ref 0–0.2)
PLATELET # BLD AUTO: 205 10*3/MM3 (ref 140–450)
PMV BLD AUTO: 9.7 FL (ref 6–12)
POTASSIUM SERPL-SCNC: 4.9 MMOL/L (ref 3.5–5.2)
PROT SERPL-MCNC: 6.8 G/DL (ref 6–8.5)
RBC # BLD AUTO: 3.92 10*6/MM3 (ref 3.77–5.28)
SODIUM SERPL-SCNC: 135 MMOL/L (ref 136–145)
WBC # BLD AUTO: 14.92 10*3/MM3 (ref 3.4–10.8)

## 2021-04-29 PROCEDURE — 97110 THERAPEUTIC EXERCISES: CPT

## 2021-04-29 PROCEDURE — 85025 COMPLETE CBC W/AUTO DIFF WBC: CPT | Performed by: INTERNAL MEDICINE

## 2021-04-29 PROCEDURE — 94799 UNLISTED PULMONARY SVC/PX: CPT

## 2021-04-29 PROCEDURE — 97116 GAIT TRAINING THERAPY: CPT

## 2021-04-29 PROCEDURE — 80053 COMPREHEN METABOLIC PANEL: CPT | Performed by: INTERNAL MEDICINE

## 2021-04-29 PROCEDURE — 86140 C-REACTIVE PROTEIN: CPT | Performed by: INTERNAL MEDICINE

## 2021-04-29 PROCEDURE — 82962 GLUCOSE BLOOD TEST: CPT

## 2021-04-29 PROCEDURE — 97530 THERAPEUTIC ACTIVITIES: CPT

## 2021-04-29 PROCEDURE — 97530 THERAPEUTIC ACTIVITIES: CPT | Performed by: OCCUPATIONAL THERAPIST

## 2021-04-29 PROCEDURE — 97535 SELF CARE MNGMENT TRAINING: CPT | Performed by: OCCUPATIONAL THERAPIST

## 2021-04-29 PROCEDURE — 97110 THERAPEUTIC EXERCISES: CPT | Performed by: OCCUPATIONAL THERAPIST

## 2021-04-29 RX ORDER — POLYETHYLENE GLYCOL 3350 17 G/17G
17 POWDER, FOR SOLUTION ORAL DAILY
Status: DISCONTINUED | OUTPATIENT
Start: 2021-04-29 | End: 2021-05-07 | Stop reason: HOSPADM

## 2021-04-29 RX ADMIN — SODIUM CHLORIDE, PRESERVATIVE FREE 10 ML: 5 INJECTION INTRAVENOUS at 20:34

## 2021-04-29 RX ADMIN — DOCUSATE SODIUM 50 MG AND SENNOSIDES 8.6 MG 2 TABLET: 8.6; 5 TABLET, FILM COATED ORAL at 20:35

## 2021-04-29 RX ADMIN — SUCRALFATE 1 G: 1 TABLET ORAL at 20:35

## 2021-04-29 RX ADMIN — ATORVASTATIN CALCIUM 20 MG: 20 TABLET, FILM COATED ORAL at 09:23

## 2021-04-29 RX ADMIN — MEGESTROL ACETATE 200 MG: 40 SUSPENSION ORAL at 09:23

## 2021-04-29 RX ADMIN — FAMOTIDINE 20 MG: 20 TABLET ORAL at 09:23

## 2021-04-29 RX ADMIN — SUCRALFATE 1 G: 1 TABLET ORAL at 11:05

## 2021-04-29 RX ADMIN — SUCRALFATE 1 G: 1 TABLET ORAL at 16:36

## 2021-04-29 RX ADMIN — ASPIRIN 81 MG: 81 TABLET, COATED ORAL at 09:23

## 2021-04-29 RX ADMIN — SODIUM CHLORIDE, PRESERVATIVE FREE 10 ML: 5 INJECTION INTRAVENOUS at 09:23

## 2021-04-29 RX ADMIN — BENZONATATE 200 MG: 100 CAPSULE, LIQUID FILLED ORAL at 20:34

## 2021-04-29 RX ADMIN — MONTELUKAST SODIUM 10 MG: 10 TABLET, COATED ORAL at 20:34

## 2021-04-29 RX ADMIN — HYDROCODONE BITARTRATE AND ACETAMINOPHEN 1 TABLET: 7.5; 325 TABLET ORAL at 20:34

## 2021-04-29 RX ADMIN — FLUTICASONE PROPIONATE 2 SPRAY: 50 SPRAY, METERED NASAL at 12:30

## 2021-04-29 RX ADMIN — GABAPENTIN 400 MG: 400 CAPSULE ORAL at 20:34

## 2021-04-29 RX ADMIN — POLYETHYLENE GLYCOL 3350 17 G: 17 POWDER, FOR SOLUTION ORAL at 09:23

## 2021-04-29 RX ADMIN — SUCRALFATE 1 G: 1 TABLET ORAL at 09:22

## 2021-04-29 RX ADMIN — LEVOTHYROXINE SODIUM 50 MCG: 50 TABLET ORAL at 09:23

## 2021-04-29 RX ADMIN — ACETAMINOPHEN 650 MG: 325 TABLET, FILM COATED ORAL at 13:56

## 2021-04-30 LAB
GLUCOSE BLDC GLUCOMTR-MCNC: 104 MG/DL (ref 70–130)
GLUCOSE BLDC GLUCOMTR-MCNC: 118 MG/DL (ref 70–130)
GLUCOSE BLDC GLUCOMTR-MCNC: 141 MG/DL (ref 70–130)
GLUCOSE BLDC GLUCOMTR-MCNC: 81 MG/DL (ref 70–130)

## 2021-04-30 PROCEDURE — 97530 THERAPEUTIC ACTIVITIES: CPT | Performed by: OCCUPATIONAL THERAPIST

## 2021-04-30 PROCEDURE — 94799 UNLISTED PULMONARY SVC/PX: CPT

## 2021-04-30 PROCEDURE — 97530 THERAPEUTIC ACTIVITIES: CPT

## 2021-04-30 PROCEDURE — 97535 SELF CARE MNGMENT TRAINING: CPT | Performed by: OCCUPATIONAL THERAPIST

## 2021-04-30 PROCEDURE — 82962 GLUCOSE BLOOD TEST: CPT

## 2021-04-30 PROCEDURE — 97110 THERAPEUTIC EXERCISES: CPT

## 2021-04-30 PROCEDURE — 97116 GAIT TRAINING THERAPY: CPT

## 2021-04-30 PROCEDURE — 97110 THERAPEUTIC EXERCISES: CPT | Performed by: OCCUPATIONAL THERAPIST

## 2021-04-30 RX ADMIN — ATORVASTATIN CALCIUM 20 MG: 20 TABLET, FILM COATED ORAL at 09:25

## 2021-04-30 RX ADMIN — SUCRALFATE 1 G: 1 TABLET ORAL at 11:28

## 2021-04-30 RX ADMIN — FLUTICASONE PROPIONATE 2 SPRAY: 50 SPRAY, METERED NASAL at 09:28

## 2021-04-30 RX ADMIN — HYDROCODONE BITARTRATE AND ACETAMINOPHEN 1 TABLET: 7.5; 325 TABLET ORAL at 20:44

## 2021-04-30 RX ADMIN — DOCUSATE SODIUM 50 MG AND SENNOSIDES 8.6 MG 2 TABLET: 8.6; 5 TABLET, FILM COATED ORAL at 20:45

## 2021-04-30 RX ADMIN — BENZONATATE 200 MG: 100 CAPSULE, LIQUID FILLED ORAL at 20:44

## 2021-04-30 RX ADMIN — SODIUM CHLORIDE, PRESERVATIVE FREE 10 ML: 5 INJECTION INTRAVENOUS at 09:29

## 2021-04-30 RX ADMIN — SUCRALFATE 1 G: 1 TABLET ORAL at 20:45

## 2021-04-30 RX ADMIN — MONTELUKAST SODIUM 10 MG: 10 TABLET, COATED ORAL at 20:45

## 2021-04-30 RX ADMIN — LEVOTHYROXINE SODIUM 50 MCG: 50 TABLET ORAL at 09:25

## 2021-04-30 RX ADMIN — SUCRALFATE 1 G: 1 TABLET ORAL at 17:08

## 2021-04-30 RX ADMIN — ACETAMINOPHEN 650 MG: 325 TABLET, FILM COATED ORAL at 17:18

## 2021-04-30 RX ADMIN — MEGESTROL ACETATE 200 MG: 40 SUSPENSION ORAL at 09:25

## 2021-04-30 RX ADMIN — GABAPENTIN 400 MG: 400 CAPSULE ORAL at 20:44

## 2021-04-30 RX ADMIN — SODIUM CHLORIDE, PRESERVATIVE FREE 10 ML: 5 INJECTION INTRAVENOUS at 20:45

## 2021-04-30 RX ADMIN — FAMOTIDINE 20 MG: 20 TABLET ORAL at 09:25

## 2021-04-30 RX ADMIN — POLYETHYLENE GLYCOL 3350 17 G: 17 POWDER, FOR SOLUTION ORAL at 09:25

## 2021-04-30 RX ADMIN — ASPIRIN 81 MG: 81 TABLET, COATED ORAL at 09:25

## 2021-04-30 RX ADMIN — SUCRALFATE 1 G: 1 TABLET ORAL at 09:25

## 2021-05-01 LAB
GLUCOSE BLDC GLUCOMTR-MCNC: 145 MG/DL (ref 70–130)
GLUCOSE BLDC GLUCOMTR-MCNC: 95 MG/DL (ref 70–130)

## 2021-05-01 PROCEDURE — 94799 UNLISTED PULMONARY SVC/PX: CPT

## 2021-05-01 PROCEDURE — 82962 GLUCOSE BLOOD TEST: CPT

## 2021-05-01 RX ADMIN — FLUTICASONE PROPIONATE 2 SPRAY: 50 SPRAY, METERED NASAL at 08:56

## 2021-05-01 RX ADMIN — GABAPENTIN 400 MG: 400 CAPSULE ORAL at 20:36

## 2021-05-01 RX ADMIN — ASPIRIN 81 MG: 81 TABLET, COATED ORAL at 08:55

## 2021-05-01 RX ADMIN — SUCRALFATE 1 G: 1 TABLET ORAL at 17:41

## 2021-05-01 RX ADMIN — ATORVASTATIN CALCIUM 20 MG: 20 TABLET, FILM COATED ORAL at 08:55

## 2021-05-01 RX ADMIN — HYDROCODONE BITARTRATE AND ACETAMINOPHEN 1 TABLET: 7.5; 325 TABLET ORAL at 19:22

## 2021-05-01 RX ADMIN — MEGESTROL ACETATE 200 MG: 40 SUSPENSION ORAL at 08:55

## 2021-05-01 RX ADMIN — ACETAMINOPHEN 650 MG: 325 TABLET, FILM COATED ORAL at 16:12

## 2021-05-01 RX ADMIN — FAMOTIDINE 20 MG: 20 TABLET ORAL at 08:55

## 2021-05-01 RX ADMIN — SODIUM CHLORIDE, PRESERVATIVE FREE 10 ML: 5 INJECTION INTRAVENOUS at 08:56

## 2021-05-01 RX ADMIN — LEVOTHYROXINE SODIUM 50 MCG: 50 TABLET ORAL at 08:55

## 2021-05-01 RX ADMIN — SUCRALFATE 1 G: 1 TABLET ORAL at 08:55

## 2021-05-01 RX ADMIN — POLYETHYLENE GLYCOL 3350 17 G: 17 POWDER, FOR SOLUTION ORAL at 08:55

## 2021-05-01 RX ADMIN — SUCRALFATE 1 G: 1 TABLET ORAL at 11:57

## 2021-05-02 LAB — GLUCOSE BLDC GLUCOMTR-MCNC: 113 MG/DL (ref 70–130)

## 2021-05-02 PROCEDURE — 94799 UNLISTED PULMONARY SVC/PX: CPT

## 2021-05-02 PROCEDURE — 82962 GLUCOSE BLOOD TEST: CPT

## 2021-05-02 RX ADMIN — HYDROCODONE BITARTRATE AND ACETAMINOPHEN 1 TABLET: 7.5; 325 TABLET ORAL at 08:25

## 2021-05-02 RX ADMIN — LEVOTHYROXINE SODIUM 50 MCG: 50 TABLET ORAL at 08:19

## 2021-05-02 RX ADMIN — SUCRALFATE 1 G: 1 TABLET ORAL at 21:27

## 2021-05-02 RX ADMIN — MONTELUKAST SODIUM 10 MG: 10 TABLET, COATED ORAL at 21:27

## 2021-05-02 RX ADMIN — ASPIRIN 81 MG: 81 TABLET, COATED ORAL at 08:19

## 2021-05-02 RX ADMIN — DOCUSATE SODIUM 50 MG AND SENNOSIDES 8.6 MG 2 TABLET: 8.6; 5 TABLET, FILM COATED ORAL at 21:27

## 2021-05-02 RX ADMIN — CHLORASEPTIC 2 SPRAY: 1.5 LIQUID ORAL at 08:26

## 2021-05-02 RX ADMIN — SODIUM CHLORIDE, PRESERVATIVE FREE 10 ML: 5 INJECTION INTRAVENOUS at 21:28

## 2021-05-02 RX ADMIN — SUCRALFATE 1 G: 1 TABLET ORAL at 17:31

## 2021-05-02 RX ADMIN — MEGESTROL ACETATE 200 MG: 40 SUSPENSION ORAL at 08:20

## 2021-05-02 RX ADMIN — FAMOTIDINE 20 MG: 20 TABLET ORAL at 08:19

## 2021-05-02 RX ADMIN — SODIUM CHLORIDE, PRESERVATIVE FREE 10 ML: 5 INJECTION INTRAVENOUS at 08:20

## 2021-05-02 RX ADMIN — HYDROCODONE BITARTRATE AND ACETAMINOPHEN 1 TABLET: 7.5; 325 TABLET ORAL at 21:28

## 2021-05-02 RX ADMIN — GABAPENTIN 400 MG: 400 CAPSULE ORAL at 21:28

## 2021-05-02 RX ADMIN — SUCRALFATE 1 G: 1 TABLET ORAL at 12:19

## 2021-05-02 RX ADMIN — SUCRALFATE 1 G: 1 TABLET ORAL at 08:19

## 2021-05-02 RX ADMIN — GABAPENTIN 400 MG: 400 CAPSULE ORAL at 08:25

## 2021-05-02 RX ADMIN — FLUTICASONE PROPIONATE 2 SPRAY: 50 SPRAY, METERED NASAL at 08:19

## 2021-05-02 RX ADMIN — ATORVASTATIN CALCIUM 20 MG: 20 TABLET, FILM COATED ORAL at 08:19

## 2021-05-03 LAB
GLUCOSE BLDC GLUCOMTR-MCNC: 160 MG/DL (ref 70–130)
GLUCOSE BLDC GLUCOMTR-MCNC: 99 MG/DL (ref 70–130)

## 2021-05-03 PROCEDURE — 97110 THERAPEUTIC EXERCISES: CPT

## 2021-05-03 PROCEDURE — 97116 GAIT TRAINING THERAPY: CPT

## 2021-05-03 PROCEDURE — 99231 SBSQ HOSP IP/OBS SF/LOW 25: CPT | Performed by: FAMILY MEDICINE

## 2021-05-03 PROCEDURE — 97110 THERAPEUTIC EXERCISES: CPT | Performed by: OCCUPATIONAL THERAPIST

## 2021-05-03 PROCEDURE — 97530 THERAPEUTIC ACTIVITIES: CPT | Performed by: OCCUPATIONAL THERAPIST

## 2021-05-03 PROCEDURE — 97535 SELF CARE MNGMENT TRAINING: CPT | Performed by: OCCUPATIONAL THERAPIST

## 2021-05-03 PROCEDURE — 97530 THERAPEUTIC ACTIVITIES: CPT

## 2021-05-03 PROCEDURE — 82962 GLUCOSE BLOOD TEST: CPT

## 2021-05-03 PROCEDURE — 94799 UNLISTED PULMONARY SVC/PX: CPT

## 2021-05-03 RX ADMIN — ATORVASTATIN CALCIUM 20 MG: 20 TABLET, FILM COATED ORAL at 10:25

## 2021-05-03 RX ADMIN — SODIUM CHLORIDE, PRESERVATIVE FREE 10 ML: 5 INJECTION INTRAVENOUS at 10:27

## 2021-05-03 RX ADMIN — FAMOTIDINE 20 MG: 20 TABLET ORAL at 12:24

## 2021-05-03 RX ADMIN — SUCRALFATE 1 G: 1 TABLET ORAL at 17:47

## 2021-05-03 RX ADMIN — MONTELUKAST SODIUM 10 MG: 10 TABLET, COATED ORAL at 20:33

## 2021-05-03 RX ADMIN — GABAPENTIN 400 MG: 400 CAPSULE ORAL at 10:24

## 2021-05-03 RX ADMIN — GABAPENTIN 400 MG: 400 CAPSULE ORAL at 20:33

## 2021-05-03 RX ADMIN — HYDROCODONE BITARTRATE AND ACETAMINOPHEN 1 TABLET: 7.5; 325 TABLET ORAL at 10:23

## 2021-05-03 RX ADMIN — OFLOXACIN 50000 UNITS: 300 TABLET, COATED ORAL at 10:25

## 2021-05-03 RX ADMIN — ASPIRIN 81 MG: 81 TABLET, COATED ORAL at 10:25

## 2021-05-03 RX ADMIN — POLYETHYLENE GLYCOL 3350 17 G: 17 POWDER, FOR SOLUTION ORAL at 12:25

## 2021-05-03 RX ADMIN — MEGESTROL ACETATE 200 MG: 40 SUSPENSION ORAL at 10:24

## 2021-05-03 RX ADMIN — SUCRALFATE 1 G: 1 TABLET ORAL at 20:33

## 2021-05-03 RX ADMIN — LEVOTHYROXINE SODIUM 50 MCG: 50 TABLET ORAL at 10:25

## 2021-05-03 RX ADMIN — SUCRALFATE 1 G: 1 TABLET ORAL at 12:25

## 2021-05-03 RX ADMIN — SODIUM CHLORIDE, PRESERVATIVE FREE 10 ML: 5 INJECTION INTRAVENOUS at 20:33

## 2021-05-03 RX ADMIN — SUCRALFATE 1 G: 1 TABLET ORAL at 07:47

## 2021-05-03 RX ADMIN — DOCUSATE SODIUM 50 MG AND SENNOSIDES 8.6 MG 2 TABLET: 8.6; 5 TABLET, FILM COATED ORAL at 20:33

## 2021-05-03 RX ADMIN — FLUTICASONE PROPIONATE 2 SPRAY: 50 SPRAY, METERED NASAL at 10:27

## 2021-05-04 ENCOUNTER — APPOINTMENT (OUTPATIENT)
Dept: ULTRASOUND IMAGING | Facility: HOSPITAL | Age: 76
End: 2021-05-04

## 2021-05-04 LAB
GLUCOSE BLDC GLUCOMTR-MCNC: 108 MG/DL (ref 70–130)
GLUCOSE BLDC GLUCOMTR-MCNC: 111 MG/DL (ref 70–130)
GLUCOSE BLDC GLUCOMTR-MCNC: 150 MG/DL (ref 70–130)

## 2021-05-04 PROCEDURE — 82962 GLUCOSE BLOOD TEST: CPT

## 2021-05-04 PROCEDURE — 97530 THERAPEUTIC ACTIVITIES: CPT

## 2021-05-04 PROCEDURE — 93971 EXTREMITY STUDY: CPT | Performed by: RADIOLOGY

## 2021-05-04 PROCEDURE — 97535 SELF CARE MNGMENT TRAINING: CPT

## 2021-05-04 PROCEDURE — 99232 SBSQ HOSP IP/OBS MODERATE 35: CPT | Performed by: FAMILY MEDICINE

## 2021-05-04 PROCEDURE — 93971 EXTREMITY STUDY: CPT

## 2021-05-04 PROCEDURE — 97112 NEUROMUSCULAR REEDUCATION: CPT

## 2021-05-04 RX ADMIN — SODIUM CHLORIDE, PRESERVATIVE FREE 10 ML: 5 INJECTION INTRAVENOUS at 09:55

## 2021-05-04 RX ADMIN — FLUTICASONE PROPIONATE 2 SPRAY: 50 SPRAY, METERED NASAL at 09:48

## 2021-05-04 RX ADMIN — DOCUSATE SODIUM 50 MG AND SENNOSIDES 8.6 MG 2 TABLET: 8.6; 5 TABLET, FILM COATED ORAL at 20:49

## 2021-05-04 RX ADMIN — APIXABAN 10 MG: 5 TABLET, FILM COATED ORAL at 20:49

## 2021-05-04 RX ADMIN — ATORVASTATIN CALCIUM 20 MG: 20 TABLET, FILM COATED ORAL at 09:49

## 2021-05-04 RX ADMIN — SODIUM CHLORIDE, PRESERVATIVE FREE 10 ML: 5 INJECTION INTRAVENOUS at 20:49

## 2021-05-04 RX ADMIN — MONTELUKAST SODIUM 10 MG: 10 TABLET, COATED ORAL at 20:48

## 2021-05-04 RX ADMIN — GABAPENTIN 400 MG: 400 CAPSULE ORAL at 20:59

## 2021-05-04 RX ADMIN — LEVOTHYROXINE SODIUM 50 MCG: 50 TABLET ORAL at 09:49

## 2021-05-04 RX ADMIN — ASPIRIN 81 MG: 81 TABLET, COATED ORAL at 09:49

## 2021-05-04 RX ADMIN — ACETAMINOPHEN 650 MG: 325 TABLET, FILM COATED ORAL at 16:30

## 2021-05-04 RX ADMIN — SUCRALFATE 1 G: 1 TABLET ORAL at 20:48

## 2021-05-04 RX ADMIN — POLYETHYLENE GLYCOL 3350 17 G: 17 POWDER, FOR SOLUTION ORAL at 09:49

## 2021-05-04 RX ADMIN — SUCRALFATE 1 G: 1 TABLET ORAL at 12:34

## 2021-05-04 RX ADMIN — SUCRALFATE 1 G: 1 TABLET ORAL at 16:32

## 2021-05-04 RX ADMIN — APIXABAN 10 MG: 5 TABLET, FILM COATED ORAL at 12:34

## 2021-05-04 RX ADMIN — FAMOTIDINE 20 MG: 20 TABLET ORAL at 12:34

## 2021-05-04 RX ADMIN — SUCRALFATE 1 G: 1 TABLET ORAL at 09:49

## 2021-05-04 RX ADMIN — MEGESTROL ACETATE 200 MG: 40 SUSPENSION ORAL at 09:48

## 2021-05-05 LAB
ANION GAP SERPL CALCULATED.3IONS-SCNC: 12.3 MMOL/L (ref 5–15)
BASOPHILS # BLD AUTO: 0.03 10*3/MM3 (ref 0–0.2)
BASOPHILS NFR BLD AUTO: 0.4 % (ref 0–1.5)
BUN SERPL-MCNC: 10 MG/DL (ref 8–23)
BUN/CREAT SERPL: 23.8 (ref 7–25)
CALCIUM SPEC-SCNC: 9 MG/DL (ref 8.6–10.5)
CHLORIDE SERPL-SCNC: 107 MMOL/L (ref 98–107)
CO2 SERPL-SCNC: 21.7 MMOL/L (ref 22–29)
CREAT SERPL-MCNC: 0.42 MG/DL (ref 0.57–1)
DEPRECATED RDW RBC AUTO: 48.1 FL (ref 37–54)
EOSINOPHIL # BLD AUTO: 0.53 10*3/MM3 (ref 0–0.4)
EOSINOPHIL NFR BLD AUTO: 6.5 % (ref 0.3–6.2)
ERYTHROCYTE [DISTWIDTH] IN BLOOD BY AUTOMATED COUNT: 15.2 % (ref 12.3–15.4)
GFR SERPL CREATININE-BSD FRML MDRD: 147 ML/MIN/1.73
GLUCOSE BLDC GLUCOMTR-MCNC: 112 MG/DL (ref 70–130)
GLUCOSE BLDC GLUCOMTR-MCNC: 115 MG/DL (ref 70–130)
GLUCOSE BLDC GLUCOMTR-MCNC: 120 MG/DL (ref 70–130)
GLUCOSE SERPL-MCNC: 116 MG/DL (ref 65–99)
HCT VFR BLD AUTO: 31.4 % (ref 34–46.6)
HGB BLD-MCNC: 9.9 G/DL (ref 12–15.9)
IMM GRANULOCYTES # BLD AUTO: 0.12 10*3/MM3 (ref 0–0.05)
IMM GRANULOCYTES NFR BLD AUTO: 1.5 % (ref 0–0.5)
LYMPHOCYTES # BLD AUTO: 1.6 10*3/MM3 (ref 0.7–3.1)
LYMPHOCYTES NFR BLD AUTO: 19.5 % (ref 19.6–45.3)
MCH RBC QN AUTO: 27 PG (ref 26.6–33)
MCHC RBC AUTO-ENTMCNC: 31.5 G/DL (ref 31.5–35.7)
MCV RBC AUTO: 85.8 FL (ref 79–97)
MONOCYTES # BLD AUTO: 0.61 10*3/MM3 (ref 0.1–0.9)
MONOCYTES NFR BLD AUTO: 7.4 % (ref 5–12)
NEUTROPHILS NFR BLD AUTO: 5.32 10*3/MM3 (ref 1.7–7)
NEUTROPHILS NFR BLD AUTO: 64.7 % (ref 42.7–76)
NRBC BLD AUTO-RTO: 0 /100 WBC (ref 0–0.2)
PLATELET # BLD AUTO: 228 10*3/MM3 (ref 140–450)
PMV BLD AUTO: 9.5 FL (ref 6–12)
POTASSIUM SERPL-SCNC: 3.8 MMOL/L (ref 3.5–5.2)
RBC # BLD AUTO: 3.66 10*6/MM3 (ref 3.77–5.28)
SODIUM SERPL-SCNC: 141 MMOL/L (ref 136–145)
WBC # BLD AUTO: 8.21 10*3/MM3 (ref 3.4–10.8)

## 2021-05-05 PROCEDURE — 97112 NEUROMUSCULAR REEDUCATION: CPT

## 2021-05-05 PROCEDURE — 82962 GLUCOSE BLOOD TEST: CPT

## 2021-05-05 PROCEDURE — 97535 SELF CARE MNGMENT TRAINING: CPT

## 2021-05-05 PROCEDURE — 94799 UNLISTED PULMONARY SVC/PX: CPT

## 2021-05-05 PROCEDURE — 80048 BASIC METABOLIC PNL TOTAL CA: CPT | Performed by: FAMILY MEDICINE

## 2021-05-05 PROCEDURE — 99232 SBSQ HOSP IP/OBS MODERATE 35: CPT | Performed by: FAMILY MEDICINE

## 2021-05-05 PROCEDURE — 97110 THERAPEUTIC EXERCISES: CPT

## 2021-05-05 PROCEDURE — 97116 GAIT TRAINING THERAPY: CPT

## 2021-05-05 PROCEDURE — 85025 COMPLETE CBC W/AUTO DIFF WBC: CPT | Performed by: FAMILY MEDICINE

## 2021-05-05 RX ADMIN — SODIUM CHLORIDE, PRESERVATIVE FREE 10 ML: 5 INJECTION INTRAVENOUS at 10:02

## 2021-05-05 RX ADMIN — DOCUSATE SODIUM 50 MG AND SENNOSIDES 8.6 MG 2 TABLET: 8.6; 5 TABLET, FILM COATED ORAL at 20:07

## 2021-05-05 RX ADMIN — GABAPENTIN 400 MG: 400 CAPSULE ORAL at 20:13

## 2021-05-05 RX ADMIN — ATORVASTATIN CALCIUM 20 MG: 20 TABLET, FILM COATED ORAL at 09:28

## 2021-05-05 RX ADMIN — SUCRALFATE 1 G: 1 TABLET ORAL at 12:00

## 2021-05-05 RX ADMIN — FLUTICASONE PROPIONATE 2 SPRAY: 50 SPRAY, METERED NASAL at 09:29

## 2021-05-05 RX ADMIN — MONTELUKAST SODIUM 10 MG: 10 TABLET, COATED ORAL at 20:07

## 2021-05-05 RX ADMIN — ASPIRIN 81 MG: 81 TABLET, COATED ORAL at 09:28

## 2021-05-05 RX ADMIN — ACETAMINOPHEN 650 MG: 325 TABLET, FILM COATED ORAL at 15:50

## 2021-05-05 RX ADMIN — MEGESTROL ACETATE 200 MG: 40 SUSPENSION ORAL at 09:29

## 2021-05-05 RX ADMIN — SUCRALFATE 1 G: 1 TABLET ORAL at 20:07

## 2021-05-05 RX ADMIN — APIXABAN 10 MG: 5 TABLET, FILM COATED ORAL at 20:07

## 2021-05-05 RX ADMIN — FAMOTIDINE 20 MG: 20 TABLET ORAL at 09:28

## 2021-05-05 RX ADMIN — LEVOTHYROXINE SODIUM 50 MCG: 50 TABLET ORAL at 09:28

## 2021-05-05 RX ADMIN — SUCRALFATE 1 G: 1 TABLET ORAL at 17:07

## 2021-05-05 RX ADMIN — SUCRALFATE 1 G: 1 TABLET ORAL at 09:28

## 2021-05-05 RX ADMIN — APIXABAN 10 MG: 5 TABLET, FILM COATED ORAL at 09:28

## 2021-05-05 RX ADMIN — HYDROCODONE BITARTRATE AND ACETAMINOPHEN 1 TABLET: 7.5; 325 TABLET ORAL at 09:46

## 2021-05-06 LAB
ANION GAP SERPL CALCULATED.3IONS-SCNC: 9.3 MMOL/L (ref 5–15)
BASOPHILS # BLD AUTO: 0.05 10*3/MM3 (ref 0–0.2)
BASOPHILS NFR BLD AUTO: 0.6 % (ref 0–1.5)
BUN SERPL-MCNC: 13 MG/DL (ref 8–23)
BUN/CREAT SERPL: 33.3 (ref 7–25)
CALCIUM SPEC-SCNC: 9.3 MG/DL (ref 8.6–10.5)
CHLORIDE SERPL-SCNC: 106 MMOL/L (ref 98–107)
CO2 SERPL-SCNC: 23.7 MMOL/L (ref 22–29)
CREAT SERPL-MCNC: 0.39 MG/DL (ref 0.57–1)
DEPRECATED RDW RBC AUTO: 48.2 FL (ref 37–54)
EOSINOPHIL # BLD AUTO: 0.55 10*3/MM3 (ref 0–0.4)
EOSINOPHIL NFR BLD AUTO: 6.3 % (ref 0.3–6.2)
ERYTHROCYTE [DISTWIDTH] IN BLOOD BY AUTOMATED COUNT: 15.3 % (ref 12.3–15.4)
GFR SERPL CREATININE-BSD FRML MDRD: >150 ML/MIN/1.73
GLUCOSE BLDC GLUCOMTR-MCNC: 103 MG/DL (ref 70–130)
GLUCOSE BLDC GLUCOMTR-MCNC: 88 MG/DL (ref 70–130)
GLUCOSE BLDC GLUCOMTR-MCNC: 96 MG/DL (ref 70–130)
GLUCOSE SERPL-MCNC: 102 MG/DL (ref 65–99)
HCT VFR BLD AUTO: 33 % (ref 34–46.6)
HGB BLD-MCNC: 10.3 G/DL (ref 12–15.9)
IMM GRANULOCYTES # BLD AUTO: 0.12 10*3/MM3 (ref 0–0.05)
IMM GRANULOCYTES NFR BLD AUTO: 1.4 % (ref 0–0.5)
LYMPHOCYTES # BLD AUTO: 1.91 10*3/MM3 (ref 0.7–3.1)
LYMPHOCYTES NFR BLD AUTO: 21.8 % (ref 19.6–45.3)
MCH RBC QN AUTO: 26.8 PG (ref 26.6–33)
MCHC RBC AUTO-ENTMCNC: 31.2 G/DL (ref 31.5–35.7)
MCV RBC AUTO: 85.7 FL (ref 79–97)
MONOCYTES # BLD AUTO: 0.6 10*3/MM3 (ref 0.1–0.9)
MONOCYTES NFR BLD AUTO: 6.8 % (ref 5–12)
NEUTROPHILS NFR BLD AUTO: 5.54 10*3/MM3 (ref 1.7–7)
NEUTROPHILS NFR BLD AUTO: 63.1 % (ref 42.7–76)
NRBC BLD AUTO-RTO: 0 /100 WBC (ref 0–0.2)
PLATELET # BLD AUTO: 249 10*3/MM3 (ref 140–450)
PMV BLD AUTO: 9.6 FL (ref 6–12)
POTASSIUM SERPL-SCNC: 3.9 MMOL/L (ref 3.5–5.2)
RBC # BLD AUTO: 3.85 10*6/MM3 (ref 3.77–5.28)
SODIUM SERPL-SCNC: 139 MMOL/L (ref 136–145)
WBC # BLD AUTO: 8.77 10*3/MM3 (ref 3.4–10.8)

## 2021-05-06 PROCEDURE — 85025 COMPLETE CBC W/AUTO DIFF WBC: CPT | Performed by: FAMILY MEDICINE

## 2021-05-06 PROCEDURE — 94799 UNLISTED PULMONARY SVC/PX: CPT

## 2021-05-06 PROCEDURE — 99232 SBSQ HOSP IP/OBS MODERATE 35: CPT | Performed by: FAMILY MEDICINE

## 2021-05-06 PROCEDURE — 97116 GAIT TRAINING THERAPY: CPT

## 2021-05-06 PROCEDURE — 80048 BASIC METABOLIC PNL TOTAL CA: CPT | Performed by: FAMILY MEDICINE

## 2021-05-06 PROCEDURE — 97110 THERAPEUTIC EXERCISES: CPT | Performed by: OCCUPATIONAL THERAPIST

## 2021-05-06 PROCEDURE — 97530 THERAPEUTIC ACTIVITIES: CPT | Performed by: OCCUPATIONAL THERAPIST

## 2021-05-06 PROCEDURE — 97535 SELF CARE MNGMENT TRAINING: CPT | Performed by: OCCUPATIONAL THERAPIST

## 2021-05-06 PROCEDURE — 82962 GLUCOSE BLOOD TEST: CPT

## 2021-05-06 PROCEDURE — 97110 THERAPEUTIC EXERCISES: CPT

## 2021-05-06 RX ORDER — TROLAMINE SALICYLATE 10 G/100G
CREAM TOPICAL 2 TIMES DAILY PRN
Status: DISCONTINUED | OUTPATIENT
Start: 2021-05-06 | End: 2021-05-07 | Stop reason: HOSPADM

## 2021-05-06 RX ADMIN — HYDROCODONE BITARTRATE AND ACETAMINOPHEN 1 TABLET: 7.5; 325 TABLET ORAL at 18:15

## 2021-05-06 RX ADMIN — SUCRALFATE 1 G: 1 TABLET ORAL at 12:06

## 2021-05-06 RX ADMIN — ACETAMINOPHEN 650 MG: 325 TABLET, FILM COATED ORAL at 12:17

## 2021-05-06 RX ADMIN — FLUTICASONE PROPIONATE 2 SPRAY: 50 SPRAY, METERED NASAL at 09:01

## 2021-05-06 RX ADMIN — MEGESTROL ACETATE 200 MG: 40 SUSPENSION ORAL at 09:01

## 2021-05-06 RX ADMIN — MONTELUKAST SODIUM 10 MG: 10 TABLET, COATED ORAL at 20:20

## 2021-05-06 RX ADMIN — GABAPENTIN 400 MG: 400 CAPSULE ORAL at 20:19

## 2021-05-06 RX ADMIN — ACETAMINOPHEN 650 MG: 325 TABLET, FILM COATED ORAL at 21:42

## 2021-05-06 RX ADMIN — LEVOTHYROXINE SODIUM 50 MCG: 50 TABLET ORAL at 09:01

## 2021-05-06 RX ADMIN — FAMOTIDINE 20 MG: 20 TABLET ORAL at 09:01

## 2021-05-06 RX ADMIN — SUCRALFATE 1 G: 1 TABLET ORAL at 20:19

## 2021-05-06 RX ADMIN — APIXABAN 10 MG: 5 TABLET, FILM COATED ORAL at 09:00

## 2021-05-06 RX ADMIN — SODIUM CHLORIDE, PRESERVATIVE FREE 10 ML: 5 INJECTION INTRAVENOUS at 20:20

## 2021-05-06 RX ADMIN — SUCRALFATE 1 G: 1 TABLET ORAL at 16:47

## 2021-05-06 RX ADMIN — APIXABAN 10 MG: 5 TABLET, FILM COATED ORAL at 20:19

## 2021-05-06 RX ADMIN — SUCRALFATE 1 G: 1 TABLET ORAL at 09:01

## 2021-05-06 RX ADMIN — SODIUM CHLORIDE, PRESERVATIVE FREE 10 ML: 5 INJECTION INTRAVENOUS at 09:01

## 2021-05-06 RX ADMIN — ASPIRIN 81 MG: 81 TABLET, COATED ORAL at 09:01

## 2021-05-06 RX ADMIN — ATORVASTATIN CALCIUM 20 MG: 20 TABLET, FILM COATED ORAL at 09:01

## 2021-05-07 ENCOUNTER — APPOINTMENT (OUTPATIENT)
Dept: CT IMAGING | Facility: HOSPITAL | Age: 76
End: 2021-05-07

## 2021-05-07 VITALS
WEIGHT: 189.6 LBS | RESPIRATION RATE: 22 BRPM | DIASTOLIC BLOOD PRESSURE: 84 MMHG | OXYGEN SATURATION: 99 % | TEMPERATURE: 99.6 F | BODY MASS INDEX: 30.47 KG/M2 | SYSTOLIC BLOOD PRESSURE: 143 MMHG | HEART RATE: 118 BPM | HEIGHT: 66 IN

## 2021-05-07 LAB
ANION GAP SERPL CALCULATED.3IONS-SCNC: 12.7 MMOL/L (ref 5–15)
BASOPHILS # BLD AUTO: 0.05 10*3/MM3 (ref 0–0.2)
BASOPHILS NFR BLD AUTO: 0.4 % (ref 0–1.5)
BUN SERPL-MCNC: 13 MG/DL (ref 8–23)
BUN/CREAT SERPL: 32.5 (ref 7–25)
CALCIUM SPEC-SCNC: 9.1 MG/DL (ref 8.6–10.5)
CHLORIDE SERPL-SCNC: 105 MMOL/L (ref 98–107)
CO2 SERPL-SCNC: 20.3 MMOL/L (ref 22–29)
CREAT SERPL-MCNC: 0.4 MG/DL (ref 0.57–1)
CRP SERPL-MCNC: 1.12 MG/DL (ref 0–0.5)
DEPRECATED RDW RBC AUTO: 48.5 FL (ref 37–54)
EOSINOPHIL # BLD AUTO: 0.53 10*3/MM3 (ref 0–0.4)
EOSINOPHIL NFR BLD AUTO: 4 % (ref 0.3–6.2)
ERYTHROCYTE [DISTWIDTH] IN BLOOD BY AUTOMATED COUNT: 15.6 % (ref 12.3–15.4)
GFR SERPL CREATININE-BSD FRML MDRD: >150 ML/MIN/1.73
GLUCOSE BLDC GLUCOMTR-MCNC: 100 MG/DL (ref 70–130)
GLUCOSE BLDC GLUCOMTR-MCNC: 104 MG/DL (ref 70–130)
GLUCOSE SERPL-MCNC: 109 MG/DL (ref 65–99)
HCT VFR BLD AUTO: 32.6 % (ref 34–46.6)
HGB BLD-MCNC: 10.2 G/DL (ref 12–15.9)
IMM GRANULOCYTES # BLD AUTO: 0.1 10*3/MM3 (ref 0–0.05)
IMM GRANULOCYTES NFR BLD AUTO: 0.8 % (ref 0–0.5)
LYMPHOCYTES # BLD AUTO: 2.02 10*3/MM3 (ref 0.7–3.1)
LYMPHOCYTES NFR BLD AUTO: 15.3 % (ref 19.6–45.3)
MCH RBC QN AUTO: 26.7 PG (ref 26.6–33)
MCHC RBC AUTO-ENTMCNC: 31.3 G/DL (ref 31.5–35.7)
MCV RBC AUTO: 85.3 FL (ref 79–97)
MONOCYTES # BLD AUTO: 0.87 10*3/MM3 (ref 0.1–0.9)
MONOCYTES NFR BLD AUTO: 6.6 % (ref 5–12)
NEUTROPHILS NFR BLD AUTO: 72.9 % (ref 42.7–76)
NEUTROPHILS NFR BLD AUTO: 9.65 10*3/MM3 (ref 1.7–7)
NRBC BLD AUTO-RTO: 0 /100 WBC (ref 0–0.2)
PLATELET # BLD AUTO: 266 10*3/MM3 (ref 140–450)
PMV BLD AUTO: 9.2 FL (ref 6–12)
POTASSIUM SERPL-SCNC: 3.8 MMOL/L (ref 3.5–5.2)
RBC # BLD AUTO: 3.82 10*6/MM3 (ref 3.77–5.28)
SODIUM SERPL-SCNC: 138 MMOL/L (ref 136–145)
WBC # BLD AUTO: 13.22 10*3/MM3 (ref 3.4–10.8)

## 2021-05-07 PROCEDURE — 25010000002 METHYLPREDNISOLONE PER 125 MG: Performed by: INTERNAL MEDICINE

## 2021-05-07 PROCEDURE — 70490 CT SOFT TISSUE NECK W/O DYE: CPT | Performed by: RADIOLOGY

## 2021-05-07 PROCEDURE — 86140 C-REACTIVE PROTEIN: CPT | Performed by: INTERNAL MEDICINE

## 2021-05-07 PROCEDURE — 97110 THERAPEUTIC EXERCISES: CPT

## 2021-05-07 PROCEDURE — 82962 GLUCOSE BLOOD TEST: CPT

## 2021-05-07 PROCEDURE — 73200 CT UPPER EXTREMITY W/O DYE: CPT | Performed by: RADIOLOGY

## 2021-05-07 PROCEDURE — 97535 SELF CARE MNGMENT TRAINING: CPT | Performed by: OCCUPATIONAL THERAPIST

## 2021-05-07 PROCEDURE — 73200 CT UPPER EXTREMITY W/O DYE: CPT

## 2021-05-07 PROCEDURE — 80048 BASIC METABOLIC PNL TOTAL CA: CPT | Performed by: FAMILY MEDICINE

## 2021-05-07 PROCEDURE — 85025 COMPLETE CBC W/AUTO DIFF WBC: CPT | Performed by: FAMILY MEDICINE

## 2021-05-07 PROCEDURE — 97530 THERAPEUTIC ACTIVITIES: CPT

## 2021-05-07 PROCEDURE — 70490 CT SOFT TISSUE NECK W/O DYE: CPT

## 2021-05-07 RX ORDER — FAMOTIDINE 20 MG/1
20 TABLET, FILM COATED ORAL 2 TIMES DAILY
Qty: 60 TABLET | Refills: 0 | Status: SHIPPED | OUTPATIENT
Start: 2021-05-07

## 2021-05-07 RX ORDER — SUCRALFATE 1 G/1
1 TABLET ORAL
Qty: 120 TABLET | Refills: 0 | Status: SHIPPED | OUTPATIENT
Start: 2021-05-07

## 2021-05-07 RX ORDER — AMOXICILLIN 250 MG
2 CAPSULE ORAL NIGHTLY
Qty: 20 TABLET | Refills: 0 | Status: SHIPPED | OUTPATIENT
Start: 2021-05-07

## 2021-05-07 RX ORDER — POLYETHYLENE GLYCOL 3350 17 G/17G
17 POWDER, FOR SOLUTION ORAL DAILY
Qty: 10 PACKET | Refills: 0 | Status: SHIPPED | OUTPATIENT
Start: 2021-05-08 | End: 2021-05-18

## 2021-05-07 RX ORDER — METHYLPREDNISOLONE SODIUM SUCCINATE 125 MG/2ML
60 INJECTION, POWDER, LYOPHILIZED, FOR SOLUTION INTRAMUSCULAR; INTRAVENOUS ONCE
Status: COMPLETED | OUTPATIENT
Start: 2021-05-07 | End: 2021-05-07

## 2021-05-07 RX ADMIN — ACETAMINOPHEN 650 MG: 325 TABLET, FILM COATED ORAL at 11:49

## 2021-05-07 RX ADMIN — ASPIRIN 81 MG: 81 TABLET, COATED ORAL at 08:47

## 2021-05-07 RX ADMIN — ACETAMINOPHEN 650 MG: 325 TABLET, FILM COATED ORAL at 03:48

## 2021-05-07 RX ADMIN — GABAPENTIN 400 MG: 400 CAPSULE ORAL at 03:48

## 2021-05-07 RX ADMIN — TROLAMINE SALICYLATE: 10 CREAM TOPICAL at 01:08

## 2021-05-07 RX ADMIN — ATORVASTATIN CALCIUM 20 MG: 20 TABLET, FILM COATED ORAL at 08:47

## 2021-05-07 RX ADMIN — LEVOTHYROXINE SODIUM 50 MCG: 50 TABLET ORAL at 08:47

## 2021-05-07 RX ADMIN — FLUTICASONE PROPIONATE 2 SPRAY: 50 SPRAY, METERED NASAL at 08:46

## 2021-05-07 RX ADMIN — APIXABAN 10 MG: 5 TABLET, FILM COATED ORAL at 08:47

## 2021-05-07 RX ADMIN — FAMOTIDINE 20 MG: 20 TABLET ORAL at 08:47

## 2021-05-07 RX ADMIN — SODIUM CHLORIDE, PRESERVATIVE FREE 10 ML: 5 INJECTION INTRAVENOUS at 08:48

## 2021-05-07 RX ADMIN — MEGESTROL ACETATE 200 MG: 40 SUSPENSION ORAL at 08:46

## 2021-05-07 RX ADMIN — METHYLPREDNISOLONE SODIUM SUCCINATE 60 MG: 125 INJECTION, POWDER, FOR SOLUTION INTRAMUSCULAR; INTRAVENOUS at 13:31

## 2021-05-07 RX ADMIN — SUCRALFATE 1 G: 1 TABLET ORAL at 08:47

## 2021-05-07 RX ADMIN — SUCRALFATE 1 G: 1 TABLET ORAL at 11:48

## 2021-05-07 RX ADMIN — HYDROCODONE BITARTRATE AND ACETAMINOPHEN 1 TABLET: 7.5; 325 TABLET ORAL at 05:15

## 2021-05-10 NOTE — PROGRESS NOTES
Patient Assessment Instrument  Quality Indicators - Discharge    Section GG. Self-Care Performance      Section GG. Mobility Performance      Section J. Health Conditions Discharge      Section M. Skin Conditions Discharge      . Current Number of Unhealed Pressure Ulcers      Section N. Medication    Medication Intervention: N/A - There were no potential clinically significant  medication issues identified since admission or patient is not taking any  medications.    Signed by: Citlalli Clark, Supervisor

## 2021-05-10 NOTE — PROGRESS NOTES
Patient Assessment Instrument  Quality Indicators - Discharge    Section GG. Self-Care Performance     Eating: Aiken sets up or cleans up; patient completes activity. Aiken assists  only prior to or following the activity.   Oral Hygiene: Aiken provides verbal cues and/or touching/steadying and/or  contact guard assistance as patient completes activity.   Toileting Hygiene: : Aiken does more than half the effort. Aiken lifts or  holds trunk or limbs and provides more than half the effort.   Shower/Bathe Self: Aiken does less than half the effort. Aiken lifts, holds  or supports trunk or limbs but provides less than half the effort.   Upper Body Dressing: Aiken does less than half the effort. Aiken lifts, holds  or supports trunk or limbs but provides less than half the effort.   Lower Body Dressing: Aiken does more than half the effort. Aiken lifts or  holds trunk or limbs and provides more than half the effort.   Putting On/Taking Off Footwear: Aiken does all of the effort. Patient does  none of the effort to complete the activity. Or, the assistance of 2 or more  helpers is required for the patient to complete the activity.    Section GG. Mobility Performance      Section J. Health Conditions Discharge      Section M. Skin Conditions Discharge      . Current Number of Unhealed Pressure Ulcers      Section N. Medication    Signed by: Vanesa Hernandez OT

## 2021-05-10 NOTE — PROGRESS NOTES
Occupational Therapy: Individual: 15 minutes.    Physical Therapy:    Speech Language Pathology:    Signed by: Vanesa Hernandez OT

## 2021-05-11 NOTE — PROGRESS NOTES
[facility] Coordinator  Inpatient Rehabilitation Discharge    Mode of Locomotion: Both walking and wheelchair.    Discharge Against Medical Advice:  No.  Discharge Information  Patient Discharged Alive:  Yes  Discharge Destination/Living Setting: Home with Home Health Services  Diagnosis for Interruption/Death:    Impairment Group: 16 Debility (non-cardiac, non-pulmonary)    Comorbidities: Rank Code      Description      1    J96.10    Chronic respiratory failure, unspecified                 whether with hypoxia or hypercapnia  2    D69.6     Thrombocytopenia, unspecified  3    B97.89    Other viral agents as the cause of diseases                 classified elsewhere  4    J44.9     Chronic obstructive pulmonary disease,                 unspecified  5    I10       Essential (primary) hypertension  6    Z87.891   Personal history of nicotine dependence  7    Z79.84    Long term (current) use of oral hypoglycemic                 drugs  8    R53.81    Other malaise  9    E11.40    Type 2 diabetes mellitus with diabetic                 neuropathy, unspecified    Complications:      TINO Bladder Accidents: 0  - Accidents.  Bladder Score = 7. Patient has not had an accident.  TINO Bowel Accident: 0  - Accidents.  Bowel Score = 6. Patient has no accidents, but uses a device/medications. meds    Signed by: Nurse Remigio

## 2021-06-02 ENCOUNTER — OFFICE VISIT (OUTPATIENT)
Dept: PULMONOLOGY | Facility: CLINIC | Age: 76
End: 2021-06-02

## 2021-06-02 VITALS
BODY MASS INDEX: 26.03 KG/M2 | HEART RATE: 101 BPM | WEIGHT: 162 LBS | OXYGEN SATURATION: 99 % | TEMPERATURE: 99.6 F | SYSTOLIC BLOOD PRESSURE: 128 MMHG | DIASTOLIC BLOOD PRESSURE: 82 MMHG | HEIGHT: 66 IN

## 2021-06-02 DIAGNOSIS — R53.81 DEBILITY: ICD-10-CM

## 2021-06-02 DIAGNOSIS — R06.09 DYSPNEA ON EXERTION: ICD-10-CM

## 2021-06-02 DIAGNOSIS — I27.20 PULMONARY HYPERTENSION (HCC): ICD-10-CM

## 2021-06-02 DIAGNOSIS — J96.11 CHRONIC RESPIRATORY FAILURE WITH HYPOXIA (HCC): Primary | ICD-10-CM

## 2021-06-02 PROBLEM — J96.01 ACUTE RESPIRATORY FAILURE WITH HYPOXIA (HCC): Status: RESOLVED | Noted: 2021-04-12 | Resolved: 2021-06-02

## 2021-06-02 PROCEDURE — 99214 OFFICE O/P EST MOD 30 MIN: CPT | Performed by: NURSE PRACTITIONER

## 2021-06-02 NOTE — PROGRESS NOTES
"Chief Complaint  Hospital Follow Up Visit    Subjective          Argenis Lay presents to [facility] PULMONARY AND CRITICAL CARE MEDICINE  History of Present Illness     Ms. Lay is a 76 year old female with a medical history significant for COVID-19 pneumonia, string of ARDS, aspiration pneumonia, diabetes and hypertension.  She recently had a prolonged hospitalization which required tracheostomy and PEG tube placement in January.  She spent several weeks in LTAC as well as inpatient rehab facility.  Her tracheostomy and PEG tube have since been removed.  She is currently on 3 to 4 L/min of supplemental oxygen via nasal cannula.  She is living at home with her daughter.  She tells me that she does not have shortness of breath at rest but does experience some shortness of breath upon exertion.  She is able to walk short distances with her walker but reports that during ambulation she also becomes tachycardic.  She is a former smoker quitting about 14 years ago.  She does not currently use any inhalers or neb treatments.  She states that most of them make her jittery.  She does complain of mucus production that is worse at nighttime.      Objective   Vital Signs:   /82   Pulse 101   Temp 99.6 °F (37.6 °C) (Temporal)   Ht 167.6 cm (66\")   Wt 73.5 kg (162 lb)   SpO2 99%   BMI 26.15 kg/m²     Physical Exam     GENERAL APPEARANCE: Well developed, well nourished, alert and cooperative, and appears to be in no acute distress.    HEAD: normocephalic. Atraumatic.    EYES: PERRL, EOMI. Vision is grossly intact.    THROAT: Oral cavity and pharynx normal. No inflammation, swelling, exudate, or lesions.     NECK: Neck supple.  No thyromegaly.    CARDIAC: Normal S1 and S2. No S3, S4 or murmurs. Rhythm is regular.     RESPIRATORY:Bilateral air entry positive. Bilateral diminished breath sounds. No wheezing, crackles or rhonchi noted.    GI: Positive bowel sounds. Soft, nondistended, nontender. "     MUSCULOSKELETAL: No significant deformity or joint abnormality. No edema. Peripheral pulses intact. No varicosities.    NEUROLOGICAL: Strength and sensation symmetric and intact throughout.     PSYCHIATRIC: The mental examination revealed the patient was oriented to person, place, and time.     Result Review :   The following data was reviewed by: BORIS Briones on 06/02/2021:  Common labs    Common Labsle 5/5/21 5/5/21 5/6/21 5/6/21 5/7/21 5/7/21    0031 0031 0110 0110 0133 0133   Glucose  116 (A)  102 (A)  109 (A)   BUN  10  13  13   Creatinine  0.42 (A)  0.39 (A)  0.40 (A)   eGFR Non African Am  147  >150  >150   Sodium  141  139  138   Potassium  3.8  3.9  3.8   Chloride  107  106  105   Calcium  9.0  9.3  9.1   WBC 8.21  8.77  13.22 (A)    Hemoglobin 9.9 (A)  10.3 (A)  10.2 (A)    Hematocrit 31.4 (A)  33.0 (A)  32.6 (A)    Platelets 228  249  266    (A) Abnormal value            Data reviewed: Recent hospitalization notes , imaging and labs.          Assessment and Plan    Diagnoses and all orders for this visit:    1. Chronic respiratory failure with hypoxia (CMS/HCC) (Primary)    2. Dyspnea on exertion  -     Home Nebulizer    3. Debility    4. Pulmonary hypertension (CMS/HCC)  -     Ambulatory Referral to Cardiology    Other orders  -     ipratropium (ATROVENT) 0.02 % nebulizer solution; Take 2.5 mL by nebulization 4 (Four) Times a Day As Needed for Wheezing or Shortness of Air.  Dispense: 12.5 mL; Refill: 5           Chronic hypoxic Respiratory failure:  -Will start her on Yupelri once daily.  Will complete PFT once the patient is stronger.  -Provided a prescription for nebulizer machine and atrovent nebs every 4 hours as needed.  -Will continue supplemental oxygen at 3-4 Liters/minute.    -history of blood clots while in the hospital: currently on Eliquis BID.      Severe pulmonary hypertension:    Results for orders placed during the hospital encounter of 03/18/21    Adult Transthoracic  Echo Complete W/ Cont if Necessary Per Protocol    Interpretation Summary  · Normal left ventricular cavity size and wall thickness noted. All left ventricular wall segments contract normally.  · Left ventricular ejection fraction appears to be 66 - 70%.  · Left ventricular diastolic function is consistent with (grade I) impaired relaxation.  · There is calcification of the aortic valve mainly affecting the non-coronary and left coronary cusp(s). The aortic valve appears trileaflet. Mild aortic valve regurgitation is present. Mild aortic valve stenosis is present.  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 Mild mitral valve regurgitation is present. No significant mitral valve stenosis is present.  · Moderate tricuspid valve regurgitation is present. Estimated right ventricular systolic pressure from tricuspid regurgitation is markedly elevated (78 mmHg).  · Severe pulmonary hypertension is present.  · There is a trivial pericardial effusion.        Patient's daughter request referral to cardiology because she was told that the patient has CHF while in the hospital.  Referral place.      Debility:    Continue with therapy at home.  Patient's Body mass index is 26.15 kg/m². indicating that she is overweight (BMI 25-29.9). Obesity-related health conditions include the following: none. Obesity is unchanged. BMI is is above average; no BMI management plan is appropriate. We discussed portion control, increasing exercise and good nutrition and adequate amounts of calories for recovery.         Follow Up   Return in about 3 months (around 9/2/2021).  Patient was given instructions and counseling regarding her condition or for health maintenance advice. Please see specific information pulled into the AVS if appropriate.

## 2021-06-15 ENCOUNTER — OFFICE VISIT (OUTPATIENT)
Dept: CARDIOLOGY | Facility: CLINIC | Age: 76
End: 2021-06-15

## 2021-06-15 VITALS
BODY MASS INDEX: 26.84 KG/M2 | WEIGHT: 167 LBS | TEMPERATURE: 97.3 F | DIASTOLIC BLOOD PRESSURE: 82 MMHG | SYSTOLIC BLOOD PRESSURE: 144 MMHG | HEART RATE: 101 BPM | OXYGEN SATURATION: 100 % | HEIGHT: 66 IN

## 2021-06-15 DIAGNOSIS — R00.0 TACHYCARDIA: Primary | ICD-10-CM

## 2021-06-15 DIAGNOSIS — E78.2 MIXED HYPERLIPIDEMIA: ICD-10-CM

## 2021-06-15 DIAGNOSIS — Z79.01 CHRONIC ANTICOAGULATION: ICD-10-CM

## 2021-06-15 DIAGNOSIS — E03.9 HYPOTHYROIDISM (ACQUIRED): ICD-10-CM

## 2021-06-15 DIAGNOSIS — I27.20 PULMONARY HYPERTENSION (HCC): ICD-10-CM

## 2021-06-15 PROBLEM — J12.82 PNEUMONIA DUE TO COVID-19 VIRUS: Status: ACTIVE | Noted: 2021-06-15

## 2021-06-15 PROBLEM — U07.1 PNEUMONIA DUE TO COVID-19 VIRUS: Status: ACTIVE | Noted: 2021-06-15

## 2021-06-15 PROBLEM — I82.402 ACUTE DEEP VEIN THROMBOSIS (DVT) OF LEFT LOWER EXTREMITY (HCC): Status: ACTIVE | Noted: 2021-06-15

## 2021-06-15 PROCEDURE — 99204 OFFICE O/P NEW MOD 45 MIN: CPT | Performed by: INTERNAL MEDICINE

## 2021-06-15 PROCEDURE — 93000 ELECTROCARDIOGRAM COMPLETE: CPT | Performed by: INTERNAL MEDICINE

## 2021-06-15 RX ORDER — ROSUVASTATIN CALCIUM 20 MG/1
20 TABLET, COATED ORAL DAILY
Qty: 90 TABLET | Refills: 1 | Status: SHIPPED | OUTPATIENT
Start: 2021-06-15

## 2021-06-15 NOTE — PROGRESS NOTES
subjective     Chief Complaint   Patient presents with   • Establish Care   • Edema   • pulmonary hypertension     History of Present Illness  Patient is 76 years old very pleasant white female who is here for cardiac evaluation because of tachycardia and pulmonary hypertension    She has a very complicated history.  She had Covid pneumonia, ARDS, aspiration pneumonia with very prolonged hospitalization had to be transferred to [place] where she had PEG placement and tracheostomy.  Patient was then transferred to [facility] and later transferred to [place] for continued care.  From [facility] she had to be transferred to [facility] in [place] for aspiration pneumonia and respiratory failure  After very long course she finally improved.  Currently she is taking 3 L of O2 by nasal cannula.  Recently she developed DVT in the left lower extremity however no embolism.  She is anticoagulated with Eliquis.  She was finally discharged from the hospital on May 7, 2021.  She is following closely with pulmonology service.    Patient is here because of fast heart rate and abnormal echocardiogram showing pulmonary hypertension  She denies any chest pain but complains of being short of breath.    History reviewed. No pertinent surgical history.  History reviewed. No pertinent family history.  Past Medical History:   Diagnosis Date   • COPD (chronic obstructive pulmonary disease) (CMS/HCC)    • Diabetes mellitus (CMS/HCC)    • Hypertension      Patient Active Problem List   Diagnosis   • Debility   • Rhinovirus infection   • Tachycardia   • Hypothyroidism (acquired)   • Pulmonary hypertension (CMS/HCC)   • Mixed hyperlipidemia   • Acute deep vein thrombosis (DVT) of left lower extremity (CMS/HCC)   • Pneumonia due to COVID-19 virus   • Chronic anticoagulation with Eliquis       Social History     Tobacco Use   • Smoking status: Former Smoker   • Smokeless tobacco: Never Used   • Tobacco comment: reported that  she was a light smoker. started in her 20s and quit approximately 13-14 years ago.  pack average unspecified.    Vaping Use   • Vaping Use: Never used   Substance Use Topics   • Alcohol use: Never   • Drug use: Never       Allergies   Allergen Reactions   • Ciprofloxacin Itching   • Contrast Dye Itching       Current Outpatient Medications on File Prior to Visit   Medication Sig   • apixaban (ELIQUIS) 5 MG tablet tablet Take 1 tablet by mouth Every 12 (Twelve) Hours. Indications: DVT/PE (active thrombosis)   • famotidine (PEPCID) 20 MG tablet Take 1 tablet by mouth 2 (Two) Times a Day.   • fluticasone (FLONASE) 50 MCG/ACT nasal spray 2 sprays into the nostril(s) as directed by provider Daily.   • gabapentin (NEURONTIN) 400 MG capsule Take 400 mg by mouth 3 (Three) Times a Day As Needed (Nerve Pain).   • HYDROcodone-acetaminophen (NORCO) 7.5-325 MG per tablet Take 1 tablet by mouth 2 (Two) Times a Day As Needed for Moderate Pain .   • ipratropium (ATROVENT) 0.02 % nebulizer solution Take 2.5 mL by nebulization 4 (Four) Times a Day As Needed for Wheezing or Shortness of Air.   • levothyroxine (SYNTHROID, LEVOTHROID) 50 MCG tablet Take 1 tablet by mouth Daily.   • montelukast (SINGULAIR) 10 MG tablet Take 10 mg by mouth Every Night.   • sennosides-docusate (PERICOLACE) 8.6-50 MG per tablet Take 2 tablets by mouth Every Night.   • simvastatin (ZOCOR) 40 MG tablet Take 40 mg by mouth Every Night.   • sucralfate (CARAFATE) 1 g tablet Take 1 tablet by mouth 4 (Four) Times a Day Before Meals & at Bedtime.   • vitamin D (ERGOCALCIFEROL) 1.25 MG (00276 UT) capsule capsule Take 50,000 Units by mouth 1 (One) Time Per Week.     No current facility-administered medications on file prior to visit.         The following portions of the patient's history were reviewed and updated as appropriate: allergies, current medications, past family history, past medical history, past social history, past surgical history and problem  "list.    Review of Systems   Constitutional: Positive for malaise/fatigue. Negative for fever.   Cardiovascular: Positive for dyspnea on exertion and palpitations. Negative for chest pain, irregular heartbeat, leg swelling, near-syncope, orthopnea, paroxysmal nocturnal dyspnea and syncope.   Respiratory: Positive for shortness of breath.           Objective:     /82   Pulse 101   Temp 97.3 °F (36.3 °C) (Infrared)   Ht 167.6 cm (66\")   Wt 75.8 kg (167 lb)   LMP  (LMP Unknown) Comment: Post menopausal  SpO2 100%   BMI 26.95 kg/m²   Pulmonary:      Effort: Pulmonary effort is normal.      Breath sounds: Normal breath sounds. No stridor. No rhonchi.   Cardiovascular:      PMI at left midclavicular line. Tachycardia present. Regular rhythm. Normal S1. Normal S2.      Murmurs: There is no murmur.      No gallop. No click. No rub.   Pulses:     Intact distal pulses.   Edema:     Peripheral edema present.     Pretibial: 2+ edema of the left pretibial area.     Ankle: 2+ edema of the left ankle.     Feet: 2+ edema of the left foot.          Lab Review  Lab Results   Component Value Date     05/07/2021    K 3.8 05/07/2021     05/07/2021    BUN 13 05/07/2021    CREATININE 0.40 (L) 05/07/2021    GLUCOSE 109 (H) 05/07/2021    CALCIUM 9.1 05/07/2021    ALT 12 04/29/2021    ALKPHOS 73 04/29/2021     No results found for: CKTOTAL  Lab Results   Component Value Date    WBC 13.22 (H) 05/07/2021    HGB 10.2 (L) 05/07/2021    HCT 32.6 (L) 05/07/2021     05/07/2021     Lab Results   Component Value Date    INR 1.15 (H) 04/13/2021    INR 1.08 04/01/2021     Lab Results   Component Value Date    MG 1.6 04/20/2021     No results found for: PSA, TSH  No results found for: BNP  Lab Results   Component Value Date    CHOL 149 01/28/2021    TRIG 162 (H) 01/28/2021    HDL 31 (L) 01/28/2021    VLDL 28 01/28/2021    LDLHDL 2.76 01/28/2021     Lab Results   Component Value Date    LDL 90 01/28/2021         ECG 12 " Lead    Date/Time: 6/15/2021 12:57 PM  Performed by: Leopoldo Jernigan MD  Authorized by: Leopoldo Jernigan MD   Comparison: compared with previous ECG from 4/11/2021  Comparison to previous ECG: Heart rate is slower.  It was 134 now is 102  Rhythm: sinus tachycardia  Ectopy: unifocal PVCs  Rate: tachycardic  BPM: 102  Conduction: left anterior fascicular block  QRS axis: left  Other findings: non-specific ST-T wave changes and left ventricular hypertrophy    Clinical impression: abnormal EKG and myocardial infarction          US VENOUS DOPPLER LOWER EXTREMITY LEFT (DUPLEX)-     REASON FOR EXAM:  left leg edema/soreness     Comparison:Previous ultrasound from 04/13/2021.     FINDINGS: There has been a change in the ultrasound comparing with the  earlier exam. The patient now has echogenic material that is  noncompressible in the left common femoral vein, superficial femoral  vein and popliteal veins. Below the knee, there was no demonstratable  thrombus in the posterior tibial vein.     IMPRESSION:  Interval development of deep venous thrombosis in the left  leg from the common femoral vein to the popliteal vein.     This report was finalized on 5/4/2021 10:33 AM by Dr. Tmi Mir II,     I personally viewed and interpreted the patient's LAB data     Interpretation Summary  Echocardiogram 3/22/2021    · Normal left ventricular cavity size and wall thickness noted. All left ventricular wall segments contract normally.  · Left ventricular ejection fraction appears to be 66 - 70%.  · Left ventricular diastolic function is consistent with (grade I) impaired relaxation.  · There is calcification of the aortic valve mainly affecting the non-coronary and left coronary cusp(s). The aortic valve appears trileaflet. Mild aortic valve regurgitation is present. Mild aortic valve stenosis is present.  · Moderate fibrocalcific disease of the posterior mitral leaflet Moderate mitral annular calcification is  present. Mild mitral valve regurgitation is present. No significant mitral valve stenosis is present.  · Moderate tricuspid valve regurgitation is present. Estimated right ventricular systolic pressure from tricuspid regurgitation is markedly elevated (78 mmHg).  · Severe pulmonary hypertension is present.  · There is a trivial pericardial effusion.                 Assessment:     1. Tachycardia    2. Pulmonary hypertension (CMS/HCC)    3. Mixed hyperlipidemia    4. Hypothyroidism (acquired)    5. Chronic anticoagulation with Eliquis          Plan:     Heart rate and blood pressure both elevated.  Will start patient on Cardizem 30 twice daily.  Apparently she could not tolerate beta-blocker therapy few months ago when it had to be discontinued.    We will repeat echocardiogram to see current LV functions and to follow-up on pericardial effusion.  We will get new readings on pulmonary hypertension also.    She was advised to have thyroid functions done which could be contributing to tachycardia.  Patient has developed new DVT in left leg she was advised to continue Eliquis.  We will also make appointment with Dr. Lowery for evaluation for pulmonary hypertension.  Follow-up scheduled        No follow-ups on file.

## 2021-07-01 ENCOUNTER — HOSPITAL ENCOUNTER (EMERGENCY)
Dept: HOSPITAL 79 - ER1 | Age: 76
Discharge: HOME | End: 2021-07-01
Payer: MEDICARE

## 2021-07-01 DIAGNOSIS — Z87.442: ICD-10-CM

## 2021-07-01 DIAGNOSIS — E11.9: ICD-10-CM

## 2021-07-01 DIAGNOSIS — R10.30: Primary | ICD-10-CM

## 2021-07-01 DIAGNOSIS — I50.9: ICD-10-CM

## 2021-07-01 DIAGNOSIS — Z88.1: ICD-10-CM

## 2021-07-01 DIAGNOSIS — Z87.891: ICD-10-CM

## 2021-07-01 LAB
BUN/CREATININE RATIO: 25 (ref 0–10)
HGB BLD-MCNC: 11.3 GM/DL (ref 12.3–15.3)
RED BLOOD COUNT: 4.06 M/UL (ref 4–5.1)
WHITE BLOOD COUNT: 8.6 K/UL (ref 4.5–11)

## 2021-10-21 ENCOUNTER — HOSPITAL ENCOUNTER (OUTPATIENT)
Dept: HOSPITAL 79 - HEART 5 | Age: 76
End: 2021-10-21
Attending: INTERNAL MEDICINE
Payer: MEDICARE

## 2021-10-21 DIAGNOSIS — I35.2: Primary | ICD-10-CM

## 2021-10-21 DIAGNOSIS — I27.20: ICD-10-CM

## 2021-11-22 ENCOUNTER — HOSPITAL ENCOUNTER (OUTPATIENT)
Dept: HOSPITAL 79 - HEART 5 | Age: 76
End: 2021-11-22
Attending: INTERNAL MEDICINE
Payer: MEDICARE

## 2021-11-22 DIAGNOSIS — E66.9: Primary | ICD-10-CM

## 2022-05-20 ENCOUNTER — HOSPITAL ENCOUNTER (OUTPATIENT)
Dept: HOSPITAL 79 - KOH-I | Age: 77
End: 2022-05-20
Attending: INTERNAL MEDICINE
Payer: MEDICARE

## 2022-05-20 DIAGNOSIS — J84.10: Primary | ICD-10-CM

## 2022-07-28 ENCOUNTER — HOSPITAL ENCOUNTER (OUTPATIENT)
Dept: HOSPITAL 79 - RT | Age: 77
End: 2022-07-28
Attending: INTERNAL MEDICINE
Payer: MEDICARE

## 2022-07-28 DIAGNOSIS — R09.02: Primary | ICD-10-CM
